# Patient Record
Sex: MALE | Race: WHITE | NOT HISPANIC OR LATINO | Employment: OTHER | URBAN - METROPOLITAN AREA
[De-identification: names, ages, dates, MRNs, and addresses within clinical notes are randomized per-mention and may not be internally consistent; named-entity substitution may affect disease eponyms.]

---

## 2017-01-16 ENCOUNTER — GENERIC CONVERSION - ENCOUNTER (OUTPATIENT)
Dept: OTHER | Facility: OTHER | Age: 67
End: 2017-01-16

## 2017-05-15 ENCOUNTER — GENERIC CONVERSION - ENCOUNTER (OUTPATIENT)
Dept: OTHER | Facility: OTHER | Age: 67
End: 2017-05-15

## 2017-05-22 ENCOUNTER — LAB REQUISITION (OUTPATIENT)
Dept: LAB | Facility: HOSPITAL | Age: 67
End: 2017-05-22
Payer: MEDICARE

## 2017-05-22 ENCOUNTER — ALLSCRIPTS OFFICE VISIT (OUTPATIENT)
Dept: OTHER | Facility: OTHER | Age: 67
End: 2017-05-22

## 2017-05-22 DIAGNOSIS — M10.9 GOUT: ICD-10-CM

## 2017-05-22 DIAGNOSIS — Z11.59 ENCOUNTER FOR SCREENING FOR OTHER VIRAL DISEASES: ICD-10-CM

## 2017-05-22 DIAGNOSIS — D72.829 ELEVATED WHITE BLOOD CELL COUNT: ICD-10-CM

## 2017-05-22 DIAGNOSIS — E11.9 TYPE 2 DIABETES MELLITUS WITHOUT COMPLICATIONS (HCC): ICD-10-CM

## 2017-05-22 DIAGNOSIS — I10 ESSENTIAL (PRIMARY) HYPERTENSION: ICD-10-CM

## 2017-05-22 DIAGNOSIS — R35.0 FREQUENCY OF MICTURITION: ICD-10-CM

## 2017-05-22 LAB
BACTERIA UR QL AUTO: ABNORMAL /HPF
BILIRUB UR QL STRIP: NEGATIVE
BILIRUB UR QL STRIP: NORMAL
CLARITY UR: ABNORMAL
CLARITY UR: NORMAL
COLOR UR: YELLOW
COLOR UR: YELLOW
GLUCOSE (HISTORICAL): NORMAL
GLUCOSE UR STRIP-MCNC: NEGATIVE MG/DL
HGB UR QL STRIP.AUTO: ABNORMAL
HGB UR QL STRIP.AUTO: NORMAL
HYALINE CASTS #/AREA URNS LPF: ABNORMAL /LPF
KETONES UR STRIP-MCNC: NEGATIVE MG/DL
KETONES UR STRIP-MCNC: NORMAL MG/DL
LEUKOCYTE ESTERASE UR QL STRIP: ABNORMAL
LEUKOCYTE ESTERASE UR QL STRIP: NORMAL
NITRITE UR QL STRIP: NEGATIVE
NITRITE UR QL STRIP: NORMAL
NON-SQ EPI CELLS URNS QL MICRO: ABNORMAL /HPF
PH UR STRIP.AUTO: 7 [PH]
PH UR STRIP.AUTO: 7 [PH] (ref 4.5–8)
PROT UR STRIP-MCNC: ABNORMAL MG/DL
PROT UR STRIP-MCNC: NORMAL MG/DL
RBC #/AREA URNS AUTO: ABNORMAL /HPF
SP GR UR STRIP.AUTO: 1
SP GR UR STRIP.AUTO: 1.01 (ref 1–1.03)
UROBILINOGEN UR QL STRIP.AUTO: 0.2
UROBILINOGEN UR QL STRIP.AUTO: 1 E.U./DL
WBC #/AREA URNS AUTO: ABNORMAL /HPF

## 2017-05-22 PROCEDURE — 81001 URINALYSIS AUTO W/SCOPE: CPT | Performed by: FAMILY MEDICINE

## 2017-05-22 PROCEDURE — 87086 URINE CULTURE/COLONY COUNT: CPT | Performed by: FAMILY MEDICINE

## 2017-05-23 ENCOUNTER — GENERIC CONVERSION - ENCOUNTER (OUTPATIENT)
Dept: OTHER | Facility: OTHER | Age: 67
End: 2017-05-23

## 2017-05-23 LAB — BACTERIA UR CULT: NORMAL

## 2017-05-24 ENCOUNTER — GENERIC CONVERSION - ENCOUNTER (OUTPATIENT)
Dept: OTHER | Facility: OTHER | Age: 67
End: 2017-05-24

## 2017-05-26 ENCOUNTER — GENERIC CONVERSION - ENCOUNTER (OUTPATIENT)
Dept: OTHER | Facility: OTHER | Age: 67
End: 2017-05-26

## 2017-06-02 ENCOUNTER — GENERIC CONVERSION - ENCOUNTER (OUTPATIENT)
Dept: OTHER | Facility: OTHER | Age: 67
End: 2017-06-02

## 2017-07-17 ENCOUNTER — GENERIC CONVERSION - ENCOUNTER (OUTPATIENT)
Dept: OTHER | Facility: OTHER | Age: 67
End: 2017-07-17

## 2017-07-25 ENCOUNTER — ALLSCRIPTS OFFICE VISIT (OUTPATIENT)
Dept: OTHER | Facility: OTHER | Age: 67
End: 2017-07-25

## 2017-07-25 DIAGNOSIS — R60.0 LOCALIZED EDEMA: ICD-10-CM

## 2017-07-25 DIAGNOSIS — D72.829 ELEVATED WHITE BLOOD CELL COUNT: ICD-10-CM

## 2017-07-25 DIAGNOSIS — I10 ESSENTIAL (PRIMARY) HYPERTENSION: ICD-10-CM

## 2017-07-25 DIAGNOSIS — M10.9 GOUT: ICD-10-CM

## 2017-08-04 ENCOUNTER — GENERIC CONVERSION - ENCOUNTER (OUTPATIENT)
Dept: OTHER | Facility: OTHER | Age: 67
End: 2017-08-04

## 2017-08-08 ENCOUNTER — HOSPITAL ENCOUNTER (OUTPATIENT)
Dept: NON INVASIVE DIAGNOSTICS | Facility: CLINIC | Age: 67
Discharge: HOME | End: 2017-08-08
Payer: MEDICARE

## 2017-08-08 DIAGNOSIS — R60.0 LOCALIZED EDEMA: ICD-10-CM

## 2017-08-08 PROCEDURE — 93306 TTE W/DOPPLER COMPLETE: CPT

## 2017-08-09 ENCOUNTER — GENERIC CONVERSION - ENCOUNTER (OUTPATIENT)
Dept: OTHER | Facility: OTHER | Age: 67
End: 2017-08-09

## 2017-08-29 ENCOUNTER — ALLSCRIPTS OFFICE VISIT (OUTPATIENT)
Dept: OTHER | Facility: OTHER | Age: 67
End: 2017-08-29

## 2017-09-15 ENCOUNTER — TRANSCRIBE ORDERS (OUTPATIENT)
Dept: SLEEP CENTER | Facility: CLINIC | Age: 67
End: 2017-09-15

## 2017-09-15 ENCOUNTER — HOSPITAL ENCOUNTER (OUTPATIENT)
Dept: SLEEP CENTER | Facility: CLINIC | Age: 67
Discharge: HOME | End: 2017-09-15
Payer: MEDICARE

## 2017-09-15 DIAGNOSIS — G47.33 OSA (OBSTRUCTIVE SLEEP APNEA): ICD-10-CM

## 2017-09-15 DIAGNOSIS — G47.33 OSA (OBSTRUCTIVE SLEEP APNEA): Primary | ICD-10-CM

## 2017-09-25 ENCOUNTER — GENERIC CONVERSION - ENCOUNTER (OUTPATIENT)
Dept: OTHER | Facility: OTHER | Age: 67
End: 2017-09-25

## 2017-11-27 ENCOUNTER — GENERIC CONVERSION - ENCOUNTER (OUTPATIENT)
Dept: FAMILY MEDICINE CLINIC | Facility: CLINIC | Age: 67
End: 2017-11-27

## 2017-11-27 ENCOUNTER — GENERIC CONVERSION - ENCOUNTER (OUTPATIENT)
Dept: OTHER | Facility: OTHER | Age: 67
End: 2017-11-27

## 2017-11-28 ENCOUNTER — GENERIC CONVERSION - ENCOUNTER (OUTPATIENT)
Dept: OTHER | Facility: OTHER | Age: 67
End: 2017-11-28

## 2017-12-08 ENCOUNTER — ALLSCRIPTS OFFICE VISIT (OUTPATIENT)
Dept: OTHER | Facility: OTHER | Age: 67
End: 2017-12-08

## 2017-12-09 NOTE — PROGRESS NOTES
Assessment    1  Diabetes mellitus (250 00) (E11 9)   · well controlled for years w remote A1C 7 2   2  Hypertension (401 9) (I10)   3  Leukocytosis (288 60) (D72 829)   4  Morbid or severe obesity due to excess calories (278 01) (E66 01)   5  Hyperlipidemia (272 4) (E78 5)   6  Acid reflux disease (530 81) (K21 9)   7  Depression with anxiety (300 4) (F41 8)   8  Gout (274 9) (M10 9)    Plan  Acid reflux disease    · Omeprazole 40 MG Oral Capsule Delayed Release; Take 1 capsule by mouth every day  Depression with anxiety    · FLUoxetine HCl - 40 MG Oral Capsule; Take 2 capsules daily  Diabetes mellitus    · MetFORMIN HCl - 500 MG Oral Tablet; Take 1 tablet by mouth  daily with a meal  Flu vaccine need    · Fluzone High-Dose 0 5 ML Intramuscular Suspension Prefilled Syringe  Gout    · Allopurinol 300 MG Oral Tablet; Take 1 tablet by mouth  daily   · (1) URIC ACID; Status:Active; Requested for:08Apr2018; Health Maintenance    · Furosemide 20 MG Oral Tablet; Take 1 tablet by mouth  daily Rx written by PA: Mady Finney  Hyperlipidemia    · Pravastatin Sodium 10 MG Oral Tablet; Take 1 tablet by mouth  daily at bedtime  Hypertension    · AmLODIPine Besylate 10 MG Oral Tablet; TAKE 1 TABLET DAILY   · HydroCHLOROthiazide 12 5 MG Oral Capsule; TAKE 1 CAPSULE BY MOUTHTWO TIMES DAILY   · (1) COMPREHENSIVE METABOLIC PANEL; Status:Active; Requested for:08Apr2018;   Leukocytosis    · (1) CBC/PLT/DIFF; Status:Active; Requested for:08Apr2018;   Need for pneumococcal vaccine    · Pneumovax 23 25 MCG/0 5ML Injection Injectable    Discussion/Summary    Patient presents today for follow-up for his chronic health issues  Blood pressure remains poorly controlled and I bumped up his amlodipine to 10 mg daily in the morning  He does remain on Bystolic which we may need to change at some point as it is quite expensive for him  I would also hold off on bumping this up as his pulse is typically on the lower side  has history of type 2 diabetes which remains quite controlled  Repeat labs in about 4 months  Hyperlipidemia remains relatively well controlled with low-dose pravastatin  Gout has been very well controlled in recent uric acid level was quite low  does continue to have some active depression  He did not increase his Prozac to 2 tablets daily until just recently  We will continue to monitor this  He continues to consider seeing a talk therapist which I think would be an excellent idea  He has been having some intermittent gas pains and I would suggest taking his omeprazole as he is doing and a Pepcid 20 mg around mid day to help prevent this bloating  He will be seeing GI  I am also going to have him see Dr Alin Ahumada again to make sure this is not coming from a cardiac source  Chief Complaint  6M Follow up - DM, HTNshot given today      Advance Directives  Advance Directive [facility]:  YES - Patient has an advance health care directive  The patient has a living will located  in patient's home--   Pt Instructed to bring in copy of living will at next appt  History of Present Illness  Patient presents today for follow-up for his chronic health issues  He has a history of hypertension  He denies any current problems chest pain, shortness of breath, palpitations or lightheadedness  has history of depression which continues to bother him  He notes he has had a lifelong issue with this  He has not increased his Prozac until just a few days ago and he would like to see how that works  He is bothered by intermittent anxiety throughout the day  has history of type 2 diabetes which is well controlled low-dose metformin  Patient presents today for follow-up for GERD  There has been no breakthrough heartburn or dysphagia  Medications are being tolerated without side effects    hyperlipidemia is well controlled with pravastatin at a low dose      Review of Systems   Constitutional: recent 2 lb weight loss, but-- no fever,-- no recent weight gain-- and-- no chills  Cardiovascular: the heart rate was not slow,-- no chest pain,-- the heart rate was not fast-- and-- no palpitations  Respiratory: shortness of breath during exertion, but-- no shortness of breath,-- no cough-- and-- no wheezing  Gastrointestinal: no abdominal pain,-- no nausea,-- no vomiting,-- no constipation-- and-- no diarrhea  Genitourinary: no dysuria  Musculoskeletal: no arthralgias-- and-- no joint swelling  Integumentary: no rashes  Neurological: no headache  Psychiatric: no anxiety-- and-- no depression  Endocrine: erectile dysfunction-- and-- chronic low T  Hematologic/Lymphatic: no tendency for easy bleeding  Active Problems  1  Acid reflux disease (530 81) (K21 9)   2  Benign colon polyp (211 3) (K63 5)   3  Depression with anxiety (300 4) (F41 8)   4  Diabetes mellitus (250 00) (E11 9)   5  History of Edema (782 3) (R60 9)   6  Edema of both legs (782 3) (R60 0)   7  Encounter for screening colonoscopy (V76 51) (Z12 11)   8  Exercise counseling (V65 41) (Z71 82)   9  Fatigue (780 79) (R53 83)   10  Flu vaccine need (V04 81) (Z23)   11  Gout (274 9) (M10 9)   12  Hyperlipidemia (272 4) (E78 5)   13  Hypertension (401 9) (I10)   14  Hypokalemia (276 8) (E87 6)   15  Leukocytosis (288 60) (D72 829)   16  Microscopic hematuria (599 72) (R31 29)   17  Morbid or severe obesity due to excess calories (278 01) (E66 01)   18  History of Need for hepatitis C screening test (V73 89) (Z11 59)   19  Peripheral neuropathy (356 9) (G62 9)   20  Sleep apnea (780 57) (G47 30)   21  Testicular hypogonadism (257 2) (E29 1)   22  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)    Past Medical History  1  History of Atypical chest pain (786 59) (R07 89)   2  History of Edema (782 3) (R60 9)   3  History of shortness of breath (V13 89) (Z87 898)   4  History of shortness of breath (V13 89) (Z87 898)   5  History of Hyponatremia (276 1) (E87 1)   6   History of Liver function abnormality (573 9) (K76 89)   7  History of Need for hepatitis C screening test (V73 89) (Z11 59)    The active problems and past medical history were reviewed and updated today  Surgical History  1  History of Cardiac Cath Procedure Outcome:   2  History of Complete Colonoscopy   3  History of Hernia Repair   4  History of Knee Replacement   5  History of Thyroglossal Duct Cyst Excision   6  History of Tonsillectomy   7  History of Type Of Stress Test    The surgical history was reviewed and updated today  Family History  Mother    1  Family history of Diabetes Mellitus (V18 0)   2  Family history of myocardial infarction (V17 3) (Z82 49)   3  Family history of Hypertension (V17 49)  Sister    4  Family history of myocardial infarction (V17 3) (Z82 49)    The family history was reviewed and updated today  Social History     · Being A Social Drinker   · Disability   ·    · Never smoker   · Occupation:  The social history was reviewed and updated today  Current Meds   1  Allopurinol 300 MG Oral Tablet; Take 1 tablet by mouth  daily; Therapy: 88XGG7589 to (Evaluate:03Mar2018)  Requested for: 74Mmn3974; Last Rx:86Aba7541 Ordered   2  AmLODIPine Besylate 2 5 MG Oral Tablet; Take 1 tablet by mouth two  times daily; Therapy: 42QEN2164 to (Evaluate:10Mar2018)  Requested for: 61Igg1352; Last Rx:26Vgz3731 Ordered   3  Aspirin 81 MG TABS; TAKE 1 TABLET DAILY; Therapy: 90BWX3858 to (Evaluate:26Kph5367) Recorded   4  Bystolic 10 MG Oral Tablet; Take 1 tablet daily; Therapy: 99HRR9878 to (Evaluate:04Nov2018); Last Rx:09Nov2017 Ordered   5  FLUoxetine HCl - 40 MG Oral Capsule; Take 2 capsules daily; Therapy: 40MDG6552 to (Evaluate:89Qrc7039)  Requested for: 50Xte1745; Last Rx:00Suz6785 Ordered   6  Furosemide 20 MG Oral Tablet; Take 1 tablet by mouth  daily Rx written by PA: Kayy Peña; Therapy: 94Qvo3732 to (Aileen Lacy)  Requested for: 37LBT7604; Last Rx:53Nkx0280 Ordered   7   HydroCHLOROthiazide 12 5 MG Oral Capsule; TAKE 1 CAPSULE BY MOUTH TWO TIMES DAILY; Therapy: 47Gcs0978 to (Evaluate:10Mar2018)  Requested for: 85Son6342; Last Rx:92Cfi2080 Ordered   8  Losartan Potassium 50 MG Oral Tablet; Take 1 tablet by mouth  twice a day; Therapy: 81WIY8553 to (Evaluate:10Mar2018)  Requested for: 16Mic4009; Last Rx:15Etj7474 Ordered   9  MetFORMIN HCl - 500 MG Oral Tablet; Take 1 tablet by mouth  daily with a meal; Therapy: 01ICN8725 to (Evaluate:03Mar2018)  Requested for: 49TTZ6971; Last Rx:81Wnz9782 Ordered   10  Omeprazole 40 MG Oral Capsule Delayed Release; Take 1 capsule by mouth  every day; Therapy: 81GNC9712 to (Evaluate:03Mar2018)  Requested for: 21Mpz5803; Last  Rx:89Irk9687 Ordered   11  Pravastatin Sodium 10 MG Oral Tablet; Take 1 tablet by mouth  daily at bedtime; Therapy: 22DDU8819 to (Evaluate:10Mar2018)  Requested for: 79Ntc7166; Last  Rx:24Psd7294 Ordered    The medication list was reviewed and updated today  Allergies  1  Cartia XT CP24   2  Benazepril HCl TABS   3  Clonidine and derivs   4  Entex T TABS    Vitals  Vital Signs    Recorded: 83UIX3464 09:19AM Recorded: 68NQP8244 08:54AM   Heart Rate  56   Respiration  18   Systolic 872 469   Diastolic 72 90   Height  5 ft 8 in   Weight  326 lb    BMI Calculated  49 57   BSA Calculated  2 52       Physical Exam   Constitutional  General appearance: Abnormal   morbidly obese  Eyes  Conjunctiva and lids: No swelling, erythema, or discharge  Pupils and irises: Equal, round and reactive to light  Ears, Nose, Mouth, and Throat  Oropharynx: Normal with no erythema, edema, exudate or lesions  Pulmonary  Respiratory effort: No increased work of breathing or signs of respiratory distress  Auscultation of lungs: Clear to auscultation, equal breath sounds bilaterally, no wheezes, no rales, no rhonci  Cardiovascular  Auscultation of heart: Normal rate and rhythm, normal S1 and S2, without murmurs     Examination of extremities for edema and/or varicosities: Normal    Carotid pulses: Normal    Abdomen  Abdomen: Abnormal   The abdomen was obese  Bowel sounds were normal   Lymphatic  Palpation of lymph nodes in neck: No lymphadenopathy  Musculoskeletal  Gait and station: Normal    Skin  Skin and subcutaneous tissue: Normal without rashes or lesions  Neurologic  Cranial nerves: Cranial nerves 2-12 intact  Psychiatric  Orientation to person, place and time: Normal    Mood and affect: Normal          Results/Data  PHQ-9 Adult Depression Screening 24FIA6056 09:34AM User, Ahs     Test Name Result Flag Reference   PHQ-9 Adult Depression Score 20       Over the last two weeks, how often have you been bothered by any of the following problems? Little interest or pleasure in doing things: Nearly every day - 3 Feeling down, depressed, or hopeless: More than half the days - 2 Trouble falling or staying asleep, or sleeping too much: More than half the days - 2 Feeling tired or having little energy: Nearly every day - 3 Poor appetite or over eating: Nearly every day - 3 Feeling bad about yourself - or that you are a failure or have let yourself or your family down: More than half the days - 2 Trouble concentrating on things, such as reading the newspaper or watching television: Nearly every day - 3 Moving or speaking so slowly that other people could have noticed  Or the opposite -  being so fidgety or restless that you have been moving around a lot more than usual: More than half the days - 2 Thoughts that you would be better off dead, or of hurting yourself in some way: Not at all - 0   PHQ-9 Adult Depression Screening Positive     PHQ-9 Difficulty Level Somewhat difficult     PHQ-9 Severity Severe Depression         Health Management  Diabetes mellitus   *VB - Eye Exam; every 1 year; Last 78IJN5537; Next Due: 05BRS9044; Active  Encounter for screening colonoscopy   COLONOSCOPY; every 5 years; Last 53SOS8103; Next Due: 44IJG6821;  Active  Hyperglycemia   *VB - Foot Exam; every 1 year; Last 02PZQ4513; Next Due: 56ZBM4582; Active    Future Appointments    Date/Time Provider Specialty Site   04/20/2018 01:00 PM GLEN Quiles   Family Medicine [facility] [address]       Signatures   Electronically signed by : GLEN Quinones ; Dec  8 2017  1:07PM EST                       (Author)

## 2018-01-11 DIAGNOSIS — D72.829 ELEVATED WHITE BLOOD CELL COUNT: ICD-10-CM

## 2018-01-11 NOTE — RESULT NOTES
Verified Results  ECHO COMPLETE WITH CONTRAST IF INDICATED 93Eqv7909 09:40AM Lucinda Champion Order Number: [id number]    - Patient Instructions: To schedule this appointment, please contact Central Scheduling at [phone]  Test Name Result Flag Reference   ECHO COMPLETE WITH CONTRAST IF INDICATED (Report)     SONIYA RAGLAND [facility]   [address]  [place], [address]   [phone]     Transthoracic Echocardiogram   2D, M-mode, Doppler, and Color Doppler     Study date: 08-Aug-2017     Patient: Mariya Ramírez   MR number: [id number]   Account number: [de-identified]   : 1950   Age: 69 years   Gender: Male   Status: Outpatient   Location: [address] [place] [address] [facility] [place]   Height: 68 in   Weight: 314 lb   BP: 148/ 80 mmHg     Indications: Edema  Hypertension     Diagnoses: R60 9 - Edema, unspecified     Sonographer: MAYI Hdz   Primary Physician: Billy Zelaya MD   Referring Physician: Billy Zelaya MD   Group: [facility] [address] [facility]   Interpreting Physician: Estephania Weaver MD     SUMMARY     LEFT VENTRICLE:   Size was at the upper limits of normal    Systolic function was normal  Ejection fraction was estimated to be 60 %  There were no regional wall motion abnormalities  Wall thickness was mildly to moderately increased  Features were consistent with a pseudonormal left ventricular filling pattern, with concomitant abnormal relaxation and increased filling pressure (grade 2 diastolic dysfunction)  LEFT ATRIUM:   The atrium was mildly dilated  AORTA:   The root exhibited mild dilatation  HISTORY: PRIOR HISTORY: Risk factors: hypertension, oral hypoglycemic-treated diabetes, medication-treated hypercholesterolemia, and morbid obesity  PROCEDURE: The study was performed in the [address] [place] [address]  This was a routine study  The transthoracic approach was used   The study included complete 2D imaging, M-mode, complete spectral Doppler, and color Doppler  The   heart rate was 60 bpm, at the start of the study  Intravenous contrast (  8ml of Definity) was administered  Echocardiographic views were limited due to poor acoustic window availability, decreased penetration, and lung interference  This   was a technically difficult study  LEFT VENTRICLE: Size was at the upper limits of normal  Systolic function was normal  Ejection fraction was estimated to be 60 %  There were no regional wall motion abnormalities  Wall thickness was mildly to moderately increased  DOPPLER:   The ratio of early ventricular filling to atrial contraction velocities was within the normal range  The deceleration time of the early transmitral flow velocity was normal  Features were consistent with a pseudonormal left ventricular   filling pattern, with concomitant abnormal relaxation and increased filling pressure (grade 2 diastolic dysfunction)  RIGHT VENTRICLE: The size was normal  Systolic function was normal  Wall thickness was normal      LEFT ATRIUM: The atrium was mildly dilated  RIGHT ATRIUM: Size was at the upper limits of normal      MITRAL VALVE: Valve structure was normal  There was normal leaflet separation  DOPPLER: The transmitral velocity was within the normal range  There was no evidence for stenosis  There was no regurgitation  AORTIC VALVE: The valve was trileaflet  Leaflets exhibited normal thickness and normal cuspal separation  DOPPLER: Transaortic velocity was within the normal range  There was no evidence for stenosis  There was no regurgitation  TRICUSPID VALVE: The valve structure was normal  There was normal leaflet separation  DOPPLER: The transtricuspid velocity was within the normal range  There was no evidence for stenosis  There was no regurgitation  PERICARDIUM: There was no pericardial effusion  The pericardium was normal in appearance       AORTA: The root exhibited mild dilatation  SYSTEMIC VEINS: IVC: The inferior vena cava was not well visualized       SYSTEM MEASUREMENT TABLES     2D   Ao Diam: 4 1 cm   IVSd: 1 2 cm   LA Diam: 5 1 cm   LVIDd: 5 5 cm   LVIDs: 3 4 cm   LVPWd: 1 3 cm     PW   MV A [place]: 0 7 m/s   MV Dec [place]: 4 7 m/s2   MV DecT: 188 4 ms   MV E [place]: 0 9 m/s   MV E/A Ratio: 1 3     [employer] Accredited Echocardiography Laboratory     Prepared and electronically signed by     Heather Ahuja MD   Signed 08-Aug-2017 13:19:19

## 2018-01-13 VITALS
RESPIRATION RATE: 18 BRPM | SYSTOLIC BLOOD PRESSURE: 146 MMHG | HEIGHT: 68 IN | WEIGHT: 315 LBS | HEART RATE: 50 BPM | BODY MASS INDEX: 47.74 KG/M2 | DIASTOLIC BLOOD PRESSURE: 80 MMHG

## 2018-01-13 VITALS
DIASTOLIC BLOOD PRESSURE: 88 MMHG | HEART RATE: 60 BPM | RESPIRATION RATE: 18 BRPM | BODY MASS INDEX: 47.74 KG/M2 | HEIGHT: 68 IN | WEIGHT: 315 LBS | SYSTOLIC BLOOD PRESSURE: 170 MMHG

## 2018-01-13 VITALS
HEIGHT: 68 IN | HEART RATE: 60 BPM | RESPIRATION RATE: 18 BRPM | WEIGHT: 314 LBS | BODY MASS INDEX: 47.59 KG/M2 | SYSTOLIC BLOOD PRESSURE: 148 MMHG | DIASTOLIC BLOOD PRESSURE: 80 MMHG

## 2018-01-17 NOTE — MISCELLANEOUS
Message   Recorded as Task   Date: 06/02/2017 11:01 AM, Created By: Jose Rafael Lezama   Task Name: Medical Complaint Callback   Assigned To: Jose Rafael Lezama   Regarding Patient: Merlin Fenton, Status: Active   Comment:    Jose Rafael Lezama - 02 Jun 2017 11:01 AM     TASK CREATED  Caller: Self; Medical Complaint; [phone] (Home)  Pt called back and is still suffering with frequency of urination but not the burning and he is questioning if he needs more antibiotic? May l/m on answering machine  Juan Jane Jr - 02 Jun 2017 5:00 PM     TASK REPLIED TO: Previously Assigned To Prashanth Oliver to refill antibiotic   Yovana Farias - 02 Jun 2017 5:15 PM     TASK EDITED  Pt notified and rx called to pharmacy  Active Problems    1  Benign colon polyp (211 3) (K63 5)   2  Depression with anxiety (300 4) (F41 8)   3  Diabetes mellitus (250 00) (E11 9)   4  History of Edema (782 3) (R60 9)   5  Encounter for screening colonoscopy (V76 51) (Z12 11)   6  Esophageal reflux (530 81) (K21 9)   7  Fatigue (780 79) (R53 83)   8  Flu vaccine need (V04 81) (Z23)   9  Gout (274 9) (M10 9)   10  Hyperlipidemia (272 4) (E78 5)   11  Hypertension (401 9) (I10)   12  Hypokalemia (276 8) (E87 6)   13  Leukocytosis (288 60) (D72 829)   14  Microscopic hematuria (599 72) (R31 29)   15  Morbid or severe obesity due to excess calories (278 01) (E66 01)   16  Need for hepatitis C screening test (V73 89) (Z11 59)   17  Peripheral neuropathy (356 9) (G62 9)   18  Sleep apnea (780 57) (G47 30)   19  Testicular hypogonadism (257 2) (E29 1)   20  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)   21  UTI (urinary tract infection) (599 0) (N39 0)    Current Meds   1  Allopurinol 300 MG Oral Tablet; Take 1 tablet by mouth  daily; Therapy: 24OXB4407 to (Carmelina Pimentel)  Requested for: 82CYW2011; Last   Rx:13Oct2016 Ordered   2  AmLODIPine Besylate 2 5 MG Oral Tablet;  Take 1 tablet by mouth two  times daily; Therapy: 47XAW0309 to (Evaluate:10Aug2017)  Requested for: 16HAO1449; Last   Rx:12May2017 Ordered   3  Aspirin 81 MG TABS; TAKE 1 TABLET DAILY; Therapy: 61OWY7929 to (Evaluate:26Mew3347) Recorded   4  Bystolic 10 MG Oral Tablet; Take 1 tablet by mouth  daily; Therapy: 55XJN5840 to (Evaluate:81Qax7435)  Requested for: 95CMC6006; Last   Rx:13Oct2016 Ordered   5  Ciprofloxacin HCl - 500 MG Oral Tablet; TAKE 1 TABLET EVERY 12 HOURS DAILY; Therapy: 16BPF4462 to (Evaluate:29May2017)  Requested for: 60TTM3730; Last   Rx:22May2017 Ordered   6  FLUoxetine HCl - 40 MG Oral Capsule; Take 1 capsule by mouth  daily; Therapy: 54GYX5455 to (Evaluate:08Oct2017)  Requested for: 35CZU8800; Last   Rx:13Oct2016 Ordered   7  Furosemide 20 MG Oral Tablet; uses once a day very rarely if increased ankle edema; Therapy: 27Uch1084 to (Evaluate:03Mar2017)  Requested for: 33DTR7671; Last   Rx:03Nov2016 Ordered   8  HydroCHLOROthiazide 12 5 MG Oral Capsule; TAKE 1 CAPSULE BY MOUTH TWO   TIMES DAILY; Therapy: 21Izj5284 to (Evaluate:10Aug2017)  Requested for: 84BIK4617; Last   Rx:12May2017 Ordered   9  Losartan Potassium 50 MG Oral Tablet; Take 1 tablet by mouth  twice a day; Therapy: 00FLX8585 to (Evaluate:10Aug2017)  Requested for: 10IWE8412; Last   Rx:12May2017 Ordered   10  MetFORMIN HCl - 500 MG Oral Tablet; Take 1 tablet by mouth  daily with a meal;    Therapy: 72GLQ7103 to [phone])  Requested for: 90EAC3552; Last    Rx:13Oct2016 Ordered   11  Omeprazole 40 MG Oral Capsule Delayed Release; Take 1 capsule by mouth  every    day; Therapy: 66ZFX3203 to [phone])  Requested for: 94BZH3496; Last    Rx:13Oct2016 Ordered   12  Pravastatin Sodium 10 MG Oral Tablet; Take 1 tablet by mouth  daily at bedtime; Therapy: 53HDF4077 to (Evaluate:10Aug2017)  Requested for: 49IPQ1429; Last    Rx:19Vji4305 Ordered   13   Zostavax 09507 UNT/0 65ML Subcutaneous Solution Reconstituted (Zostavax 15958 UNT/0 65ML Subcutaneous Solution Reconstituted); get shot x 1; Therapy: 37QQP9938 to (Evaluate:04Nov2016); Last Rx:03Nov2016 Ordered    Allergies    1  Cartia XT CP24   2  Benazepril HCl TABS   3  Clonidine and derivs   4   Entex T TABS    Plan  UTI (urinary tract infection)    · Ciprofloxacin HCl - 500 MG Oral Tablet (Cipro); TAKE 1 TABLET EVERY 12  HOURS DAILY    Signatures   Electronically signed by : Lois Victoria, ; Jun 2 2017  5:15PM EST                       (Author)

## 2018-01-18 NOTE — RESULT NOTES
Verified Results  (1) URINE MICROSCOPIC 38AVK0051 10:05AM Segundo Woodruff Order Number: [id number]     Test Name Result Flag Reference   CLINICAL REPORT (Report)     Test:        Urine culture  Specimen Source:  Urine, Unspecified Source  Specimen Type:   Urine  Specimen Date:   5/22/2017 10:05 AM  Result Date:    5/23/2017 1:04 PM  Result Status:   Final result  Resulting Lab:   [place] [address]            Tel: [phone]      CULTURE                                       ------------------                                   <10,000 cfu/ml Gram Negative Jd

## 2018-01-23 VITALS
SYSTOLIC BLOOD PRESSURE: 150 MMHG | HEART RATE: 56 BPM | RESPIRATION RATE: 18 BRPM | HEIGHT: 68 IN | WEIGHT: 315 LBS | BODY MASS INDEX: 47.74 KG/M2 | DIASTOLIC BLOOD PRESSURE: 72 MMHG

## 2018-01-28 DIAGNOSIS — K21.9 GASTROESOPHAGEAL REFLUX DISEASE WITHOUT ESOPHAGITIS: ICD-10-CM

## 2018-01-28 DIAGNOSIS — E11.9 CONTROLLED TYPE 2 DIABETES MELLITUS WITHOUT COMPLICATION, WITHOUT LONG-TERM CURRENT USE OF INSULIN (HCC): Primary | ICD-10-CM

## 2018-01-28 PROBLEM — R60.0 EDEMA OF BOTH LEGS: Status: ACTIVE | Noted: 2017-07-25

## 2018-01-28 RX ORDER — NEBIVOLOL 10 MG/1
1 TABLET ORAL DAILY
COMMUNITY
Start: 2017-11-09 | End: 2018-02-01 | Stop reason: SDUPTHER

## 2018-01-28 RX ORDER — HYDROCHLOROTHIAZIDE 12.5 MG/1
1 CAPSULE, GELATIN COATED ORAL 2 TIMES DAILY
COMMUNITY
Start: 2014-08-26 | End: 2018-06-20 | Stop reason: SDUPTHER

## 2018-01-28 RX ORDER — OMEPRAZOLE 40 MG/1
CAPSULE, DELAYED RELEASE ORAL
Qty: 90 CAPSULE | Refills: 3 | Status: SHIPPED | OUTPATIENT
Start: 2018-01-28 | End: 2021-01-19

## 2018-01-28 RX ORDER — OMEPRAZOLE 40 MG/1
1 CAPSULE, DELAYED RELEASE ORAL DAILY
COMMUNITY
Start: 2014-06-02 | End: 2018-04-05 | Stop reason: SDUPTHER

## 2018-01-28 RX ORDER — AMLODIPINE BESYLATE 10 MG/1
1 TABLET ORAL DAILY
COMMUNITY
Start: 2014-08-04 | End: 2018-06-20 | Stop reason: SDUPTHER

## 2018-01-28 RX ORDER — PRAVASTATIN SODIUM 10 MG
1 TABLET ORAL
COMMUNITY
Start: 2013-04-03 | End: 2018-12-24 | Stop reason: SDUPTHER

## 2018-01-28 RX ORDER — FUROSEMIDE 20 MG/1
TABLET ORAL
COMMUNITY
Start: 2012-02-27 | End: 2018-11-03 | Stop reason: HOSPADM

## 2018-01-28 RX ORDER — ALLOPURINOL 300 MG/1
1 TABLET ORAL DAILY
COMMUNITY
Start: 2012-03-22 | End: 2018-12-24 | Stop reason: SDUPTHER

## 2018-01-28 RX ORDER — FLUOXETINE HYDROCHLORIDE 40 MG/1
1 CAPSULE ORAL DAILY
COMMUNITY
Start: 2014-06-12 | End: 2018-12-24 | Stop reason: SDUPTHER

## 2018-01-28 RX ORDER — LOSARTAN POTASSIUM 50 MG/1
1 TABLET ORAL 2 TIMES DAILY
COMMUNITY
Start: 2014-07-07 | End: 2018-02-07 | Stop reason: SDUPTHER

## 2018-02-01 DIAGNOSIS — I10 ESSENTIAL HYPERTENSION: Primary | ICD-10-CM

## 2018-02-01 RX ORDER — NEBIVOLOL 10 MG/1
10 TABLET ORAL DAILY
Qty: 90 TABLET | Refills: 3 | Status: SHIPPED | OUTPATIENT
Start: 2018-02-01 | End: 2019-03-18 | Stop reason: SDUPTHER

## 2018-02-07 DIAGNOSIS — I10 ESSENTIAL HYPERTENSION: Primary | ICD-10-CM

## 2018-02-07 RX ORDER — LOSARTAN POTASSIUM 50 MG/1
50 TABLET ORAL 2 TIMES DAILY
Qty: 180 TABLET | Refills: 3 | Status: SHIPPED | OUTPATIENT
Start: 2018-02-07 | End: 2019-03-18 | Stop reason: SDUPTHER

## 2018-04-01 DIAGNOSIS — E11.9 CONTROLLED TYPE 2 DIABETES MELLITUS WITHOUT COMPLICATION, WITHOUT LONG-TERM CURRENT USE OF INSULIN (HCC): ICD-10-CM

## 2018-04-01 DIAGNOSIS — K21.9 GASTROESOPHAGEAL REFLUX DISEASE WITHOUT ESOPHAGITIS: ICD-10-CM

## 2018-04-01 RX ORDER — OMEPRAZOLE 40 MG/1
CAPSULE, DELAYED RELEASE ORAL
Qty: 90 CAPSULE | OUTPATIENT
Start: 2018-04-01

## 2018-04-05 RX ORDER — OMEPRAZOLE 40 MG/1
40 CAPSULE, DELAYED RELEASE ORAL DAILY
Qty: 90 CAPSULE | Refills: 3 | Status: SHIPPED | OUTPATIENT
Start: 2018-04-05 | End: 2018-06-20 | Stop reason: SDUPTHER

## 2018-04-08 DIAGNOSIS — I10 ESSENTIAL (PRIMARY) HYPERTENSION: ICD-10-CM

## 2018-04-08 DIAGNOSIS — M10.9 GOUT: ICD-10-CM

## 2018-04-08 DIAGNOSIS — D72.829 ELEVATED WHITE BLOOD CELL COUNT: ICD-10-CM

## 2018-06-19 LAB
HEPATITIS C ANTIBODY (HISTORICAL): NORMAL
HEPATITIS C ANTIBODY CONFIRMATION (HISTORICAL): NORMAL

## 2018-06-19 RX ORDER — AMOXICILLIN 500 MG/1
CAPSULE ORAL
COMMUNITY
Start: 2018-06-13 | End: 2018-06-20 | Stop reason: ALTCHOICE

## 2018-06-20 ENCOUNTER — OFFICE VISIT (OUTPATIENT)
Dept: FAMILY MEDICINE CLINIC | Facility: CLINIC | Age: 68
End: 2018-06-20
Payer: MEDICARE

## 2018-06-20 VITALS
WEIGHT: 315 LBS | DIASTOLIC BLOOD PRESSURE: 86 MMHG | OXYGEN SATURATION: 96 % | HEIGHT: 68 IN | BODY MASS INDEX: 47.74 KG/M2 | HEART RATE: 60 BPM | RESPIRATION RATE: 18 BRPM | SYSTOLIC BLOOD PRESSURE: 150 MMHG

## 2018-06-20 DIAGNOSIS — G47.33 OBSTRUCTIVE SLEEP APNEA SYNDROME: ICD-10-CM

## 2018-06-20 DIAGNOSIS — R60.0 EDEMA OF BOTH LEGS: ICD-10-CM

## 2018-06-20 DIAGNOSIS — Z23 NEED FOR ZOSTER VACCINE: ICD-10-CM

## 2018-06-20 DIAGNOSIS — Z00.00 MEDICARE ANNUAL WELLNESS VISIT, SUBSEQUENT: Primary | ICD-10-CM

## 2018-06-20 DIAGNOSIS — D72.829 LEUKOCYTOSIS, UNSPECIFIED TYPE: ICD-10-CM

## 2018-06-20 DIAGNOSIS — N40.1 BENIGN PROSTATIC HYPERPLASIA WITH URINARY FREQUENCY: ICD-10-CM

## 2018-06-20 DIAGNOSIS — E11.9 TYPE 2 DIABETES MELLITUS WITHOUT COMPLICATION, WITHOUT LONG-TERM CURRENT USE OF INSULIN (HCC): ICD-10-CM

## 2018-06-20 DIAGNOSIS — Z12.5 PROSTATE CANCER SCREENING: ICD-10-CM

## 2018-06-20 DIAGNOSIS — E66.01 MORBID OBESITY (HCC): ICD-10-CM

## 2018-06-20 DIAGNOSIS — R35.0 BENIGN PROSTATIC HYPERPLASIA WITH URINARY FREQUENCY: ICD-10-CM

## 2018-06-20 DIAGNOSIS — K21.9 GASTROESOPHAGEAL REFLUX DISEASE WITHOUT ESOPHAGITIS: ICD-10-CM

## 2018-06-20 DIAGNOSIS — I10 ESSENTIAL HYPERTENSION: ICD-10-CM

## 2018-06-20 DIAGNOSIS — F41.8 DEPRESSION WITH ANXIETY: ICD-10-CM

## 2018-06-20 LAB — SL AMB POCT HEMOGLOBIN AIC: NORMAL

## 2018-06-20 PROCEDURE — G0439 PPPS, SUBSEQ VISIT: HCPCS | Performed by: FAMILY MEDICINE

## 2018-06-20 PROCEDURE — 83036 HEMOGLOBIN GLYCOSYLATED A1C: CPT | Performed by: FAMILY MEDICINE

## 2018-06-20 PROCEDURE — 99214 OFFICE O/P EST MOD 30 MIN: CPT | Performed by: FAMILY MEDICINE

## 2018-06-20 RX ORDER — HYDROCHLOROTHIAZIDE 12.5 MG/1
25 CAPSULE, GELATIN COATED ORAL 2 TIMES DAILY
Qty: 90 CAPSULE | Refills: 3 | Status: SHIPPED | OUTPATIENT
Start: 2018-06-20 | End: 2018-08-20 | Stop reason: SDUPTHER

## 2018-06-20 RX ORDER — AMLODIPINE BESYLATE 5 MG/1
5 TABLET ORAL DAILY
Qty: 90 TABLET | Refills: 3 | Status: SHIPPED | OUTPATIENT
Start: 2018-06-20 | End: 2019-07-16 | Stop reason: SDUPTHER

## 2018-06-20 NOTE — ASSESSMENT & PLAN NOTE
Patient notes his depression is somewhat active but thinks he is doing a bit better  He would like to continue with his fluoxetine which she has only been taking 1 tablet daily  He agrees to contact me if things are getting any worse

## 2018-06-20 NOTE — ASSESSMENT & PLAN NOTE
Home blood pressures remained stable  I am going to adjust his amlodipine to 5 mg daily in light of some persistent peripheral edema  I increased his hydrochlorothiazide to 25 mg daily, but he will take the entire dose 1st thing in the morning as he is having some significant nocturia  His nocturia may certainly be related to his prostate and if he is not improving, we may consider giving him something to help open up the prostate and improve his urinary flow

## 2018-06-20 NOTE — PROGRESS NOTES
Assessment/Plan:    Depression with anxiety  Patient notes his depression is somewhat active but thinks he is doing a bit better  He would like to continue with his fluoxetine which she has only been taking 1 tablet daily  He agrees to contact me if things are getting any worse  Benign prostatic hyperplasia with urinary frequency  Continue to monitor  Essential hypertension  Home blood pressures remained stable  I am going to adjust his amlodipine to 5 mg daily in light of some persistent peripheral edema  I increased his hydrochlorothiazide to 25 mg daily, but he will take the entire dose 1st thing in the morning as he is having some significant nocturia  His nocturia may certainly be related to his prostate and if he is not improving, we may consider giving him something to help open up the prostate and improve his urinary flow  Type 2 diabetes mellitus without complication, without long-term current use of insulin ([facility])  Lab Results   Component Value Date    HGBA1C 6 1 (H) 06/18/2013       No results for input(s): POCGLU in the last 72 hours  Check A1c prior to follow-up  Sleep apnea  Continue on CPAP    Morbid obesity ([facility] [address] )  Continue to work on weight loss  Gastroesophageal reflux disease without esophagitis  He is doing well off of omeprazole and he has had some decrease in chronic bloating  He may utilizing omeprazole just as needed  Edema of both legs  Monitor  Hopefully this will improve with lower dosing of amlodipine    Leukocytosis  Continue to monitor CBC  This has been stable  Diagnoses and all orders for this visit:    Medicare annual wellness visit, subsequent    Need for zoster vaccine  -     Zoster Vac Recomb Adjuvanted ([facility] [address]) 50 MCG SUSR;  Inject 0 5 mL into the shoulder, thigh, or buttocks once for 1 dose    Type 2 diabetes mellitus without complication, without long-term current use of insulin ([facility])  -     POCT hemoglobin A1c  -     Microalbumin / creatinine urine ratio; Future  -     Hemoglobin A1C; Future    Morbid obesity (HCC)    Depression with anxiety    Gastroesophageal reflux disease without esophagitis    Benign prostatic hyperplasia with urinary frequency    Obstructive sleep apnea syndrome    Essential hypertension  -     amLODIPine (NORVASC) 5 mg tablet; Take 1 tablet (5 mg total) by mouth daily  -     hydrochlorothiazide (MICROZIDE) 12 5 mg capsule; Take 2 capsules (25 mg total) by mouth 2 (two) times a day  -     Comprehensive metabolic panel; Future    Edema of both legs    Leukocytosis, unspecified type  -     CBC and differential; Future    Prostate cancer screening  -     PSA, Total Screen; Future    Other orders  -     Discontinue: amoxicillin (AMOXIL) 500 mg capsule;   -     HEPATITIS C ANTIBODY (HISTORICAL)  -     Hm Colonoscopy          Subjective:      Patient ID: Sheri Feldman  is a 77 y o  male  HPI  The patient presents today for follow-up for diabetes  Fortunately, the A1c is well controlled  There have been no significant episodes of hypo or hyperglycemia  The patient is not experiencing any blurry vision, polyuria, polydipsia, or peripheral neuropathy symptoms  Patient remains compliant with medications and routine follow-up  The patient presents today for a hypertension follow-up  Patient remains compliant with medications and denies any chest pain, shortness of breath, palpitations, lightheadedness or diaphoresis  He does have some persistent lower extremity swelling which he thinks is from the increased dose of amlodipine  He also has some significant nocturia which he thinks is coming from taking his hydrochlorothiazide twice a day  He has reflux and was having bloating with omeprazole also he has now decreased that to just as needed  He has been having some intermittent heartburn especially when he eats spicy meal   He continues to be compliant with CPAP and denies excessive fatigue at this time    He is morbidly obese and has had trouble consistently losing weight  The following portions of the patient's history were reviewed and updated as appropriate: allergies, current medications, past family history, past medical history, past social history, past surgical history and problem list     Review of Systems   Constitutional: Negative for appetite change, chills, fatigue, fever and unexpected weight change  HENT: Negative for trouble swallowing  Eyes: Negative for visual disturbance  Respiratory: Negative for cough, chest tightness, shortness of breath and wheezing  Cardiovascular: Negative for chest pain  Gastrointestinal: Negative for abdominal distention, abdominal pain, blood in stool, constipation and diarrhea  Endocrine: Negative for polyuria  Genitourinary: Negative for difficulty urinating and flank pain  Musculoskeletal: Negative for arthralgias and myalgias  Skin: Negative for rash  Neurological: Negative for dizziness and light-headedness  Hematological: Negative for adenopathy  Does not bruise/bleed easily  Psychiatric/Behavioral: Positive for dysphoric mood  Negative for sleep disturbance  The patient is not nervous/anxious  Objective:      /86   Pulse 60   Resp 18   Ht 5' 8" (1 727 m)   Wt (!) 146 kg (322 lb)   SpO2 96%   BMI 48 96 kg/m²          Physical Exam   Constitutional: He is oriented to person, place, and time  He appears well-developed and well-nourished  No distress  HENT:   Head: Normocephalic  Eyes: Pupils are equal, round, and reactive to light  Neck: No tracheal deviation present  No thyromegaly present  Cardiovascular: Normal rate, regular rhythm and normal heart sounds  No murmur heard  Pulmonary/Chest: Effort normal  No respiratory distress  He has no wheezes  He has no rales  Abdominal: Soft  He exhibits no distension  There is no tenderness  Musculoskeletal: Normal range of motion     Neurological: He is alert and oriented to person, place, and time  No cranial nerve deficit  Skin: Skin is warm  He is not diaphoretic  No erythema  Psychiatric: He has a normal mood and affect   Judgment and thought content normal

## 2018-06-20 NOTE — ASSESSMENT & PLAN NOTE
He is doing well off of omeprazole and he has had some decrease in chronic bloating  He may utilizing omeprazole just as needed

## 2018-06-20 NOTE — PATIENT INSTRUCTIONS
Depression with anxiety  Patient notes his depression is somewhat active but thinks he is doing a bit better  He would like to continue with his fluoxetine which she has only been taking 1 tablet daily  He agrees to contact me if things are getting any worse  Benign prostatic hyperplasia with urinary frequency  Continue to monitor  Essential hypertension  I am going to adjust his amlodipine to 5 mg daily in light of some persistent peripheral edema  I increased his hydrochlorothiazide to 25 mg daily, but he will take the entire dose 1st thing in the morning as he is having some significant nocturia  His nocturia may certainly be related to his prostate and if he is not improving, we may consider giving him something to help open up the prostate and improve his urinary flow  Type 2 diabetes mellitus without complication, without long-term current use of insulin ([facility])  Lab Results   Component Value Date    HGBA1C 6 1 (H) 06/18/2013       No results for input(s): POCGLU in the last 72 hours  Check A1c prior to follow-up  Sleep apnea  Continue on CPAP    Morbid obesity ([facility] [address] )  Continue to work on weight loss  Gastroesophageal reflux disease without esophagitis  He is doing well off of omeprazole and he has had some decrease in chronic bloating  He may utilizing omeprazole just as needed  Edema of both legs  Monitor    Hopefully this will improve with lower dosing of amlodipine

## 2018-06-20 NOTE — ASSESSMENT & PLAN NOTE
Lab Results   Component Value Date    HGBA1C 6 1 (H) 06/18/2013       No results for input(s): POCGLU in the last 72 hours  Check A1c prior to follow-up

## 2018-06-20 NOTE — PROGRESS NOTES
Assessment and Plan:    Problem List Items Addressed This Visit     Gastroesophageal reflux disease without esophagitis    Depression with anxiety     Patient notes his depression is somewhat active but thinks he is doing a bit better  He would like to continue with his fluoxetine which she has only been taking 1 tablet daily  He agrees to contact me if things are getting any worse  Type 2 diabetes mellitus without complication, without long-term current use of insulin ([facility] [address] )    Relevant Orders    POCT hemoglobin A1c    Morbid obesity ([facility] [address] )      Other Visit Diagnoses     Medicare annual wellness visit, subsequent    -  Primary    Need for zoster vaccine        Relevant Medications    Zoster Vac Recomb Adjuvanted ([address]) [address] [facility] Maintenance Due   Topic Date Due    Depression Screening PHQ-9  1950    URINE MICROALBUMIN  02/06/1958    HEMOGLOBIN A1C  12/18/2013    GLAUCOMA SCREENING 67+ YR  02/06/2015    DXA SCAN  10/04/2015     Patient presents today for Medicare wellness visit  Overall, he does seem to be doing pretty well  He is having some active issues with depression which is chronic issue for him  He remains on Prozac at 40 mg daily  He had taken up to 2 tablets daily but would prefer to stay on 40 mg  He feels he is stable and improving and is certainly not suicidal   He would like to hold off on any changes in that department  He has absolute no trouble with his activities of daily life  He is upset with himself with his ongoing difficulty losing weight  Ice had no falls, appears to be safe at home and has had no difficulty with cognitive impairment  He does not have a living will and he was given a copy of 5 wishes today  He is up-to-date on colonoscopy  Prostate exam revealed a mildly enlarged prostate today  He is having relatively significant BPH symptoms  PSA will be checked prior to follow-up      HPI:  Tian Mayers  is a 77 y o  male here for his Subsequent Wellness Visit      Patient Active Problem List   Diagnosis    Gastroesophageal reflux disease without esophagitis    Benign colon polyp    Depression with anxiety    Type 2 diabetes mellitus without complication, without long-term current use of insulin (HCC)    Edema of both legs    Fatigue    Gout    Hyperlipidemia    Hypertension    Hypokalemia    Leukocytosis    Microscopic hematuria    Morbid obesity ([address] )    Peripheral neuropathy    Sleep apnea    Testicular hypogonadism    Thyroglossal duct cyst     Past Medical History:   Diagnosis Date    Hyponatremia     last assessed: 09/08/2014    Liver function abnormality      Past Surgical History:   Procedure Laterality Date    CARDIAC CATHETERIZATION      outcome: Neg    CARDIOVASCULAR STRESS TEST      resolved: 10/27/2010; negative    COLONOSCOPY  11/2013    polyp; complete    HERNIA REPAIR      resolved: 11/1999    REPLACEMENT TOTAL KNEE Left 2010    REPLACEMENT TOTAL KNEE Right 2003    THYROGLOSSAL DUCT EXCISION      TONSILLECTOMY      last assessed: 06/02/2014     Family History   Problem Relation Age of Onset    Diabetes Mother     Heart attack Mother     Hypertension Mother     Heart attack Sister      History   Smoking Status    Never Smoker   Smokeless Tobacco    Never Used     History   Alcohol Use    Yes     Comment: Social: 5-6 on the weekends      History   Drug use: Unknown       Current Outpatient Prescriptions   Medication Sig Dispense Refill    allopurinol (ZYLOPRIM) 300 mg tablet Take 1 tablet by mouth daily      amLODIPine (NORVASC) 10 mg tablet Take 1 tablet by mouth daily      aspirin 81 MG tablet Take 1 tablet by mouth daily      FLUoxetine (PROzac) 40 MG capsule Take 2 capsules by mouth daily      furosemide (LASIX) 20 mg tablet Take by mouth      hydrochlorothiazide (MICROZIDE) 12 5 mg capsule Take 1 capsule by mouth 2 (two) times a day      losartan (COZAAR) 50 mg tablet Take 1 tablet (50 mg total) by mouth 2 (two) times a day 180 tablet 3    metFORMIN (GLUCOPHAGE) 500 mg tablet Take 1 tablet (500 mg total) by mouth daily with breakfast 90 tablet 3    nebivolol (BYSTOLIC) 10 mg tablet Take 1 tablet (10 mg total) by mouth daily 90 tablet 3    omeprazole (PriLOSEC) 40 MG capsule TAKE 1 CAPSULE BY MOUTH  EVERY DAY 90 capsule 3    pravastatin (PRAVACHOL) 10 mg tablet Take 1 tablet by mouth      Zoster Vac Recomb Adjuvanted (SHINGRIX) 50 MCG SUSR Inject 0 5 mL into the shoulder, thigh, or buttocks once for 1 dose 1 each 0     No current facility-administered medications for this visit        Allergies   Allergen Reactions    Benazepril Cough    Clonidine Fatigue    Diltiazem Bradycardia    Entex T  [Pseudoephedrine-Guaifenesin]      Annotation - [id number]: LEG SWELLING     Immunization History   Administered Date(s) Administered    Influenza Split High Dose Preservative Free IM 09/30/2013, 12/01/2014, 11/02/2015, 11/03/2016, 12/08/2017    Influenza TIV (IM) 12/14/2005, 11/26/2007    Pneumococcal Conjugate 13-Valent 12/01/2014    Pneumococcal Polysaccharide PPV23 11/26/2007, 12/08/2017    Tdap 06/05/2012       Patient Care Team:  Audi Johnson MD as PCP - MD Audi De La Paz MD Jiles Fanning, MD      Medicare Screening Tests and Risk Assessments:  AWV Clinical     ISAR:   Previous hospitalizations?:  No       Once in a Lifetime Medicare Screening:       Medicare Screening Tests and Risk Assessment:   AAA Risk Assessment    Osteoporosis Risk Assessment    HIV Risk Assessment        Drug and Alcohol Use:   Tobacco use    Cigarettes:  never smoker    Tobacco use duration    Tobacco Cessation Readiness    Alcohol use    Alcohol use:  occasional use    Alcohol Treatment Readiness   Illicit Drug Use    Drug use:  never        Diet & Exercise:   Diet   What is your diet?:  Regular   How many servings a day of the following:   Exercise    Do you currently exercise?:  yes    Frequency:  occasional   Times per week:  3     Type of exercise:  walking       Cognitive Impairment Screening:   Cognitive Impairment Screening    Do you have difficulty learning or retaining new information?:  No Do you have difficulty handling new tasks?:  No   Do you have difficulty with reasoning?:  No Do you have difficulty with spatial ability and orientation?:  No   Do you have difficulty with language?:  No Do you have difficulty with behavior?:  Yes       Functional Ability/Level of Safety:   Hearing    Hearing difficulties:  Yes Bilateral:  slightly decreased   Left:  slightly decreased Right:  slightly decreased   Hearing aid:  No    Hearing Impairment Assessment    Current Activities    Help needed with the folllowing:    Using the phone:  No Transportation:  No   Shopping:  No Preparing Meals:  No   Doing Housework:  No Doing Laundry:  No   Managing Medications:  No Managing Money:  No   ADL    Fall Risk   Have you fallen in the last 12 months?:  No    Injury History       Home Safety:   Home Safety Risk Factors   Unfamilar with surroundings:  No Uneven floors:  No   Stairs or handrail saftey risk:  Yes Loose rugs:  No   Household clutter:  No Poor household lighting:  No   No grab bars in bathroom:  Yes Further evaluation needed:  No       Advanced Directives:   Advanced Directives    Living Will:  No Durable POA for healthcare:  No   Advanced directive:  No    Patient's End of Life Decisions    Reviewed with patient:  Yes        Urinary Incontinence:   Do you have urinary incontinence?:  No        Glaucoma:            Provider Screening     Preventative Screening/Counseling:   Cardiovascular Screening/Counseling:   (Labs Q5 years, EKG optional one-time)   General:  Screening Current           Diabetes Screening/Counseling:   (2 tests/year if Pre-Diabetes or 1 test/year if no Diabetes)   General:  Screening Current           Colorectal Cancer Screening/Counseling:   (FOBT Q1 yr; Flex Sig Q4 yrs or Q10 yrs after Screening Colonoscopy; Screening Colonoscpy Q2 yrs High Risk or Q10 yrs Low Risk; Barium Enema Q2 yrs High Risk or Q4 yrs Low Risk)   General:  Screening Current           Prostate Cancer Screening/Counseling:   (Annual)     Due for: Labs:  PSA         Breast Cancer Screening/Counseling:   (Baseline Age 28 - 43; Annual Age 36+)         Cervical Cancer Screening/Counseling:   (Annual for High Risk or Childbearing Age with Abnormal Pap in Last 3 yrs; Every 2 all others)         Osteoporosis Screening/Counseling:   (Every 2 Yrs if at risk or more if medically necessary)   General:  Screening Not Indicated           AAA Screening/Counseling:   (Once per Lifetime with risk factors)    General:  Screening Not Indicated           Glaucoma Screening/Counseling:   (Annual)   General:  Screening Current          HIV Screening/Counseling:   (Voluntary; Once annually for high risk OR 3 times for Pregnancy at diagnosis of IUP; 3rd trimester; and at Labor         Hepatitis C Screening:             Immunizations:        Other Preventative Couseling (Non-Medicare Wellness Visit Required):       Referrals (Non-Medicare Wellness Visit Required):       Medical Equipment/Suppliers:

## 2018-06-28 DIAGNOSIS — G47.33 OSA (OBSTRUCTIVE SLEEP APNEA): Primary | ICD-10-CM

## 2018-08-20 DIAGNOSIS — I10 ESSENTIAL HYPERTENSION: ICD-10-CM

## 2018-08-20 RX ORDER — HYDROCHLOROTHIAZIDE 12.5 MG/1
CAPSULE, GELATIN COATED ORAL
Qty: 360 CAPSULE | Refills: 3 | Status: SHIPPED | OUTPATIENT
Start: 2018-08-20 | End: 2018-08-23 | Stop reason: SDUPTHER

## 2018-08-23 DIAGNOSIS — I10 ESSENTIAL HYPERTENSION: ICD-10-CM

## 2018-08-23 RX ORDER — HYDROCHLOROTHIAZIDE 12.5 MG/1
CAPSULE, GELATIN COATED ORAL
Qty: 360 CAPSULE | Refills: 3 | Status: SHIPPED | OUTPATIENT
Start: 2018-08-23 | End: 2018-11-03 | Stop reason: HOSPADM

## 2018-09-28 ENCOUNTER — OFFICE VISIT (OUTPATIENT)
Dept: SLEEP CENTER | Facility: CLINIC | Age: 68
End: 2018-09-28
Payer: MEDICARE

## 2018-09-28 VITALS
RESPIRATION RATE: 14 BRPM | DIASTOLIC BLOOD PRESSURE: 78 MMHG | HEART RATE: 72 BPM | BODY MASS INDEX: 47.74 KG/M2 | WEIGHT: 315 LBS | HEIGHT: 68 IN | SYSTOLIC BLOOD PRESSURE: 128 MMHG | OXYGEN SATURATION: 98 %

## 2018-09-28 DIAGNOSIS — F41.8 DEPRESSION WITH ANXIETY: ICD-10-CM

## 2018-09-28 DIAGNOSIS — E66.01 MORBID OBESITY (HCC): ICD-10-CM

## 2018-09-28 DIAGNOSIS — I10 ESSENTIAL HYPERTENSION: ICD-10-CM

## 2018-09-28 DIAGNOSIS — E11.9 TYPE 2 DIABETES MELLITUS WITHOUT COMPLICATION, WITHOUT LONG-TERM CURRENT USE OF INSULIN (HCC): ICD-10-CM

## 2018-09-28 DIAGNOSIS — J31.0 CHRONIC RHINITIS: ICD-10-CM

## 2018-09-28 DIAGNOSIS — G47.33 OSA (OBSTRUCTIVE SLEEP APNEA): Primary | ICD-10-CM

## 2018-09-28 PROCEDURE — 99214 OFFICE O/P EST MOD 30 MIN: CPT | Performed by: INTERNAL MEDICINE

## 2018-09-28 RX ORDER — CHLORHEXIDINE GLUCONATE 0.12 MG/ML
RINSE ORAL
COMMUNITY
Start: 2018-07-11 | End: 2019-05-14

## 2018-09-28 RX ORDER — AMOXICILLIN 500 MG/1
CAPSULE ORAL
COMMUNITY
Start: 2018-07-11 | End: 2018-09-28 | Stop reason: ALTCHOICE

## 2018-09-28 RX ORDER — METRONIDAZOLE 500 MG/1
TABLET ORAL
COMMUNITY
Start: 2018-07-11 | End: 2018-09-28 | Stop reason: ALTCHOICE

## 2018-09-28 NOTE — PROGRESS NOTES
Follow-Up Note - [facility]   77 y o  male  :1950  QJQ:[id number]    CC: I saw this patient for follow-up in clinic today for his Sleep Disordered Breathing, Coexisting Sleep and Medical Problems  He got a replacement CPAP machine recently  PFSH, Problem List, Medications & Allergies were reviewed in EMR  Interval changes: none reported  He  has a past medical history of Diabetes mellitus ([address] ); Hypertension; Hyponatremia; Liver function abnormality; and Sleep apnea  He has a current medication list which includes the following prescription(s): allopurinol, amlodipine, aspirin, chlorhexidine, fluoxetine, furosemide, hydrochlorothiazide, losartan, metformin, nebivolol, omeprazole, and pravastatin  ROS: Reviewed (see attached)  Significant for approximately 10-12 lb weight gain  He has nasal congestion and burning sensation in the nares  He also reports some postnasal drip  Acid reflux and mood are stable on current medication  Medical conditions are stable  Last hemoglobin A1c was 6 1 mg percent  HPI:  With respect to compliance, data download shows:  using PAP > 4 hours/night 97% of the time  JOHNY (estimated) 0 4/hour at 90th percentile pressure of 15 3 cm H2O  Suzy Mcmahon reports  · minor difficulty tolerating PAP;   · no  adverse effects: nasal congesetion  · Benefitting from use: sleeping better     Sleep Routine: He reports getting 8 hours sleep; he has no difficulty initiating or maintaining sleep   He awakens spontaneously feeling refreshed  He has excessive drowsiness and naps several times a week "out of boredom"  He rated himself at Total score: 4 /24 on the [place] sleepiness scale  Habits: reports that he has never smoked  He has never used smokeless tobacco ,  reports that he drinks alcohol ,  reports that he does not use drugs  , Caffeine use: limited , Exercise routine: none         EXAM: /78 (BP Location: Left arm, Cuff Size: Large) Pulse 72   Resp 14   Ht 5' 8" (1 727 m)   Wt (!) 148 kg (327 lb)   SpO2 98%   BMI 49 72 kg/m²      Patient is well groomed; well appearing; no deformity  He is alert, orientated, cooperative and in no distress  Mental state appears normal   Skin/Extrem: warm & dry; col & hydration normal; no edema  EOMI; There are no facial pressure marks or rashes  Nasal airway is patent  Oral airway is crowded  Mucous membranes appeared normal  RRR; Resp effort is normal  There is truncal obesity  Gait & Balance normal   Speech is clear & coherent  IMPRESSION: Primary/secondary Sleep conditions (to Medical or psychiatric) & comorbidities   1  MARISSA (obstructive sleep apnea)  Sleep F/U  - established patient    PAP DME Resupply/Reorder   2  Chronic rhinitis     3  Depression with anxiety     4  Essential hypertension     5  Type 2 diabetes mellitus without complication, without long-term current use of insulin ([facility] [address] )     6  Morbid obesity ([facility] [address] )         PLAN:  1  Treatment with  PAP is medically necessary and Vanna Pandya is agreable to continue use  2  We discussed methods to improve comfort using PAP, care of equipment, and importance of compliance with therapy  3  Pressure setting:  Continue 15-20 cm H2O     4  Rx provided to replace supplies and Care coordinated with DME provider  5  Strategies for weight reduction were discussed  6  I advised regular nasal saline rinse with saline gel q h s  He is under care of ENT but  If nasal symptoms persist, he may need to return to ENT  7  Follow-up is advised in 1 year or sooner if needed to monitor progress, compliance and to adjust therapy  Thank you for allowing me to participate in the care of this patient      Sincerely,    Authenticated electronically by Felice Anderson MD on 21/34/24   Board Certified Specialist

## 2018-09-28 NOTE — PROGRESS NOTES
Review of Systems      Genitourinary need to urinate more than twice a night   Cardiology ankle/leg swelling   Gastrointestinal none   Neurology awaken with headache, numbness/tingling of an extremity, forgetfulness and poor concentration or confusion,    Constitutional weight change   Integumentary none   Psychiatry anxiety and depression   Musculoskeletal none   Pulmonary none   ENT throat clearing   Endocrine frequent urination   Hematological none

## 2018-10-01 ENCOUNTER — TELEPHONE (OUTPATIENT)
Dept: SLEEP CENTER | Facility: CLINIC | Age: 68
End: 2018-10-01

## 2018-10-01 DIAGNOSIS — G47.33 OSA (OBSTRUCTIVE SLEEP APNEA): Primary | ICD-10-CM

## 2018-10-03 DIAGNOSIS — G47.33 OSA (OBSTRUCTIVE SLEEP APNEA): Primary | ICD-10-CM

## 2018-10-30 ENCOUNTER — APPOINTMENT (OUTPATIENT)
Dept: CT IMAGING | Facility: HOSPITAL | Age: 68
DRG: 309 | End: 2018-10-30
Payer: MEDICARE

## 2018-10-30 ENCOUNTER — APPOINTMENT (EMERGENCY)
Dept: RADIOLOGY | Facility: HOSPITAL | Age: 68
DRG: 309 | End: 2018-10-30
Payer: MEDICARE

## 2018-10-30 ENCOUNTER — HOSPITAL ENCOUNTER (INPATIENT)
Facility: HOSPITAL | Age: 68
LOS: 3 days | Discharge: HOME | DRG: 309 | End: 2018-11-03
Attending: EMERGENCY MEDICINE | Admitting: INTERNAL MEDICINE
Payer: MEDICARE

## 2018-10-30 DIAGNOSIS — I10 ESSENTIAL HYPERTENSION: ICD-10-CM

## 2018-10-30 DIAGNOSIS — R94.31 EKG ABNORMALITIES: ICD-10-CM

## 2018-10-30 DIAGNOSIS — R61 DIAPHORESIS: Primary | ICD-10-CM

## 2018-10-30 DIAGNOSIS — I48.91 ATRIAL FIBRILLATION, UNSPECIFIED TYPE (HCC): ICD-10-CM

## 2018-10-30 DIAGNOSIS — I48.20 CHRONIC ATRIAL FIBRILLATION (HCC): ICD-10-CM

## 2018-10-30 DIAGNOSIS — R06.00 DYSPNEA ON EXERTION: ICD-10-CM

## 2018-10-30 PROBLEM — R06.09 DYSPNEA ON EXERTION: Status: ACTIVE | Noted: 2018-10-30

## 2018-10-30 LAB
ALBUMIN SERPL BCP-MCNC: 4.3 G/DL (ref 3.5–5)
ALP SERPL-CCNC: 50 U/L (ref 46–116)
ALT SERPL W P-5'-P-CCNC: 57 U/L (ref 12–78)
ANION GAP SERPL CALCULATED.3IONS-SCNC: 12 MMOL/L (ref 4–13)
APTT PPP: 35 SECONDS (ref 24–36)
AST SERPL W P-5'-P-CCNC: 36 U/L (ref 5–45)
ATRIAL RATE: 108 BPM
ATRIAL RATE: 117 BPM
BASOPHILS # BLD MANUAL: 0 THOUSAND/UL (ref 0–0.1)
BASOPHILS NFR MAR MANUAL: 0 % (ref 0–1)
BILIRUB SERPL-MCNC: 0.93 MG/DL (ref 0.2–1)
BUN SERPL-MCNC: 16 MG/DL (ref 5–25)
CALCIUM SERPL-MCNC: 9.9 MG/DL (ref 8.3–10.1)
CHLORIDE SERPL-SCNC: 96 MMOL/L (ref 100–108)
CO2 SERPL-SCNC: 27 MMOL/L (ref 21–32)
CREAT SERPL-MCNC: 1.14 MG/DL (ref 0.6–1.3)
DEPRECATED D DIMER PPP: ABNORMAL NG/ML (FEU) (ref 0–424)
EOSINOPHIL # BLD MANUAL: 0 THOUSAND/UL (ref 0–0.4)
EOSINOPHIL NFR BLD MANUAL: 0 % (ref 0–6)
ERYTHROCYTE [DISTWIDTH] IN BLOOD BY AUTOMATED COUNT: 11.6 % (ref 11.6–15.1)
GFR SERPL CREATININE-BSD FRML MDRD: 65 ML/MIN/1.73SQ M
GLUCOSE SERPL-MCNC: 128 MG/DL (ref 65–140)
GLUCOSE SERPL-MCNC: 133 MG/DL (ref 65–140)
HCT VFR BLD AUTO: 44.1 % (ref 36.5–49.3)
HGB BLD-MCNC: 15.6 G/DL (ref 12–17)
INR PPP: 1.06 (ref 0.86–1.17)
LYMPHOCYTES # BLD AUTO: 40 % (ref 14–44)
LYMPHOCYTES # BLD AUTO: 5.28 THOUSAND/UL (ref 0.6–4.47)
MCH RBC QN AUTO: 32.1 PG (ref 26.8–34.3)
MCHC RBC AUTO-ENTMCNC: 35.4 G/DL (ref 31.4–37.4)
MCV RBC AUTO: 91 FL (ref 82–98)
MONOCYTES # BLD AUTO: 1.06 THOUSAND/UL (ref 0–1.22)
MONOCYTES NFR BLD: 8 % (ref 4–12)
MYELOCYTES NFR BLD MANUAL: 2 % (ref 0–1)
NEUTROPHILS # BLD MANUAL: 6.47 THOUSAND/UL (ref 1.85–7.62)
NEUTS SEG NFR BLD AUTO: 49 % (ref 43–75)
NRBC BLD AUTO-RTO: 0 /100 WBCS
P AXIS: 64 DEGREES
P AXIS: 80 DEGREES
PLATELET # BLD AUTO: 233 THOUSANDS/UL (ref 149–390)
PLATELET BLD QL SMEAR: ADEQUATE
PMV BLD AUTO: 10 FL (ref 8.9–12.7)
POTASSIUM SERPL-SCNC: 4 MMOL/L (ref 3.5–5.3)
PROT SERPL-MCNC: 8.1 G/DL (ref 6.4–8.2)
PROTHROMBIN TIME: 13.9 SECONDS (ref 11.8–14.2)
QRS AXIS: -16 DEGREES
QRS AXIS: -8 DEGREES
QRSD INTERVAL: 100 MS
QRSD INTERVAL: 96 MS
QT INTERVAL: 296 MS
QT INTERVAL: 298 MS
QTC INTERVAL: 391 MS
QTC INTERVAL: 391 MS
RBC # BLD AUTO: 4.86 MILLION/UL (ref 3.88–5.62)
RBC MORPH BLD: NORMAL
SODIUM SERPL-SCNC: 135 MMOL/L (ref 136–145)
T WAVE AXIS: 104 DEGREES
T WAVE AXIS: 88 DEGREES
TOTAL CELLS COUNTED SPEC: 100
TROPONIN I SERPL-MCNC: 0.02 NG/ML
TROPONIN I SERPL-MCNC: <0.02 NG/ML
TSH SERPL DL<=0.05 MIU/L-ACNC: 1.75 UIU/ML (ref 0.36–3.74)
VARIANT LYMPHS # BLD AUTO: 1 %
VENTRICULAR RATE: 104 BPM
VENTRICULAR RATE: 105 BPM
WBC # BLD AUTO: 13.21 THOUSAND/UL (ref 4.31–10.16)

## 2018-10-30 PROCEDURE — 85610 PROTHROMBIN TIME: CPT | Performed by: EMERGENCY MEDICINE

## 2018-10-30 PROCEDURE — 85007 BL SMEAR W/DIFF WBC COUNT: CPT | Performed by: EMERGENCY MEDICINE

## 2018-10-30 PROCEDURE — 84484 ASSAY OF TROPONIN QUANT: CPT | Performed by: EMERGENCY MEDICINE

## 2018-10-30 PROCEDURE — 93005 ELECTROCARDIOGRAM TRACING: CPT

## 2018-10-30 PROCEDURE — 85730 THROMBOPLASTIN TIME PARTIAL: CPT | Performed by: EMERGENCY MEDICINE

## 2018-10-30 PROCEDURE — 93010 ELECTROCARDIOGRAM REPORT: CPT | Performed by: INTERNAL MEDICINE

## 2018-10-30 PROCEDURE — 71046 X-RAY EXAM CHEST 2 VIEWS: CPT

## 2018-10-30 PROCEDURE — 96360 HYDRATION IV INFUSION INIT: CPT

## 2018-10-30 PROCEDURE — 84443 ASSAY THYROID STIM HORMONE: CPT | Performed by: EMERGENCY MEDICINE

## 2018-10-30 PROCEDURE — 36415 COLL VENOUS BLD VENIPUNCTURE: CPT | Performed by: EMERGENCY MEDICINE

## 2018-10-30 PROCEDURE — 85379 FIBRIN DEGRADATION QUANT: CPT | Performed by: INTERNAL MEDICINE

## 2018-10-30 PROCEDURE — 84484 ASSAY OF TROPONIN QUANT: CPT | Performed by: INTERNAL MEDICINE

## 2018-10-30 PROCEDURE — 99220 PR INITIAL OBSERVATION CARE/DAY 70 MINUTES: CPT | Performed by: INTERNAL MEDICINE

## 2018-10-30 PROCEDURE — 99285 EMERGENCY DEPT VISIT HI MDM: CPT

## 2018-10-30 PROCEDURE — 80053 COMPREHEN METABOLIC PANEL: CPT | Performed by: EMERGENCY MEDICINE

## 2018-10-30 PROCEDURE — 71275 CT ANGIOGRAPHY CHEST: CPT

## 2018-10-30 PROCEDURE — 85027 COMPLETE CBC AUTOMATED: CPT | Performed by: EMERGENCY MEDICINE

## 2018-10-30 PROCEDURE — 82948 REAGENT STRIP/BLOOD GLUCOSE: CPT

## 2018-10-30 RX ORDER — DOCUSATE SODIUM 100 MG/1
100 CAPSULE, LIQUID FILLED ORAL 2 TIMES DAILY
Status: DISCONTINUED | OUTPATIENT
Start: 2018-10-30 | End: 2018-11-03 | Stop reason: HOSPADM

## 2018-10-30 RX ORDER — NEBIVOLOL 10 MG/1
10 TABLET ORAL
Status: DISCONTINUED | OUTPATIENT
Start: 2018-10-30 | End: 2018-10-31

## 2018-10-30 RX ORDER — LOSARTAN POTASSIUM 50 MG/1
50 TABLET ORAL 2 TIMES DAILY
Status: DISCONTINUED | OUTPATIENT
Start: 2018-10-30 | End: 2018-10-31

## 2018-10-30 RX ORDER — ALLOPURINOL 100 MG/1
300 TABLET ORAL DAILY
Status: DISCONTINUED | OUTPATIENT
Start: 2018-10-31 | End: 2018-11-03 | Stop reason: HOSPADM

## 2018-10-30 RX ORDER — ONDANSETRON 2 MG/ML
4 INJECTION INTRAMUSCULAR; INTRAVENOUS EVERY 6 HOURS PRN
Status: DISCONTINUED | OUTPATIENT
Start: 2018-10-30 | End: 2018-11-03 | Stop reason: HOSPADM

## 2018-10-30 RX ORDER — PANTOPRAZOLE SODIUM 40 MG/1
40 TABLET, DELAYED RELEASE ORAL
Status: DISCONTINUED | OUTPATIENT
Start: 2018-10-31 | End: 2018-11-03 | Stop reason: HOSPADM

## 2018-10-30 RX ORDER — ACETAMINOPHEN 325 MG/1
650 TABLET ORAL EVERY 6 HOURS PRN
Status: DISCONTINUED | OUTPATIENT
Start: 2018-10-30 | End: 2018-11-03 | Stop reason: HOSPADM

## 2018-10-30 RX ORDER — AMLODIPINE BESYLATE 5 MG/1
5 TABLET ORAL DAILY
Status: DISCONTINUED | OUTPATIENT
Start: 2018-10-31 | End: 2018-11-01

## 2018-10-30 RX ORDER — FLUOXETINE HYDROCHLORIDE 20 MG/1
40 CAPSULE ORAL DAILY
Status: DISCONTINUED | OUTPATIENT
Start: 2018-10-31 | End: 2018-11-03 | Stop reason: HOSPADM

## 2018-10-30 RX ORDER — DIGOXIN 0.25 MG/ML
125 INJECTION INTRAMUSCULAR; INTRAVENOUS ONCE AS NEEDED
Status: DISCONTINUED | OUTPATIENT
Start: 2018-10-30 | End: 2018-11-03 | Stop reason: HOSPADM

## 2018-10-30 RX ORDER — ASPIRIN 81 MG/1
81 TABLET, CHEWABLE ORAL DAILY
Status: DISCONTINUED | OUTPATIENT
Start: 2018-10-31 | End: 2018-11-03

## 2018-10-30 RX ORDER — ASPIRIN 81 MG/1
324 TABLET, CHEWABLE ORAL ONCE
Status: COMPLETED | OUTPATIENT
Start: 2018-10-30 | End: 2018-10-30

## 2018-10-30 RX ORDER — PRAVASTATIN SODIUM 10 MG
10 TABLET ORAL
Status: DISCONTINUED | OUTPATIENT
Start: 2018-10-31 | End: 2018-11-03 | Stop reason: HOSPADM

## 2018-10-30 RX ORDER — HYDROCHLOROTHIAZIDE 25 MG/1
25 TABLET ORAL DAILY
Status: DISCONTINUED | OUTPATIENT
Start: 2018-10-31 | End: 2018-11-03 | Stop reason: HOSPADM

## 2018-10-30 RX ORDER — HYDROCHLOROTHIAZIDE 12.5 MG/1
25 TABLET ORAL ONCE
Status: COMPLETED | OUTPATIENT
Start: 2018-10-30 | End: 2018-10-30

## 2018-10-30 RX ADMIN — LOSARTAN POTASSIUM 50 MG: 50 TABLET ORAL at 21:15

## 2018-10-30 RX ADMIN — HYDROCHLOROTHIAZIDE 25 MG: 12.5 TABLET ORAL at 17:26

## 2018-10-30 RX ADMIN — DOCUSATE SODIUM 100 MG: 100 CAPSULE, LIQUID FILLED ORAL at 21:16

## 2018-10-30 RX ADMIN — ASPIRIN 324 MG: 81 TABLET, CHEWABLE ORAL at 16:17

## 2018-10-30 RX ADMIN — SODIUM CHLORIDE 1000 ML: 0.9 INJECTION, SOLUTION INTRAVENOUS at 15:30

## 2018-10-30 RX ADMIN — ENOXAPARIN SODIUM 150 MG: 150 INJECTION SUBCUTANEOUS at 21:41

## 2018-10-30 RX ADMIN — NEBIVOLOL HYDROCHLORIDE 10 MG: 10 TABLET ORAL at 21:15

## 2018-10-30 RX ADMIN — IOHEXOL 100 ML: 350 INJECTION, SOLUTION INTRAVENOUS at 23:24

## 2018-10-30 NOTE — ED PROVIDER NOTES
History  Chief Complaint   Patient presents with    Rapid Heart Rate     Pt was scheduled for outpatient hemhroidectomy today  Pt noted to be in A-fibb on Monitor at center, no history  Denies chest pain or SOB  Pt speaking in full sentances  HR between  as per EMS  Patient is a 64-year-old male with a past medical history significant for hypertension, hyperlipidemia, diabetes, morbid obesity who presents with shortness of breath, alternating heart rate that was seen at the outpatient surgery center when patient presented for his hemorrhoidectomy  Per report, patient presented for his procedure and was noted to be diaphoretic  An EKG was done which was read as atrial fibrillation with rapid ventricular response, therefore patient was sent over to the emergency department for evaluation  Patient reports that over the last 3 months he has been increasingly fatigued, dyspneic with exertion as compared to prior ability to walk up steps without difficulty, and notes changes in his heart rate  Patient reports that he takes his blood pressure daily and sometimes he takes it a few minutes apart and has noted that at 1 time he can have a heart rate in the 60s and then when he read takes it his heart rate is in the 100s  Denies sensing the difference in heart rate  Denies chest pain, chest tightness, dizziness, syncope, fevers, chills, cough  Prior to Admission Medications   Prescriptions Last Dose Informant Patient Reported? Taking?    FLUoxetine (PROzac) 40 MG capsule   Yes No   Sig: Take 2 capsules by mouth daily   allopurinol (ZYLOPRIM) 300 mg tablet   Yes No   Sig: Take 1 tablet by mouth daily   amLODIPine (NORVASC) 5 mg tablet   No No   Sig: Take 1 tablet (5 mg total) by mouth daily   aspirin 81 MG tablet Past Week at Unknown time  Yes Yes   Sig: Take 1 tablet by mouth daily   chlorhexidine (PERIDEX) 0 12 % solution   Yes No   furosemide (LASIX) 20 mg tablet   Yes No   Sig: Take by mouth hydrochlorothiazide (MICROZIDE) 12 5 mg capsule   No No   Sig: Take 2 Capsules by mouth two times a day   losartan (COZAAR) 50 mg tablet   No No   Sig: Take 1 tablet (50 mg total) by mouth 2 (two) times a day   metFORMIN (GLUCOPHAGE) 500 mg tablet   No No   Sig: Take 1 tablet (500 mg total) by mouth daily with breakfast   nebivolol (BYSTOLIC) 10 mg tablet   No No   Sig: Take 1 tablet (10 mg total) by mouth daily   omeprazole (PriLOSEC) 40 MG capsule   No No   Sig: TAKE 1 CAPSULE BY MOUTH  EVERY DAY   pravastatin (PRAVACHOL) 10 mg tablet   Yes No   Sig: Take 1 tablet by mouth      Facility-Administered Medications: None       Past Medical History:   Diagnosis Date    Diabetes mellitus ([facility] [address] )     Hypertension     Hyponatremia     last assessed: 09/08/2014    Liver function abnormality     Sleep apnea        Past Surgical History:   Procedure Laterality Date    CARDIAC CATHETERIZATION      outcome: Neg    CARDIOVASCULAR STRESS TEST      resolved: 10/27/2010; negative    COLONOSCOPY  11/2013    polyp; complete    HERNIA REPAIR      resolved: 11/1999    REPLACEMENT TOTAL KNEE Left 2010    REPLACEMENT TOTAL KNEE Right 2003    THYROGLOSSAL DUCT EXCISION      TONSILLECTOMY      last assessed: 06/02/2014       Family History   Problem Relation Age of Onset    Diabetes Mother     Heart attack Mother     Hypertension Mother     Heart attack Sister      I have reviewed and agree with the history as documented  Social History   Substance Use Topics    Smoking status: Never Smoker    Smokeless tobacco: Never Used    Alcohol use Yes      Comment: Social: 5-6 on the weekends        Review of Systems   Constitutional: Positive for activity change, diaphoresis and fatigue  Negative for chills and fever  HENT: Negative for congestion and rhinorrhea  Eyes: Negative for photophobia and visual disturbance  Respiratory: Positive for shortness of breath  Negative for cough, chest tightness and wheezing  Cardiovascular: Positive for leg swelling  Negative for chest pain and palpitations  Gastrointestinal: Negative for abdominal pain, constipation, diarrhea, nausea and vomiting  Genitourinary: Negative for dysuria and hematuria  Musculoskeletal: Negative for back pain and neck pain  Skin: Negative for pallor and rash  Neurological: Positive for weakness  Negative for dizziness, seizures, syncope, light-headedness, numbness and headaches  Physical Exam  ED Triage Vitals   Temperature Pulse Respirations Blood Pressure SpO2   10/30/18 1713 10/30/18 1439 10/30/18 1439 10/30/18 1439 10/30/18 1439   98 5 °F (36 9 °C) 91 17 (!) 193/114 99 %      Temp Source Heart Rate Source Patient Position - Orthostatic VS BP Location FiO2 (%)   10/30/18 1713 10/30/18 1439 10/30/18 1439 10/30/18 1439 --   Oral Monitor Sitting Right arm       Pain Score       10/30/18 1439       No Pain           Orthostatic Vital Signs  Vitals:    10/30/18 1439 10/30/18 1655 10/30/18 1700   BP: (!) 193/114 (!) 180/96 (!) 180/96   Pulse: 91 (!) 108 100   Patient Position - Orthostatic VS: Sitting Lying Lying       Physical Exam   Constitutional: He is oriented to person, place, and time  He appears well-developed and well-nourished  No distress  Morbid obesity   HENT:   Head: Normocephalic and atraumatic  Right Ear: External ear normal    Left Ear: External ear normal    Nose: Nose normal    Mouth/Throat: Oropharynx is clear and moist  No oropharyngeal exudate  Eyes: Pupils are equal, round, and reactive to light  Conjunctivae and EOM are normal    Neck: Normal range of motion  Neck supple  Cardiovascular: Normal heart sounds and intact distal pulses  Exam reveals no gallop and no friction rub  No murmur heard  HR ranging from 65-125bpm   Pulmonary/Chest: Effort normal and breath sounds normal  No respiratory distress  He has no wheezes  He has no rales  He exhibits no tenderness  Abdominal: Soft   Bowel sounds are normal  He exhibits no distension  There is no tenderness  There is no rebound and no guarding  Musculoskeletal: Normal range of motion  He exhibits edema  Neurological: He is alert and oriented to person, place, and time  Skin: Skin is warm  Capillary refill takes less than 2 seconds  He is diaphoretic  No erythema  No pallor  Psychiatric: He has a normal mood and affect  His behavior is normal    Nursing note and vitals reviewed  ED Medications  Medications   sodium chloride 0 9 % bolus 1,000 mL (0 mL Intravenous Stopped 10/30/18 1655)   aspirin chewable tablet 324 mg (324 mg Oral Given 10/30/18 1617)   hydrochlorothiazide (HYDRODIURIL) tablet 25 mg (25 mg Oral Given 10/30/18 1726)       Diagnostic Studies  Results Reviewed     Procedure Component Value Units Date/Time    TSH, 3rd generation with Free T4 reflex [[id number]]  (Normal) Collected:  10/30/18 1514    Lab Status:  Final result Specimen:  Blood from Arm, Right Updated:  10/30/18 1612     TSH 3RD GENERATON 1 748 uIU/mL     Narrative:         Patients undergoing fluorescein dye angiography may retain small amounts of fluorescein in the body for 48-72 hours post procedure  Samples containing fluorescein can produce falsely depressed TSH values  If the patient had this procedure,a specimen should be resubmitted post fluorescein clearance  CBC and differential [[id number]]  (Abnormal) Collected:  10/30/18 1511    Lab Status:  Final result Specimen:  Blood from Arm, Right Updated:  10/30/18 1547     WBC 13 21 (H) Thousand/uL      RBC 4 86 Million/uL      Hemoglobin 15 6 g/dL      Hematocrit 44 1 %      MCV 91 fL      MCH 32 1 pg      MCHC 35 4 g/dL      RDW 11 6 %      MPV 10 0 fL      Platelets 163 Thousands/uL      nRBC 0 /100 WBCs     Narrative: This is an appended report  These results have been appended to a previously verified report      Comprehensive metabolic panel [[id number]]  (Abnormal) Collected:  10/30/18 1511    Lab Status:  Final result Specimen:  Blood from Arm, Right Updated:  10/30/18 1541     Sodium 135 (L) mmol/L      Potassium 4 0 mmol/L      Chloride 96 (L) mmol/L      CO2 27 mmol/L      ANION GAP 12 mmol/L      BUN 16 mg/dL      Creatinine 1 14 mg/dL      Glucose 128 mg/dL      Calcium 9 9 mg/dL      AST 36 U/L      ALT 57 U/L      Alkaline Phosphatase 50 U/L      Total Protein 8 1 g/dL      Albumin 4 3 g/dL      Total Bilirubin 0 93 mg/dL      eGFR 65 ml/min/1 73sq m     Narrative:         National Kidney Disease Education Program recommendations are as follows:  GFR calculation is accurate only with a steady state creatinine  Chronic Kidney disease less than 60 ml/min/1 73 sq  meters  Kidney failure less than 15 ml/min/1 73 sq  meters  Troponin I [[id number]]  (Normal) Collected:  10/30/18 1511    Lab Status:  Final result Specimen:  Blood from Arm, Right Updated:  10/30/18 1541     Troponin I 0 02 ng/mL     Protime-INR [[id number]]  (Normal) Collected:  10/30/18 1513    Lab Status:  Final result Specimen:  Blood from Arm, Right Updated:  10/30/18 1534     Protime 13 9 seconds      INR 1 06    APTT [[id number]]  (Normal) Collected:  10/30/18 1513    Lab Status:  Final result Specimen:  Blood from Arm, Right Updated:  10/30/18 1534     PTT 35 seconds     POCT urinalysis dipstick [[id number]]     Lab Status:  No result Specimen:  Urine                  X-ray chest 2 views   ED Interpretation by Lendon Aase, DO (10/30 1623)   No acute abnormalities as interpreted by me independently       Final Result by Gisele Valdez DO (10/30 1655)   Exam limited by patient body habitus  No acute cardiopulmonary disease              Workstation performed: [id number]               Procedures  ECG 12 Lead Documentation  Date/Time: 10/30/2018 2:52 PM  Performed by: Oksana Mckenzie by: Jamari Mejia     ECG reviewed by me, the ED Provider: yes    Patient location:  ED  Previous ECG:     Previous ECG:  Unavailable  Interpretation: Interpretation: non-specific    Rate:     ECG rate:  104    ECG rate assessment: tachycardic    Rhythm:     Rhythm: sinus rhythm    Ectopy:     Ectopy: none    QRS:     QRS axis:  Normal    QRS intervals:  Normal  Conduction:     Conduction: abnormal      Abnormal conduction: non-specific intraventricular conduction delay    ST segments:     ST segments:  Normal  T waves:     T waves: normal                Phone Consults  ED Phone Contact    ED Course  ED Course as of Oct 30 1731   Tue Oct 30, 2018   1709 Patient did not take his daily hydralazine dose today so will administer home medication as patient is hypertensive here in the ED  HEART Risk Score      Most Recent Value   History  1 Filed at: 10/30/2018 1532   ECG  1 Filed at: 10/30/2018 1532   Age  2 Filed at: 10/30/2018 1532   Risk Factors  2 Filed at: 10/30/2018 1532   Troponin  0 Filed at: 10/30/2018 1532   Heart Score Risk Calculator   History  1 Filed at: 10/30/2018 1532   ECG  1 Filed at: 10/30/2018 1532   Age  2 Filed at: 10/30/2018 1532   Risk Factors  2 Filed at: 10/30/2018 1532   Troponin  0 Filed at: 10/30/2018 1532   HEART Score  6 Filed at: 10/30/2018 1532   HEART Score  6 Filed at: 10/30/2018 1532                      Initial Sepsis Screening     Row Name 10/30/18 1708                Is the patient's history suggestive of a new or worsening infection? No  -LK        Suspected source of infection          Are two or more of the following signs & symptoms of infection both present and new to the patient?           Indicate SIRS criteria          If the answer is yes to both questions, suspicion of sepsis is present          If severe sepsis is present AND tissue hypoperfusion perists in the hour after fluid resuscitation or lactate > 4, the patient meets criteria for SEPTIC SHOCK          Are any of the following organ dysfunction criteria present within 6 hours of suspected infection and SIRS criteria that are NOT considered to be chronic conditions?         Organ dysfunction          Date of presentation of severe sepsis          Time of presentation of severe sepsis          Tissue hypoperfusion persists in the hour after crystalloid fluid administration, evidenced, by either:          Was hypotension present within one hour of the conclusion of crystalloid fluid administration?         Date of presentation of septic shock          Time of presentation of septic shock            User Key  (r) = Recorded By, (t) = Taken By, (c) = Cosigned By    [address] Name Provider Type    REECE Barron DO Physician                  [facility]  Number of Diagnoses or Management Options  Diaphoresis:   Dyspnea on exertion:   EKG abnormalities:   Diagnosis management comments: Assessment and plan:  Normal sinus rhythm with conduction delay versus atrial fibrillation  Unstable angina versus tachy dysrhythmia  EKG from outpatient facility appears like atrial fibrillation (photo in chart), but here patient is in NSR  Heart score 6  Admission        CritCare Time    Disposition  Final diagnoses:   Diaphoresis   Dyspnea on exertion   EKG abnormalities     Time reflects when diagnosis was documented in both MDM as applicable and the Disposition within this note     Time User Action Codes Description Comment    10/30/2018  3:39 PM [facility] [place]     10/30/2018  3:40 PM Mercedes Solo Add [R06 [id number]] Dyspnea on exertion     10/30/2018  3:40 PM Mercedes Solo Add [R94 31] EKG abnormalities       ED Disposition     ED Disposition Condition Comment    Admit  Case was discussed with Dr Colton Grande and the patient's admission status was agreed to be Admission Status: observation status to the service of Dr Colton Grande   Follow-up Information    None         Patient's Medications   Discharge Prescriptions    No medications on file     No discharge procedures on file      ED Provider  Attending physically available and evaluated Evy Patterson [place] I managed the patient along with the ED Attending      Electronically Signed by         Sesar Elmore, DO  10/30/18 [address] [facility], DO  10/30/18 1167

## 2018-10-30 NOTE — ED NOTES
Dinner tray ordered for patient at this time-to be delivered to in-patient room      Suellen Leventhal, RN  10/30/18 7511

## 2018-10-30 NOTE — SEPSIS NOTE
Sepsis Note   Herve Castellano  77 y o  male MRN: [id number]  Unit/Bed#: ED 32 Encounter: [id number]            Initial Sepsis Screening     Row Name 10/30/18 8811                Is the patient's history suggestive of a new or worsening infection? No  -LK        Suspected source of infection          Are two or more of the following signs & symptoms of infection both present and new to the patient?         Indicate SIRS criteria          If the answer is yes to both questions, suspicion of sepsis is present          If severe sepsis is present AND tissue hypoperfusion perists in the hour after fluid resuscitation or lactate > 4, the patient meets criteria for SEPTIC SHOCK          Are any of the following organ dysfunction criteria present within 6 hours of suspected infection and SIRS criteria that are NOT considered to be chronic conditions?         Organ dysfunction          Date of presentation of severe sepsis          Time of presentation of severe sepsis          Tissue hypoperfusion persists in the hour after crystalloid fluid administration, evidenced, by either:          Was hypotension present within one hour of the conclusion of crystalloid fluid administration?           Date of presentation of septic shock          Time of presentation of septic shock            User Key  (r) = Recorded By, (t) = Taken By, (c) = Cosigned By    [address] Name Provider Type    REECE Betancourt DO Physician

## 2018-10-30 NOTE — ED NOTES
Pt states that he has been feeling weak with exertion  Pt states he takes his HR regularly and has been noticed it is erratic  Pt states he "wants to sleep a lot" and much fatigue        Azael Doherty RN  10/30/18 7159

## 2018-10-30 NOTE — ED ATTENDING ATTESTATION
Bess GOLDBERG DO, saw and evaluated the patient  I have discussed the patient with the resident/non-physician practitioner and agree with the resident's/non-physician practitioner's findings, Plan of Care, and MDM as documented in the resident's/non-physician practitioner's note, except where noted  All available labs and Radiology studies were reviewed  At this point I agree with the current assessment done in the Emergency Department  I have conducted an independent evaluation of this patient a history and physical is as follows:    77 y o  M w/ h/o HTN, HLD, DM sent from a surgical center for "new onset afib "  Pt was scheduled to have an elective hemorrhoidectomy  They noted him to be in afib and pt was diaphoretic, so they cancelled the surgery and sent him to the ER  Pt denies CP or SOB  Pt states he's been having a rapid heart beat for the past 2-3 months, but has not been able to get an appointment with cardiology  Plan: Will check labs, CXR      Critical Care Time  CritCare Time    Procedures

## 2018-10-31 ENCOUNTER — APPOINTMENT (OUTPATIENT)
Dept: NON INVASIVE DIAGNOSTICS | Facility: HOSPITAL | Age: 68
DRG: 309 | End: 2018-10-31
Payer: MEDICARE

## 2018-10-31 LAB
ANION GAP SERPL CALCULATED.3IONS-SCNC: 12 MMOL/L (ref 4–13)
BUN SERPL-MCNC: 16 MG/DL (ref 5–25)
CALCIUM SERPL-MCNC: 9.2 MG/DL (ref 8.3–10.1)
CHLORIDE SERPL-SCNC: 97 MMOL/L (ref 100–108)
CHOLEST SERPL-MCNC: 140 MG/DL (ref 50–200)
CO2 SERPL-SCNC: 26 MMOL/L (ref 21–32)
CREAT SERPL-MCNC: 1.15 MG/DL (ref 0.6–1.3)
ERYTHROCYTE [DISTWIDTH] IN BLOOD BY AUTOMATED COUNT: 11.8 % (ref 11.6–15.1)
GFR SERPL CREATININE-BSD FRML MDRD: 64 ML/MIN/1.73SQ M
GLUCOSE P FAST SERPL-MCNC: 130 MG/DL (ref 65–99)
GLUCOSE SERPL-MCNC: 111 MG/DL (ref 65–140)
GLUCOSE SERPL-MCNC: 113 MG/DL (ref 65–140)
GLUCOSE SERPL-MCNC: 116 MG/DL (ref 65–140)
GLUCOSE SERPL-MCNC: 121 MG/DL (ref 65–140)
GLUCOSE SERPL-MCNC: 130 MG/DL (ref 65–140)
HCT VFR BLD AUTO: 41 % (ref 36.5–49.3)
HDLC SERPL-MCNC: 42 MG/DL (ref 40–60)
HGB BLD-MCNC: 14.3 G/DL (ref 12–17)
LDLC SERPL CALC-MCNC: 70 MG/DL (ref 0–100)
MCH RBC QN AUTO: 32 PG (ref 26.8–34.3)
MCHC RBC AUTO-ENTMCNC: 34.9 G/DL (ref 31.4–37.4)
MCV RBC AUTO: 92 FL (ref 82–98)
NONHDLC SERPL-MCNC: 98 MG/DL
PLATELET # BLD AUTO: 218 THOUSANDS/UL (ref 149–390)
PMV BLD AUTO: 10 FL (ref 8.9–12.7)
POTASSIUM SERPL-SCNC: 3.7 MMOL/L (ref 3.5–5.3)
RBC # BLD AUTO: 4.47 MILLION/UL (ref 3.88–5.62)
SODIUM SERPL-SCNC: 135 MMOL/L (ref 136–145)
TRIGL SERPL-MCNC: 138 MG/DL
TROPONIN I SERPL-MCNC: 0.02 NG/ML
WBC # BLD AUTO: 15.64 THOUSAND/UL (ref 4.31–10.16)

## 2018-10-31 PROCEDURE — 80048 BASIC METABOLIC PNL TOTAL CA: CPT | Performed by: INTERNAL MEDICINE

## 2018-10-31 PROCEDURE — 85027 COMPLETE CBC AUTOMATED: CPT | Performed by: INTERNAL MEDICINE

## 2018-10-31 PROCEDURE — 80061 LIPID PANEL: CPT | Performed by: INTERNAL MEDICINE

## 2018-10-31 PROCEDURE — 99222 1ST HOSP IP/OBS MODERATE 55: CPT | Performed by: INTERNAL MEDICINE

## 2018-10-31 PROCEDURE — 93970 EXTREMITY STUDY: CPT | Performed by: SURGERY

## 2018-10-31 PROCEDURE — 94660 CPAP INITIATION&MGMT: CPT

## 2018-10-31 PROCEDURE — 93970 EXTREMITY STUDY: CPT

## 2018-10-31 PROCEDURE — 82948 REAGENT STRIP/BLOOD GLUCOSE: CPT

## 2018-10-31 PROCEDURE — 99232 SBSQ HOSP IP/OBS MODERATE 35: CPT | Performed by: INTERNAL MEDICINE

## 2018-10-31 RX ORDER — LOSARTAN POTASSIUM 50 MG/1
50 TABLET ORAL 2 TIMES DAILY
Status: DISCONTINUED | OUTPATIENT
Start: 2018-10-31 | End: 2018-11-03 | Stop reason: HOSPADM

## 2018-10-31 RX ORDER — NEBIVOLOL 10 MG/1
20 TABLET ORAL
Status: DISCONTINUED | OUTPATIENT
Start: 2018-10-31 | End: 2018-10-31

## 2018-10-31 RX ORDER — METOPROLOL SUCCINATE 50 MG/1
50 TABLET, EXTENDED RELEASE ORAL EVERY 12 HOURS
Status: DISCONTINUED | OUTPATIENT
Start: 2018-10-31 | End: 2018-11-01

## 2018-10-31 RX ADMIN — LOSARTAN POTASSIUM 50 MG: 50 TABLET ORAL at 17:16

## 2018-10-31 RX ADMIN — ASPIRIN 81 MG: 81 TABLET, CHEWABLE ORAL at 09:42

## 2018-10-31 RX ADMIN — FLUOXETINE HYDROCHLORIDE 40 MG: 20 CAPSULE ORAL at 09:42

## 2018-10-31 RX ADMIN — RIVAROXABAN 20 MG: 20 TABLET, FILM COATED ORAL at 13:19

## 2018-10-31 RX ADMIN — ALLOPURINOL 300 MG: 100 TABLET ORAL at 09:42

## 2018-10-31 RX ADMIN — LOSARTAN POTASSIUM 50 MG: 50 TABLET ORAL at 09:42

## 2018-10-31 RX ADMIN — DOCUSATE SODIUM 100 MG: 100 CAPSULE, LIQUID FILLED ORAL at 09:42

## 2018-10-31 RX ADMIN — PRAVASTATIN SODIUM 10 MG: 10 TABLET ORAL at 17:16

## 2018-10-31 RX ADMIN — AMLODIPINE BESYLATE 5 MG: 5 TABLET ORAL at 09:42

## 2018-10-31 RX ADMIN — HYDROCHLOROTHIAZIDE 25 MG: 25 TABLET ORAL at 09:42

## 2018-10-31 RX ADMIN — ENOXAPARIN SODIUM 150 MG: 150 INJECTION SUBCUTANEOUS at 09:42

## 2018-10-31 RX ADMIN — METOPROLOL SUCCINATE 50 MG: 50 TABLET, EXTENDED RELEASE ORAL at 20:13

## 2018-10-31 RX ADMIN — PANTOPRAZOLE SODIUM 40 MG: 40 TABLET, DELAYED RELEASE ORAL at 06:07

## 2018-10-31 NOTE — H&P
H&P Exam - Corry Odell  77 y o  male MRN: [id number]    Unit/Bed#: E4 -87 Encounter: [id number]    Chief complaint:  Referred for evaluation of atrial fibrillation with rapid ventricular rate    History of Present Illness     HPI:  Corry Odell  is a 77 y o  male with past medical history significant for hypertension, hyperlipidemia, diabetes type 2, and chronic diastolic congestive heart failure who presented for a scheduled hemorrhoidectomy today  Preoperatively patient was noted to have atrial fibrillation with rapid ventricular rate  He was noted to be diaphoretic  He was sent to the ER for further evaluation  Patient is noted to be in atrial fibrillation in the ER, however he was rate controlled  He did not require any rate control agents  Patient was referred for admission  Patient currently denies any chest pain, chest pressure, or chest discomfort  He denies any sensation of palpitations  Patient notes however for the past several months he has had fatigue, as well as dyspnea with exertion, and decreased exercise tolerance  Patient also notes for the past several months when he would check his blood pressure, his heart rate would vary widely with 2 subsequent blood pressure checks, only a few minutes apart  He notes his heart rate with speed up as far as 120  Patient relates he has had lower extremity edema  He notes this improved with his family doctor decreasing his Norvasc from 10 mg to 5 mg  Patient relates he is intolerant to diltiazem  He is not certain of the reaction  Patient also relates that he had dyspnea while he was on metoprolol  He believes this was on high doses of metoprolol, but he did not feel comfortable restarting this medication  Patient relates that he has white coat syndrome  He notes his blood pressure was markedly elevated when he goes to his family doctor's office    He notes he has a machine and checks his blood pressure at home  He relates that in the morning, prior to taking his blood pressure medications his systolic blood pressure is usually 160s  However in the evening, systolic blood pressures in the 120s  Patient denies any current pain anywhere  He denies any headache, chest pain, back pain, abdominal pain  He denies any nausea, vomiting, diarrhea  He denies any shortness of breath at rest or cough  He denies any dizziness or lightheadedness  Patient notes he has never had a DVT or PE  He notes however DVTs run in his family  He denies any recent calf pain or swelling  He denies any travel        Historical Information   Past Medical History:   Diagnosis Date    BPH (benign prostatic hyperplasia)     Chronic diastolic (congestive) heart failure (HCC)     Depression     Diabetes mellitus (HCC)     GERD (gastroesophageal reflux disease)     Hyperlipidemia     Hypertension     Hyponatremia     last assessed: 09/08/2014    Leukocytosis     Liver function abnormality     Morbid obesity (HCC)     Obstructive sleep apnea     Sleep apnea      Patient Active Problem List   Diagnosis    Gastroesophageal reflux disease without esophagitis    Benign colon polyp    Depression with anxiety    Type 2 diabetes mellitus without complication, without long-term current use of insulin (HCC)    Edema of both legs    Fatigue    Gout    Hyperlipidemia    Essential hypertension    Hypokalemia    Leukocytosis    Microscopic hematuria    Morbid obesity (HCC)    Peripheral neuropathy    MARISSA (obstructive sleep apnea)    Testicular hypogonadism    Thyroglossal duct cyst    Benign prostatic hyperplasia with urinary frequency    Chronic rhinitis    Chronic diastolic (congestive) heart failure (HCC)    GERD (gastroesophageal reflux disease)    Obstructive sleep apnea    Depression    BPH (benign prostatic hyperplasia)    A-fib (HCC)    Dyspnea on exertion     Past Surgical History:   Procedure Laterality Date    CARDIAC CATHETERIZATION      outcome: Neg    CARDIOVASCULAR STRESS TEST      resolved: 10/27/2010; negative    COLONOSCOPY  11/2013    polyp; complete    HERNIA REPAIR      resolved: 11/1999    REPLACEMENT TOTAL KNEE Left 2010    REPLACEMENT TOTAL KNEE Right 2003    THYROGLOSSAL DUCT EXCISION      TONSILLECTOMY      last assessed: 06/02/2014       Social History   History   Alcohol Use    Yes     Comment: Social: 5-6 on the weekends     History   Drug Use No     History   Smoking Status    Never Smoker   Smokeless Tobacco    Never Used       Family History:   Family History   Problem Relation Age of Onset    Diabetes Mother     Heart attack Mother     Hypertension Mother     Heart attack Sister        Meds/Allergies       Current Facility-Administered Medications:     acetaminophen (TYLENOL) tablet 650 mg, 650 mg, Oral, Q6H PRN, Enrrique Irizarry MD    [START ON 10/31/2018] allopurinol (ZYLOPRIM) tablet 300 mg, 300 mg, Oral, Daily, Enrrique Irizarry MD    [START ON 10/31/2018] amLODIPine (NORVASC) tablet 5 mg, 5 mg, Oral, Daily, MD Beatrice Lyons  [START ON 10/31/2018] aspirin tablet 81 mg, 81 mg, Oral, Daily, Enrrique Irizarry MD    digoxin (LANOXIN) injection 125 mcg, 125 mcg, Intravenous, Once PRN, Enrrique Irizarry MD    docusate sodium (COLACE) capsule 100 mg, 100 mg, Oral, BID, Enrrique Irizarry MD    enoxaparin (LOVENOX) subcutaneous injection 150 mg, 1 mg/kg, Subcutaneous, Q12H [facility], MD Beatrice Lyons ON 10/31/2018] FLUoxetine (PROzac) capsule 40 mg, 40 mg, Oral, Daily, MD Beatrice Lyons ON 10/31/2018] hydrochlorothiazide (HYDRODIURIL) tablet 25 mg, 25 mg, Oral, Daily, Enrrique Irizarry MD    losartan (COZAAR) tablet 50 mg, 50 mg, Oral, BID, Enrrique Irizarry MD    nebivolol (BYSTOLIC) tablet 10 mg, 10 mg, Oral, HS, Enrrique Irizarry MD    ondansetron (ZOFRAN) injection 4 mg, 4 mg, Intravenous, Q6H PRN, Enrrique Irizarry MD    [START ON 10/31/2018] pantoprazole (PROTONIX) EC tablet 40 mg, 40 mg, Oral, Early Morning, MD Eagle Kirby  Manrique Locks ON 10/31/2018] pravastatin (PRAVACHOL) tablet 10 mg, 10 mg, Oral, Daily With Jenni Gonzalez MD    Allergies   Allergen Reactions    Benazepril Cough    Clonidine Fatigue    Diltiazem Bradycardia    Entex T  [Pseudoephedrine-Guaifenesin]      Annotation - [id number]: LEG SWELLING       Review of Systems  A detailed 12 point review of systems was conducted and is negative apart from those mentioned in the HPI  Objective   Vitals: Blood pressure 160/92, pulse 89, temperature 98 4 °F (36 9 °C), temperature source Tympanic, resp  rate 22, height 5' 10" (1 778 m), weight (!) 151 kg (332 lb 14 3 oz), SpO2 94 %  Physical Exam   HEENT: EOMI, sclera anicteric, dry mucous membranes, tongue mucosa dry without lesions  Neck: supple, no JVD,   Heart:  Irregularly irregular, S1S2 present  No murmur, rub or gallop  Lungs; Clear to auscultation bilaterally  No wheezing, crackles or rhonchi  No accessory muscle use or respiratory distress  Abdomen: soft, non-tender, non-distended, NABS  No guarding or rebound  No peritoneal sound or mass  Extremities: no clubbing, cyanosis  1+ nonpitting edema bilateral lower extremities, and 1+ bilateral pedal edema     2+ pedal pulses bilaterally  Full range of motion  Neurologic:  Awake and alert  Fluent speech  Skin: warm and dry  No petechiae, purpura or rash      Lab Results:     Results from last 7 days  Lab Units 10/30/18  1511   WBC Thousand/uL 13 21*   HEMOGLOBIN g/dL 15 6   HEMATOCRIT % 44 1   PLATELETS Thousands/uL 233       Results from last 7 days  Lab Units 10/30/18  1511   SODIUM mmol/L 135*   POTASSIUM mmol/L 4 0   CHLORIDE mmol/L 96*   CO2 mmol/L 27   BUN mg/dL 16   CREATININE mg/dL 1 14   CALCIUM mg/dL 9 9         Imaging:  Chest x-ray:  No acute disease      Assessment/Plan   1-atrial fibrillation with rapid ventricular rate:  Patient initially presented with atrial fibrillation with rapid ventricular rate  In the ER however his heart rate has been adequately controlled and did not require the initiation of any medication  -patient relates he is unable take diltiazem as he was previously allergic to it  He also notes he is unable to take metoprolol as it induced dyspnea in the past   It is noted that this dyspnea was on higher doses as his metoprolol and initially started at 50 mg that had been increased to 100 mg  Patient's he does not wish to try either of these medications at this time  -patient's heart rate is currently controlled  If he has a rapid rate, will initiate digoxin  -TSH within normal limits   -check 2D echocardiogram   -based on patient's history, he has been having intermittent episodes of atrial fibrillation for several weeks now    -will start anticoagulation with Lovenox 1 mg/kg subcu q 12 hours   -patient has a history of obstructive sleep apnea that may predispose him to atrial fibrillation  -patient's left calf is also tender to palpation  He notes a family history of DVTs  Will check a D-dimer to risk stratify for thrombotic event  If elevated will also check a CTA of the chest to evaluate for pulmonary embolism, especially with patient's new onset of dyspnea with exertion over the past several weeks    2-chronic diastolic congestive heart failure:  Patient's 2D echocardiogram from 08/2017 revealed a left ventricular ejection fraction of 60%, and grade 2 diastolic dysfunction  Patient is not have any evidence of current volume overload or congestive heart failure exacerbation    -check 2D echocardiogram due to atrial fibrillation   -patient takes Lasix on a p r n   Basis at home  He also takes daily hydrochlorothiazide    3-dyspnea on exertion:  Patient's he has had dyspnea on exertion the past several weeks      -This may be related to atrial fibrillation with rapid ventricular rate      - He also has a history of chronic diastolic congestive heart failure which may contribute to his dyspnea  -Patient also being evaluated with a D-dimer for possible PE risk stratification    4-essential hypertension:  Patient is currently on Norvasc 5 mg tablet daily, hydrochlorothiazide 25 mg daily, losartan 50 mg b i d , and Bystolic 10 mg at bedtime  -he notes his blood pressure typically ranges systolic we 537 in the morning as well as 120 in the evening    -will attempt to titrate his antihypertensive regimen, for heart rate control, as well as blood pressure control  Patient's unfortunately he is not able to tolerate diltiazem or metoprolol  Collaborate with the cardiology service  5-hyperlipidemia:  Continue statin    6-diabetes type 2:  Without long-term use of insulin  Without complication  Home metformin on hold  Will check Accu-Cheks and give sliding scale insulin    7-morbid obesity:  Nutritional counseling    8-depression:  Continue home fluoxetine  He takes 40 mg once daily    9-chronic leukocytosis:  White count at baseline at 13  Continue outpatient follow-up    Disposition:  Based on patient's new onset atrial fibrillation, it is anticipated that his length of stay will be 1 midnight, and he will be placed on observation status  This will be readdressed tomorrow based on his test results and heart rate  Code Status: Level 1 - Full Code        Portions of the record may have been created with voice recognition software

## 2018-10-31 NOTE — PROGRESS NOTES
I spoke with Dr Kvng Campos in reference to patient's admission to floor  Order was placed for a D-Dimer to be checked and for SLIM on call to be notified if elevated  D-Dimer did result very elevated and was reported to Fernando REILLY and a new STAT order of a CTA was placed to be completed

## 2018-10-31 NOTE — PROGRESS NOTES
Progress Note - Lco Montenegro  77 y o  male MRN: [id number]    Unit/Bed#: E4 -01 Encounter: [id number]      Assessment/Plan:  1-atrial fibrillation with rapid ventricular rate:  Patient initially presented with atrial fibrillation with rapid ventricular rate  He was admitted to the hospital for further evaluation  -TSH within normal limits              -awake 2D echocardiogram              -based on patient's symptom history, he has been having intermittent episodes of atrial fibrillation for several weeks now               -patient was evaluated by the cardiology service  He recommended SANDRITA cardioversion, and heart rate control with Toprol XL, instead of patient's home Bystolic   -initially patient was started on anticoagulation with Lovenox 1 mg/kg subcu q 12 hours  He was transitioned to Xarelto, by the cardiology service  -patient has a history of obstructive sleep apnea that may predispose him to atrial fibrillation               -due to his elevated D-dimer, patient underwent a CTA of the chest that was unremarkable for a pulmonary embolism      2-chronic diastolic congestive heart failure:  Patient's 2D echocardiogram from 08/2017 revealed a left ventricular ejection fraction of 60%, and grade 2 diastolic dysfunction  Patient does not have any evidence of current volume overload or congestive heart failure exacerbation               -check 2D echocardiogram due to atrial fibrillation              -patient takes Lasix on a p r n   Basis at home  He also takes daily hydrochlorothiazide     3-dyspnea on exertion:  Patient's he has had dyspnea on exertion the past several weeks                 -This may be related to atrial fibrillation with rapid ventricular rate  - He also has a history of chronic diastolic congestive heart failure which may contribute to his dyspnea                  -CTA of the chest negative for PE, or any other pulmonary pathology  -echocardiogram pending     4-essential hypertension:  Patient is currently on Norvasc 5 mg tablet daily, hydrochlorothiazide 25 mg daily, losartan 50 mg b i d , and Bystolic 10 mg at bedtime  -he notes his blood pressure typically ranges systolic we 939 in the morning as well as 120 in the evening               -patient's medication regimen is being titrated, with the recommendations of the cardiology service  Continue to monitor     5-hyperlipidemia:  Continue statin     6-diabetes type 2:  Without long-term use of insulin  Without complication  Home metformin on hold  Will check Accu-Cheks and give sliding scale insulin   -blood sugars today were adequately controlled, with blood sugars of 121, 113, and 116     7-morbid obesity:  Nutritional counseling     8-depression:  Continue home fluoxetine  He takes 40 mg once daily     9-chronic leukocytosis:  White count at baseline  Continue outpatient follow-up    10-probable coronary disease:  Patient's CT scan revealed evidence of aortic and coronary atherosclerosis  Lipid panel pending  Cardiology service recommends evaluating for intensive statin therapy due to evidence of atherosclerosis in the coronary arteries   -due to patient's dyspnea on exertion, and decreased exercise tolerance, will confer with Cardiology service if further ischemic testing is warranted   -patient does not appear to have a prior stress test in the system    11-family:  Updated patient's wife at the bedside and answered all questions  Reviewed all test results and treatment plan     Discussed with Dr Tamara Vickers     VTE Pharmacologic Prophylaxis: RX contraindicated due to: Xarelto  VTE Mechanical Prophylaxis: sequential compression device    Certification Statement: The patient will continue to require additional inpatient hospital stay due to need for further acute intervention for SANDRITA and cardioversion in a m      Status: inpatient ===================================================================    Subjective:  Patient relates that he did have some dyspnea on exertion  He denies any chest congestion or cough  He denies any current pain anywhere  He notes he was ambulating and denies any dizziness or lightheadedness  He is tolerating p o  Denies any nausea, vomiting, diarrhea  Physical Exam:   Temp:  [96 4 °F (35 8 °C)-98 °F (36 7 °C)] 96 4 °F (35 8 °C)  HR:  [74-92] 74  Resp:  [22] 22  BP: (132-157)/(87-98) 150/87    Gen:  Pleasant, non-tachypnic, non-dyspnic  Conversant  Sitting up in a chair  Laughing and joking with his wife  Heart:  Irregularly irregular , S1S2 present, no murmur, rub or gallop  Lungs:  Decreased air movement bilaterally  Coarse breath sounds, however no wheezing, crackles, or rhonchi  No accessory muscle use or respiratory distress  Abd: soft, non-tender, non-distended  NABS, no guarding, rebound or peritoneal signs  Extremities: no clubbing, cyanosis  1+ bilateral lower extremity edema     2+pedal pulses bilaterally  Full range of motion  Neuro: awake, alert and oriented  Fluent and goal directed speech  moving all 4 extremities  Skin: warm and dry: no petechiae, purpura and rash  LABS:     Results from last 7 days  Lab Units 10/31/18  0606 10/30/18  1511   WBC Thousand/uL 15 64* 13 21*   HEMOGLOBIN g/dL 14 3 15 6   HEMATOCRIT % 41 0 44 1   PLATELETS Thousands/uL 218 233       Results from last 7 days  Lab Units 10/31/18  0606 10/30/18  1511   SODIUM mmol/L 135* 135*   POTASSIUM mmol/L 3 7 4 0   CHLORIDE mmol/L 97* 96*   CO2 mmol/L 26 27   BUN mg/dL 16 16   CREATININE mg/dL 1 15 1 14   CALCIUM mg/dL 9 2 9 9       Hospital Data:    Chest x-ray:  No acute disease    CTA chest PE study: No evidence of pulmonary embolus    No evidence of acute intrathoracic pathology    Bilateral lower extremity ultrasound:  Pending    Echocardiogram: Pending        ---------------------------------------------------------------------------------------------------------------  This note has been constructed using a voice recognition system

## 2018-10-31 NOTE — CONSULTS
Consult - Cardiology   Soumya Mcdonnell  77 y o  male MRN: [id number]  Unit/Bed#: E4 -01 Encounter: [id number]        Reason For Consult:  Atrial fibrillation               Assessment and Plan:     1  Atrial fibrillation:  New diagnosis  RVR on arrival; current heart rate 70-90s  Poorly felt by patient  At times mildly symptomatic with intermittent and variable reduction in effort tolerance  2  Hypertension:  Patient reports chronic pattern of high morning pressures with otherwise overall normotensive trend on pre-hospital regimen of Bystolic 10 mg q h s , Cozaar 50 mg b i d , HCTZ 25 mg + Norvasc 5 mg q a m   3  Dyslipidemia:  Pre-hospital treatment with Pravachol  4  Obstructive sleep apnea:  CPAP compliant  5  Obesity  6  Diabetes mellitus    · Echocardiogram forthcoming  · For atrial fibrillation we will for now pursue rate control strategy advancing Bystolic to to 20 mg daily  Option of ablation versus antiarrhythmic therapy was discussed with the patient and can be readdressed in the outpatient setting  And while not currently an option, because of probable duration of dysrhythmia, future cardioversion can be considered  · Anticoagulation indicated with chads Vasc score of 3 (age, hypertension, diabetes)  Options discussed with the patient with expressed preference for DOAC (direct oral anticoagulant)  Will initiate Xarelto 20 mg daily  · For the patient's hypertension we will continue to follow his trend with advance of Bystolic perhaps needing to consider near future reduction in Norvasc or Cozaar  With current BP trend will continue same for now      History Of Present Illness: This gentleman is a 70-year-old historically cared for by Dr Chito Rivera of our group typically seen annually for management of hypertension, dyslipidemia, and mild peripheral edema (last seen August 2017)      # previous echocardiogram, 8/2017:  Normal EF with no RWMA, Gr II diastolic dysfunction, mild left atrial dilatation, without sign of valvular heart disease  # Lexiscan Myoview stress test 4/2013:  Normal by ECG criteria with no perfusion defect    Yesterday the patient presented to an outpatient surgery center for elective hemorrhoidectomy where he was discovered to have an irregular and rapid heart rate  ECG confirmed atrial fibrillation prompting EMS transfer and subsequent admission to this campus  He remains in atrial fibrillation with a controlled ventricular rate at the time my visit  The patient is not keenly aware of his heart rate rhythm though he does indicate that with routine blood pressure checks he has for at least the last 2-3 months noted some rapid variability in his heart rate  During the same amount of time he has had some generalized fatigue and varying degrees of effort tolerance which seem new compared to the way he had felt earlier in the year  He has had note chest pain, orthopnea, PND, syncope or presyncope        Past Medical History:        Past Medical History:   Diagnosis Date    BPH (benign prostatic hyperplasia)     Chronic diastolic (congestive) heart failure (HCC)     Depression     [facility] [facility]     GERD (gastroesophageal reflux disease)     Hyperlipidemia     Hypertension     Hyponatremia     last assessed: 09/08/2014    Leukocytosis     Liver function abnormality     Morbid obesity [facility] [address] [facility]     Obstructive sleep apnea     Sleep apnea     Past Surgical History:   Procedure Laterality Date    CARDIAC CATHETERIZATION      outcome: Neg    CARDIOVASCULAR STRESS TEST      resolved: 10/27/2010; negative    COLONOSCOPY  11/2013    polyp; complete    HERNIA REPAIR      resolved: 11/1999    REPLACEMENT TOTAL KNEE Left 2010    REPLACEMENT TOTAL KNEE Right 2003    THYROGLOSSAL DUCT EXCISION      TONSILLECTOMY      last assessed: 06/02/2014        Allergy:        Allergies   Allergen Reactions    Benazepril Cough    Clonidine Fatigue    Diltiazem Bradycardia    Entex T  [Pseudoephedrine-Guaifenesin]      [facility] - [id number]: LEG SWELLING       Medications:       Prior to Admission medications    Medication Sig Start Date End Date Taking?  Authorizing Provider   aspirin 81 MG tablet Take 1 tablet by mouth daily 7/7/14  Yes Historical Provider, MD   allopurinol (ZYLOPRIM) 300 mg tablet Take 1 tablet by mouth daily 3/22/12   Historical Provider, MD   amLODIPine (NORVASC) 5 mg tablet Take 1 tablet (5 mg total) by mouth daily 6/20/18   Lucien Nguyễn MD   chlorhexidine (PERIDEX) 0 12 % solution  7/11/18   Historical Provider, MD   FLUoxetine (PROzac) 40 MG capsule Take 2 capsules by mouth daily 6/12/14   Historical Provider, MD   furosemide (LASIX) 20 mg tablet Take by mouth 2/27/12   Historical Provider, MD   hydrochlorothiazide (MICROZIDE) 12 5 mg capsule Take 2 Capsules by mouth two times a day 8/23/18   Lucien Nguyễn MD   losartan (COZAAR) 50 mg tablet Take 1 tablet (50 mg total) by mouth 2 (two) times a day 2/7/18   Esther Forde MD   metFORMIN (GLUCOPHAGE) 500 mg tablet Take 1 tablet (500 mg total) by mouth daily with breakfast 4/5/18   Lucien Nguyễn MD   nebivolol (BYSTOLIC) 10 mg tablet Take 1 tablet (10 mg total) by mouth daily 2/1/18   Esther Forde MD   omeprazole (PriLOSEC) 40 MG capsule TAKE 1 CAPSULE BY MOUTH  EVERY DAY 1/28/18   Wilton Denson,    pravastatin (PRAVACHOL) 10 mg tablet Take 1 tablet by mouth 4/3/13   Historical Provider, MD       Family History:     Family History   Problem Relation Age of Onset    Diabetes Mother     Heart attack Mother     Hypertension Mother     Heart attack Sister         Social History:       Social History     Social History    Marital status: /Civil Union     Spouse name: N/A    Number of children: N/A    Years of education: N/A     Occupational History    Disability           significant asbestos and silica exposure in the past     Social History Main Topics    Smoking status: Never Smoker    Smokeless tobacco: Never Used    Alcohol use Yes      Comment: Social: 5-6 on the weekends    Drug use: No    Sexual activity: Not Currently     Other Topics Concern    None     Social History Narrative    None       ROS:  Symptoms per HPI  Compliant with CPAP  Chronic mild dependent edema without recent worsening  Occasional arthralgias  Remainder review of systems is negative    Exam:  General:  Alert, normally conversant, comfortable appearing gentleman who visually appears to have a BMI of obesity  Head: Normocephalic, atraumatic  Eyes:  EOMI  Pupils - equal, round, reactive to accomodation  No icterus  Normal Conjunctiva  Oropharynx:  Moist without lesion  Neck:  No gross bruit, JVD, thyromegaly, or lymphadenopathy  Heart:  Somewhat distant, Irregular with controlled rate  No rub nor pathologic murmur  Lungs: Moderate excursion air exchange- Clear without rales/rhonchi/wheeze  Abdomen:  Soft and nontender with normal bowel sounds  No organomegaly or mass  Lower Limbs:  No edema  Pulses[de-identified]  RLE - DP:  2+                 LLE - DP:  2+  Musculoskeletal: Independent movement of limbs observed, Formal ROM and strength eval not performed  Neurologic:    Oriented to: person , place, situation  Cranial Nerves: grossly intact - vision, smell, taste, and hearing  were not tested       Motor function:grossly normal, symmetric   Sensation: Was not tested

## 2018-10-31 NOTE — UTILIZATION REVIEW
Initial Clinical Review    Admission: Date/Time/Statement:  OBS 10/30 @ 1706    Orders Placed This Encounter   Procedures    Place in Observation (expected length of stay for this patient is less than two midnights)     Standing Status:   Standing     Number of Occurrences:   1     Order Specific Question:   Admitting Physician     Answer:   Marquez Lindsay     Order Specific Question:   Level of Care     Answer:   Med Surg [16]         ED: Date/Time/Mode of Arrival:   ED Arrival Information     Expected Arrival [address] Wright of Arrival Escorted By Service Admission Type    10/30/2018  10/30/2018 14:36 Urgent Ambulance [facility] Urgent    Arrival Complaint    new onset a fib          Chief Complaint:   Chief Complaint   Patient presents with    Rapid Heart Rate     Pt was scheduled for outpatient hemhroidectomy today  Pt noted to be in A-fibb on Monitor at center, no history  Denies chest pain or SOB  Pt speaking in full sentances  HR between  as per EMS  History of Illness: 75-year-old male with a past medical history significant for hypertension, hyperlipidemia, diabetes, morbid obesity who presents with shortness of breath, alternating heart rate that was seen at the outpatient surgery center when patient presented for his hemorrhoidectomy  Per report, patient presented for his procedure and was noted to be diaphoretic  An EKG was done which was read as atrial fibrillation with rapid ventricular response, therefore patient was sent over to the emergency department for evaluation  Patient reports that over the last 3 months he has been increasingly fatigued, dyspneic with exertion as compared to prior ability to walk up steps without difficulty, and notes changes in his heart rate    Patient reports that he takes his blood pressure daily and sometimes he takes it a few minutes apart and has noted that at 1 time he can have a heart rate in the 60s and then when he read takes it his heart rate is in the 100s  Denies sensing the difference in heart rate  Denies chest pain, chest tightness, dizziness, syncope, fevers, chills, cough  HR ranging from 65-125bpm     ED Vital Signs:   ED Triage Vitals   Temperature Pulse Respirations Blood Pressure SpO2   10/30/18 1713 10/30/18 1439 10/30/18 1439 10/30/18 1439 10/30/18 1439   98 5 °F (36 9 °C) 91 17 (!) 193/114 99 %      Temp Source Heart Rate Source Patient Position - Orthostatic VS BP Location FiO2 (%)   10/30/18 1713 10/30/18 1439 10/30/18 1439 10/30/18 1439 --   Oral Monitor Sitting Right arm       Pain Score       10/30/18 1439       No Pain        Wt Readings from Last 1 Encounters:   10/30/18 (!) 151 kg (332 lb 14 3 oz)       Vital Signs (abnormal):   10/30/18 1745 -- 102  27  174/83 -- -- CP   10/30/18 1713 98 5 °F (36 9 °C) -- -- -- -- -- AG   10/30/18 1700 -- 100  26  180/96 93 % -- AG   10/30/18 1655 --  108 15  180/96 94 % -- AG   10/30/18 1442 -- -- -- -- --  151 kg (332 lb 14 3 oz) AG   10/30/18 1439 -- 91 17  193/114        Abnormal Labs/Diagnostic Test Results:   WBC's 13 21  Na 135,   Cl 96  CXR: No acute cardiopulmonary disease    EKG: Atrial fibrillation with rapid ventricular response  D-Dimer >10,000      ED Treatment:   Medication Administration from 10/30/2018 1352 to 10/30/2018 1807       Date/Time Order Dose Route Action Action by Comments     10/30/2018 1655 sodium chloride 0 9 % bolus 1,000 mL 0 mL Intravenous Stopped Nathalia Parra RN      10/30/2018 1530 sodium chloride 0 9 % bolus 1,000 mL 1,000 mL Intravenous New Bag Nathalia Parra RN      10/30/2018 1617 aspirin chewable tablet 324 mg 324 mg Oral Given Nathalia Parra RN      10/30/2018 1726 hydrochlorothiazide (HYDRODIURIL) tablet 25 mg 25 mg Oral Given Nathalia Parra RN           Past Medical/Surgical History:   Past Medical History:   Diagnosis Date    BPH (benign prostatic hyperplasia)     Chronic diastolic (congestive[facility] failure ([facility] )     Depression     Diabetes mellitus ([facility] )     GERD (gastroesophageal reflux disease)     Hyperlipidemia     Hypertension     Hyponatremia     Leukocytosis     Liver function abnormality     Morbid obesity (HCC)     Obstructive sleep apnea     Sleep apnea        Admitting Diagnosis: Diaphoresis [R61]  A-fib (HCC) [I48 91]  Dyspnea on exertion [R06 09]  EKG abnormalities [R94 31]    Age/Sex: 77 y o  male    Assessment/Plan:  78 yo female presents with   atrial fibrillation with rapid ventricular rate:  Patient initially presented with atrial fibrillation with rapid ventricular rate  In the ER however his heart rate has been adequately controlled and did not require the initiation of any medication  -patient relates he is unable take diltiazem as he was previously allergic to it  He also notes he is unable to take metoprolol as it induced dyspnea in the past   It is noted that this dyspnea was on higher doses as his metoprolol and initially started at 50 mg that had been increased to 100 mg  Patient's he does not wish to try either of these medications at this time  -patient's heart rate is currently controlled  If he has a rapid rate, will initiate digoxin  -TSH within normal limits              -check 2D echocardiogram              -based on patient's history, he has been having intermittent episodes of atrial fibrillation for several weeks now               -will start anticoagulation with Lovenox 1 mg/kg subcu q 12 hours              -patient has a history of obstructive sleep apnea that may predispose him to atrial fibrillation  -patient's left calf is also tender to palpation  He notes a family history of DVTs  Will check a D-dimer to risk stratify for thrombotic event    If elevated will also check a CTA of the chest to evaluate for pulmonary embolism, especially with patient's new onset of dyspnea with exertion over the past several weeks     2-chronic diastolic congestive heart failure:  Patient's 2D echocardiogram from 08/2017 revealed a left ventricular ejection fraction of 60%, and grade 2 diastolic dysfunction  Patient is not have any evidence of current volume overload or congestive heart failure exacerbation               -check 2D echocardiogram due to atrial fibrillation              -patient takes Lasix on a p r n   Basis at home  He also takes daily hydrochlorothiazide     3-dyspnea on exertion:  Patient's he has had dyspnea on exertion the past several weeks                 -This may be related to atrial fibrillation with rapid ventricular rate  - He also has a history of chronic diastolic congestive heart failure which may contribute to his dyspnea  -Patient also being evaluated with a D-dimer for possible PE risk stratification     4-essential hypertension:  Patient is currently on Norvasc 5 mg tablet daily, hydrochlorothiazide 25 mg daily, losartan 50 mg b i d , and Bystolic 10 mg at bedtime  -he notes his blood pressure typically ranges systolic we 978 in the morning as well as 120 in the evening               -will attempt to titrate his antihypertensive regimen, for heart rate control, as well as blood pressure control  Patient's unfortunately he is not able to tolerate diltiazem or metoprolol  Collaborate with the cardiology service      5-hyperlipidemia:  Continue statin     6-diabetes type 2:  Without long-term use of insulin  Without complication  Home metformin on hold  Will check Accu-Cheks and give sliding scale insulin     7-morbid obesity:  Nutritional counseling     8-depression:  Continue home fluoxetine  He takes 40 mg once daily     9-chronic leukocytosis:  White count at baseline at 13   Continue outpatient follow-up     Disposition:  Based on patient's new onset atrial fibrillation, it is anticipated that his length of stay will be 1 midnight, and he will be placed on observation status  This will be readdressed tomorrow based on his test results and heart rate        Admission Orders: OBS  TELE  Consult Cardio  Serial trops  ECHO  D Dimer  Bilat LE Duplex  CBC, BMP in am  SCD's  CPAP @ hs    Scheduled Meds:   Current Facility-Administered Medications:  acetaminophen 650 mg Oral Q6H PRN Veneda Ryan, MD   allopurinol 300 mg Oral Daily Veneda Mu, MD   amLODIPine 5 mg Oral Daily Veneda Ryan, MD   aspirin 81 mg Oral Daily Veneda Ryan, MD   digoxin 125 mcg Intravenous Once PRN Veneda Mu, MD   docusate sodium 100 mg Oral BID Veneda Mu, MD   enoxaparin 1 mg/kg Subcutaneous Q12H Sandra Butterfield MD   FLUoxetine 40 mg Oral Daily Veneda Mu, MD   hydrochlorothiazide 25 mg Oral Daily Veneda Ryan, MD   insulin lispro 1-5 Units Subcutaneous TID Nadia Napier MD   insulin lispro 1-5 Units Subcutaneous HS Veneda Mu, MD   losartan 50 mg Oral BID Veneda Mu, MD   nebivolol 10 mg Oral HS Veneda Mu, MD   ondansetron 4 mg Intravenous Q6H PRN Veneda Mu, MD   pantoprazole 40 mg Oral Daily Before Breakfast Veneda Mu, MD   pravastatin 10 mg Oral Daily With Fior Oseguera MD     Continuous Infusions:    PRN Meds:   acetaminophen    digoxin    Ondansetron  10/31: 98 - 88 - 22   157/98   RA  WBC's 15 64  Per Cardiio: New onset atrial fibrillation  Would favor SANDRITA cardioversion-will try to set up for tomorrow  NPO after midnight      Need echocardiogram to rule out tachycardia induced cardiomyopathy      Will use Toprol XL instead of Bystolic      Will check lipids and consider intensifying statin given evidence of atherosclerosis in the coronary arteries  Thank you,  Aysha Johns [facility] Review Department  Phone: [phone];  Fax [phone]  ATTENTION: Please call with any questions or concerns to [phone]  and carefully follow the prompts so that you are directed to the right person  Send all requests for admission clinical reviews, approved or denied determinations and any other requests to fax [phone]   All voicemails are confidential

## 2018-10-31 NOTE — PHYSICIAN ADVISOR
Current patient class: Inpatient  The patient is currently on Hospital Day: 2 at [facility]      The patient was admitted to the hospital at [id number] on 10/31/18 for the following diagnosis:  Diaphoresis [R61]  A-fib ([address] ) [I48 91]  Dyspnea on exertion [R06 09]  EKG abnormalities [R94 31]       There is documentation in the medical record of an expected length of stay of at least 2 midnights  The patient is therefore expected to satisfy the 2 midnight benchmark and given the 2 midnight presumption is appropriate for INPATIENT ADMISSION  Given this expectation of a satisfying stay, CMS instructs us that the patient is most often appropriate for inpatient admission under part A provided medical necessity is documented in the chart  After review of the relevant documentation, labs, vital signs and test results, the patient is appropriate for INPATIENT ADMISSION  Admission to the hospital as an inpatient is a complex decision making process which requires the practitioner to consider the patients presenting complaint, history and physical examination and all relevant testing  With this in mind, in this case, the patient was deemed appropriate for INPATIENT ADMISSION  After review of the documentation and testing available at the time of the admission I concur with this clinical determination of medical necessity  Rationale is as follows: The patient is a 77 yrs old Male who presented to the ED at 10/30/2018  2:37 PM with a chief complaint of Rapid Heart Rate (Pt was scheduled for outpatient hemhroidectomy today  Pt noted to be in A-fibb on Monitor at center, no history  Denies chest pain or SOB  Pt speaking in full sentances  HR between  as per EMS )   The patient is a 77-year-old male who presented with new onset rapid atrial fibrillation  He was admitted for cardiology evaluation, echocardiogram and adjustment of rate controlling medications    After evaluation by Cardiology they feel he is appropriate for SANDRITA cardioversion  Severity of illness and intensity of service warrant inpatient LOC given expected LOS >2 days        The patients vitals on arrival were ED Triage Vitals   Temperature Pulse Respirations Blood Pressure SpO2   10/30/18 1713 10/30/18 1439 10/30/18 1439 10/30/18 1439 10/30/18 1439   98 5 °F (36 9 °C) 91 17 (!) 193/114 99 %      Temp Source Heart Rate Source Patient Position - Orthostatic VS BP Location FiO2 (%)   10/30/18 1713 10/30/18 1439 10/30/18 1439 10/30/18 1439 --   Oral Monitor Sitting Right arm       Pain Score       10/30/18 1439       No Pain           Past Medical History:   Diagnosis Date    BPH (benign prostatic hyperplasia)     Chronic diastolic (congestive) heart failure (HCC)     Depression     Diabetes mellitus ([facility] [address] [facility]     GERD (gastroesophageal reflux disease)     Hyperlipidemia     Hypertension     Hyponatremia     last assessed: 09/08/2014    Leukocytosis     Liver function abnormality     Morbid obesity ([facility] [address] [facility]     Obstructive sleep apnea     Sleep apnea      Past Surgical History:   Procedure Laterality Date    CARDIAC CATHETERIZATION      outcome: Neg    CARDIOVASCULAR STRESS TEST      resolved: 10/27/2010; negative    COLONOSCOPY  11/2013    polyp; complete    HERNIA REPAIR      resolved: 11/1999    REPLACEMENT TOTAL KNEE Left 2010    REPLACEMENT TOTAL KNEE Right 2003    THYROGLOSSAL DUCT EXCISION      TONSILLECTOMY      last assessed: 06/02/2014           Consults have been placed to:   IP CONSULT TO CARDIOLOGY    Vitals:    10/31/18 0300 10/31/18 0755 10/31/18 1206 10/31/18 1616   BP: 134/98 157/98 132/94 150/87   BP Location: Left arm Left arm Left arm Left arm   Pulse: 92 88 77 74   Resp: 22 22 22 22   Temp: 97 6 °F (36 4 °C) 98 °F (36 7 °C)  (!) 96 4 °F (35 8 °C)   TempSrc: Tympanic Temporal  Oral   SpO2: 93% 97% 96% 96%   Weight:       Height:           Most recent labs:    Recent Labs      10/30/18   1511 10/30/18   1513   10/30/18   2339  10/31/18   0606   WBC  13 21*   --    --    --   15 64*   HGB  15 6   --    --    --   14 3   HCT  44 1   --    --    --   41 0   PLT  233   --    --    --   218   K  4 0   --    --    --   3 7   NA  135*   --    --    --   135*   CALCIUM  9 9   --    --    --   9 2   BUN  16   --    --    --   16   CREATININE  1 14   --    --    --   1 15   INR   --   1 06   --    --    --    TROPONINI  0 02   --    < >  0 02   --    AST  36   --    --    --    --    ALT  57   --    --    --    --    ALKPHOS  50   --    --    --    --     < > = values in this interval not displayed         Scheduled Meds:  Current Facility-Administered Medications:  acetaminophen 650 mg Oral Q6H PRN Pablo Gayle MD   allopurinol 300 mg Oral Daily Pablo Gayle MD   amLODIPine 5 mg Oral Daily Pablo Gayle MD   aspirin 81 mg Oral Daily Pablo Gayle MD   digoxin 125 mcg Intravenous Once PRN Pablo Gayle MD   docusate sodium 100 mg Oral BID Pablo Gayle MD   FLUoxetine 40 mg Oral Daily Pablo Gayle MD   hydrochlorothiazide 25 mg Oral Daily Pablo Gayle MD   insulin lispro 1-5 Units Subcutaneous TID Sarahi Garcia MD   insulin lispro 1-5 Units Subcutaneous HS Pablo Gayle MD   losartan 50 mg Oral BID Renaldo Caballero PA-C   metoprolol succinate 50 mg Oral Q12H Missy Rosales MD   ondansetron 4 mg Intravenous Q6H PRN Pablo Gayle MD   pantoprazole 40 mg Oral Daily Before Breakfast Pablo Gayle MD   pravastatin 10 mg Oral Daily With Shiela Luciano MD   rivaroxaban 20 mg Oral Daily With Breakfast Renaldo Caballero PA-C     Continuous Infusions:   PRN Meds:   acetaminophen    digoxin    ondansetron    Surgical procedures (if appropriate):

## 2018-11-01 ENCOUNTER — ANESTHESIA EVENT (INPATIENT)
Dept: NON INVASIVE DIAGNOSTICS | Facility: HOSPITAL | Age: 68
DRG: 309 | End: 2018-11-01
Payer: MEDICARE

## 2018-11-01 LAB
GLUCOSE SERPL-MCNC: 104 MG/DL (ref 65–140)
GLUCOSE SERPL-MCNC: 119 MG/DL (ref 65–140)
GLUCOSE SERPL-MCNC: 120 MG/DL (ref 65–140)
GLUCOSE SERPL-MCNC: 135 MG/DL (ref 65–140)

## 2018-11-01 PROCEDURE — 82948 REAGENT STRIP/BLOOD GLUCOSE: CPT

## 2018-11-01 PROCEDURE — 99232 SBSQ HOSP IP/OBS MODERATE 35: CPT | Performed by: INTERNAL MEDICINE

## 2018-11-01 PROCEDURE — 93306 TTE W/DOPPLER COMPLETE: CPT | Performed by: INTERNAL MEDICINE

## 2018-11-01 PROCEDURE — 94660 CPAP INITIATION&MGMT: CPT

## 2018-11-01 PROCEDURE — 93306 TTE W/DOPPLER COMPLETE: CPT

## 2018-11-01 RX ORDER — DIGOXIN 125 MCG
250 TABLET ORAL DAILY
Status: DISCONTINUED | OUTPATIENT
Start: 2018-11-02 | End: 2018-11-02

## 2018-11-01 RX ORDER — POTASSIUM CHLORIDE 750 MG/1
10 TABLET, EXTENDED RELEASE ORAL DAILY
Status: DISCONTINUED | OUTPATIENT
Start: 2018-11-01 | End: 2018-11-03 | Stop reason: HOSPADM

## 2018-11-01 RX ORDER — NEBIVOLOL 10 MG/1
10 TABLET ORAL DAILY
Status: DISCONTINUED | OUTPATIENT
Start: 2018-11-01 | End: 2018-11-03 | Stop reason: HOSPADM

## 2018-11-01 RX ORDER — AMLODIPINE BESYLATE 2.5 MG/1
2.5 TABLET ORAL DAILY
Status: DISCONTINUED | OUTPATIENT
Start: 2018-11-02 | End: 2018-11-02

## 2018-11-01 RX ORDER — DIGOXIN 125 MCG
250 TABLET ORAL EVERY 4 HOURS
Status: COMPLETED | OUTPATIENT
Start: 2018-11-01 | End: 2018-11-02

## 2018-11-01 RX ADMIN — LOSARTAN POTASSIUM 50 MG: 50 TABLET ORAL at 09:26

## 2018-11-01 RX ADMIN — DIGOXIN 250 MCG: 125 TABLET ORAL at 22:34

## 2018-11-01 RX ADMIN — FLUOXETINE HYDROCHLORIDE 40 MG: 20 CAPSULE ORAL at 09:26

## 2018-11-01 RX ADMIN — AMLODIPINE BESYLATE 5 MG: 5 TABLET ORAL at 09:26

## 2018-11-01 RX ADMIN — NEBIVOLOL HYDROCHLORIDE 10 MG: 10 TABLET ORAL at 17:03

## 2018-11-01 RX ADMIN — DIGOXIN 250 MCG: 125 TABLET ORAL at 17:03

## 2018-11-01 RX ADMIN — RIVAROXABAN 20 MG: 20 TABLET, FILM COATED ORAL at 09:26

## 2018-11-01 RX ADMIN — ALLOPURINOL 300 MG: 100 TABLET ORAL at 09:26

## 2018-11-01 RX ADMIN — HYDROCHLOROTHIAZIDE 25 MG: 25 TABLET ORAL at 09:26

## 2018-11-01 RX ADMIN — LOSARTAN POTASSIUM 50 MG: 50 TABLET ORAL at 17:03

## 2018-11-01 RX ADMIN — METOPROLOL SUCCINATE 50 MG: 50 TABLET, EXTENDED RELEASE ORAL at 06:09

## 2018-11-01 RX ADMIN — POTASSIUM CHLORIDE 10 MEQ: 750 TABLET, EXTENDED RELEASE ORAL at 17:03

## 2018-11-01 RX ADMIN — PANTOPRAZOLE SODIUM 40 MG: 40 TABLET, DELAYED RELEASE ORAL at 06:09

## 2018-11-01 RX ADMIN — ASPIRIN 81 MG: 81 TABLET, CHEWABLE ORAL at 09:26

## 2018-11-01 RX ADMIN — PRAVASTATIN SODIUM 10 MG: 10 TABLET ORAL at 17:03

## 2018-11-01 NOTE — ANESTHESIA PREPROCEDURE EVALUATION
Review of Systems/Medical History  Patient summary reviewed  Chart reviewed      Cardiovascular  Hyperlipidemia, Hypertension , Dysrhythmias , atrial fibrillation,    Pulmonary  Shortness of breath, Sleep apnea ,        GI/Hepatic    GERD well controlled,        Prostatic disorder, benign prostatic hyperplasia       Endo/Other  Diabetes type 2 ,   Obesity  morbid obesity   GYN       Hematology  Negative hematology ROS      Musculoskeletal  Negative musculoskeletal ROS        Neurology    Diabetic neuropathy,    Psychology   Anxiety, Depression ,              Physical Exam    Airway    Mallampati score: III  TM Distance: >3 FB  Neck ROM: full     Dental   No notable dental hx     Cardiovascular  Rhythm: irregular, Rate: normal,     Pulmonary  Pulmonary exam normal Breath sounds clear to auscultation,     Other Findings        Anesthesia Plan  ASA Score- 4     Anesthesia Type- general with ASA Monitors  Additional Monitors:   Airway Plan:         Plan Factors-    Induction-     Postoperative Plan-     Informed Consent- Anesthetic plan and risks discussed with patient

## 2018-11-01 NOTE — PROGRESS NOTES
Progress Note - Nell Number  77 y o  male MRN: [id number]    Unit/Bed#: E4 -01 Encounter: [id number]      Assessment/Plan:  1-atrial fibrillation with rapid ventricular rate:  Patient initially presented with atrial fibrillation with rapid ventricular rate  He was admitted to the hospital for further evaluation               -TSH within normal limits              -2D echocardiogram without significant valvular heart disease  Left atrium only mildly dilated               -based on patient's symptom history, he has been having intermittent episodes of atrial fibrillation for several weeks now               -COLLEENJLZL was evaluated by the cardiology service  He recommended SANDRITA cardioversion, and heart rate control                        -patient has a history of obstructive sleep apnea that may predispose him to atrial fibrillation               -due to his elevated D-dimer, patient underwent a CTA of the chest that was unremarkable for a pulmonary embolism    -patient was evaluated by Cardiology today  The note the patient has done poorly with metoprolol in the past, they recommend continuing never followed 10 mg daily, and adding digoxin, with a loading dose    -continue anticoagulation with Xarelto, as prescribed by the cardiology service      2-chronic diastolic congestive heart failure:  Patient's 2D echocardiogram from 08/2017 revealed a left ventricular ejection fraction of 60%, and grade 2 diastolic dysfunction  -patient's current 2D echocardiogram revealed a normal left ventricular ejection fraction of 55%    They were not able to comment on diastolic dysfunction      3-dyspnea on exertion:  Patient's he has had dyspnea on exertion the past several weeks                 -This may be related to atrial fibrillation with rapid ventricular rate                - He also has a history of chronic diastolic congestive heart failure which may contribute to his dyspnea                 -CTA of the chest negative for PE, or any other pulmonary pathology                  4-essential hypertension:  Patient's blood pressure regimen is being titrated with the assistance of the cardiology service  His systolic blood pressure today ranged 130-100 50s  Continue current regimen and monitor      5-hyperlipidemia:  Continue statin     6-diabetes type 2:  Without long-term use of insulin   Without complication   Home metformin on hold   Will check Accu-Cheks and give sliding scale insulin              -blood sugars today were adequately controlled, with blood sugars of 119, 120, 104     7-morbid obesity:  Nutritional counseling     8-depression:  Continue home fluoxetine   He takes 40 mg once daily     9-chronic leukocytosis:  White count at baseline  Continue outpatient follow-up     10-probable coronary disease:  Patient's CT scan revealed evidence of aortic and coronary atherosclerosis  Lipid panel pending  Cardiology service recommends evaluating for intensive statin therapy due to evidence of atherosclerosis in the coronary arteries              -due to patient's dyspnea on exertion, and decreased exercise tolerance, will confer with Cardiology service if further ischemic testing is warranted              -patient does not appear to have a prior stress test in the system     11-family:  Updated patient's wife at the bedside and answered all questions  plan          VTE Pharmacologic Prophylaxis: RX contraindicated due to: Xarelto  VTE Mechanical Prophylaxis: sequential compression device      Certification Statement: The patient will continue to require additional inpatient hospital stay due to need for further acute intervention for SANDRITA and cardioversion    Status: inpatient     ===================================================================    Subjective:  Patient relates he feels better today    He denies any shortness of breath at rest   He notes he did have dyspnea when he was walking slowly around the halls  He denies any cough  He denies any pain anywhere  He denies any chest pain, back pain, abdominal pain  He denies any nausea, vomiting, diarrhea  He is tolerating p  O  He is passing his bowel movements  Denies constipation  He denies any dizziness or lightheadedness  Physical Exam:   Temp:  [96 °F (35 6 °C)-97 8 °F (36 6 °C)] 97 8 °F (36 6 °C)  HR:  [64-80] 75  Resp:  [18-20] 18  BP: (122-151)/(71-88) 151/78    Gen:  Pleasant, non-tachypnic, non-dyspnic  Conversant  Sitting up in a chair  Wife at the bedside  Heart:  Irregularly irregular, S1S2 present, no murmur, rub or gallop  Lungs: clear to ausculatation bilaterally  No wheezing, crackles, or rhonchi  No accessory muscle use or respiratory distress  Abd: soft, non-tender, non-distended  NABS, no guarding, rebound or peritoneal signs  Extremities: no clubbing, cyanosis  1+ bilateral lower extremity edema  1+ pedal edema     2+pedal pulses bilaterally  Full range of motion  Neuro: awake, alert and oriented  Fluent speech  Steady gait  Strength globally intact  Skin: warm and dry: no petechiae, purpura and rash  LABS:     Results from last 7 days  Lab Units 10/31/18  0606 10/30/18  1511   WBC Thousand/uL 15 64* 13 21*   HEMOGLOBIN g/dL 14 3 15 6   HEMATOCRIT % 41 0 44 1   PLATELETS Thousands/uL 218 233       Results from last 7 days  Lab Units 10/31/18  0606 10/30/18  1511   POTASSIUM mmol/L 3 7 4 0   CHLORIDE mmol/L 97* 96*   CO2 mmol/L 26 27   BUN mg/dL 16 16   CREATININE mg/dL 1 15 1 14   CALCIUM mg/dL 9 2 9 9       Hospital Data:    Chest x-ray:  No acute disease     CTA chest PE study: No evidence of pulmonary embolus   No evidence of acute intrathoracic pathology     Bilateral lower extremity ultrasound:   negative DVT     Echocardiogram:    LEFT VENTRICLE:  Systolic function was normal  Ejection fraction was estimated to be 55 %  This study was inadequate for the evaluation of regional wall motion    Wall thickness was mildly increased  LEFT ATRIUM:  The atrium was mildly dilated  ---------------------------------------------------------------------------------------------------------------  This note has been constructed using a voice recognition system

## 2018-11-01 NOTE — PROGRESS NOTES
Progress Note - Ty Grace  77 y o  male MRN: [id number]    Unit/Bed#: E4 -01 Encounter: [id number]  Subjective:   Still somewhat fatigued  No chest pain  Not aware palpitations  No significant edema  No lightheadedness although he has been getting this when he gets up quickly    Objective:   Vitals: Blood pressure 148/88, pulse 71, temperature (!) 97 3 °F (36 3 °C), temperature source Tympanic, resp  rate 18, height 5' 10" (1 778 m), weight (!) 151 kg (332 lb 14 3 oz), SpO2 98 %  ,Body mass index is 47 77 kg/m²  Physical exam:  Lungs-clear without wheezing rhonchi rales  Heart-regular without murmur rub or gallop  Abdomen-soft nontender without mass organomegaly  Extremities-trace edema  Assessment:  1  New onset atrial fibrillation with RVR  Xarelto started patient switch from nebivolol 2 metoprolol for rate control but has done poorly with metoprolol in the past  2  Hypertension  Somewhat difficult to control on multiple meds including beta-blocker, ARB, thiazide diuretic and calcium channel blocker  Obesity is certainly been a contributing factor  3  Hyperlipidemia-patient on statin therapy   4  Diabetes mellitus  5  Morbid obesity  6  Suspect perhaps mild acute diastolic heart failure with onset of atrial fibrillation    Plan:   Transesophageal echo/cardioversion tomorrow  I suspect patient will revert to atrial fibrillation  He may have been in this for 3 months since he has not felt right for that period of time    Continue Xarelto  Will switch back to nebivolol 10 mg daily but load with digoxin   Continue amlodipine, losartan and hydrochlorothiazide  Continue pravastatin  Hopefully digoxin will not affect sinus rate-has lower potential for this then increasing beta-blocker or switching amlodipine to diltiazem      David Gutierrez MD

## 2018-11-02 ENCOUNTER — APPOINTMENT (INPATIENT)
Dept: NON INVASIVE DIAGNOSTICS | Facility: HOSPITAL | Age: 68
DRG: 309 | End: 2018-11-02
Attending: INTERNAL MEDICINE
Payer: MEDICARE

## 2018-11-02 ENCOUNTER — ANESTHESIA (INPATIENT)
Dept: NON INVASIVE DIAGNOSTICS | Facility: HOSPITAL | Age: 68
DRG: 309 | End: 2018-11-02
Payer: MEDICARE

## 2018-11-02 LAB
ANION GAP SERPL CALCULATED.3IONS-SCNC: 8 MMOL/L (ref 4–13)
BUN SERPL-MCNC: 14 MG/DL (ref 5–25)
CALCIUM SERPL-MCNC: 9.2 MG/DL (ref 8.3–10.1)
CHLORIDE SERPL-SCNC: 95 MMOL/L (ref 100–108)
CO2 SERPL-SCNC: 26 MMOL/L (ref 21–32)
CREAT SERPL-MCNC: 1.02 MG/DL (ref 0.6–1.3)
ERYTHROCYTE [DISTWIDTH] IN BLOOD BY AUTOMATED COUNT: 11.6 % (ref 11.6–15.1)
GFR SERPL CREATININE-BSD FRML MDRD: 74 ML/MIN/1.73SQ M
GLUCOSE SERPL-MCNC: 109 MG/DL (ref 65–140)
GLUCOSE SERPL-MCNC: 120 MG/DL (ref 65–140)
GLUCOSE SERPL-MCNC: 127 MG/DL (ref 65–140)
GLUCOSE SERPL-MCNC: 93 MG/DL (ref 65–140)
GLUCOSE SERPL-MCNC: 95 MG/DL (ref 65–140)
GLUCOSE SERPL-MCNC: 98 MG/DL (ref 65–140)
HCT VFR BLD AUTO: 43 % (ref 36.5–49.3)
HGB BLD-MCNC: 14.8 G/DL (ref 12–17)
MAGNESIUM SERPL-MCNC: 2.1 MG/DL (ref 1.6–2.6)
MCH RBC QN AUTO: 31.8 PG (ref 26.8–34.3)
MCHC RBC AUTO-ENTMCNC: 34.4 G/DL (ref 31.4–37.4)
MCV RBC AUTO: 93 FL (ref 82–98)
PLATELET # BLD AUTO: 200 THOUSANDS/UL (ref 149–390)
PMV BLD AUTO: 10.4 FL (ref 8.9–12.7)
POTASSIUM SERPL-SCNC: 4 MMOL/L (ref 3.5–5.3)
RBC # BLD AUTO: 4.65 MILLION/UL (ref 3.88–5.62)
SODIUM SERPL-SCNC: 129 MMOL/L (ref 136–145)
WBC # BLD AUTO: 14.22 THOUSAND/UL (ref 4.31–10.16)

## 2018-11-02 PROCEDURE — 94760 N-INVAS EAR/PLS OXIMETRY 1: CPT

## 2018-11-02 PROCEDURE — 82948 REAGENT STRIP/BLOOD GLUCOSE: CPT

## 2018-11-02 PROCEDURE — 83735 ASSAY OF MAGNESIUM: CPT | Performed by: INTERNAL MEDICINE

## 2018-11-02 PROCEDURE — 93325 DOPPLER ECHO COLOR FLOW MAPG: CPT | Performed by: INTERNAL MEDICINE

## 2018-11-02 PROCEDURE — 93005 ELECTROCARDIOGRAM TRACING: CPT

## 2018-11-02 PROCEDURE — 5A2204Z RESTORATION OF CARDIAC RHYTHM, SINGLE: ICD-10-PCS | Performed by: INTERNAL MEDICINE

## 2018-11-02 PROCEDURE — 92960 CARDIOVERSION ELECTRIC EXT: CPT | Performed by: INTERNAL MEDICINE

## 2018-11-02 PROCEDURE — 85027 COMPLETE CBC AUTOMATED: CPT | Performed by: INTERNAL MEDICINE

## 2018-11-02 PROCEDURE — 93320 DOPPLER ECHO COMPLETE: CPT | Performed by: INTERNAL MEDICINE

## 2018-11-02 PROCEDURE — 80048 BASIC METABOLIC PNL TOTAL CA: CPT | Performed by: INTERNAL MEDICINE

## 2018-11-02 PROCEDURE — 93312 ECHO TRANSESOPHAGEAL: CPT

## 2018-11-02 PROCEDURE — 94660 CPAP INITIATION&MGMT: CPT

## 2018-11-02 PROCEDURE — 99232 SBSQ HOSP IP/OBS MODERATE 35: CPT | Performed by: INTERNAL MEDICINE

## 2018-11-02 PROCEDURE — 93312 ECHO TRANSESOPHAGEAL: CPT | Performed by: INTERNAL MEDICINE

## 2018-11-02 PROCEDURE — 92960 CARDIOVERSION ELECTRIC EXT: CPT

## 2018-11-02 RX ORDER — PROPOFOL 10 MG/ML
INJECTION, EMULSION INTRAVENOUS AS NEEDED
Status: DISCONTINUED | OUTPATIENT
Start: 2018-11-02 | End: 2018-11-02 | Stop reason: SURG

## 2018-11-02 RX ORDER — SODIUM CHLORIDE 9 MG/ML
INJECTION, SOLUTION INTRAVENOUS CONTINUOUS PRN
Status: DISCONTINUED | OUTPATIENT
Start: 2018-11-02 | End: 2018-11-02 | Stop reason: SURG

## 2018-11-02 RX ORDER — PROPOFOL 10 MG/ML
INJECTION, EMULSION INTRAVENOUS CONTINUOUS PRN
Status: DISCONTINUED | OUTPATIENT
Start: 2018-11-02 | End: 2018-11-02

## 2018-11-02 RX ORDER — AMLODIPINE BESYLATE 10 MG/1
10 TABLET ORAL DAILY
Status: DISCONTINUED | OUTPATIENT
Start: 2018-11-03 | End: 2018-11-03 | Stop reason: HOSPADM

## 2018-11-02 RX ORDER — ONDANSETRON 2 MG/ML
4 INJECTION INTRAMUSCULAR; INTRAVENOUS ONCE AS NEEDED
Status: DISCONTINUED | OUTPATIENT
Start: 2018-11-02 | End: 2018-11-03 | Stop reason: HOSPADM

## 2018-11-02 RX ADMIN — LOSARTAN POTASSIUM 50 MG: 50 TABLET ORAL at 08:52

## 2018-11-02 RX ADMIN — ASPIRIN 81 MG: 81 TABLET, CHEWABLE ORAL at 08:51

## 2018-11-02 RX ADMIN — PROPOFOL 50 MG: 10 INJECTION, EMULSION INTRAVENOUS at 14:13

## 2018-11-02 RX ADMIN — NEBIVOLOL HYDROCHLORIDE 10 MG: 10 TABLET ORAL at 08:52

## 2018-11-02 RX ADMIN — LOSARTAN POTASSIUM 50 MG: 50 TABLET ORAL at 17:08

## 2018-11-02 RX ADMIN — ALLOPURINOL 300 MG: 100 TABLET ORAL at 08:51

## 2018-11-02 RX ADMIN — PRAVASTATIN SODIUM 10 MG: 10 TABLET ORAL at 17:08

## 2018-11-02 RX ADMIN — PROPOFOL 100 MG: 10 INJECTION, EMULSION INTRAVENOUS at 14:09

## 2018-11-02 RX ADMIN — DIGOXIN 250 MCG: 125 TABLET ORAL at 08:51

## 2018-11-02 RX ADMIN — PROPOFOL 20 MG: 10 INJECTION, EMULSION INTRAVENOUS at 14:04

## 2018-11-02 RX ADMIN — PROPOFOL 50 MG: 10 INJECTION, EMULSION INTRAVENOUS at 14:17

## 2018-11-02 RX ADMIN — PROPOFOL 50 MG: 10 INJECTION, EMULSION INTRAVENOUS at 14:11

## 2018-11-02 RX ADMIN — SODIUM CHLORIDE: 0.9 INJECTION, SOLUTION INTRAVENOUS at 13:46

## 2018-11-02 RX ADMIN — RIVAROXABAN 20 MG: 20 TABLET, FILM COATED ORAL at 08:52

## 2018-11-02 RX ADMIN — PROPOFOL 50 MG: 10 INJECTION, EMULSION INTRAVENOUS at 14:21

## 2018-11-02 RX ADMIN — PROPOFOL 50 MG: 10 INJECTION, EMULSION INTRAVENOUS at 14:24

## 2018-11-02 RX ADMIN — HYDROCHLOROTHIAZIDE 25 MG: 25 TABLET ORAL at 08:52

## 2018-11-02 RX ADMIN — PROPOFOL 50 MG: 10 INJECTION, EMULSION INTRAVENOUS at 14:03

## 2018-11-02 RX ADMIN — PROPOFOL 100 MG: 10 INJECTION, EMULSION INTRAVENOUS at 14:02

## 2018-11-02 RX ADMIN — POTASSIUM CHLORIDE 10 MEQ: 750 TABLET, EXTENDED RELEASE ORAL at 08:52

## 2018-11-02 RX ADMIN — PROPOFOL 130 MG: 10 INJECTION, EMULSION INTRAVENOUS at 14:05

## 2018-11-02 RX ADMIN — AMLODIPINE BESYLATE 2.5 MG: 2.5 TABLET ORAL at 08:52

## 2018-11-02 RX ADMIN — DIGOXIN 250 MCG: 125 TABLET ORAL at 01:57

## 2018-11-02 RX ADMIN — FLUOXETINE HYDROCHLORIDE 40 MG: 20 CAPSULE ORAL at 08:52

## 2018-11-02 NOTE — PROGRESS NOTES
Pt interested in eating better  Provided wt loss/heart healthy diet ed  Reviewed My[employer]ate for balanced meals and portion control  Discussed tips for eating out as pt reported eating out frequently w/wife  Also reviewed healthier snack options  Encouraged high fiber, lean proteins, fresh fruits and veggies, encouraged cooking at home vs eating out so frequently  Written ed material was provided and outpt RD contact information as well to f/u  Pt was appreciative of all the information  Currently pt on CCD2 w/ no nutrition protocol  Rec adding TLC 2 3gm Na to CCD2 as pt w/ CHF, HTN, HLD and looking to lose wt  Thanks

## 2018-11-02 NOTE — PROCEDURES
Procedure note:  SANDRITA Preliminary Report    Impression:      LV:  Normal size and systolic function  Left ventricular ejection fraction ~ 60%  LA:  Dilated  DARIO:  Normal size and function  No spontaneous echocontrast ("smoke"), or thrombus  RA:  Not well visualized  Interatrial septum:  Appears normal with no PFO or ASD   RV:  Normal size and systolic function  MV:  Normal structure  No mitral stenosis or significant regurgitation  AV:  Normal structure  No aortic stenosis or significant regurgitation  TV:  Normal structure  No tricuspid stenosis  Mild tricuspid regurgitation  PV:  Grossly normal but poorly visualized  No gross pulmonic stenosis or regurgitation  AO:  Normal appearing root, descending thoracic aorta, and aortic arch  Atherosclerosis noted in the descending aorta and arch  Informed consent obtained from patient prior to procedure after all indications, risks, and benefits of SANDRITA thoroughly discussed  Propofol was utilized for sedation and administered intravenously by Anesthesia  Blood pressure, EKG, pulse oximetry, respirations, and level of consciousness were monitored throughout the procedure  Pt tolerated procedure well with nurse anesthetist performing sedation and monitoring  SANDRITA probe passed without difficulty with no blood seen on probe upon extubation  Loc Montenegro  is a 77 y o  y o  male who follows with Dr Lulu Rosado in the office  The patient was identified in the cath lab  A time out procedure was performed  The patient was sedated by the anesthesia service  After adequate sedation, a SANDRITA was performed  Please see separate report for full details  Briefly, the LV function was normal, and there was no left atrial or left atrial appendage thrombus identified  After the SANDRITA, adequate sedation was once again confirmed  The patient was cardioverted to sinus rhythm with a 150 J biphasic shock  There were no complications    The patient was monitored for the appropriate time interval in the holding area, and was transferred back to the medical floor in stable condition

## 2018-11-02 NOTE — ANESTHESIA POSTPROCEDURE EVALUATION
Post-Op Assessment Note      CV Status:  Stable    Mental Status:  Alert and awake    Hydration Status:  Euvolemic    PONV Controlled:  Controlled    Airway Patency:  Patent    Post Op Vitals Reviewed: Yes          Staff: Anesthesiologist           /79 (11/02/18 1454)    Temp      Pulse (!) 48 (11/02/18 1454)   Resp 19 (11/02/18 1454)    SpO2 96 % (11/02/18 1454)

## 2018-11-02 NOTE — PROGRESS NOTES
Progress Note - Malorie Riggs  77 y o  male MRN: [id number]    Unit/Bed#: E4 -01 Encounter: [id number]      Assessment/Plan:  1-atrial fibrillation with rapid ventricular rate:  Patient initially presented with atrial fibrillation with rapid ventricular rate   He was admitted to the hospital for further evaluation   Js Bedollalexy within normal limits              -2D echocardiogram without significant valvular heart disease  Left atrium only mildly dilated               -based on patient's symptom history, he has been having intermittent episodes of atrial fibrillation for several weeks now              -patient has a history of obstructive sleep apnea that may predispose him to atrial fibrillation               -due to his elevated D-dimer, patient underwent a CTA of the chest that was unremarkable for a pulmonary embolism               -patient was evaluated by Cardiology  The note the patient has done poorly with metoprolol in the past, they recommend continuing never followed 10 mg daily, and adding digoxin, with a loading dose               -continue anticoagulation with Xarelto, as prescribed by the cardiology service  -patient was referred for cardioversion, which was successful today  He had subsequent bradycardia, so cardiology service recommends he be monitored overnight  Currently in a sinus bradycardia in the 50s  Patient denies any symptoms      2-chronic diastolic congestive heart failure:  Patient's 2D echocardiogram from 08/2017 revealed a left ventricular ejection fraction of 60%, and grade 2 diastolic dysfunction  -patient's current 2D echocardiogram revealed a normal left ventricular ejection fraction of 55%  They were not able to comment on diastolic dysfunction     -patient is currently euvolemic      3-dyspnea on exertion:  Patient's he has had dyspnea on exertion the past several weeks                 -This may be related to atrial fibrillation with rapid ventricular rate                - He also has a history of chronic diastolic congestive heart failure which may contribute to his dyspnea                 -CTA of the chest negative for PE, or any other pulmonary pathology  -patient's noted dyspnea has already improved status post cardioversion                  4-essential hypertension:  Patient's blood pressure regimen is being titrated with the assistance of the cardiology service  His blood pressure has historically been challenging to optimize  Blood pressure today ranging a systolic of 574W-685P primarily    -continue current regimen and monitor  It is being titrated with the assistance of his cardiologist     5-hyperlipidemia:  Continue statin     6-diabetes type 2:  Without long-term use of insulin   Without complication   Home metformin on hold   Will check Accu-Cheks and give sliding scale insulin              -blood sugars today were adequately controlled, with blood sugars of 120, 109, 93, 95     7-morbid obesity:  Nutritional counseling     8-depression:  Continue home fluoxetine   He takes 40 mg once daily     9-chronic leukocytosis:  White count at baseline  Continue outpatient follow-up     10-probable coronary disease:  Patient's CT scan revealed evidence of aortic and coronary atherosclerosis   Lipid panel pending   Cardiology service recommends evaluating for intensive statin therapy due to evidence of atherosclerosis in the coronary arteries              -due to patient's dyspnea on exertion, and decreased exercise tolerance, will confer with Cardiology service if further ischemic testing is warranted as an outpatient              -DONTE does not appear to have a prior stress test in the system    11-hyponatremia:  Patient's sodium was low today 129  Suspect this is secondary to fluid status, as the past 2 days it has been stable at 135  Recheck in a m        11-family:  Updated patient's wife at the bedside and answered all questions       Discussed with patient's nurse and   Discussed with cardiologist:  Dr Irma Solis        VTE Pharmacologic Prophylaxis: RX contraindicated due to: Xarelto  VTE Mechanical Prophylaxis: sequential compression device      Certification Statement: The patient will continue to require additional inpatient hospital stay due to need for further acute intervention for post cardioversion bradycardia, and telemetry monitoring    Status: inpatient     ===================================================================    Subjective:  Patient relates that he feels much better after his cardioversion  He denies any chest pain, chest tightness  He notes he is slightly sore on his skin, at the cardioversion site  he denies any sore throat  He denies any difficulty swallowing  He is tolerating p o  He denies any pain anywhere else  He denies any headache, back pain, abdominal pain  He denies any nausea, vomiting, or indigestion  He denies any dizziness, and lightheadedness, and notes he has been walking without any difficulty  Physical Exam:   Temp:  [96 8 °F (36 °C)-97 7 °F (36 5 °C)] 97 7 °F (36 5 °C)  HR:  [46-86] 55  Resp:  [14-19] 18  BP: (131-200)/() 161/96    Gen:  Pleasant, non-tachypnic, non-dyspnic  Conversant  Sitting up in a chair  Wife at the bedside  Heart: regular rate and rhythm, S1S2 present, no murmur, rub or gallop  Lungs: clear to ausculatation bilaterally  No wheezing, crackles, or rhonchi  No accessory muscle use or respiratory distress  Abd: soft, non-tender, non-distended  NABS, no guarding, rebound or peritoneal signs  Extremities: no clubbing, cyanosis  Trace edema of both hands  2+ radial pulses  1+ edema both lower extremities  2+pedal pulses bilaterally  Full range of motion  Neuro: awake, alert and oriented  Fluent and goal directed speech  Skin: warm and dry: no petechiae, purpura and rash      LABS:     Results from last 7 days  Lab Units 11/02/18  0534 10/31/18  0606 10/30/18  1511   WBC Thousand/uL 14 22* 15 64* 13 21*   HEMOGLOBIN g/dL 14 8 14 3 15 6   HEMATOCRIT % 43 0 41 0 44 1   PLATELETS Thousands/uL 200 218 233       Results from last 7 days  Lab Units 11/02/18 0534 10/31/18  0606 10/30/18  1511   POTASSIUM mmol/L 4 0 3 7 4 0   CHLORIDE mmol/L 95* 97* 96*   CO2 mmol/L 26 26 27   BUN mg/dL 14 16 16   CREATININE mg/dL 1 02 1 15 1 14   CALCIUM mg/dL 9 2 9 2 9 9       Hospital Data:    Chest x-ray:  No acute disease     CTA chest PE study: No evidence of pulmonary embolus   No evidence of acute intrathoracic pathology     Bilateral lower extremity ultrasound:   negative DVT     Echocardiogram:    LEFT VENTRICLE:  Systolic function was normal  Ejection fraction was estimated to be 55 %  This study was inadequate for the evaluation of regional wall motion  Wall thickness was mildly increased  LEFT ATRIUM:  The atrium was mildly dilated        ---------------------------------------------------------------------------------------------------------------  This note has been constructed using a voice recognition system

## 2018-11-03 VITALS
BODY MASS INDEX: 45.1 KG/M2 | HEIGHT: 70 IN | SYSTOLIC BLOOD PRESSURE: 161 MMHG | RESPIRATION RATE: 18 BRPM | TEMPERATURE: 97.1 F | OXYGEN SATURATION: 96 % | HEART RATE: 53 BPM | WEIGHT: 315 LBS | DIASTOLIC BLOOD PRESSURE: 83 MMHG

## 2018-11-03 LAB
ANION GAP SERPL CALCULATED.3IONS-SCNC: 8 MMOL/L (ref 4–13)
ATRIAL RATE: 42 BPM
BUN SERPL-MCNC: 16 MG/DL (ref 5–25)
CALCIUM SERPL-MCNC: 9.4 MG/DL (ref 8.3–10.1)
CHLORIDE SERPL-SCNC: 96 MMOL/L (ref 100–108)
CO2 SERPL-SCNC: 28 MMOL/L (ref 21–32)
CREAT SERPL-MCNC: 1.09 MG/DL (ref 0.6–1.3)
ERYTHROCYTE [DISTWIDTH] IN BLOOD BY AUTOMATED COUNT: 11.6 % (ref 11.6–15.1)
GFR SERPL CREATININE-BSD FRML MDRD: 68 ML/MIN/1.73SQ M
GLUCOSE SERPL-MCNC: 112 MG/DL (ref 65–140)
GLUCOSE SERPL-MCNC: 122 MG/DL (ref 65–140)
HCT VFR BLD AUTO: 39.4 % (ref 36.5–49.3)
HGB BLD-MCNC: 13.6 G/DL (ref 12–17)
MCH RBC QN AUTO: 31.6 PG (ref 26.8–34.3)
MCHC RBC AUTO-ENTMCNC: 34.5 G/DL (ref 31.4–37.4)
MCV RBC AUTO: 92 FL (ref 82–98)
P AXIS: 25 DEGREES
PLATELET # BLD AUTO: 201 THOUSANDS/UL (ref 149–390)
PMV BLD AUTO: 10.8 FL (ref 8.9–12.7)
POTASSIUM SERPL-SCNC: 4 MMOL/L (ref 3.5–5.3)
QRS AXIS: -12 DEGREES
QRSD INTERVAL: 104 MS
QT INTERVAL: 500 MS
QTC INTERVAL: 417 MS
RBC # BLD AUTO: 4.3 MILLION/UL (ref 3.88–5.62)
SODIUM SERPL-SCNC: 132 MMOL/L (ref 136–145)
T WAVE AXIS: 73 DEGREES
VENTRICULAR RATE: 42 BPM
WBC # BLD AUTO: 11.88 THOUSAND/UL (ref 4.31–10.16)

## 2018-11-03 PROCEDURE — 93010 ELECTROCARDIOGRAM REPORT: CPT | Performed by: INTERNAL MEDICINE

## 2018-11-03 PROCEDURE — 99232 SBSQ HOSP IP/OBS MODERATE 35: CPT | Performed by: INTERNAL MEDICINE

## 2018-11-03 PROCEDURE — 94660 CPAP INITIATION&MGMT: CPT

## 2018-11-03 PROCEDURE — 85027 COMPLETE CBC AUTOMATED: CPT | Performed by: INTERNAL MEDICINE

## 2018-11-03 PROCEDURE — 80048 BASIC METABOLIC PNL TOTAL CA: CPT | Performed by: INTERNAL MEDICINE

## 2018-11-03 PROCEDURE — 82948 REAGENT STRIP/BLOOD GLUCOSE: CPT

## 2018-11-03 PROCEDURE — 99239 HOSP IP/OBS DSCHRG MGMT >30: CPT | Performed by: INTERNAL MEDICINE

## 2018-11-03 RX ORDER — POTASSIUM CHLORIDE 750 MG/1
10 TABLET, EXTENDED RELEASE ORAL DAILY
Qty: 30 TABLET | Refills: 0 | Status: SHIPPED | OUTPATIENT
Start: 2018-11-04 | End: 2018-11-20 | Stop reason: SDUPTHER

## 2018-11-03 RX ORDER — AMLODIPINE BESYLATE 10 MG/1
10 TABLET ORAL DAILY
Qty: 30 TABLET | Refills: 0 | Status: SHIPPED | OUTPATIENT
Start: 2018-11-04 | End: 2018-11-20 | Stop reason: SDUPTHER

## 2018-11-03 RX ORDER — HYDROCHLOROTHIAZIDE 25 MG/1
25 TABLET ORAL DAILY
Qty: 30 TABLET | Refills: 0 | Status: SHIPPED | OUTPATIENT
Start: 2018-11-04 | End: 2018-11-20 | Stop reason: SDUPTHER

## 2018-11-03 RX ADMIN — DOCUSATE SODIUM 100 MG: 100 CAPSULE, LIQUID FILLED ORAL at 09:42

## 2018-11-03 RX ADMIN — NEBIVOLOL HYDROCHLORIDE 10 MG: 10 TABLET ORAL at 09:42

## 2018-11-03 RX ADMIN — LOSARTAN POTASSIUM 50 MG: 50 TABLET ORAL at 09:42

## 2018-11-03 RX ADMIN — RIVAROXABAN 20 MG: 20 TABLET, FILM COATED ORAL at 09:41

## 2018-11-03 RX ADMIN — FLUOXETINE HYDROCHLORIDE 40 MG: 20 CAPSULE ORAL at 09:41

## 2018-11-03 RX ADMIN — POTASSIUM CHLORIDE 10 MEQ: 750 TABLET, EXTENDED RELEASE ORAL at 09:41

## 2018-11-03 RX ADMIN — HYDROCHLOROTHIAZIDE 25 MG: 25 TABLET ORAL at 09:42

## 2018-11-03 RX ADMIN — AMLODIPINE BESYLATE 10 MG: 10 TABLET ORAL at 09:42

## 2018-11-03 RX ADMIN — ASPIRIN 81 MG: 81 TABLET, CHEWABLE ORAL at 09:42

## 2018-11-03 RX ADMIN — ALLOPURINOL 300 MG: 100 TABLET ORAL at 09:42

## 2018-11-03 RX ADMIN — PANTOPRAZOLE SODIUM 40 MG: 40 TABLET, DELAYED RELEASE ORAL at 06:12

## 2018-11-03 NOTE — PROGRESS NOTES
Progress Note - Cardiology   Omar Si  77 y o  male MRN: [id number]  Unit/Bed#: E4 -01 Encounter: [id number]      Assessment:     1  Persistent atrial fibrillation status post cardioversion now in sinus rhythm  2  Evidence of aortic and coronary atherosclerosis from CT scan  3  Diabetes  4  Hypertension  5  Dyslipidemia  6  Obesity  7  Obstructive sleep apnea     Discussion/Recommendations:    Maintaining sinus rhythm on telemetry  Would continue Bystolic  May consider antiarrhythmics and/or ablation as an outpatient  Given samples for Xarelto  Follow-up with Cardiology artery scheduled with Dr Lyla Mejia      Subjective:  Feels well      Physical Exam:  GEN:  NAD  HEENT:  MMM, NCAT, pink conjunctiva, EOMI, nonicteric sclera  CV:  NO JVD/HJR, RR, NO M/R/G, +S1/S2, NO PARASTERNAL HEAVE/THRILL, NO LE EDEMA, NO HEPATIC SYSTOLIC PULSATION, WARM EXTREMITIES  RESP:  CTAB/L  ABD:  SOFT, NT, NO GROSS ORGANOMEGALY        Vitals:   /83 (BP Location: Right arm)   Pulse (!) 53   Temp (!) 97 1 °F (36 2 °C) (Tympanic)   Resp 18   Ht 5' 10" (1 778 m)   Wt (!) 151 kg (332 lb 14 3 oz)   SpO2 96%   BMI 47 77 kg/m²   Vitals:    10/30/18 1442 10/30/18 1900   Weight: (!) 151 kg (332 lb 14 3 oz) (!) 151 kg (332 lb 14 3 oz)       Intake/Output Summary (Last 24 hours) at 11/03/18 1040  Last data filed at 11/02/18 1440   Gross per 24 hour   Intake              400 ml   Output                0 ml   Net              400 ml         TELEMETRY:  Sinus rhythm  Lab Results:    Results from last 7 days  Lab Units 11/03/18  0613   WBC Thousand/uL 11 88*   HEMOGLOBIN g/dL 13 6   HEMATOCRIT % 39 4   PLATELETS Thousands/uL 201       Results from last 7 days  Lab Units 11/03/18  0613  10/30/18  1511   POTASSIUM mmol/L 4 0  < > 4 0   CHLORIDE mmol/L 96*  < > 96*   CO2 mmol/L 28  < > 27   BUN mg/dL 16  < > 16   CREATININE mg/dL 1 09  < > 1 14   CALCIUM mg/dL 9 4  < > 9 9   ALK PHOS U/L  --   --  50   ALT U/L --   --  57   AST U/L  --   --  36   < > = values in this interval not displayed      Results from last 7 days  Lab Units 11/03/18  0613   POTASSIUM mmol/L 4 0   CHLORIDE mmol/L 96*   CO2 mmol/L 28   BUN mg/dL 16   CREATININE mg/dL 1 09   CALCIUM mg/dL 9 4             Medications:    Current Facility-Administered Medications:     acetaminophen (TYLENOL) tablet 650 mg, 650 mg, Oral, Q6H PRN, Javon Beckett MD    allopurinol (ZYLOPRIM) tablet 300 mg, 300 mg, Oral, Daily, Javon Beckett MD, 300 mg at 11/03/18 0942    amLODIPine (NORVASC) tablet 10 mg, 10 mg, Oral, Daily, Dorita Garcia MD, 10 mg at 11/03/18 0942    digoxin (LANOXIN) injection 125 mcg, 125 mcg, Intravenous, Once PRN, Javon Beckett MD    docusate sodium (COLACE) capsule 100 mg, 100 mg, Oral, BID, Javon Beckett MD, 100 mg at 11/03/18 0942    FLUoxetine (PROzac) capsule 40 mg, 40 mg, Oral, Daily, Javon Beckett MD, 40 mg at 11/03/18 0941    hydrochlorothiazide (HYDRODIURIL) tablet 25 mg, 25 mg, Oral, Daily, Javon Beckett MD, 25 mg at 11/03/18 0942    insulin lispro (HumaLOG) 100 units/mL subcutaneous injection 1-5 Units, 1-5 Units, Subcutaneous, TID AC **AND** Fingerstick Glucose (POCT), , , TID AC, Javon Beckett MD    insulin lispro (HumaLOG) 100 units/mL subcutaneous injection 1-5 Units, 1-5 Units, Subcutaneous, HS, Javon Beckett MD    losartan (COZAAR) tablet 50 mg, 50 mg, Oral, BID, Ryan Peralta PA-C, 50 mg at 11/03/18 5846    nebivolol (BYSTOLIC) tablet 10 mg, 10 mg, Oral, Daily, Javon Beckett MD, 10 mg at 11/03/18 0942    ondansetron (ZOFRAN) injection 4 mg, 4 mg, Intravenous, Q6H PRN, Javon Beckett MD    ondansetron [facility]) injection 4 mg, 4 mg, Intravenous, Once PRN, K[address]oy,     pantoprazole (PROTONIX) EC tablet 40 mg, 40 mg, Oral, Daily Before Breakfast, Javon Beckett MD, 40 mg at 11/03/18 0612    perflutren lipid microsphere (DEFINITY) injection, 1 2 mL/min, Intravenous, Once in imaging, Milady Tabitha Flower MD    potassium chloride (K-DUR,KLOR-CON) CR tablet 10 mEq, 10 mEq, Oral, Daily, Toño Calvert MD, 10 mEq at 11/03/18 0941    pravastatin (PRAVACHOL) tablet 10 mg, 10 mg, Oral, Daily With Jacque Lujan MD, 10 mg at 11/02/18 1708    rivaroxaban (XARELTO) tablet 20 mg, 20 mg, Oral, Daily With Breakfast, Miguel Patel PA-C, 20 mg at 11/03/18 0941    Portions of the record may have been created with voice recognition software  Occasional wrong word or "sound a like" substitutions may have occurred due to the inherent limitations of voice recognition software  Read the chart carefully and recognize, using context, where substitutions have occurred

## 2018-11-03 NOTE — DISCHARGE SUMMARY
Discharge Summary - [address] Sophie Mulligan  77 y o  male MRN: [id number]    [facility] [place] [address] MED SURG Room / Bed: [place] [address] [address] 297-* Encounter: [id number]    BRIEF OVERVIEW    Admitting Provider: Esme Candelaria MD  Discharge Provider: Kenzie Arellano DO  Admission Date: 10/30/2018     Discharge Date: No discharge date for patient encounter    Primary Care Physician at Discharge: Sharon Quevedo [phone]    Primary Discharge Diagnosis  AFib with RVR    Other Problems Addressed  Patient Active Problem List    Diagnosis Date Noted    A-fib [facility]) 10/30/2018    Dyspnea on exertion 10/30/2018    Chronic diastolic (congestive) heart failure (HCC)     GERD (gastroesophageal reflux disease)     Obstructive sleep apnea     Depression     BPH (benign prostatic hyperplasia)     Chronic rhinitis 09/28/2018    Benign prostatic hyperplasia with urinary frequency 06/20/2018    Edema of both legs 07/25/2017    Type 2 diabetes mellitus without complication, without long-term current use of insulin ([facility] ) 05/02/2016    MARISSA (obstructive sleep apnea) 07/31/2015    Hypokalemia 12/02/2014    Leukocytosis 12/02/2014    Depression with anxiety 07/03/2014    Microscopic hematuria 07/03/2014    Benign colon polyp 07/02/2014    Thyroglossal duct cyst 06/02/2014    Morbid obesity ([facility] ) 11/20/2013    Testicular hypogonadism 07/01/2013    Fatigue 06/26/2013    Peripheral neuropathy 08/08/2012    Gastroesophageal reflux disease without esophagitis 05/02/2012    Gout 05/02/2012    Hyperlipidemia 05/02/2012    Essential hypertension 05/02/2012       Consulting Providers   Cardiology Dr Génesis Ruby  Therapeutic Operative Procedures Performed  Lower extremity venous duplex no evidence of acute or chronic deep vein thrombosis  PE systolic function normal EF 55 percent    Discharge Disposition: home    Allergies  Allergies   Allergen Reactions    Benazepril Cough    Clonidine Fatigue    Diltiazem Bradycardia    Entex T  [Pseudoephedrine-Guaifenesin]      Annotation - [id number]: LEG SWELLING     Diet restrictions: diabetic low-salt diet   Activity restrictions: none  Discharge Condition: good      Outpatient Follow-Up  Dr Frankie Porter in 1 week  Follow up with consulting providers  Dr Patricia Long in one week     [address] [facility]    Presenting Problem/History of Present Illness  A-fib [facility])  Hospital Course  77year-old presents with increased heart rate  AFib with rapid ventricular response,  new onset a fib, rate was controlled with Bystolic, consult by Cardiology and a SANDRITA cardioversion was completed  Patient tolerated procedure and prior to discharge heart rate was in the 60s with normal sinus rhythm  He will remain on Xarelto and beta-blocker for rate control  Diastolic heart failure   patient remain euvolemic, and rate controlled, heart rate compensated on discharge    Morbid obesity    recommend diet and exercise for weight loss    Other medical conditions stable at discharge      Discharge  Statement   I spent 35 minutes discharging the patient  This time was spent on the day of discharge  I had direct contact with the patient on the day of discharge  Additional documentation is required if more than 30 minutes were spent on discharge  Discussed with Cardiology    Discharge instructions/Information to patient and family:   See after visit summary for information provided to patient and family

## 2018-11-03 NOTE — PROGRESS NOTES
Progress Note - Loc [place]  77 y o  male MRN: [id number]    Unit/Bed#: E4 -43 Encounter: [id number]    Assessment/Plan:    AFib    now rate controlled with beta-blocker continue Xarelto and Cardiology follow-up    Morbid obesity   would benefit from weight loss    Chronic diastolic HF  euvolemic compensated with current medications continue Cardiology follow-up      Hypertension   slight elevation monitor BG further titration of doses if persists    Dyslipidemia   continue statin for LDL control    Diabetes   restart metformin    Subjective:   Feels good today, denies chest pain shortness of breath nausea vomiting diarrhea no fevers chills appetite good    Objective:     Vitals: Blood pressure 161/83, pulse (!) 53, temperature (!) 97 1 °F (36 2 °C), temperature source Tympanic, resp  rate 18, height 5' 10" (1 778 m), weight (!) 151 kg (332 lb 14 3 oz), SpO2 96 %  ,Body mass index is 47 77 kg/m²  Results from last 7 days  Lab Units 11/03/18  0613  10/30/18  1513   WBC Thousand/uL 11 88*  < >  --    HEMOGLOBIN g/dL 13 6  < >  --    HEMATOCRIT % 39 4  < >  --    PLATELETS Thousands/uL 201  < >  --    INR   --   --  1 06   < > = values in this interval not displayed  Results from last 7 days  Lab Units 11/03/18  0613  10/30/18  1511   POTASSIUM mmol/L 4 0  < > 4 0   CHLORIDE mmol/L 96*  < > 96*   CO2 mmol/L 28  < > 27   BUN mg/dL 16  < > 16   CREATININE mg/dL 1 09  < > 1 14   CALCIUM mg/dL 9 4  < > 9 9   ALK PHOS U/L  --   --  50   ALT U/L  --   --  57   AST U/L  --   --  36   < > = values in this interval not displayed      Scheduled Meds:    Current Facility-Administered Medications:  acetaminophen 650 mg Oral Q6H PRN Jayshree Nunez MD   allopurinol 300 mg Oral Daily Jayshree Nunez MD   amLODIPine 10 mg Oral Daily Mirza Benson MD   digoxin 125 mcg Intravenous Once PRN Jayshree Nunez MD   docusate sodium 100 mg Oral BID Jayshree Nunez MD   FLUoxetine 40 mg Oral Daily Nirmala Flattery Roman Lockhart MD   hydrochlorothiazide 25 mg Oral Daily Joy Shaw MD   insulin lispro 1-5 Units Subcutaneous TID AC Joy Shaw MD   insulin lispro 1-5 Units Subcutaneous HS Joy Shwa MD   losartan 50 mg Oral BID Aston Jones PA-C   nebivolol 10 mg Oral Daily Joy Shaw MD   ondansetron 4 mg Intravenous Q6H PRN Joy Shaw MD   ondansetron 4 mg Intravenous Once PRN Royal Dioni DO   pantoprazole 40 mg Oral Daily Before Marce Fraga MD   perflutren lipid microsphere 1 2 mL/min Intravenous Once in imaging Joy Shaw MD   potassium chloride 10 mEq Oral Daily Edgardo Clinton MD   pravastatin 10 mg Oral Daily With Merline Deng MD   rivaroxaban 20 mg Oral Daily With Breakfast Aston Jones PA-C       Continuous Infusions:     Physical exam:  General appearance:  Alert no distress interaction appropriate orient x3  Head/Eyes:  Nonicteric PERRL EOMI   Neck:  Supple  Lungs:  Decreased BS bilateral no wheezing rhonchi or rales  Heart: normal S1 S2 irregular  Abdomen:  Obese nontender with bowel sounds  Extremities: no edema  Skin: no rash    Invasive Devices     Peripheral Intravenous Line            Peripheral IV 11/03/18 Left Forearm less than 1 day                  VTE Pharmacologic Prophylaxis:  Xarelto         Counseling / Coordination of Care  Total floor / unit time spent today  30   minutes  Greater than 50% of total time was spent with the patient and / or family counseling and / or coordination of care    A description of the counseling / coordination of care:

## 2018-11-05 ENCOUNTER — TRANSITIONAL CARE MANAGEMENT (OUTPATIENT)
Dept: FAMILY MEDICINE CLINIC | Facility: CLINIC | Age: 68
End: 2018-11-05

## 2018-11-09 ENCOUNTER — OFFICE VISIT (OUTPATIENT)
Dept: FAMILY MEDICINE CLINIC | Facility: CLINIC | Age: 68
End: 2018-11-09
Payer: MEDICARE

## 2018-11-09 VITALS
WEIGHT: 315 LBS | OXYGEN SATURATION: 98 % | SYSTOLIC BLOOD PRESSURE: 148 MMHG | DIASTOLIC BLOOD PRESSURE: 70 MMHG | HEIGHT: 68 IN | BODY MASS INDEX: 47.74 KG/M2 | HEART RATE: 60 BPM | RESPIRATION RATE: 18 BRPM

## 2018-11-09 DIAGNOSIS — I50.32 CHRONIC DIASTOLIC (CONGESTIVE) HEART FAILURE (HCC): ICD-10-CM

## 2018-11-09 DIAGNOSIS — F32.A DEPRESSION, UNSPECIFIED DEPRESSION TYPE: ICD-10-CM

## 2018-11-09 DIAGNOSIS — E66.01 MORBID OBESITY (HCC): ICD-10-CM

## 2018-11-09 DIAGNOSIS — F41.8 DEPRESSION WITH ANXIETY: ICD-10-CM

## 2018-11-09 DIAGNOSIS — E11.9 TYPE 2 DIABETES MELLITUS WITHOUT COMPLICATION, WITHOUT LONG-TERM CURRENT USE OF INSULIN (HCC): ICD-10-CM

## 2018-11-09 DIAGNOSIS — G47.33 OBSTRUCTIVE SLEEP APNEA: ICD-10-CM

## 2018-11-09 DIAGNOSIS — I48.20 CHRONIC ATRIAL FIBRILLATION (HCC): Primary | ICD-10-CM

## 2018-11-09 DIAGNOSIS — G47.33 OSA (OBSTRUCTIVE SLEEP APNEA): ICD-10-CM

## 2018-11-09 DIAGNOSIS — I10 ESSENTIAL HYPERTENSION: ICD-10-CM

## 2018-11-09 PROBLEM — R06.00 DYSPNEA ON EXERTION: Status: RESOLVED | Noted: 2018-10-30 | Resolved: 2018-11-09

## 2018-11-09 PROBLEM — R60.0 EDEMA OF BOTH LEGS: Status: RESOLVED | Noted: 2017-07-25 | Resolved: 2018-11-09

## 2018-11-09 PROBLEM — R06.09 DYSPNEA ON EXERTION: Status: RESOLVED | Noted: 2018-10-30 | Resolved: 2018-11-09

## 2018-11-09 PROCEDURE — 99495 TRANSJ CARE MGMT MOD F2F 14D: CPT | Performed by: FAMILY MEDICINE

## 2018-11-09 RX ORDER — FUROSEMIDE 20 MG/1
TABLET ORAL AS NEEDED
COMMUNITY
Start: 2018-11-05 | End: 2018-11-20 | Stop reason: SDUPTHER

## 2018-11-09 RX ORDER — LIDOCAINE 50 MG/G
OINTMENT TOPICAL 2 TIMES DAILY PRN
COMMUNITY
Start: 2018-10-16 | End: 2021-07-26 | Stop reason: ALTCHOICE

## 2018-11-09 NOTE — ASSESSMENT & PLAN NOTE
Lab Results   Component Value Date    HGBA1C 6 2% 06/20/2018     Current A1c was 6 2  Check A1c prior to follow-up next month

## 2018-11-09 NOTE — PROGRESS NOTES
Assessment/Plan:       Problem List Items Addressed This Visit        Endocrine    Type 2 diabetes mellitus without complication, without long-term current use of insulin ([email or web address] )     Lab Results   Component Value Date    HGBA1C 6 2% 06/20/2018     Current A1c was 6 2  Check A1c prior to follow-up next month  Respiratory    MARISSA (obstructive sleep apnea)    RESOLVED: Obstructive sleep apnea       Cardiovascular and Mediastinum    Essential hypertension     Blood pressure was a bit elevated today  Check some blood pressure readings at home  I have an appointment with him in 1 month and he will be seeing Cardiology next month  He should notify me if his pressure is consistently above 140/90  We can consider bumping up his Bystolic but we would have to be careful of his pulse which is only in the low 60s today  Relevant Medications    furosemide (LASIX) 20 mg tablet    Chronic diastolic (congestive) heart failure ([facility] [address] )     He appears to be well compensated with no signs of fluid overload at this point  A-fib ([email or web address] ) - Primary     He remains in sinus rhythm at this point  Continue with Bystolic as well as Xarelto  Other    Depression with anxiety     This actually seems improved at this point  Continue to monitor  Morbid obesity ([facility] )     He was encouraged to continue work on weight loss  Depression            Subjective:      Patient ID: Seema Pop  is a 77 y o  male      Date and time hospital follow up call was made:  11/5/2018  3:47 PM  Hospital care reviewed:  Records reviewed  Patient was hopsitalized at:  [address]  Date of admission:  10/30/18  Date of discharge:  11/3/18  Diagnosis:  Diaphoresis  Disposition:  Home  Were the patients medicaitons reviewed and updated:  No  Current symptoms:  None  Post hospital issues:  None  Should patient be enrolled in anticoag monitoring?:  No  Scheduled for follow up?:  Yes  Did you obtain your prescribed medications:  Yes  Do you need help managing your perscriptions or medications:  No  Is transportation to your appointments needed:  No  I have advised the patient to call PCP with any new or worsening symptoms (please type in name along with any credentials):  lhunter  Living Arrangements:  Spouse or Significiant other  Support System:  None  Are you recieving outpatient services:  No  Are you recieving home care services:  No  Are you using any community resources:  No  Current waiver service:  No  Have you fallen in the last 12 months:  No  Interperter language line required?:  No  Counseling:  Patient  Comments:  konrad with Dr Willis Clayton on 11/9         HPI   Patient presents today for transition care management visit  He was admitted to [address] [address] after experiencing significant fluttering in his chest for several days or maybe even weeks  He was noted to be in atrial fibrillation and also had an elevated D-dimer  CTA of his chest was negative  A lower extremity duplex was negative as well  He had a echocardiogram did not reveal significant issues  He underwent cardioversion and remains in sinus rhythm  He notes he is feeling much better  He notes that his fatigue is much improved as well as his mood  He currently denies any problems chest pain, shortness of breath, palpitations or lightheadedness  He has been working on weight loss and has been exercising more without cardiovascular limitations such as chest pain or palpitations  He does get a bit short of breath which she blames on being a bit out of shape  His history of diabetes and denies polyuria or polydipsia        The following portions of the patient's history were reviewed and updated as appropriate: allergies, current medications, past family history, past medical history, past social history, past surgical history and problem list     Review of Systems   Constitutional: Negative for appetite change, chills, fatigue, fever and unexpected weight change  HENT: Negative for trouble swallowing  Eyes: Negative for visual disturbance  Respiratory: Negative for cough, chest tightness, shortness of breath and wheezing  Cardiovascular: Negative for chest pain  Gastrointestinal: Negative for abdominal distention, abdominal pain, blood in stool, constipation and diarrhea  Endocrine: Negative for polyuria  Genitourinary: Negative for difficulty urinating and flank pain  Musculoskeletal: Negative for arthralgias and myalgias  Skin: Negative for rash  Neurological: Negative for dizziness and light-headedness  Hematological: Negative for adenopathy  Does not bruise/bleed easily  Psychiatric/Behavioral: Negative for sleep disturbance  Objective:      /70   Pulse 60   Resp 18   Ht 5' 8" (1 727 m)   Wt (!) 144 kg (317 lb)   SpO2 98%   BMI 48 20 kg/m²          Physical Exam   Constitutional: He is oriented to person, place, and time  He appears well-developed and well-nourished  No distress  obese   HENT:   Head: Normocephalic  Eyes: Pupils are equal, round, and reactive to light  Right eye exhibits no discharge  Neck: No tracheal deviation present  No thyromegaly present  Cardiovascular: Normal rate, regular rhythm and normal heart sounds  No murmur heard  Pulmonary/Chest: Effort normal  No respiratory distress  He has no wheezes  He has no rales  He exhibits no tenderness  Abdominal: Soft  He exhibits no distension  There is no tenderness  There is no rebound  Musculoskeletal: Normal range of motion  He exhibits no edema  Lymphadenopathy:     He has no cervical adenopathy  Neurological: He is alert and oriented to person, place, and time  No cranial nerve deficit  Skin: Skin is warm  He is not diaphoretic  No erythema  Psychiatric: He has a normal mood and affect   Judgment and thought content normal          Ellen Cochran MD

## 2018-11-09 NOTE — ASSESSMENT & PLAN NOTE
Blood pressure was a bit elevated today  Check some blood pressure readings at home  I have an appointment with him in 1 month and he will be seeing Cardiology next month  He should notify me if his pressure is consistently above 140/90  We can consider bumping up his Bystolic but we would have to be careful of his pulse which is only in the low 60s today

## 2018-11-20 ENCOUNTER — OFFICE VISIT (OUTPATIENT)
Dept: CARDIOLOGY CLINIC | Facility: CLINIC | Age: 68
End: 2018-11-20
Payer: MEDICARE

## 2018-11-20 VITALS
DIASTOLIC BLOOD PRESSURE: 86 MMHG | SYSTOLIC BLOOD PRESSURE: 126 MMHG | HEART RATE: 61 BPM | WEIGHT: 315 LBS | BODY MASS INDEX: 47.74 KG/M2 | HEIGHT: 68 IN

## 2018-11-20 DIAGNOSIS — R06.00 DOE (DYSPNEA ON EXERTION): Primary | ICD-10-CM

## 2018-11-20 DIAGNOSIS — I48.0 PAROXYSMAL ATRIAL FIBRILLATION (HCC): ICD-10-CM

## 2018-11-20 DIAGNOSIS — E78.2 MIXED HYPERLIPIDEMIA: ICD-10-CM

## 2018-11-20 DIAGNOSIS — I10 ESSENTIAL HYPERTENSION: ICD-10-CM

## 2018-11-20 DIAGNOSIS — I50.32 CHRONIC DIASTOLIC (CONGESTIVE) HEART FAILURE (HCC): ICD-10-CM

## 2018-11-20 DIAGNOSIS — I48.20 CHRONIC ATRIAL FIBRILLATION (HCC): ICD-10-CM

## 2018-11-20 PROCEDURE — 93000 ELECTROCARDIOGRAM COMPLETE: CPT | Performed by: INTERNAL MEDICINE

## 2018-11-20 PROCEDURE — 99214 OFFICE O/P EST MOD 30 MIN: CPT | Performed by: INTERNAL MEDICINE

## 2018-11-20 RX ORDER — POTASSIUM CHLORIDE 750 MG/1
10 TABLET, EXTENDED RELEASE ORAL DAILY
Qty: 90 TABLET | Refills: 3 | Status: SHIPPED | OUTPATIENT
Start: 2018-11-20 | End: 2019-11-07 | Stop reason: SDUPTHER

## 2018-11-20 RX ORDER — AMLODIPINE BESYLATE 10 MG/1
10 TABLET ORAL DAILY
Qty: 30 TABLET | Refills: 0
Start: 2018-11-20 | End: 2019-10-16 | Stop reason: SDUPTHER

## 2018-11-20 RX ORDER — FUROSEMIDE 20 MG/1
20 TABLET ORAL EVERY OTHER DAY
Refills: 0
Start: 2018-11-20 | End: 2018-12-24 | Stop reason: SDUPTHER

## 2018-11-20 RX ORDER — HYDROCHLOROTHIAZIDE 25 MG/1
25 TABLET ORAL DAILY
Qty: 90 TABLET | Refills: 3 | Status: SHIPPED | OUTPATIENT
Start: 2018-11-20 | End: 2019-11-26 | Stop reason: SDUPTHER

## 2018-11-20 NOTE — PROGRESS NOTES
Cardiology Follow Up    Madie Strongster   1950  [id number]  [address]  [address] [facility] [address] [phone]    Reason for visit: Post hospital FU for new afib s/p DCCV during recent admission    Also has HTN, HLP, DM, morbid obesity    Perhaps mild acute diastolic CHF associated with afib    1  Persistent atrial fibrillation (HCC)  POCT ECG   2  Chronic diastolic (congestive) heart failure ([address] )     3  Essential hypertension     4  Mixed hyperlipidemia         Interval History: Since his discharge he felt better however over the weekend he had some orthopnea and ABURTO    I directed him to take lasix 20 mg two times which helped    He still has some dyspnea    He denies chest pain  Elsa Prows He does have some edema of the LLE  Elsa Prows He does get some lightheadedness with change of position   Elsa Prows He denies palpitations       Patient Active Problem List   Diagnosis    Gastroesophageal reflux disease without esophagitis    Benign colon polyp    Depression with anxiety    Type 2 diabetes mellitus without complication, without long-term current use of insulin (HCC)    Gout    Hyperlipidemia    Essential hypertension    Hypokalemia    Leukocytosis    Microscopic hematuria    Morbid obesity (HCC)    Peripheral neuropathy    MARISSA (obstructive sleep apnea)    Testicular hypogonadism    Thyroglossal duct cyst    Benign prostatic hyperplasia with urinary frequency    Chronic rhinitis    Chronic diastolic (congestive) heart failure (HCC)    Depression    A-fib (HCC)     Past Medical History:   Diagnosis Date    BPH (benign prostatic hyperplasia)     Chronic diastolic (congestive) heart failure (HCC)     Depression     Diabetes mellitus (HCC)     GERD (gastroesophageal reflux disease)     Hyperlipidemia     Hypertension     Hyponatremia     last assessed: 09/08/2014    Leukocytosis     Liver function abnormality     Morbid obesity ([facility] )     Obstructive sleep apnea     Sleep apnea      Social History     Social History    Marital status: /Civil Union     Spouse name: N/A    Number of children: N/A    Years of education: N/A     Occupational History    Disability           significant asbestos and silica exposure in the past     Social History Main Topics    Smoking status: Never Smoker    Smokeless tobacco: Never Used    Alcohol use Yes      Comment: Social: 5-6 on the weekends    Drug use: No    Sexual activity: Not Currently     Other Topics Concern    Not on file     Social History Narrative    No narrative on file      Family History   Problem Relation Age of Onset    Diabetes Mother     Heart attack Mother     Hypertension Mother     Heart attack Sister      Past Surgical History:   Procedure Laterality Date    CARDIAC CATHETERIZATION      outcome: Neg    CARDIOVASCULAR STRESS TEST      resolved: 10/27/2010; negative    COLONOSCOPY  11/2013    polyp; complete    HERNIA REPAIR      resolved: 11/1999    REPLACEMENT TOTAL KNEE Left 2010    REPLACEMENT TOTAL KNEE Right 2003    THYROGLOSSAL DUCT EXCISION      TONSILLECTOMY      last assessed: 06/02/2014       Current Outpatient Prescriptions:     allopurinol (ZYLOPRIM) 300 mg tablet, Take 1 tablet by mouth daily, Disp: , Rfl:     amLODIPine (NORVASC) 10 mg tablet, Take 1 tablet (10 mg total) by mouth daily (Patient taking differently: Take 25 mg by mouth daily  ), Disp: 30 tablet, Rfl: 0    chlorhexidine (PERIDEX) 0 12 % solution, , Disp: , Rfl:     FLUoxetine (PROzac) 40 MG capsule, Take 1 capsule by mouth daily  , Disp: , Rfl:     furosemide (LASIX) 20 mg tablet, as needed  , Disp: , Rfl:     hydrochlorothiazide (HYDRODIURIL) 25 mg tablet, Take 1 tablet (25 mg total) by mouth daily, Disp: 30 tablet, Rfl: 0    lidocaine (XYLOCAINE) 5 % ointment, , Disp: , Rfl:     losartan (COZAAR) 50 mg tablet, Take 1 tablet (50 mg total) by mouth 2 (two) times a day, Disp: 180 tablet, Rfl: 3    metFORMIN (GLUCOPHAGE) 500 mg tablet, Take 1 tablet (500 mg total) by mouth daily with breakfast, Disp: 90 tablet, Rfl: 3    nebivolol (BYSTOLIC) 10 mg tablet, Take 1 tablet (10 mg total) by mouth daily, Disp: 90 tablet, Rfl: 3    omeprazole (PriLOSEC) 40 MG capsule, TAKE 1 CAPSULE BY MOUTH  EVERY DAY, Disp: 90 capsule, Rfl: 3    potassium chloride (K-DUR,KLOR-CON) 10 mEq tablet, Take 1 tablet (10 mEq total) by mouth daily, Disp: 30 tablet, Rfl: 0    pravastatin (PRAVACHOL) 10 mg tablet, Take 1 tablet by mouth, Disp: , Rfl:     rivaroxaban (XARELTO) 20 mg tablet, Take 1 tablet (20 mg total) by mouth daily with breakfast, Disp: 30 tablet, Rfl: 0  Allergies   Allergen Reactions    Metoprolol Shortness Of Breath     Tolerates Bystolic    Benazepril Cough    Clonidine Fatigue    Diltiazem Bradycardia    Entex T  [Pseudoephedrine-Guaifenesin]      Annotation - [id number]: LEG SWELLING       Review of Systems:  Review of Systems   Constitutional: Negative for appetite change, fatigue and unexpected weight change  Respiratory: Positive for cough (dry) and shortness of breath  Negative for wheezing  Cardiovascular: Positive for leg swelling  Negative for chest pain  Gastrointestinal: Negative for abdominal pain, blood in stool, constipation and diarrhea  Genitourinary: Positive for frequency  Negative for dysuria, hematuria and urgency  Musculoskeletal: Positive for arthralgias  Negative for back pain, gait problem and joint swelling  Neurological: Positive for headaches  Negative for dizziness, facial asymmetry, light-headedness and numbness  Psychiatric/Behavioral: Negative for agitation, behavioral problems, confusion and decreased concentration         Physical Exam:  Vitals:    11/20/18 1607   BP: 126/86   BP Location: Right arm   Patient Position: Sitting   Cuff Size: Large   Pulse: 61   Weight: (!) 143 kg (315 lb)   Height: 5' 8" (1 727 m) Physical Exam   Constitutional: He is oriented to person, place, and time  He appears well-developed and well-nourished  No distress  obese   HENT:   Head: Normocephalic and atraumatic  Mouth/Throat: No oropharyngeal exudate  Eyes: Conjunctivae are normal  No scleral icterus  Neck: Neck supple  Normal carotid pulses and no JVD present  Carotid bruit is not present  No thyromegaly present  Cardiovascular: Normal rate, regular rhythm, normal heart sounds and intact distal pulses  Exam reveals no gallop and no friction rub  No murmur heard  Pulmonary/Chest: Breath sounds normal  He has no decreased breath sounds  He has no wheezes  He has no rhonchi  He has no rales  Abdominal: Soft  He exhibits no mass  There is no hepatosplenomegaly  There is no tenderness  Musculoskeletal: He exhibits edema (trace edema of the LEs)  He exhibits no tenderness or deformity  Neurological: He is alert and oriented to person, place, and time  He has normal strength  No cranial nerve deficit or sensory deficit  Skin: Skin is warm and dry  No rash noted  No erythema  No pallor  Psychiatric: He has a normal mood and affect  His behavior is normal  Judgment and thought content normal           Discussion/Summary:  1/2  Dypsnea/diastolic CHF  Gerhardt Sep Perhaps acute diastolic CHF    Mild    Will give lasix QOD  Gerhardt Sep Check BMP and probnp along with CBC  3  PAF-now jx rhythm    Perhaps loss of atrial kick not helping    Continue xarelto and bystolic for potential rate control  4  HTN-well controlled on beta blocker, CCB, ARB, thiazide  5  HLP-on statin therapy    Continue same  Gerhardt Sep LDL 83 last year      FU one month    Fu with blood work by phone      Edin Nicole MD      2

## 2018-11-28 LAB
LEFT EYE DIABETIC RETINOPATHY: NORMAL
RIGHT EYE DIABETIC RETINOPATHY: NORMAL
SEVERITY (EYE EXAM): NORMAL

## 2018-12-03 ENCOUNTER — PATIENT OUTREACH (OUTPATIENT)
Dept: FAMILY MEDICINE CLINIC | Facility: CLINIC | Age: 68
End: 2018-12-03

## 2018-12-07 LAB
CREAT ?TM UR-SCNC: 208 UMOL/L
HBA1C MFR BLD HPLC: 6.1 %
MICROALBUMIN UR-MCNC: 1.1 MG/L (ref 0–20)
MICROALBUMIN/CREAT UR: 5.3 MG/G{CREAT}

## 2018-12-10 ENCOUNTER — OFFICE VISIT (OUTPATIENT)
Dept: FAMILY MEDICINE CLINIC | Facility: CLINIC | Age: 68
End: 2018-12-10
Payer: MEDICARE

## 2018-12-10 VITALS
HEART RATE: 64 BPM | WEIGHT: 306.2 LBS | SYSTOLIC BLOOD PRESSURE: 130 MMHG | HEIGHT: 68 IN | DIASTOLIC BLOOD PRESSURE: 82 MMHG | BODY MASS INDEX: 46.41 KG/M2 | RESPIRATION RATE: 18 BRPM | TEMPERATURE: 98 F

## 2018-12-10 DIAGNOSIS — I10 ESSENTIAL HYPERTENSION: ICD-10-CM

## 2018-12-10 DIAGNOSIS — I48.0 PAROXYSMAL ATRIAL FIBRILLATION (HCC): ICD-10-CM

## 2018-12-10 DIAGNOSIS — I50.32 CHRONIC DIASTOLIC (CONGESTIVE) HEART FAILURE (HCC): ICD-10-CM

## 2018-12-10 DIAGNOSIS — F32.A MILD DEPRESSIVE DISORDER: ICD-10-CM

## 2018-12-10 DIAGNOSIS — E78.2 MIXED HYPERLIPIDEMIA: ICD-10-CM

## 2018-12-10 DIAGNOSIS — G47.33 OSA (OBSTRUCTIVE SLEEP APNEA): ICD-10-CM

## 2018-12-10 DIAGNOSIS — K21.9 GASTROESOPHAGEAL REFLUX DISEASE WITHOUT ESOPHAGITIS: ICD-10-CM

## 2018-12-10 DIAGNOSIS — E66.01 MORBID OBESITY (HCC): ICD-10-CM

## 2018-12-10 DIAGNOSIS — F41.8 DEPRESSION WITH ANXIETY: ICD-10-CM

## 2018-12-10 DIAGNOSIS — E11.9 TYPE 2 DIABETES MELLITUS WITHOUT COMPLICATION, WITHOUT LONG-TERM CURRENT USE OF INSULIN (HCC): Primary | ICD-10-CM

## 2018-12-10 PROCEDURE — 99214 OFFICE O/P EST MOD 30 MIN: CPT | Performed by: FAMILY MEDICINE

## 2018-12-10 NOTE — ASSESSMENT & PLAN NOTE
He is stable with furosemide    He did have some mild hyponatremia and hypokalemia which I will re-evaluate prior to follow-up

## 2018-12-10 NOTE — ASSESSMENT & PLAN NOTE
He is in sinus rhythm currently  Continue with anticoagulation  Continue with routine cardiology follow-up

## 2018-12-10 NOTE — PATIENT INSTRUCTIONS
Problem List Items Addressed This Visit        Digestive    Gastroesophageal reflux disease without esophagitis     Stable with omeprazole every 3rd day            Endocrine    Type 2 diabetes mellitus without complication, without long-term current use of insulin ([facility] [address] ) - Primary     Lab Results   Component Value Date    HGBA1C 6 1 12/07/2018     Very well controlled  Continue with low-dose metformin  Relevant Orders    Comprehensive metabolic panel    Hemoglobin A1C       Respiratory    MARISSA (obstructive sleep apnea)       Cardiovascular and Mediastinum    Essential hypertension     Blood pressure is very well controlled  Continue to work on weight loss  As his weight is coming down, so will his blood pressure  He has had a couple episodes of lightheadedness  Consider breaking his hydrochlorothiazide in half to 12 5 mg if he continues to have some intermittent lightheadedness when standing abruptly  Relevant Orders    Comprehensive metabolic panel    Chronic diastolic (congestive) heart failure ([facility] [address] )     He is stable with furosemide  He did have some mild hyponatremia and hypokalemia which I will re-evaluate prior to follow-up         Relevant Orders    Comprehensive metabolic panel    A-fib [facility])     He is in sinus rhythm currently  Continue with anticoagulation  Continue with routine cardiology follow-up  Other    Depression with anxiety    Hyperlipidemia    Relevant Orders    Comprehensive metabolic panel    Morbid obesity ([facility] )    Relevant Orders    Comprehensive metabolic panel    Mild depressive disorder (HCC)        Low Fat Diet   AMBULATORY CARE:   A low-fat diet  is an eating plan that is low in total fat, unhealthy fat, and cholesterol  You may need to follow a low-fat diet if you have trouble digesting or absorbing fat  You may also need to follow this diet if you have high cholesterol  You can also lower your cholesterol by increasing the amount of fiber in your diet  Soluble fiber is a type of fiber that helps to decrease cholesterol levels  Different types of fat in food:   · Limit unhealthy fats  A diet that is high in cholesterol, saturated fat, and trans fat may cause unhealthy cholesterol levels  Unhealthy cholesterol levels increase your risk of heart disease  ¨ Cholesterol:  Limit intake of cholesterol to less than 200 mg per day  Cholesterol is found in meat, eggs, and dairy  ¨ Saturated fat:  Limit saturated fat to less than 7% of your total daily calories  Ask your dietitian how many calories you need each day  Saturated fat is found in butter, cheese, ice cream, whole milk, and palm oil  Saturated fat is also found in meat, such as beef, pork, chicken skin, and processed meats  Processed meats include sausage, hot dogs, and bologna  ¨ Trans fat:  Avoid trans fat as much as possible  Trans fat is used in fried and baked foods  Foods that say trans fat free on the label may still have up to 0 5 grams of trans fat per serving  · Include healthy fats  Replace foods that are high in saturated and trans fat with foods high in healthy fats  This may help to decrease high cholesterol levels  ¨ Monounsaturated fats: These are found in avocados, nuts, and vegetable oils, such as olive, canola, and sunflower oil  ¨ Polyunsaturated fats: These can be found in vegetable oils, such as soybean or corn oil  Omega-3 fats can help to decrease the risk of heart disease  Omega-3 fats are found in fish, such as salmon, herring, trout, and tuna  Omega-3 fats can also be found in plant foods, such as walnuts, flaxseed, soybeans, and canola oil    Foods to limit or avoid:   · Grains:      ¨ Snacks that are made with partially hydrogenated oils, such as chips, regular crackers, and butter-flavored popcorn    ¨ High-fat baked goods, such as biscuits, croissants, doughnuts, pies, cookies, and pastries    · Dairy:      ¨ Whole milk, 2% milk, and yogurt and ice cream made with whole milk    ¨ Half and half creamer, heavy cream, and whipping cream    ¨ Cheese, cream cheese, and sour cream    · Meats and proteins:      ¨ High-fat cuts of meat (T-bone steak, regular hamburger, and ribs)    ¨ Fried meat, poultry (turkey and chicken), and fish    ¨ Poultry (chicken and turkey) with skin    ¨ Cold cuts (salami or bologna), hot dogs, holley, and sausage    ¨ Whole eggs and egg yolks    · Vegetables and fruits with added fat:      ¨ Fried vegetables or vegetables in butter or high-fat sauces, such as cream or cheese sauces    ¨ Fried fruit or fruit served with butter or cream    · Fats:      ¨ Butter, stick margarine, and shortening    ¨ Coconut, palm oil, and palm kernel oil  Foods to include:   · Grains:      ¨ Whole-grain breads, cereals, pasta, and brown rice    ¨ Low-fat crackers and pretzels    · Vegetables and fruits:      ¨ Fresh, frozen, or canned vegetables (no salt or low-sodium)    ¨ Fresh, frozen, dried, or canned fruit (canned in light syrup or fruit juice)    ¨ Avocado    · Low-fat dairy products:      ¨ Nonfat (skim) or 1% milk    ¨ Nonfat or low-fat cheese, yogurt, and cottage cheese    · Meats and proteins:      ¨ Chicken or turkey with no skin    ¨ Baked or broiled fish    ¨ Lean beef and pork (loin, round, extra lean hamburger)    ¨ Beans and peas, unsalted nuts, soy products    ¨ Egg whites and substitutes    ¨ Seeds and nuts    · Fats:      ¨ Unsaturated oil, such as canola, olive, peanut, soybean, or sunflower oil    ¨ Soft or liquid margarine and vegetable oil spread    ¨ Low-fat salad dressing  Other ways to decrease fat:   · Read food labels before you buy foods  Choose foods that have less than 30% of calories from fat  Choose low-fat or fat-free dairy products  Remember that fat free does not mean calorie free  These foods still contain calories, and too many calories can lead to weight gain  · Trim fat from meat and avoid fried food    Trim all visible fat from meat before you cook it  Remove the skin from poultry  Do not mcclain meat, fish, or poultry  Bake, roast, boil, or broil these foods instead  Avoid fried foods  Eat a baked potato instead of Western Jasmin fries  Steam vegetables instead of sautéing them in butter  · Add less fat to foods  Use imitation holley bits on salads and baked potatoes instead of regular holley bits  Use fat-free or low-fat salad dressings instead of regular dressings  Use low-fat or nonfat butter-flavored topping instead of regular butter or margarine on popcorn and other foods  Ways to decrease fat in recipes:  Replace high-fat ingredients with low-fat or nonfat ones  This may cause baked goods to be drier than usual  You may need to use nonfat cooking spray on pans to prevent food from sticking  You also may need to change the amount of other ingredients, such as water, in the recipe  Try the following:  · Use low-fat or light margarine instead of regular margarine or shortening  · Use lean ground turkey breast or chicken, or lean ground beef (less than 5% fat) instead of hamburger  · Add 1 teaspoon of canola oil to 8 ounces of skim milk instead of using cream or half and half  · Use grated zucchini, carrots, or apples in breads instead of coconut  · Use blenderized, low-fat cottage cheese, plain tofu, or low-fat ricotta cheese instead of cream cheese  · Use 1 egg white and 1 teaspoon of canola oil, or use ¼ cup (2 ounces) of fat-free egg substitute instead of a whole egg  · Replace half of the oil that is called for in a recipe with applesauce when you bake  Use 3 tablespoons of cocoa powder and 1 tablespoon of canola oil instead of a square of baking chocolate  How to increase fiber:  Eat enough high-fiber foods to get 20 to 30 grams of fiber every day  Slowly increase your fiber intake to avoid stomach cramps, gas, and other problems  · Eat 3 ounces of whole-grain foods each day    An ounce is about 1 slice of bread  Eat whole-grain breads, such as whole-wheat bread  Whole wheat, whole-wheat flour, or other whole grains should be listed as the first ingredient on the food label  Replace white flour with whole-grain flour or use half of each in recipes  Whole-grain flour is heavier than white flour, so you may have to add more yeast or baking powder  · Eat a high-fiber cereal for breakfast   Oatmeal is a good source of soluble fiber  Look for cereals that have bran or fiber in the name  Choose whole-grain products, such as brown rice, barley, and whole-wheat pasta  · Eat more beans, peas, and lentils  For example, add beans to soups or salads  Eat at least 5 cups of fruits and vegetables each day  Eat fruits and vegetables with the peel because the peel is high in fiber  © 2017 [address] Information is for End User's use only and may not be sold, redistributed or otherwise used for commercial purposes  All illustrations and images included in CareNotes® are the copyrighted property of [employer] , [employer]  or Beto Parra  The above information is an  only  It is not intended as medical advice for individual conditions or treatments  Talk to your doctor, nurse or pharmacist before following any medical regimen to see if it is safe and effective for you  Heart Healthy Diet   AMBULATORY CARE:   A heart healthy diet  is an eating plan low in total fat, unhealthy fats, and sodium (salt)  A heart healthy diet helps decrease your risk for heart disease and stroke  Limit the amount of fat you eat to 25% to 35% of your total daily calories  Limit sodium to less than 2,300 mg each day  Healthy fats:  Healthy fats can help improve cholesterol levels  The risk for heart disease is decreased when cholesterol levels are normal  Choose healthy fats, such as the following:  · Unsaturated fat  is found in foods such as soybean, canola, olive, corn, and safflower oils   It is also found in soft tub margarine that is made with liquid vegetable oil  · Omega-3 fat  is found in certain fish, such as salmon, tuna, and trout, and in walnuts and flaxseed  Unhealthy fats:  Unhealthy fats can cause unhealthy cholesterol levels in your blood and increase your risk of heart disease  Limit unhealthy fats, such as the following:  · Cholesterol  is found in animal foods, such as eggs and lobster, and in dairy products made from whole milk  Limit cholesterol to less than 300 milligrams (mg) each day  You may need to limit cholesterol to 200 mg each day if you have heart disease  · Saturated fat  is found in meats, such as holley and hamburger  It is also found in chicken or turkey skin, whole milk, and butter  Limit saturated fat to less than 7% of your total daily calories  Limit saturated fat to less than 6% if you have heart disease or are at increased risk for it  · Trans fat  is found in packaged foods, such as potato chips and cookies  It is also in hard margarine, some fried foods, and shortening  Avoid trans fats as much as possible    Heart healthy foods and drinks to include:  Ask your dietitian or healthcare provider how many servings to have from each of the following food groups:  · Grains:      ¨ Whole-wheat breads, cereals, and pastas, and brown rice    ¨ Low-fat, low-sodium crackers and chips    · Vegetables:      ¨ Broccoli, green beans, green peas, and spinach    ¨ Collards, kale, and lima beans    ¨ Carrots, sweet potatoes, tomatoes, and peppers    ¨ Canned vegetables with no salt added    · Fruits:      ¨ Bananas, peaches, pears, and pineapple    ¨ Grapes, raisins, and dates    ¨ Oranges, tangerines, grapefruit, orange juice, and grapefruit juice    ¨ Apricots, mangoes, melons, and papaya    ¨ Raspberries and strawberries    ¨ Canned fruit with no added sugar    · Low-fat dairy products:      ¨ Nonfat (skim) milk, 1% milk, and low-fat almond, cashew, or soy milks fortified with calcium    ¨ Low-fat cheese, regular or frozen yogurt, and cottage cheese    · Meats and proteins , such as lean cuts of beef and pork (loin, leg, round), skinless chicken and turkey, legumes, soy products, egg whites, and nuts  Foods and drinks to limit or avoid:  Ask your dietitian or healthcare provider about these and other foods that are high in unhealthy fat, sodium, and sugar:  · Snack or packaged foods , such as frozen dinners, cookies, macaroni and cheese, and cereals with more than 300 mg of sodium per serving    · Canned or dry mixes  for cakes, soups, sauces, or gravies    · Vegetables with added sodium , such as instant potatoes, vegetables with added sauces, or regular canned vegetables    · Other foods high in sodium , such as ketchup, barbecue sauce, salad dressing, pickles, olives, soy sauce, and miso    · High-fat dairy foods  such as whole or 2% milk, cream cheese, or sour cream, and cheeses     · High-fat protein foods  such as high-fat cuts of beef (T-bone steaks, ribs), chicken or turkey with skin, and organ meats, such as liver    · Cured or smoked meats , such as hot dogs, holley, and sausage    · Unhealthy fats and oils , such as butter, stick margarine, shortening, and cooking oils such as coconut or palm oil    · Food and drinks high in sugar , such as soft drinks (soda), sports drinks, sweetened tea, candy, cake, cookies, pies, and doughnuts  Other diet guidelines to follow:   · Eat more foods containing omega-3 fats  Eat fish high in omega-3 fats at least 2 times a week  · Limit alcohol  Too much alcohol can damage your heart and raise your blood pressure  Women should limit alcohol to 1 drink a day  Men should limit alcohol to 2 drinks a day  A drink of alcohol is 12 ounces of beer, 5 ounces of wine, or 1½ ounces of liquor  · Choose low-sodium foods  High-sodium foods can lead to high blood pressure  Add little or no salt to food you prepare   Use herbs and spices in place of salt     · Eat more fiber  to help lower cholesterol levels  Eat at least 5 servings of fruits and vegetables each day  Eat 3 ounces of whole-grain foods each day  Legumes (beans) are also a good source of fiber  Lifestyle guidelines:   · Do not smoke  Nicotine and other chemicals in cigarettes and cigars can cause lung and heart damage  Ask your healthcare provider for information if you currently smoke and need help to quit  E-cigarettes or smokeless tobacco still contain nicotine  Talk to your healthcare provider before you use these products  · Exercise regularly  to help you maintain a healthy weight and improve your blood pressure and cholesterol levels  Ask your healthcare provider about the best exercise plan for you  Do not start an exercise program without asking your healthcare provider  Follow up with your healthcare provider as directed:  Write down your questions so you remember to ask them during your visits  © 2017 [address] Information is for End User's use only and may not be sold, redistributed or otherwise used for commercial purposes  All illustrations and images included in CareNotes® are the copyrighted property of A D A [employer]  or Bet[employer] Parra  The above information is an  only  It is not intended as medical advice for individual conditions or treatments  Talk to your doctor, nurse or pharmacist before following any medical regimen to see if it is safe and effective for you  Calorie Counting Diet   WHAT YOU NEED TO KNOW:   What is a calorie counting diet? It is a meal plan based on counting calories each day to reach a healthy body weight  You will need to eat fewer calories if you are trying to lose weight  Weight loss may decrease your risk for certain health problems or improve your health if you have health problems  Some of these health problems include heart disease, high blood pressure, and diabetes     What foods should I avoid? Your dietitian will tell you if you need to avoid certain foods based on your body weight and health condition  You may need to avoid high-fat foods if you are at risk for or have heart disease  You may need to eat fewer foods from the breads and starches food group if you have diabetes  How many calories are in foods? The following is a list of foods and drinks with the approximate number of calories in each  Check the food label to find the exact number of calories  A dietitian can tell you how many calories you should have from each food group each day    · Carbohydrate:      ¨ ½ of a 3-inch bagel, 1 slice of bread, or ½ of a hamburger bun or hot dog bun (80)    ¨ 1 (8-inch) flour tortilla or ½ cup of cooked rice (100)    ¨ 1 (6-inch) corn tortilla (80)    ¨ 1 (6-inch) pancake or 1 cup of bran flakes cereal (110)    ¨ ½ cup of cooked cereal (80)    ¨ ½ cup of cooked pasta (85)    ¨ 1 ounce of pretzels (100)    ¨ 3 cups of air-popped popcorn without butter or oil (80)    · Dairy:      ¨ 1 cup of skim or 1% milk (90)    ¨ 1 cup of 2% milk (120)    ¨ 1 cup of whole milk (160)    ¨ 1 cup of 2% chocolate milk (220)    ¨ 1 ounce of low-fat cheese with 3 grams of fat per ounce (70)    ¨ 1 ounce of cheddar cheese (114)    ¨ ½ cup of 1% fat cottage cheese (80)    ¨ 1 cup of plain or sugar-free, fat-free yogurt (90)    · Protein foods:      ¨ 3 ounces of fish (not breaded or fried) (95)    ¨ 3 ounces of breaded, fried fish (195)    ¨ ¾ cup of tuna canned in water (105)    ¨ 3 ounces of chicken breast without skin (105)    ¨ 1 fried chicken breast with skin (350)    ¨ ¼ cup of fat free egg substitute (40)    ¨ 1 large egg (75)    ¨ 3 ounces of lean beef or pork (165)    ¨ 3 ounces of fried pork chop or ham (185)    ¨ ½ cup of cooked dried beans, such as kidney, patel, lentils, or navy (115)    ¨ 3 ounces of bologna or lunch meat (225)    ¨ 2 links of breakfast sausage (140)    · Vegetables:      ¨ ½ cup of sliced mushrooms (10)    ¨ 1 cup of salad greens, such as lettuce, spinach, or miguel angel (15)    ¨ ½ cup of steamed asparagus (20)    ¨ ½ cup of cooked summer squash, zucchini squash, or green or wax beans (25)    ¨ 1 cup of broccoli or cauliflower florets, or 1 medium tomato (25)    ¨ 1 large raw carrot or ½ cup of cooked carrots (40)    ¨ ? of a medium cucumber or 1 stalk of celery (5)    ¨ 1 small baked potato (160)    ¨ 1 cup of breaded, fried vegetables (230)    · Fruit:      ¨ 1 (6-inch) banana (55)     ¨ ½ of a 4-inch grapefruit (55)    ¨ 15 grapes (60)    ¨ 1 medium orange or apple (70)    ¨ 1 large peach (65)    ¨ 1 cup of fresh pineapple chunks (75)    ¨ 1 cup of melon cubes (50)    ¨ 1¼ cups of whole strawberries (45)    ¨ ½ cup of fruit canned in juice (55)    ¨ ½ cup of fruit canned in heavy syrup (110)    ¨ ?  cup of raisins (130)    ¨ ½ cup of unsweetened fruit juice (60)    ¨ ½ cup of grape, cranberry, or prune juice (90)    · Fat:      ¨ 10 peanuts or 2 teaspoons of peanut butter (55)    ¨ 2 tablespoons of avocado or 1 tablespoon of regular salad dressing (45)    ¨ 2 slices of holley (90)    ¨ 1 teaspoon of oil, such as safflower, canola, corn, or olive oil (45)    ¨ 2 teaspoons of low-fat margarine, or 1 tablespoon of low-fat mayonnaise (50)    ¨ 1 teaspoon of regular margarine (40)    ¨ 1 tablespoon of regular mayonnaise (135)    ¨ 1 tablespoon of cream cheese or 2 tablespoons of low-fat cream cheese (45)    ¨ 2 tablespoons of vegetable shortening (215)    · Dessert and sweets:      ¨ 8 animal crackers or 5 vanilla wafers (80)    ¨ 1 frozen fruit juice bar (80)    ¨ ½ cup of ice milk or low-fat frozen yogurt (90)    ¨ ½ cup of sherbet or sorbet (125)    ¨ ½ cup of sugar-free pudding or custard (60)    ¨ ½ cup of ice cream (140)    ¨ ½ cup of pudding or custard (175)    ¨ 1 (2-inch) square chocolate brownie (185)    · Combination foods:      ¨ Bean burrito made with an 8-inch tortilla, without cheese (275)    ¨ Chicken breast sandwich with lettuce and tomato (325)    ¨ 1 cup of chicken noodle soup (60)    ¨ 1 beef taco (175)    ¨ Regular hamburger with lettuce and tomato (310)    ¨ Regular cheeseburger with lettuce and tomato (410)     ¨ ¼ of a 12-inch cheese pizza (280)    ¨ Fried fish sandwich with lettuce and tomato (425)    ¨ Hot dog and bun (275)    ¨ 1½ cups of macaroni and cheese (310)    ¨ Taco salad with a fried tortilla shell (870)    · Low-calorie foods:      ¨ 1 tablespoon of ketchup or 1 tablespoon of fat free sour cream (15)    ¨ 1 teaspoon of mustard (5)    ¨ ¼ cup of salsa (20)    ¨ 1 large dill pickle (15)    ¨ 1 tablespoon of fat free salad dressing (10)    ¨ 2 teaspoons of low-sugar, light jam or jelly, or 1 tablespoon of sugar-free syrup (15)    ¨ 1 sugar-free popsicle (15)    ¨ 1 cup of club soda, seltzer water, or diet soda (0)  CARE AGREEMENT:   You have the right to help plan your care  Discuss treatment options with your caregivers to decide what care you want to receive  You always have the right to refuse treatment  The above information is an  only  It is not intended as medical advice for individual conditions or treatments  Talk to your doctor, nurse or pharmacist before following any medical regimen to see if it is safe and effective for you  © 2017 [address] Information is for End User's use only and may not be sold, redistributed or otherwise used for commercial purposes  All illustrations and images included in CareNotes® are the copyrighted property of [employer]  or [employer]

## 2018-12-10 NOTE — ASSESSMENT & PLAN NOTE
Blood pressure is very well controlled  Continue to work on weight loss  As his weight is coming down, so will his blood pressure  He has had a couple episodes of lightheadedness  Consider breaking his hydrochlorothiazide in half to 12 5 mg if he continues to have some intermittent lightheadedness when standing abruptly

## 2018-12-10 NOTE — PROGRESS NOTES
Assessment/Plan:       Problem List Items Addressed This Visit        Digestive    Gastroesophageal reflux disease without esophagitis     Stable with omeprazole every 3rd day            Endocrine    Type 2 diabetes mellitus without complication, without long-term current use of insulin ([facility] [address] ) - Primary     Lab Results   Component Value Date    HGBA1C 6 1 12/07/2018     Very well controlled  Continue with low-dose metformin  Relevant Orders    Comprehensive metabolic panel    Hemoglobin A1C       Respiratory    MARISSA (obstructive sleep apnea)     Continue on CPAP treatment  Cardiovascular and Mediastinum    Essential hypertension     Blood pressure is very well controlled  Continue to work on weight loss  As his weight is coming down, so will his blood pressure  He has had a couple episodes of lightheadedness  Consider breaking his hydrochlorothiazide in half to 12 5 mg if he continues to have some intermittent lightheadedness when standing abruptly  Relevant Orders    Comprehensive metabolic panel    Chronic diastolic (congestive) heart failure ([facility] [address] )     He is stable with furosemide  He did have some mild hyponatremia and hypokalemia which I will re-evaluate prior to follow-up         Relevant Orders    Comprehensive metabolic panel    A-fib [facility])     He is in sinus rhythm currently  Continue with anticoagulation  Continue with routine cardiology follow-up  Other    Depression with anxiety    Hyperlipidemia    Relevant Orders    Comprehensive metabolic panel    Morbid obesity ([facility] [address] )    Relevant Orders    Comprehensive metabolic panel    Mild depressive disorder (HCC)            Subjective:      Patient ID: Loc [place]en[place]  is a 77 y o  male  HPI patient presents today for follow-up for chronic health issues  The patient presents today for follow-up for diabetes  Fortunately, the A1c is well controlled    There have been no significant episodes of hypo or hyperglycemia  The patient is not experiencing any blurry vision, polyuria, polydipsia, or peripheral neuropathy symptoms  Patient remains compliant with medications and routine follow-up  The patient presents today for a hypertension follow-up  Patient remains compliant with medications and denies any chest pain, shortness of breath, palpitations,  or diaphoresis  He has been losing weight and was wondering if he could cut back on his medications at all especially as he has had a few episodes of mild lightheadedness upon standing abruptly  He has had no significant palpitations  He has known atrial fibrillation  He does remain on anticoagulation without bruising or bleeding  He has history of some mild depression which remains quite stable with fluoxetine  He denies excessive depression or anxiety  The following portions of the patient's history were reviewed and updated as appropriate: allergies, current medications, past family history, past medical history, past social history, past surgical history and problem list     Review of Systems   Constitutional: Negative for appetite change, chills, fatigue, fever and unexpected weight change  HENT: Negative for trouble swallowing  Eyes: Negative for visual disturbance  Respiratory: Negative for cough, chest tightness, shortness of breath and wheezing  Cardiovascular: Negative for chest pain  Gastrointestinal: Negative for abdominal distention, abdominal pain, blood in stool, constipation and diarrhea  Endocrine: Negative for polyuria  Genitourinary: Negative for difficulty urinating and flank pain  Musculoskeletal: Negative for arthralgias and myalgias  Skin: Negative for rash  Neurological: Negative for dizziness and light-headedness  Hematological: Negative for adenopathy  Does not bruise/bleed easily  Psychiatric/Behavioral: Negative for sleep disturbance           Objective:      /82   Pulse 64   Temp 98 °F (36 7 °C) Resp 18   Ht 5' 8" (1 727 m)   Wt (!) 139 kg (306 lb 3 2 oz)   BMI 46 56 kg/m²          Physical Exam   Constitutional: He is oriented to person, place, and time  He appears well-developed and well-nourished  No distress  HENT:   Head: Normocephalic  Eyes: Pupils are equal, round, and reactive to light  Neck: No tracheal deviation present  No thyromegaly present  Cardiovascular: Normal rate, regular rhythm and normal heart sounds  No murmur heard  Pulmonary/Chest: Effort normal  No respiratory distress  He has no wheezes  He has no rales  Abdominal: Soft  He exhibits no distension  There is no tenderness  Musculoskeletal: Normal range of motion  He exhibits no edema or tenderness  Neurological: He is alert and oriented to person, place, and time  No cranial nerve deficit  Skin: Skin is warm  He is not diaphoretic  Psychiatric: He has a normal mood and affect  Judgment and thought content normal          Kendy Abrams MD    BMI Counseling: Body mass index is 46 56 kg/m²  Discussed the patient's BMI with him  The BMI is above average  BMI counseling and education was provided to the patient  Nutrition recommendations include reducing portion sizes, decreasing overall calorie intake and 3-5 servings of fruits/vegetables daily  Exercise recommendations include moderate aerobic physical activity for 150 minutes/week

## 2018-12-10 NOTE — ASSESSMENT & PLAN NOTE
Lab Results   Component Value Date    HGBA1C 6 1 12/07/2018     Very well controlled  Continue with low-dose metformin

## 2018-12-19 ENCOUNTER — OFFICE VISIT (OUTPATIENT)
Dept: CARDIOLOGY CLINIC | Facility: CLINIC | Age: 68
End: 2018-12-19
Payer: MEDICARE

## 2018-12-19 ENCOUNTER — PATIENT OUTREACH (OUTPATIENT)
Dept: FAMILY MEDICINE CLINIC | Facility: CLINIC | Age: 68
End: 2018-12-19

## 2018-12-19 VITALS
SYSTOLIC BLOOD PRESSURE: 128 MMHG | HEART RATE: 58 BPM | BODY MASS INDEX: 46.29 KG/M2 | DIASTOLIC BLOOD PRESSURE: 78 MMHG | WEIGHT: 305.4 LBS | HEIGHT: 68 IN

## 2018-12-19 DIAGNOSIS — I48.0 PAROXYSMAL ATRIAL FIBRILLATION (HCC): Primary | ICD-10-CM

## 2018-12-19 DIAGNOSIS — I50.32 CHRONIC DIASTOLIC (CONGESTIVE) HEART FAILURE (HCC): ICD-10-CM

## 2018-12-19 DIAGNOSIS — I10 ESSENTIAL HYPERTENSION: ICD-10-CM

## 2018-12-19 DIAGNOSIS — E78.2 MIXED HYPERLIPIDEMIA: ICD-10-CM

## 2018-12-19 PROCEDURE — 99213 OFFICE O/P EST LOW 20 MIN: CPT | Performed by: INTERNAL MEDICINE

## 2018-12-19 NOTE — PROGRESS NOTES
Cardiology Follow Up    Seema Pop   1950  [id number]  [address]  [address] [address] [phone]    Reason for FU: here for one month FU of new onset AFIB s/p DCCV  Lalitha Wilkes Also has chronic diastolic CHF  Lalitha Wilkes Also has HTN and HLP    1  Paroxysmal atrial fibrillation (HCC)     2  Chronic diastolic (congestive) heart failure ([address] )     3  Essential hypertension     4  Mixed hyperlipidemia         Interval History: SInce last visit he states his dyspnea is gone  He denies chest pain  Lalitha Nairer He denies palpitations  He does get edema but it is better with taking lasix q2-3 days  Lalitha Nairer He denies lightheadedness       Patient Active Problem List   Diagnosis    Gastroesophageal reflux disease without esophagitis    Benign colon polyp    Depression with anxiety    Type 2 diabetes mellitus without complication, without long-term current use of insulin (HCC)    Gout    Hyperlipidemia    Essential hypertension    Hypokalemia    Leukocytosis    Microscopic hematuria    Morbid obesity (HCC)    Peripheral neuropathy    MARISSA (obstructive sleep apnea)    Testicular hypogonadism    Thyroglossal duct cyst    Benign prostatic hyperplasia with urinary frequency    Chronic rhinitis    Chronic diastolic (congestive) heart failure (HCC)    Mild depressive disorder (HCC)    A-fib (HCC)     Past Medical History:   Diagnosis Date    BPH (benign prostatic hyperplasia)     Chronic diastolic (congestive) heart failure (HCC)     Depression     Diabetes mellitus (HCC)     GERD (gastroesophageal reflux disease)     Hyperlipidemia     Hypertension     Hyponatremia     last assessed: 09/08/2014    Leukocytosis     Liver function abnormality     Morbid obesity (HCC)     Obstructive sleep apnea     Sleep apnea      Social History     Social History    Marital status: /Civil Union     Spouse name: N/A    Number of children: N/A    Years of education: N/A     Occupational History    Disability           significant asbestos and silica exposure in the past     Social History Main Topics    Smoking status: Never Smoker    Smokeless tobacco: Never Used    Alcohol use Yes      Comment: Social: 5-6 on the weekends    Drug use: No    Sexual activity: Not Currently     Other Topics Concern    Not on file     Social History Narrative    No narrative on file      Family History   Problem Relation Age of Onset    Diabetes Mother     Heart attack Mother     Hypertension Mother     Heart attack Sister      Past Surgical History:   Procedure Laterality Date    CARDIAC CATHETERIZATION      outcome: Neg    CARDIOVASCULAR STRESS TEST      resolved: 10/27/2010; negative    COLONOSCOPY  11/2013    polyp; complete    HERNIA REPAIR      resolved: 11/1999    REPLACEMENT TOTAL KNEE Left 2010    REPLACEMENT TOTAL KNEE Right 2003    THYROGLOSSAL DUCT EXCISION      TONSILLECTOMY      last assessed: 06/02/2014       Current Outpatient Prescriptions:     allopurinol (ZYLOPRIM) 300 mg tablet, Take 1 tablet by mouth daily, Disp: , Rfl:     amLODIPine (NORVASC) 10 mg tablet, Take 1 tablet (10 mg total) by mouth daily, Disp: 30 tablet, Rfl: 0    chlorhexidine (PERIDEX) 0 12 % solution, , Disp: , Rfl:     FLUoxetine (PROzac) 40 MG capsule, Take 1 capsule by mouth daily  , Disp: , Rfl:     furosemide (LASIX) 20 mg tablet, Take 1 tablet (20 mg total) by mouth every other day, Disp: , Rfl: 0    hydrochlorothiazide (HYDRODIURIL) 25 mg tablet, Take 1 tablet (25 mg total) by mouth daily, Disp: 90 tablet, Rfl: 3    lidocaine (XYLOCAINE) 5 % ointment, , Disp: , Rfl:     losartan (COZAAR) 50 mg tablet, Take 1 tablet (50 mg total) by mouth 2 (two) times a day, Disp: 180 tablet, Rfl: 3    metFORMIN (GLUCOPHAGE) 500 mg tablet, Take 1 tablet (500 mg total) by mouth daily with breakfast, Disp: 90 tablet, Rfl: 3    nebivolol (BYSTOLIC) 10 mg tablet, Take 1 tablet (10 mg total) by mouth daily, Disp: 90 tablet, Rfl: 3    omeprazole (PriLOSEC) 40 MG capsule, TAKE 1 CAPSULE BY MOUTH  EVERY DAY, Disp: 90 capsule, Rfl: 3    potassium chloride (K-DUR,KLOR-CON) 10 mEq tablet, Take 1 tablet (10 mEq total) by mouth daily, Disp: 90 tablet, Rfl: 3    pravastatin (PRAVACHOL) 10 mg tablet, Take 1 tablet by mouth, Disp: , Rfl:     rivaroxaban (XARELTO) 20 mg tablet, Take 1 tablet (20 mg total) by mouth daily with breakfast, Disp: 90 tablet, Rfl: 3  Allergies   Allergen Reactions    Metoprolol Shortness Of Breath     Tolerates Bystolic    Benazepril Cough    Clonidine Fatigue    Diltiazem Bradycardia    Entex T  [Pseudoephedrine-Guaifenesin]      Annotation - [id number]: LEG SWELLING       Review of Systems:  Review of Systems   Constitutional: Negative for appetite change, fatigue and unexpected weight change  Respiratory: Positive for cough (dry cough    better)  Negative for shortness of breath and wheezing  Cardiovascular: Positive for leg swelling (better)  Negative for chest pain  Gastrointestinal: Negative for abdominal pain, blood in stool, constipation and diarrhea  Genitourinary: Positive for frequency  Negative for dysuria, hematuria and urgency  Musculoskeletal: Positive for arthralgias  Negative for back pain, gait problem and joint swelling  Neurological: Negative for dizziness, facial asymmetry, light-headedness, numbness and headaches  Psychiatric/Behavioral: Negative for agitation, behavioral problems, confusion and decreased concentration  Physical Exam:  Vitals:    12/19/18 1418   BP: 128/78   BP Location: Right arm   Patient Position: Sitting   Cuff Size: Large   Pulse: 58   Weight: (!) 139 kg (305 lb 6 4 oz)   Height: 5' 8" (1 727 m)       Physical Exam   Constitutional: He is oriented to person, place, and time  He appears well-developed and well-nourished  No distress     obese   HENT:   Head: Normocephalic and atraumatic  Mouth/Throat: No oropharyngeal exudate  Eyes: Conjunctivae are normal  No scleral icterus  Neck: Neck supple  Normal carotid pulses and no JVD present  Carotid bruit is not present  No thyromegaly present  Cardiovascular: Normal rate, regular rhythm, normal heart sounds and intact distal pulses  Exam reveals no gallop and no friction rub  No murmur heard  Pulmonary/Chest: Breath sounds normal  He has no decreased breath sounds  He has no wheezes  He has no rhonchi  He has no rales  Abdominal: Soft  He exhibits no mass  There is no hepatosplenomegaly  There is no tenderness  Musculoskeletal: He exhibits edema (trace edema of the LEs)  He exhibits no tenderness or deformity  Neurological: He is alert and oriented to person, place, and time  He has normal strength  No cranial nerve deficit or sensory deficit  Skin: Skin is warm and dry  No rash noted  No erythema  No pallor  Psychiatric: He has a normal mood and affect  His behavior is normal  Judgment and thought content normal        Discussion/Summary:  1  PAF-in NSR  Eduardo Francis On xarelto    On nebivolol for BP and potential rate control  2  Chronic diastolic CHF-well compensated with prn use of furosemide with increased  KCL supplement on those days  Wt down 8 lbs and feels good  3  HTN-well controlled on HCTZ, amlodipine and nebivilol and losartan  4  HLP-on statin-lipids good        FU 4 months      Bong Reddy MD

## 2018-12-19 NOTE — PROGRESS NOTES
Maegan Malone continues to work on weight loss  He is following a diabetic, low sodium diet  Blood sugars have been in the  range without hypoglycemic episodes  He has no s/s of heart failure and is aware of weight parameters to call cardiology  BP has been stable  He is taking all medications and f/u appointments are scheduled  He is managing well at home and declines further outreach

## 2018-12-24 DIAGNOSIS — F33.41 RECURRENT MAJOR DEPRESSIVE DISORDER, IN PARTIAL REMISSION (HCC): ICD-10-CM

## 2018-12-24 DIAGNOSIS — R06.00 DOE (DYSPNEA ON EXERTION): ICD-10-CM

## 2018-12-24 DIAGNOSIS — I50.32 CHRONIC DIASTOLIC (CONGESTIVE) HEART FAILURE (HCC): ICD-10-CM

## 2018-12-24 DIAGNOSIS — E78.00 HYPERCHOLESTEROLEMIA: ICD-10-CM

## 2018-12-24 DIAGNOSIS — M1A.00X0 IDIOPATHIC CHRONIC GOUT WITHOUT TOPHUS, UNSPECIFIED SITE: Primary | ICD-10-CM

## 2018-12-24 RX ORDER — FUROSEMIDE 20 MG/1
TABLET ORAL
Qty: 90 TABLET | Refills: 3 | Status: SHIPPED | OUTPATIENT
Start: 2018-12-24 | End: 2019-12-06 | Stop reason: HOSPADM

## 2018-12-24 RX ORDER — PRAVASTATIN SODIUM 10 MG
TABLET ORAL
Qty: 90 TABLET | Refills: 3 | Status: SHIPPED | OUTPATIENT
Start: 2018-12-24 | End: 2020-03-16

## 2018-12-24 RX ORDER — FLUOXETINE HYDROCHLORIDE 40 MG/1
CAPSULE ORAL
Qty: 180 CAPSULE | Refills: 3 | Status: SHIPPED | OUTPATIENT
Start: 2018-12-24 | End: 2019-10-16 | Stop reason: SDUPTHER

## 2018-12-24 RX ORDER — ALLOPURINOL 300 MG/1
TABLET ORAL DAILY
Qty: 90 TABLET | Refills: 3 | Status: SHIPPED | OUTPATIENT
Start: 2018-12-24 | End: 2020-01-20

## 2019-01-15 ENCOUNTER — EPISODE CHANGES (OUTPATIENT)
Dept: CASE MANAGEMENT | Facility: HOSPITAL | Age: 69
End: 2019-01-15

## 2019-01-31 ENCOUNTER — PATIENT OUTREACH (OUTPATIENT)
Dept: FAMILY MEDICINE CLINIC | Facility: CLINIC | Age: 69
End: 2019-01-31

## 2019-03-18 DIAGNOSIS — E11.9 CONTROLLED TYPE 2 DIABETES MELLITUS WITHOUT COMPLICATION, WITHOUT LONG-TERM CURRENT USE OF INSULIN (HCC): ICD-10-CM

## 2019-03-18 DIAGNOSIS — I10 ESSENTIAL HYPERTENSION: ICD-10-CM

## 2019-03-18 DIAGNOSIS — K21.9 GASTROESOPHAGEAL REFLUX DISEASE WITHOUT ESOPHAGITIS: ICD-10-CM

## 2019-03-18 RX ORDER — LOSARTAN POTASSIUM 50 MG/1
TABLET ORAL
Qty: 180 TABLET | Refills: 3 | Status: SHIPPED | OUTPATIENT
Start: 2019-03-18 | End: 2019-12-06 | Stop reason: HOSPADM

## 2019-03-18 RX ORDER — OMEPRAZOLE 40 MG/1
CAPSULE, DELAYED RELEASE ORAL
Qty: 90 CAPSULE | Refills: 3 | Status: SHIPPED | OUTPATIENT
Start: 2019-03-18 | End: 2019-09-17 | Stop reason: SDUPTHER

## 2019-03-18 RX ORDER — NEBIVOLOL HYDROCHLORIDE 10 MG/1
TABLET ORAL
Qty: 90 TABLET | Refills: 3 | Status: SHIPPED | OUTPATIENT
Start: 2019-03-18 | End: 2019-12-06 | Stop reason: HOSPADM

## 2019-04-25 ENCOUNTER — TELEPHONE (OUTPATIENT)
Dept: CARDIOLOGY CLINIC | Facility: CLINIC | Age: 69
End: 2019-04-25

## 2019-05-14 ENCOUNTER — OFFICE VISIT (OUTPATIENT)
Dept: CARDIOLOGY CLINIC | Facility: CLINIC | Age: 69
End: 2019-05-14
Payer: MEDICARE

## 2019-05-14 VITALS
WEIGHT: 315 LBS | SYSTOLIC BLOOD PRESSURE: 148 MMHG | BODY MASS INDEX: 46.65 KG/M2 | HEIGHT: 69 IN | HEART RATE: 56 BPM | DIASTOLIC BLOOD PRESSURE: 86 MMHG | RESPIRATION RATE: 18 BRPM

## 2019-05-14 DIAGNOSIS — I50.32 CHRONIC DIASTOLIC (CONGESTIVE) HEART FAILURE (HCC): ICD-10-CM

## 2019-05-14 DIAGNOSIS — I10 ESSENTIAL HYPERTENSION: ICD-10-CM

## 2019-05-14 DIAGNOSIS — I48.0 PAROXYSMAL ATRIAL FIBRILLATION (HCC): Primary | ICD-10-CM

## 2019-05-14 DIAGNOSIS — E78.2 MIXED HYPERLIPIDEMIA: ICD-10-CM

## 2019-05-14 PROCEDURE — 99214 OFFICE O/P EST MOD 30 MIN: CPT | Performed by: INTERNAL MEDICINE

## 2019-05-14 PROCEDURE — 1124F ACP DISCUSS-NO DSCNMKR DOCD: CPT | Performed by: INTERNAL MEDICINE

## 2019-07-16 DIAGNOSIS — I10 ESSENTIAL HYPERTENSION: ICD-10-CM

## 2019-07-16 RX ORDER — AMLODIPINE BESYLATE 10 MG/1
10 TABLET ORAL DAILY
Qty: 90 TABLET | Refills: 3 | Status: SHIPPED | OUTPATIENT
Start: 2019-07-16 | End: 2019-09-17 | Stop reason: SDUPTHER

## 2019-07-16 NOTE — TELEPHONE ENCOUNTER
Called and spoke with Pt  Pt is currently taking 10mg  Arabella corrected prescription ordered   Pending approval

## 2019-09-17 ENCOUNTER — OFFICE VISIT (OUTPATIENT)
Dept: CARDIOLOGY CLINIC | Facility: CLINIC | Age: 69
End: 2019-09-17
Payer: MEDICARE

## 2019-09-17 VITALS
BODY MASS INDEX: 46.65 KG/M2 | SYSTOLIC BLOOD PRESSURE: 150 MMHG | HEIGHT: 69 IN | DIASTOLIC BLOOD PRESSURE: 92 MMHG | WEIGHT: 315 LBS

## 2019-09-17 DIAGNOSIS — E78.2 MIXED HYPERLIPIDEMIA: ICD-10-CM

## 2019-09-17 DIAGNOSIS — I10 ESSENTIAL HYPERTENSION: ICD-10-CM

## 2019-09-17 DIAGNOSIS — I50.32 CHRONIC DIASTOLIC (CONGESTIVE) HEART FAILURE (HCC): ICD-10-CM

## 2019-09-17 DIAGNOSIS — I48.0 PAROXYSMAL ATRIAL FIBRILLATION (HCC): Primary | ICD-10-CM

## 2019-09-17 PROCEDURE — 93000 ELECTROCARDIOGRAM COMPLETE: CPT | Performed by: INTERNAL MEDICINE

## 2019-09-17 PROCEDURE — 99214 OFFICE O/P EST MOD 30 MIN: CPT | Performed by: INTERNAL MEDICINE

## 2019-09-17 PROCEDURE — 1124F ACP DISCUSS-NO DSCNMKR DOCD: CPT | Performed by: INTERNAL MEDICINE

## 2019-09-17 RX ORDER — DIGOXIN 250 MCG
250 TABLET ORAL DAILY
Qty: 30 TABLET | Refills: 5 | Status: SHIPPED | OUTPATIENT
Start: 2019-09-17 | End: 2019-12-06 | Stop reason: HOSPADM

## 2019-09-17 RX ORDER — ZOLPIDEM TARTRATE 10 MG/1
TABLET ORAL
COMMUNITY
Start: 2008-01-15 | End: 2019-12-19 | Stop reason: ALTCHOICE

## 2019-09-17 NOTE — PROGRESS NOTES
Cardiology Follow Up    Ariane Abts   1950  [id number]  [address]  [address] [facility] [address] [phone]    Reason for visit: 4 month FU for PAF  Haris Sena Also has chronic diastolic CHF, HTN and HLP      1  Paroxysmal atrial fibrillation (HCC)  POCT ECG   2  Essential hypertension     3  Chronic diastolic (congestive) heart failure ([address] )     4  Mixed hyperlipidemia         Interval History: Since his last visit he started feeling sluggish in the past month    He did not that his HR was higher about a month    He did have increased edema and decreased his amlodipine to 5 mg daily  Haris Sena His edema is better  He takes lasix 2x per month    He does get some pressure in the chest pain  He does get ABURTO with overexertion  He denies palpitations  He denies lightheadedness       Patient Active Problem List   Diagnosis    Gastroesophageal reflux disease without esophagitis    Benign colon polyp    Depression with anxiety    Type 2 diabetes mellitus without complication, without long-term current use of insulin (HCC)    Gout    Hyperlipidemia    Essential hypertension    Hypokalemia    Leukocytosis    Microscopic hematuria    Morbid obesity (HCC)    Peripheral neuropathy    MARISSA (obstructive sleep apnea)    Testicular hypogonadism    Thyroglossal duct cyst    Benign prostatic hyperplasia with urinary frequency    Chronic rhinitis    Chronic diastolic (congestive) heart failure (HCC)    Mild depressive disorder (HCC)    A-fib (HCC)     Past Medical History:   Diagnosis Date    Atrial fibrillation (HCC)     BPH (benign prostatic hyperplasia)     Chronic diastolic (congestive) heart failure (HCC)     Depression     Diabetes mellitus (HCC)     GERD (gastroesophageal reflux disease)     Gout     Hyperlipidemia     Hypertension     Hyponatremia     last assessed: 09/08/2014    Leukocytosis     Liver function abnormality     Morbid obesity (HCC)     Obstructive sleep apnea     Sleep apnea      Social History     Socioeconomic History    Marital status: /Civil Union     Spouse name: Not on file    Number of children: Not on file    Years of education: Not on file    Highest education level: Not on file   Occupational History    Occupation: Disability    Occupation:      Comment: significant asbestos and silica exposure in the past   Social Needs    Financial resource strain: Not on file    Food insecurity:     Worry: Not on file     Inability: Not on file   Evisors needs:     Medical: Not on file     Non-medical: Not on file   Tobacco Use    Smoking status: Never Smoker    Smokeless tobacco: Never Used   Substance and Sexual Activity    Alcohol use: Yes     Comment: Social: 5-6 on the weekends    Drug use: No    Sexual activity: Not Currently   Lifestyle    Physical activity:     Days per week: Not on file     Minutes per session: Not on file    Stress: Not on file   Relationships    Social connections:     Talks on phone: Not on file     Gets together: Not on file     Attends Mandaen service: Not on file     Active member of club or organization: Not on file     Attends meetings of clubs or organizations: Not on file     Relationship status: Not on file    Intimate partner violence:     Fear of current or ex partner: Not on file     Emotionally abused: Not on file     Physically abused: Not on file     Forced sexual activity: Not on file   Other Topics Concern    Not on file   Social History Narrative    4 cups of coffee/day      Family History   Problem Relation Age of Onset    Diabetes Mother     Heart attack Mother     Hypertension Mother     Heart attack Sister      Past Surgical History:   Procedure Laterality Date    CARDIAC CATHETERIZATION      outcome: Neg    CARDIAC ELECTROPHYSIOLOGY STUDY AND ABLATION      CARDIOVASCULAR STRESS TEST      resolved: 10/27/2010; negative    COLONOSCOPY  11/2013    polyp; complete    HERNIA REPAIR      resolved: 11/1999    REPLACEMENT TOTAL KNEE Left 2010    REPLACEMENT TOTAL KNEE Right 2003    THYROGLOSSAL DUCT EXCISION      TONSILLECTOMY      last assessed: 06/02/2014       Current Outpatient Medications:     allopurinol (ZYLOPRIM) 300 mg tablet, TAKE 1 TABLET BY MOUTH  DAILY, Disp: 90 tablet, Rfl: 3    amLODIPine (NORVASC) 10 mg tablet, Take 1 tablet (10 mg total) by mouth daily, Disp: 30 tablet, Rfl: 0    BYSTOLIC 10 MG tablet, TAKE 1 TABLET BY MOUTH  DAILY, Disp: 90 tablet, Rfl: 3    FLUoxetine (PROzac) 40 MG capsule, TAKE 2 CAPSULES BY MOUTH  DAILY, Disp: 180 capsule, Rfl: 3    furosemide (LASIX) 20 mg tablet, TAKE 1 TABLET BY MOUTH  DAILY RX WRITTEN BY PA: NINO ANDRADE (Patient taking differently: daily as needed ), Disp: 90 tablet, Rfl: 3    hydrochlorothiazide (HYDRODIURIL) 25 mg tablet, Take 1 tablet (25 mg total) by mouth daily, Disp: 90 tablet, Rfl: 3    lidocaine (XYLOCAINE) 5 % ointment, , Disp: , Rfl:     losartan (COZAAR) 50 mg tablet, TAKE 1 TABLET BY MOUTH TWO  TIMES DAILY, Disp: 180 tablet, Rfl: 3    metFORMIN (GLUCOPHAGE) 500 mg tablet, TAKE 1 TABLET BY MOUTH  DAILY WITH BREAKFAST, Disp: 90 tablet, Rfl: 3    omeprazole (PriLOSEC) 40 MG capsule, TAKE 1 CAPSULE BY MOUTH  EVERY DAY, Disp: 90 capsule, Rfl: 3    potassium chloride (K-DUR,KLOR-CON) 10 mEq tablet, Take 1 tablet (10 mEq total) by mouth daily, Disp: 90 tablet, Rfl: 3    pravastatin (PRAVACHOL) 10 mg tablet, TAKE 1 TABLET BY MOUTH  DAILY AT BEDTIME, Disp: 90 tablet, Rfl: 3    rivaroxaban (XARELTO) 20 mg tablet, Take 1 tablet (20 mg total) by mouth daily with breakfast, Disp: 90 tablet, Rfl: 3    zolpidem (AMBIEN) 10 mg tablet, Take one tablet at bedtime, Disp: , Rfl:   Allergies   Allergen Reactions    Metoprolol Shortness Of Breath     Tolerates Bystolic    Benazepril Cough    Clonidine Fatigue    Diltiazem Bradycardia    Entex T  [Pseudoephedrine-Guaifenesin]      [facility] - [id number]: LEG SWELLING       Review of Systems:  Review of Systems   Constitutional: Positive for fatigue  Negative for activity change, appetite change and unexpected weight change  Respiratory: Positive for cough and shortness of breath  Negative for chest tightness and wheezing  Cardiovascular: Positive for leg swelling  Negative for chest pain and palpitations  Gastrointestinal: Positive for constipation  Negative for abdominal pain, blood in stool and diarrhea  Genitourinary: Positive for frequency and hematuria  Negative for dysuria and urgency  Musculoskeletal: Positive for arthralgias and back pain  Negative for gait problem and joint swelling  Neurological: Positive for dizziness and light-headedness  Negative for syncope, speech difficulty and headaches  Psychiatric/Behavioral: Negative for agitation, behavioral problems, confusion and decreased concentration  Physical Exam:  Vitals:    09/17/19 1637   BP: 150/92   Weight: (!) 144 kg (318 lb)   Height: 5' 9" (1 753 m)       Physical Exam   Constitutional: He is oriented to person, place, and time  He appears well-developed and well-nourished  No distress  obese   HENT:   Head: Normocephalic and atraumatic  Mouth/Throat: Oropharynx is clear and moist  No oropharyngeal exudate  Eyes: Conjunctivae are normal  No scleral icterus  Neck: Neck supple  Normal carotid pulses and no JVD present  Carotid bruit is not present  No thyromegaly present  Cardiovascular: An irregularly irregular rhythm present  Tachycardia present  Exam reveals no gallop and no friction rub  No murmur heard  Pulses:       Dorsalis pedis pulses are 2+ on the right side, and 2+ on the left side  Posterior tibial pulses are 2+ on the right side, and 2+ on the left side  Pulmonary/Chest: Breath sounds normal  He has no wheezes  He has no rhonchi  He has no rales     Abdominal: He exhibits no mass  There is no hepatosplenomegaly  There is no tenderness  Genitourinary: Penile tenderness: 2+ edema  Musculoskeletal: He exhibits edema  He exhibits no tenderness or deformity  Neurological: He is alert and oriented to person, place, and time  He has normal strength  No cranial nerve deficit or sensory deficit  Skin: Skin is warm and dry  No rash noted  No erythema  No pallor  Psychiatric: He has a normal mood and affect  His behavior is normal  Judgment and thought content normal        Discussion/Summary:  1  Paroxysmal atrial fibrillation  Back in atrial fibrillation  Does appear to be borderline tachycardic mildly tachycardic  On Bystolic  I feel he needs definitive treatment of his arrhythmia  Will send him to electrophysiology for an opinion regarding a rhythm control medication versus going directly to ablation  He does have sleep apnea but is compliant with CPAP  He does tend to be bradycardic in sinus rhythm  I suspect he has been in atrial fibrillation continuously for at least close to 1 month  I will start digoxin at 0 25 mg daily  Will check a digoxin level and BMP in 10 days time  I will also do a CBC at that time since he is on Xarelto  2  Hypertension  Somewhat labile  For now continue amlodipine at 5 mg daily along with Bystolic and hydrochlorothiazide  He also takes losartan as well  3  Chronic diastolic heart failure  Feel there may be some acuity due to change of rhythm  Did encourage him to take furosemide perhaps 1-2 times in the next week as we try to afforded better rate control  4  Hyperlipidemia  Generally favorable lipids on low-dose pravastatin  Will check FLP with upcoming blood work          FU with me one month  Refer to Duane Kwok MD

## 2019-09-27 ENCOUNTER — OFFICE VISIT (OUTPATIENT)
Dept: SLEEP CENTER | Facility: CLINIC | Age: 69
End: 2019-09-27
Payer: MEDICARE

## 2019-09-27 VITALS
SYSTOLIC BLOOD PRESSURE: 142 MMHG | HEIGHT: 69 IN | HEART RATE: 60 BPM | DIASTOLIC BLOOD PRESSURE: 76 MMHG | WEIGHT: 315 LBS | BODY MASS INDEX: 46.65 KG/M2

## 2019-09-27 DIAGNOSIS — I50.32 CHRONIC DIASTOLIC (CONGESTIVE) HEART FAILURE (HCC): ICD-10-CM

## 2019-09-27 DIAGNOSIS — G47.33 OSA (OBSTRUCTIVE SLEEP APNEA): Primary | ICD-10-CM

## 2019-09-27 DIAGNOSIS — I48.0 PAROXYSMAL ATRIAL FIBRILLATION (HCC): ICD-10-CM

## 2019-09-27 DIAGNOSIS — G47.9 SLEEP DISTURBANCE: ICD-10-CM

## 2019-09-27 DIAGNOSIS — J31.0 CHRONIC RHINITIS: ICD-10-CM

## 2019-09-27 DIAGNOSIS — E11.9 TYPE 2 DIABETES MELLITUS WITHOUT COMPLICATION, WITHOUT LONG-TERM CURRENT USE OF INSULIN (HCC): ICD-10-CM

## 2019-09-27 DIAGNOSIS — I10 ESSENTIAL HYPERTENSION: ICD-10-CM

## 2019-09-27 DIAGNOSIS — K21.9 GASTROESOPHAGEAL REFLUX DISEASE WITHOUT ESOPHAGITIS: ICD-10-CM

## 2019-09-27 DIAGNOSIS — F41.8 DEPRESSION WITH ANXIETY: ICD-10-CM

## 2019-09-27 DIAGNOSIS — E66.01 MORBID OBESITY (HCC): ICD-10-CM

## 2019-09-27 PROCEDURE — 99214 OFFICE O/P EST MOD 30 MIN: CPT | Performed by: INTERNAL MEDICINE

## 2019-09-27 NOTE — PATIENT INSTRUCTIONS

## 2019-09-27 NOTE — PROGRESS NOTES
Follow-Up Note - [address]   68 y o  male  QIV:[birth year]  XOH:[id number]    CC: I saw this patient for follow-up in clinic today for his Sleep Disordered Breathing, Coexisting Sleep and Medical Problems  PFSH, Problem List, Medications & Allergies were reviewed in EMR  Interval changes: Incidental finding of atrial fibrillation and had unsuccessful cardioversion  He  has a past medical history of Atrial fibrillation ([facility] [address] ), BPH (benign prostatic hyperplasia), Chronic diastolic (congestive) heart failure ([facility] ), Depression, Diabetes mellitus ([facility] ), GERD (gastroesophageal reflux disease), Gout, Hyperlipidemia, Hypertension, Hyponatremia, Leukocytosis, Liver function abnormality, Morbid obesity ([facility] ), Obstructive sleep apnea, and Sleep apnea  He has a current medication list which includes the following prescription(s): allopurinol, amlodipine, bystolic, digoxin, fluoxetine, furosemide, hydrochlorothiazide, lidocaine, losartan, metformin, omeprazole, potassium chloride, pravastatin, rivaroxaban, and zolpidem  ROS: Reviewed (see attached)  Significant for intentional weight loss but regained some while on vacation  He has nasal symptoms due to allergies  He has some fatigue  He feels mood is stable on current medication  He reported no cardiac or respiratory symptoms  DATA REVIEWED:  using PAP > 4 hours/night 100% of the time  Estimated JOHNY 1/hour at pressure of 15 5 cm H2O @90th percentile  SUBJECTIVE: Regarding use of PAP, Darryl Esquivel reports:   · He is experiencing some adverse effects: dry nose  · He is   benefiting from use: sleeping better and unable to sleep without PAP   Sleep Routine: He reports getting 8 hrs sleep  ; he has no difficulty initiating, but reports diffculty maintaining sleep  because of nocturia 3-4 times a night  He awakens spontaneously and feels refreshed  He denied excessive drowsiness    He rated himself at Total score: 3 /24 on the [place] sleepiness scale  Habits: reports that he has never smoked  He has never used smokeless tobacco ,  reports that he drinks alcohol ,  reports that he does not use drugs  , Caffeine use: limited , Exercise routine: irregular   OBJECTIVE: /76   Pulse 60   Ht 5' 9" (1 753 m)   Wt (!) 145 kg (319 lb)   BMI 47 11 kg/m²    Constitutional: Patient is well groomed; well appearing  Skin/Extrem: warm & dry; col & hydration normal; no edema  Psych: cooperativeand in no distress  Mental State appears normal   CNS: Alert, orientated, clear & coherent speech  H&N: EOMI; NC/AT:+ facial pressure marks, no rashes  Neck Circumference: 20"  ENMT Mucus membranes normal Nasal airway:patent  Oral airway: crowded  Resp:effort is normal CVS: RRR ABD:truncal obesity MSK:Gait normal     ASSESSMENT: Primary Sleep/Secondary(to Medical or Psych conditions) & comorbidities   1  MARISSA (obstructive sleep apnea)  PAP DME Resupply/Reorder   2  Sleep disturbance     3  Chronic rhinitis     4  Depression with anxiety     5  Essential hypertension     6  Morbid obesity ([facility] [address] )     7  Type 2 diabetes mellitus without complication, without long-term current use of insulin ([facility] [address] )     8  Chronic diastolic (congestive) heart failure (HCC)     9  Paroxysmal atrial fibrillation ([facility] )     10  Gastroesophageal reflux disease without esophagitis       PLAN:  1  Treatment with  PAP is medically necessary and [place] is agreable to continue use  2  Care of equipment, methods to improve comfort using PAP and importance of compliance with therapy were discussed  3  Pressure setting: continue 15-20 cmH2O  A retitration study is not warranted  4  Rx provided to replace supplies and Care coordinated with DME provider  5  Strategies for weight reduction were discussed  6  He is planned for cardioversion  7  Nasal symptoms may improve with regular nasal saline rinse followed by topical nasal steroid if necessary    8  Follow-up is advised in 1 year or sooner if needed to monitor progress, compliance and to adjust therapy  Thank you for allowing me to participate in the care of this patient      Sincerely,    Authenticated electronically by Hema Bains MD on 11/49/41   Board Certified Specialist

## 2019-09-27 NOTE — PROGRESS NOTES
Review of Systems      Genitourinary need to urinate more than twice a night   Cardiology ankle/leg swelling   Gastrointestinal frequent heartburn/acid reflux   Neurology balance problems   Constitutional weight change   Integumentary none   Psychiatry anxiety and depression   Musculoskeletal none   Pulmonary none   ENT none   Endocrine frequent urination   Hematological none

## 2019-10-02 ENCOUNTER — TELEPHONE (OUTPATIENT)
Dept: SLEEP CENTER | Facility: CLINIC | Age: 69
End: 2019-10-02

## 2019-10-14 PROBLEM — I11.0 HYPERTENSIVE HEART DISEASE WITH CONGESTIVE HEART FAILURE (HCC): Status: ACTIVE | Noted: 2019-10-14

## 2019-10-16 ENCOUNTER — OFFICE VISIT (OUTPATIENT)
Dept: FAMILY MEDICINE CLINIC | Facility: CLINIC | Age: 69
End: 2019-10-16
Payer: MEDICARE

## 2019-10-16 ENCOUNTER — TELEPHONE (OUTPATIENT)
Dept: FAMILY MEDICINE CLINIC | Facility: CLINIC | Age: 69
End: 2019-10-16

## 2019-10-16 VITALS
DIASTOLIC BLOOD PRESSURE: 87 MMHG | HEART RATE: 100 BPM | SYSTOLIC BLOOD PRESSURE: 135 MMHG | HEIGHT: 68 IN | WEIGHT: 315 LBS | OXYGEN SATURATION: 96 % | BODY MASS INDEX: 47.74 KG/M2

## 2019-10-16 DIAGNOSIS — I50.32 CHRONIC DIASTOLIC (CONGESTIVE) HEART FAILURE (HCC): ICD-10-CM

## 2019-10-16 DIAGNOSIS — I50.42 HYPERTENSIVE HEART DISEASE WITH CHRONIC COMBINED SYSTOLIC AND DIASTOLIC CONGESTIVE HEART FAILURE (HCC): ICD-10-CM

## 2019-10-16 DIAGNOSIS — N40.1 BENIGN PROSTATIC HYPERPLASIA WITH URINARY FREQUENCY: ICD-10-CM

## 2019-10-16 DIAGNOSIS — E66.01 MORBID OBESITY (HCC): ICD-10-CM

## 2019-10-16 DIAGNOSIS — F32.0 MAJOR DEPRESSIVE DISORDER, SINGLE EPISODE, MILD (HCC): ICD-10-CM

## 2019-10-16 DIAGNOSIS — E66.01 OBESITY, CLASS III, BMI 40-49.9 (MORBID OBESITY) (HCC): ICD-10-CM

## 2019-10-16 DIAGNOSIS — E11.9 CONTROLLED TYPE 2 DIABETES MELLITUS WITHOUT COMPLICATION, WITHOUT LONG-TERM CURRENT USE OF INSULIN (HCC): ICD-10-CM

## 2019-10-16 DIAGNOSIS — E11.9 TYPE 2 DIABETES MELLITUS WITHOUT COMPLICATION, WITHOUT LONG-TERM CURRENT USE OF INSULIN (HCC): ICD-10-CM

## 2019-10-16 DIAGNOSIS — Z00.00 MEDICARE ANNUAL WELLNESS VISIT, SUBSEQUENT: Primary | ICD-10-CM

## 2019-10-16 DIAGNOSIS — G47.33 OSA (OBSTRUCTIVE SLEEP APNEA): ICD-10-CM

## 2019-10-16 DIAGNOSIS — D72.829 LEUKOCYTOSIS, UNSPECIFIED TYPE: ICD-10-CM

## 2019-10-16 DIAGNOSIS — E78.2 MIXED HYPERLIPIDEMIA: ICD-10-CM

## 2019-10-16 DIAGNOSIS — R35.0 BENIGN PROSTATIC HYPERPLASIA WITH URINARY FREQUENCY: ICD-10-CM

## 2019-10-16 DIAGNOSIS — I10 ESSENTIAL HYPERTENSION: ICD-10-CM

## 2019-10-16 DIAGNOSIS — F33.41 RECURRENT MAJOR DEPRESSIVE DISORDER, IN PARTIAL REMISSION (HCC): ICD-10-CM

## 2019-10-16 DIAGNOSIS — I11.0 HYPERTENSIVE HEART DISEASE WITH CHRONIC COMBINED SYSTOLIC AND DIASTOLIC CONGESTIVE HEART FAILURE (HCC): ICD-10-CM

## 2019-10-16 DIAGNOSIS — I48.0 PAROXYSMAL ATRIAL FIBRILLATION (HCC): ICD-10-CM

## 2019-10-16 DIAGNOSIS — Z23 FLU VACCINE NEED: ICD-10-CM

## 2019-10-16 DIAGNOSIS — K21.9 GASTROESOPHAGEAL REFLUX DISEASE WITHOUT ESOPHAGITIS: ICD-10-CM

## 2019-10-16 LAB — SL AMB POCT HEMOGLOBIN AIC: 6.1 (ref ?–6.5)

## 2019-10-16 PROCEDURE — 83036 HEMOGLOBIN GLYCOSYLATED A1C: CPT | Performed by: FAMILY MEDICINE

## 2019-10-16 PROCEDURE — G0008 ADMIN INFLUENZA VIRUS VAC: HCPCS | Performed by: FAMILY MEDICINE

## 2019-10-16 PROCEDURE — 90662 IIV NO PRSV INCREASED AG IM: CPT | Performed by: FAMILY MEDICINE

## 2019-10-16 PROCEDURE — 99214 OFFICE O/P EST MOD 30 MIN: CPT | Performed by: FAMILY MEDICINE

## 2019-10-16 PROCEDURE — G0439 PPPS, SUBSEQ VISIT: HCPCS | Performed by: FAMILY MEDICINE

## 2019-10-16 RX ORDER — AMLODIPINE BESYLATE 5 MG/1
5 TABLET ORAL DAILY
Qty: 90 TABLET | Refills: 3
Start: 2019-10-16 | End: 2019-12-19

## 2019-10-16 RX ORDER — FLUOXETINE HYDROCHLORIDE 40 MG/1
80 CAPSULE ORAL DAILY
Qty: 180 CAPSULE | Refills: 3
Start: 2019-10-16 | End: 2019-12-06 | Stop reason: HOSPADM

## 2019-10-16 NOTE — PROGRESS NOTES
Assessment and Plan:     Problem List Items Addressed This Visit     None      Visit Diagnoses     Medicare annual wellness visit, subsequent    -  Primary    Obesity, Class III, BMI 40-49 9 (morbid obesity) ([facility] )        Controlled type 2 diabetes mellitus without complication, without long-term current use of insulin ([facility] )        Relevant Orders    POCT hemoglobin A1c (Completed)    Ambulatory referral to Ophthalmology    Flu vaccine need        Relevant Orders    influenza vaccine, [id number], high-dose, PF 0 5 mL (FLUZONE HIGH-DOSE) (Completed)        BMI Counseling: Body mass index is 48 67 kg/m²  The BMI is above normal  Nutrition recommendations include decreasing portion sizes and encouraging healthy choices of fruits and vegetables  Exercise recommendations include moderate physical activity 150 minutes/week  No pharmacotherapy was ordered  Preventive health issues were discussed with patient, and age appropriate screening tests were ordered as noted in patient's After Visit Summary  Personalized health advice and appropriate referrals for health education or preventive services given if needed, as noted in patient's After Visit Summary       History of Present Illness:     Patient presents for Medicare Annual Wellness visit    Patient Care Team:  Azra Powell MD as PCP - General  MD Azra Aquino MD Louellen Rossetti, MD     Problem List:     Patient Active Problem List   Diagnosis    Gastroesophageal reflux disease without esophagitis    Benign colon polyp    Major depressive disorder, single episode, mild (HCC)    Type 2 diabetes mellitus without complication, without long-term current use of insulin ([facility] )    Gout    Hyperlipidemia    Essential hypertension    Hypokalemia    Leukocytosis    Microscopic hematuria    Morbid obesity ([facility] )    Peripheral neuropathy    MARISSA (obstructive sleep apnea)    Testicular hypogonadism    Thyroglossal duct cyst    Benign prostatic hyperplasia with urinary frequency    Chronic rhinitis    Chronic diastolic (congestive) heart failure (HCC)    Mild depressive disorder (HCC)    A-fib (HCC)    Hypertensive heart disease with congestive heart failure ([place] [address] )      Past Medical and Surgical History:     Past Medical History:   Diagnosis Date    Atrial fibrillation (HCC)     BPH (benign prostatic hyperplasia)     Chronic diastolic (congestive) heart failure (HCC)     Depression     Diabetes mellitus ([facility] )     GERD (gastroesophageal reflux disease)     Gout     Hyperlipidemia     Hypertension     Hyponatremia     last assessed: 09/08/2014    Leukocytosis     Liver function abnormality     Morbid obesity ([place] [address] )     Obstructive sleep apnea     Sleep apnea      Past Surgical History:   Procedure Laterality Date    CARDIAC CATHETERIZATION      outcome: Neg    CARDIAC ELECTROPHYSIOLOGY STUDY AND ABLATION      CARDIOVASCULAR STRESS TEST      resolved: 10/27/2010; negative    COLONOSCOPY  11/2013    polyp; complete    HERNIA REPAIR      resolved: 11/1999    REPLACEMENT TOTAL KNEE Left 2010    REPLACEMENT TOTAL KNEE Right 2003    THYROGLOSSAL DUCT EXCISION      TONSILLECTOMY      last assessed: 06/02/2014      Family History:     Family History   Problem Relation Age of Onset    Diabetes Mother     Heart attack Mother     Hypertension Mother     Heart attack Sister       Social History:     Social History     Socioeconomic History    Marital status: /Civil Union     Spouse name: Not on file    Number of children: Not on file    Years of education: Not on file    Highest education level: Not on file   Occupational History    Occupation: Disability    Occupation:      Comment: significant asbestos and silica exposure in the past   Social Needs    Financial resource strain: Not on file    Food insecurity:     Worry: Not on file     Inability: Not on file   [employer] needs: Medical: Not on file     Non-medical: Not on file   Tobacco Use    Smoking status: Never Smoker    Smokeless tobacco: Never Used   Substance and Sexual Activity    Alcohol use: Yes     Comment: Social: 5-6 on the weekends    Drug use: No    Sexual activity: Not Currently   Lifestyle    Physical activity:     Days per week: Not on file     Minutes per session: Not on file    Stress: Not on file   Relationships    Social connections:     Talks on phone: Not on file     Gets together: Not on file     Attends Anabaptist service: Not on file     Active member of club or organization: Not on file     Attends meetings of clubs or organizations: Not on file     Relationship status: Not on file    Intimate partner violence:     Fear of current or ex partner: Not on file     Emotionally abused: Not on file     Physically abused: Not on file     Forced sexual activity: Not on file   Other Topics Concern    Not on file   Social History Narrative    4 cups of coffee/day       Medications and Allergies:     Current Outpatient Medications   Medication Sig Dispense Refill    allopurinol (ZYLOPRIM) 300 mg tablet TAKE 1 TABLET BY MOUTH  DAILY 90 tablet 3    amLODIPine (NORVASC) 10 mg tablet Take 1 tablet (10 mg total) by mouth daily 30 tablet 0    BYSTOLIC 10 MG tablet TAKE 1 TABLET BY MOUTH  DAILY 90 tablet 3    digoxin (LANOXIN) 0 25 mg tablet Take 1 tablet (250 mcg total) by mouth daily 30 tablet 5    FLUoxetine (PROzac) 40 MG capsule TAKE 2 CAPSULES BY MOUTH  DAILY (Patient taking differently: Take 40 mg by mouth daily ) 180 capsule 3    furosemide (LASIX) 20 mg tablet TAKE 1 TABLET BY MOUTH  DAILY RX WRITTEN BY PA: NINO ANDRADE (Patient taking differently: daily as needed ) 90 tablet 3    hydrochlorothiazide (HYDRODIURIL) 25 mg tablet Take 1 tablet (25 mg total) by mouth daily (Patient taking differently: Take 25 mg by mouth 2 (two) times a day ) 90 tablet 3    lidocaine (XYLOCAINE) 5 % ointment       losartan (COZAAR) 50 mg tablet TAKE 1 TABLET BY MOUTH TWO  TIMES DAILY 180 tablet 3    metFORMIN (GLUCOPHAGE) 500 mg tablet TAKE 1 TABLET BY MOUTH  DAILY WITH BREAKFAST 90 tablet 3    omeprazole (PriLOSEC) 40 MG capsule TAKE 1 CAPSULE BY MOUTH  EVERY DAY 90 capsule 3    potassium chloride (K-DUR,KLOR-CON) 10 mEq tablet Take 1 tablet (10 mEq total) by mouth daily 90 tablet 3    pravastatin (PRAVACHOL) 10 mg tablet TAKE 1 TABLET BY MOUTH  DAILY AT BEDTIME 90 tablet 3    rivaroxaban (XARELTO) 20 mg tablet Take 1 tablet (20 mg total) by mouth daily with breakfast 90 tablet 3    zolpidem (AMBIEN) 10 mg tablet Take one tablet at bedtime       No current facility-administered medications for this visit        Allergies   Allergen Reactions    Metoprolol Shortness Of Breath     Tolerates Bystolic    Benazepril Cough    Clonidine Fatigue    Diltiazem Bradycardia    Entex T  [Pseudoephedrine-Guaifenesin]      Annotation - [id number]: LEG SWELLING      Immunizations:     Immunization History   Administered Date(s) Administered    Influenza Split High Dose Preservative Free IM 09/30/2013, 12/01/2014, 11/02/2015, 11/03/2016, 12/08/2017    Influenza TIV (IM) 12/14/2005, 11/26/2007    Influenza, high dose seasonal 0 5 mL 10/16/2019    Pneumococcal Conjugate 13-Valent 12/01/2014    Pneumococcal Polysaccharide PPV23 11/26/2007, 12/08/2017, 10/26/2018    Td (adult), adsorbed 01/15/1996    Tdap 06/05/2012    Zoster Vaccine Recombinant 12/15/2018, 04/30/2019    influenza, trivalent, adjuvanted 10/26/2018      Health Maintenance:         Topic Date Due    CRC Screening: Colonoscopy  05/26/2022    Hepatitis C Screening  Completed         Topic Date Due    HEPATITIS B VACCINES (1 of 3 - Risk 3-dose series) 02/06/1969    INFLUENZA VACCINE  07/01/2019      Medicare Health Risk Assessment:     BP (!) 178/98 (BP Location: Left arm, Patient Position: Sitting, Cuff Size: Large)   Pulse 100   Ht 5' 7 5" (1 715 m)   Wt (!) 143 kg (315 lb 6 oz)   SpO2 96%   BMI 48 67 kg/m²      Sixto Sousa is here for his Subsequent Wellness visit  Health Risk Assessment:   Patient rates overall health as good  Patient feels that their physical health rating is slightly worse  Eyesight was rated as slightly worse  Hearing was rated as slightly worse  Patient feels that their emotional and mental health rating is slightly worse  Pain experienced in the last 7 days has been none  Fall Risk Screening: In the past year, patient has experienced: no history of falling in past year      Home Safety:  Patient does not have trouble with stairs inside or outside of their home  Patient has working smoke alarms and has working carbon monoxide detector  Home safety hazards include: none  Nutrition:   Current diet is Diabetic and No Added Salt  Medications:   Patient is not currently taking any over-the-counter supplements  Patient is able to manage medications  Activities of Daily Living (ADLs)/Instrumental Activities of Daily Living (IADLs):   Walk and transfer into and out of bed and chair?: Yes  Dress and groom yourself?: Yes    Bathe or shower yourself?: Yes    Feed yourself?  Yes  Do your laundry/housekeeping?: Yes  Manage your money, pay your bills and track your expenses?: Yes  Make your own meals?: Yes    Do your own shopping?: Yes    Previous Hospitalizations:   Any hospitalizations or ED visits within the last 12 months?: No      Advance Care Planning:   Living will: No    Durable POA for healthcare: Yes    Five wishes given: Yes    End of Life Decisions reviewed with patient: Yes      Cognitive Screening:   Provider or family/friend/caregiver concerned regarding cognition?: No    PREVENTIVE SCREENINGS      Cardiovascular Screening:    General: Screening Not Indicated and History Lipid Disorder      Diabetes Screening:     General: Screening Not Indicated and History Diabetes      Colorectal Cancer Screening:     General: Screening Current      Prostate Cancer Screening:    General: Risks and Benefits Discussed      Osteoporosis Screening:    General: Screening Not Indicated      Abdominal Aortic Aneurysm (AAA) Screening:    Risk factors include: age between 73-69 yo        Lung Cancer Screening:     General: Screening Not Indicated      Hepatitis C Screening:    General: Screening Current      Preventive Screening Comments: Patient presents today for Medicare wellness visit  Overall, he is doing relatively well  His health is stable but he is experiencing some depression which were going to work on  He does not want to undergo any talk therapy at this time  He is up-to-date on colon cancer screening  He would like to hold off on digital rectal exam   Colon cancer screening is up-to-date until next year  He is up to date with vaccines  He completes all of his own activities of daily life  He has no cognitive impairment        Kristie Huggins MD

## 2019-10-16 NOTE — ASSESSMENT & PLAN NOTE
Hold on making changes today  He will be seeing Cardiology later this week and we may decide to bump up his Bystolic  I did reach out to Dr Niurka Danielle today

## 2019-10-16 NOTE — ASSESSMENT & PLAN NOTE
He is on anticoagulation  He will see Cardiology in 2 days  I did reach out to his cardiologist to keep an eye on his blood pressure  He will also be seeing EP regarding his recurrent atrial fibrillation  He did not tolerate amlodipine at 10 mg due to significant lower extremity edema

## 2019-10-16 NOTE — TELEPHONE ENCOUNTER
Pt called, he said that at his appointment today you guys had discussed seeing a therapist about his depression and he had said no several times  He said driving home he was thinking about it and decided he wanted to go ahead and try this

## 2019-10-16 NOTE — TELEPHONE ENCOUNTER
LMOVM for pt to call back  I'm fairly certain that [facility] does not take Medicare  We can offer him other options or SL

## 2019-10-16 NOTE — TELEPHONE ENCOUNTER
Pt  Had and appointment today,  Prozac medication was increase, do you still want us to work on the referral

## 2019-10-16 NOTE — ASSESSMENT & PLAN NOTE
Wt Readings from Last 3 Encounters:   10/16/19 (!) 143 kg (315 lb 6 oz)   09/27/19 (!) 145 kg (319 lb)   09/17/19 (!) 144 kg (318 lb)     He has no current signs of congestive heart failure  Continue to monitor closely

## 2019-10-16 NOTE — ASSESSMENT & PLAN NOTE
We decided to bump up his fluoxetine to 80 mg daily  He actually had already done this a few days ago  Monitor symptoms closely and follow up within 4 months  Since he is having some breakthrough anxiety, he may benefit from something such as BuSpar, but I certainly agree with his sentiment to avoid more medication at this time

## 2019-10-16 NOTE — PROGRESS NOTES
Assessment/Plan:       Problem List Items Addressed This Visit        Digestive    Gastroesophageal reflux disease without esophagitis     Reflux is stable  Endocrine    Type 2 diabetes mellitus without complication, without long-term current use of insulin ([facility] )       Lab Results   Component Value Date    HGBA1C 6 1 10/16/2019    Patient is having some diarrhea since starting digoxin  Metformin can also contribute to diarrhea, and since his A1c is so good, we are going to stop the metformin at this time  He should continue to watch out for extra carbohydrates such as bread products, pastas etc   He should certainly avoid sweets  I will see him back within 4 months time will repeat an A1c  Respiratory    MARISSA (obstructive sleep apnea)     Continue on CPAP            Cardiovascular and Mediastinum    Essential hypertension     Hold on making changes today  He will be seeing Cardiology later this week and we may decide to bump up his Bystolic  I did reach out to Dr Bernie Kidd today  Chronic diastolic (congestive) heart failure (HCC)    A-fib ([facility] )     He is on anticoagulation  He will see Cardiology in 2 days  I did reach out to his cardiologist to keep an eye on his blood pressure  He will also be seeing EP regarding his recurrent atrial fibrillation  He did not tolerate amlodipine at 10 mg due to significant lower extremity edema  Hypertensive heart disease with congestive heart failure (HCC)     Wt Readings from Last 3 Encounters:   10/16/19 (!) 143 kg (315 lb 6 oz)   09/27/19 (!) 145 kg (319 lb)   09/17/19 (!) 144 kg (318 lb)     He has no current signs of congestive heart failure  Continue to monitor closely  Other    Major depressive disorder, single episode, mild (HCC)     We decided to bump up his fluoxetine to 80 mg daily  He actually had already done this a few days ago  Monitor symptoms closely and follow up within 4 months    Since he is having some breakthrough anxiety, he may benefit from something such as BuSpar, but I certainly agree with his sentiment to avoid more medication at this time  Hyperlipidemia     Continue on pravastatin  Leukocytosis    Morbid obesity ([facility] )     He continues to work on weight loss  Benign prostatic hyperplasia with urinary frequency     He is having no excessive symptoms at this time  He declines VAN today  Other Visit Diagnoses     Medicare annual wellness visit, subsequent    -  Primary    Obesity, Class III, BMI 40-49 9 (morbid obesity) ([facility] )        Controlled type 2 diabetes mellitus without complication, without long-term current use of insulin ([facility] )        Relevant Orders    POCT hemoglobin A1c (Completed)    Ambulatory referral to Ophthalmology    Flu vaccine need        Relevant Orders    influenza vaccine, [id number], high-dose, PF 0 5 mL (FLUZONE HIGH-DOSE) (Completed)    Recurrent major depressive disorder, in partial remission ([place] [address] )                Subjective:      Patient ID: Esperanza Ferris  is a 68 y o  male  HPI patient presents today for follow-up for chronic health issues in addition to Medicare wellness visit  Overall, he feels he is doing relatively well  Unfortunately, his mood has been poor and he does feel depressed  He scored a 12 on his PHQ-9  He denies suicidal homicidal ideation  He took it upon himself to bump up his Prozac to 40 mg about 3 days ago  He has history of hypertension and denies chest pain, shortness of breath, palpitations or lightheadedness  He is back in atrial fibrillation and tolerating did digoxin as well as Bystolic  He is on anticoagulation without bruising or bleeding  He has a history of hyperlipidemia tolerates low-dose statin without significant myalgias  He has had no recurrent gout and does remain on allopurinol    He has a history of intermittent leukocytosis but denies fever, chills or unintentional weight loss       The following portions of the patient's history were reviewed and updated as appropriate: allergies, current medications, past family history, past medical history, past social history, past surgical history and problem list       Current Outpatient Medications:     allopurinol (ZYLOPRIM) 300 mg tablet, TAKE 1 TABLET BY MOUTH  DAILY, Disp: 90 tablet, Rfl: 3    amLODIPine (NORVASC) 10 mg tablet, Take 1 tablet (10 mg total) by mouth daily, Disp: 30 tablet, Rfl: 0    BYSTOLIC 10 MG tablet, TAKE 1 TABLET BY MOUTH  DAILY, Disp: 90 tablet, Rfl: 3    digoxin (LANOXIN) 0 25 mg tablet, Take 1 tablet (250 mcg total) by mouth daily, Disp: 30 tablet, Rfl: 5    FLUoxetine (PROzac) 40 MG capsule, TAKE 2 CAPSULES BY MOUTH  DAILY (Patient taking differently: Take 40 mg by mouth daily ), Disp: 180 capsule, Rfl: 3    furosemide (LASIX) 20 mg tablet, TAKE 1 TABLET BY MOUTH  DAILY RX WRITTEN BY PA: NINO ANDRADE (Patient taking differently: daily as needed ), Disp: 90 tablet, Rfl: 3    hydrochlorothiazide (HYDRODIURIL) 25 mg tablet, Take 1 tablet (25 mg total) by mouth daily (Patient taking differently: Take 25 mg by mouth 2 (two) times a day ), Disp: 90 tablet, Rfl: 3    lidocaine (XYLOCAINE) 5 % ointment, , Disp: , Rfl:     losartan (COZAAR) 50 mg tablet, TAKE 1 TABLET BY MOUTH TWO  TIMES DAILY, Disp: 180 tablet, Rfl: 3    omeprazole (PriLOSEC) 40 MG capsule, TAKE 1 CAPSULE BY MOUTH  EVERY DAY, Disp: 90 capsule, Rfl: 3    potassium chloride (K-DUR,KLOR-CON) 10 mEq tablet, Take 1 tablet (10 mEq total) by mouth daily, Disp: 90 tablet, Rfl: 3    pravastatin (PRAVACHOL) 10 mg tablet, TAKE 1 TABLET BY MOUTH  DAILY AT BEDTIME, Disp: 90 tablet, Rfl: 3    rivaroxaban (XARELTO) 20 mg tablet, Take 1 tablet (20 mg total) by mouth daily with breakfast, Disp: 90 tablet, Rfl: 3    zolpidem (AMBIEN) 10 mg tablet, Take one tablet at bedtime, Disp: , Rfl:      Review of Systems   Constitutional: Negative for appetite change, chills, fatigue, fever and unexpected weight change  HENT: Negative for trouble swallowing  Eyes: Negative for visual disturbance  Respiratory: Negative for cough, chest tightness, shortness of breath and wheezing  Cardiovascular: Positive for leg swelling (mild)  Negative for chest pain  Gastrointestinal: Negative for abdominal distention, abdominal pain, blood in stool, constipation and diarrhea  Endocrine: Negative for polyuria  Genitourinary: Negative for difficulty urinating and flank pain  Musculoskeletal: Negative for arthralgias and myalgias  Skin: Negative for rash  Neurological: Negative for dizziness, tremors, weakness and light-headedness  Hematological: Negative for adenopathy  Does not bruise/bleed easily  Psychiatric/Behavioral: Negative for sleep disturbance  Objective:      /87 Comment: Home blood pressure reading this morning  Pulse 100   Ht 5' 7 5" (1 715 m)   Wt (!) 143 kg (315 lb 6 oz)   SpO2 96%   BMI 48 67 kg/m²          Physical Exam   Constitutional: He is oriented to person, place, and time  He appears well-developed and well-nourished  No distress  HENT:   Head: Normocephalic  Eyes: Pupils are equal, round, and reactive to light  Right eye exhibits no discharge  Left eye exhibits no discharge  Neck: No tracheal deviation present  No thyromegaly present  Cardiovascular: Normal rate, regular rhythm and normal heart sounds  No murmur heard  Pulmonary/Chest: Effort normal  No respiratory distress  He has no wheezes  He has no rales  Abdominal: Soft  He exhibits no distension  There is no tenderness  Musculoskeletal: Normal range of motion  He exhibits edema (trace edema)  Lymphadenopathy:     He has no cervical adenopathy  Neurological: He is alert and oriented to person, place, and time  No cranial nerve deficit  Skin: Skin is warm  He is not diaphoretic  No erythema  Psychiatric: He has a normal mood and affect   Judgment and thought content normal          [facility], MD

## 2019-10-16 NOTE — ASSESSMENT & PLAN NOTE
Lab Results   Component Value Date    HGBA1C 6 1 10/16/2019    Patient is having some diarrhea since starting digoxin  Metformin can also contribute to diarrhea, and since his A1c is so good, we are going to stop the metformin at this time  He should continue to watch out for extra carbohydrates such as bread products, pastas etc   He should certainly avoid sweets  I will see him back within 4 months time will repeat an A1c

## 2019-10-16 NOTE — PATIENT INSTRUCTIONS

## 2019-10-17 ENCOUNTER — TELEPHONE (OUTPATIENT)
Dept: FAMILY MEDICINE CLINIC | Facility: CLINIC | Age: 69
End: 2019-10-17

## 2019-10-17 NOTE — TELEPHONE ENCOUNTER
As per the previous message, the patient requested counseling after he left the visit  Can we please help to set him up with someone for talk therapy regarding chronic anxiety?

## 2019-10-18 ENCOUNTER — OFFICE VISIT (OUTPATIENT)
Dept: CARDIOLOGY CLINIC | Facility: CLINIC | Age: 69
End: 2019-10-18
Payer: MEDICARE

## 2019-10-18 VITALS
HEIGHT: 68 IN | DIASTOLIC BLOOD PRESSURE: 82 MMHG | HEART RATE: 71 BPM | SYSTOLIC BLOOD PRESSURE: 138 MMHG | OXYGEN SATURATION: 96 % | WEIGHT: 315 LBS | BODY MASS INDEX: 47.74 KG/M2

## 2019-10-18 DIAGNOSIS — I50.32 CHRONIC DIASTOLIC (CONGESTIVE) HEART FAILURE (HCC): ICD-10-CM

## 2019-10-18 DIAGNOSIS — E78.2 MIXED HYPERLIPIDEMIA: ICD-10-CM

## 2019-10-18 DIAGNOSIS — I48.19 OTHER PERSISTENT ATRIAL FIBRILLATION (HCC): Primary | ICD-10-CM

## 2019-10-18 DIAGNOSIS — I10 ESSENTIAL HYPERTENSION: ICD-10-CM

## 2019-10-18 PROCEDURE — 99214 OFFICE O/P EST MOD 30 MIN: CPT | Performed by: INTERNAL MEDICINE

## 2019-10-18 NOTE — PROGRESS NOTES
Cardiology Follow Up    Genesis Lubin   1950  [id number]  [address]  [address] [facility] [employer] [address] [phone]      Reason for visit:  1 month follow-up for persistent atrial fibrillation  Patient also some diastolic heart failure attending atrial fibrillation as well as hypertension hyperlipidemia    1  Other persistent atrial fibrillation     2  Chronic diastolic (congestive) heart failure ([facility] )     3  Essential hypertension     4  Mixed hyperlipidemia         Interval History:  Since the patient's last visit, he denies palpitations  He states his breathing is good  He denies chest pressure  He does get some bloating at times  He has been taking his Lasix up to 3 times per week which does relieve this  He states he has edema but this is not bad at this time  The patient does get some dizziness at times  His blood pressures are labile    He has had some blood pressures in the low 100  Range    Patient Active Problem List   Diagnosis    Gastroesophageal reflux disease without esophagitis    Benign colon polyp    Major depressive disorder, single episode, mild (HCC)    Type 2 diabetes mellitus without complication, without long-term current use of insulin (HCC)    Gout    Hyperlipidemia    Essential hypertension    Hypokalemia    Leukocytosis    Microscopic hematuria    Morbid obesity (HCC)    Peripheral neuropathy    MARISSA (obstructive sleep apnea)    Testicular hypogonadism    Thyroglossal duct cyst    Benign prostatic hyperplasia with urinary frequency    Chronic rhinitis    Chronic diastolic (congestive) heart failure (HCC)    Mild depressive disorder (HCC)    [facility]fib [facility]    Hypertensive heart disease with congestive heart failure (HCC)     Past Medical History:   Diagnosis Date    Atrial fibrillation (HCC)     BPH (benign prostatic hyperplasia)     Chronic diastolic (congestive) heart failure ([facility] )     Depression     Diabetes mellitus ([facility] )     GERD (gastroesophageal reflux disease)     Gout     Hyperlipidemia     Hypertension     Hyponatremia     last assessed: 09/08/2014    Leukocytosis     Liver function abnormality     Morbid obesity (HCC)     Obstructive sleep apnea     Sleep apnea      Social History     Socioeconomic History    Marital status: /Civil Union     Spouse name: Not on file    Number of children: Not on file    Years of education: Not on file    Highest education level: Not on file   Occupational History    Occupation: Disability    Occupation:      Comment: significant asbestos and silica exposure in the past   Social Needs    Financial resource strain: Not on file    Food insecurity:     Worry: Not on file     Inability: Not on file   [employer] needs:     Medical: Not on file     Non-medical: Not on file   Tobacco Use    Smoking status: Never Smoker    Smokeless tobacco: Never Used   Substance and Sexual Activity    Alcohol use: Yes     Comment: Social: 5-6 on the weekends    Drug use: No    Sexual activity: Not Currently   Lifestyle    Physical activity:     Days per week: Not on file     Minutes per session: Not on file    Stress: Not on file   Relationships    Social connections:     Talks on phone: Not on file     Gets together: Not on file     Attends Orthodox service: Not on file     Active member of club or organization: Not on file     Attends meetings of clubs or organizations: Not on file     Relationship status: Not on file    Intimate partner violence:     Fear of current or ex partner: Not on file     Emotionally abused: Not on file     Physically abused: Not on file     Forced sexual activity: Not on file   Other Topics Concern    Not on file   Social History Narrative    4 cups of coffee/day      Family History   Problem Relation Age of Onset    Diabetes Mother     Heart attack Mother    Ambrosione Ashley Hypertension Mother     Heart attack Sister      Past Surgical History:   Procedure Laterality Date    CARDIAC CATHETERIZATION      outcome: Neg    CARDIAC ELECTROPHYSIOLOGY STUDY AND ABLATION      CARDIOVASCULAR STRESS TEST      resolved: 10/27/2010; negative    COLONOSCOPY  11/2013    polyp; complete    HERNIA REPAIR      resolved: 11/1999    REPLACEMENT TOTAL KNEE Left 2010    REPLACEMENT TOTAL KNEE Right 2003    THYROGLOSSAL DUCT EXCISION      TONSILLECTOMY      last assessed: 06/02/2014       Current Outpatient Medications:     allopurinol (ZYLOPRIM) 300 mg tablet, TAKE 1 TABLET BY MOUTH  DAILY, Disp: 90 tablet, Rfl: 3    amLODIPine (NORVASC) 5 mg tablet, Take 1 tablet (5 mg total) by mouth daily, Disp: 90 tablet, Rfl: 3    BYSTOLIC 10 MG tablet, TAKE 1 TABLET BY MOUTH  DAILY, Disp: 90 tablet, Rfl: 3    digoxin (LANOXIN) 0 25 mg tablet, Take 1 tablet (250 mcg total) by mouth daily, Disp: 30 tablet, Rfl: 5    FLUoxetine (PROzac) 40 MG capsule, Take 2 capsules (80 mg total) by mouth daily, Disp: 180 capsule, Rfl: 3    furosemide (LASIX) 20 mg tablet, TAKE 1 TABLET BY MOUTH  DAILY RX WRITTEN BY PA: NINO ANDRADE (Patient taking differently: daily as needed ), Disp: 90 tablet, Rfl: 3    hydrochlorothiazide (HYDRODIURIL) 25 mg tablet, Take 1 tablet (25 mg total) by mouth daily (Patient taking differently: Take 25 mg by mouth 2 (two) times a day ), Disp: 90 tablet, Rfl: 3    lidocaine (XYLOCAINE) 5 % ointment, 2 (two) times a day as needed , Disp: , Rfl:     losartan (COZAAR) 50 mg tablet, TAKE 1 TABLET BY MOUTH TWO  TIMES DAILY, Disp: 180 tablet, Rfl: 3    omeprazole (PriLOSEC) 40 MG capsule, TAKE 1 CAPSULE BY MOUTH  EVERY DAY, Disp: 90 capsule, Rfl: 3    potassium chloride (K-DUR,KLOR-CON) 10 mEq tablet, Take 1 tablet (10 mEq total) by mouth daily, Disp: 90 tablet, Rfl: 3    pravastatin (PRAVACHOL) 10 mg tablet, TAKE 1 TABLET BY MOUTH  DAILY AT BEDTIME, Disp: 90 tablet, Rfl: 3    rivaroxaban (XARELTO) 20 mg tablet, Take 1 tablet (20 mg total) by mouth daily with breakfast, Disp: 90 tablet, Rfl: 3    zolpidem (AMBIEN) 10 mg tablet, Take one tablet at bedtime, Disp: , Rfl:   Allergies   Allergen Reactions    Metoprolol Shortness Of Breath     Tolerates Bystolic    Benazepril Cough    Clonidine Fatigue    Diltiazem Bradycardia    Entex T  [Pseudoephedrine-Guaifenesin]      Annotation - [id number]: LEG SWELLING       Review of Systems:  Review of Systems   Constitutional: Positive for fatigue  Negative for activity change, appetite change and unexpected weight change  Respiratory: Positive for cough  Negative for chest tightness, shortness of breath and wheezing  Cardiovascular: Positive for leg swelling  Negative for chest pain and palpitations  Gastrointestinal: Positive for abdominal distention  Negative for abdominal pain, blood in stool, constipation and diarrhea  Genitourinary: Positive for frequency  Negative for dysuria, hematuria and urgency  Musculoskeletal: Positive for arthralgias  Negative for back pain, gait problem, joint swelling and myalgias  Neurological: Positive for dizziness and light-headedness  Negative for syncope and speech difficulty  Psychiatric/Behavioral: Negative for agitation, behavioral problems, confusion and decreased concentration  Physical Exam:  Vitals:    10/18/19 1328   BP: 138/82   BP Location: Left arm   Patient Position: Sitting   Cuff Size: Large   Pulse: 71   SpO2: 96%   Weight: (!) 145 kg (319 lb)   Height: 5' 7 5" (1 715 m)       Physical Exam   Constitutional: He is oriented to person, place, and time  He appears well-developed and well-nourished  No distress  obese   HENT:   Head: Normocephalic and atraumatic  Mouth/Throat: Oropharynx is clear and moist    Eyes: Conjunctivae are normal  No scleral icterus  Neck: Neck supple  Normal carotid pulses and no JVD present  Carotid bruit is not present  No thyromegaly present  Cardiovascular: Normal rate  An irregularly irregular rhythm present  Exam reveals no gallop and no friction rub  No murmur heard  Pulses:       Dorsalis pedis pulses are 2+ on the right side, and 2+ on the left side  Posterior tibial pulses are 2+ on the right side, and 2+ on the left side  Pulmonary/Chest: He has decreased breath sounds  He has no wheezes  He has no rhonchi  He has no rales  Abdominal: Soft  He exhibits no mass  There is no hepatosplenomegaly  There is no tenderness  Musculoskeletal: He exhibits edema (1+)  He exhibits no tenderness or deformity  Neurological: He is alert and oriented to person, place, and time  He has normal strength  No cranial nerve deficit or sensory deficit  Skin: Skin is warm and dry  No rash noted  No erythema  No pallor  Psychiatric: He has a normal mood and affect  His behavior is normal  Judgment and thought content normal        Discussion/Summary:  1  Persistent atrial fibrillation  Rate control with nebivolol and digoxin  Level recently checked and was fine  Patient on systemic anticoagulation in the form of Xarelto  Continue same  Will be seen Dr Uri Jara in near future to discuss possible rhythm control verses ablation  2  Chronic diastolic heart failure  Appears to be compensated with intermittent use of furosemide 20 mg daily  Continue same  3  Hypertension  Labile  Was high and his family [de-identified] office  Is on a rather complex regimen which includes beta-blockers, calcium channel blockers, thiazide diuretics and ARBs  At times his blood pressure is on the low side  Would not adjust medications for that reason  Once again wt loss advised  4  Mixed hyperlipidemia  Historically good LDL cholesterol on low-dose pravastatin    Continue same    Fu 4 months      Lavetta Opitz, MD

## 2019-10-25 ENCOUNTER — CONSULT (OUTPATIENT)
Dept: CARDIOLOGY CLINIC | Facility: CLINIC | Age: 69
End: 2019-10-25
Payer: MEDICARE

## 2019-10-25 VITALS
HEIGHT: 68 IN | DIASTOLIC BLOOD PRESSURE: 90 MMHG | BODY MASS INDEX: 47.74 KG/M2 | SYSTOLIC BLOOD PRESSURE: 152 MMHG | WEIGHT: 315 LBS | HEART RATE: 83 BPM

## 2019-10-25 DIAGNOSIS — I48.0 PAROXYSMAL ATRIAL FIBRILLATION (HCC): Primary | ICD-10-CM

## 2019-10-25 DIAGNOSIS — I50.32 CHRONIC DIASTOLIC (CONGESTIVE) HEART FAILURE (HCC): ICD-10-CM

## 2019-10-25 DIAGNOSIS — Z79.01 ANTICOAGULATION ADEQUATE: ICD-10-CM

## 2019-10-25 DIAGNOSIS — I10 ESSENTIAL HYPERTENSION: ICD-10-CM

## 2019-10-25 PROCEDURE — 99205 OFFICE O/P NEW HI 60 MIN: CPT | Performed by: INTERNAL MEDICINE

## 2019-10-25 PROCEDURE — 93000 ELECTROCARDIOGRAM COMPLETE: CPT | Performed by: INTERNAL MEDICINE

## 2019-10-25 NOTE — PROGRESS NOTES
EPS Consultation/New Patient Evaluation - Mary Tubbs  68 y o  male MRN: [id number]           ASSESSMENT:  1  Paroxysmal atrial fibrillation [facility])  Ambulatory referral to Cardiac Electrophysiology    POCT ECG   2  Essential hypertension     3  Chronic diastolic (congestive) heart failure ([facility] [address] )     4  Anticoagulation adequate             PLAN:  1  Persistent atrial fibrillation  S/p electrical cardioversion in November 2018 remain NSR for about 1-2 months then he was back in Afib  On Digoxin and anticoagulated with Xarelto   He felt much better in regular rhythm  More energy and less sob  I explained to the patient that they would be a candidate for an atrial fibrillation ablation  I explained that the success rate is approximately 70-80% with the understanding that some patients will require a 2nd procedure  I explained in detail how the procedure is performed  Risks were explained to the patient including but not limited to bleeding, damage to blood vessels heart valves and other organs, small risk of serious complication including cerebral vascular accident, heart perforation, myocardial infarction, atrial esophageal fistula, phrenic nerve damage  I also explained that the majority of patients have transient chest discomfort postoperatively secondary to heart inflammation and this often lasts up to one week  Finally I explained that patients may experience atrial fibrillation in the 1st three months postoperatively and this does not mean the procedure was unsuccessful as transient atrial arrhythmias may occur secondary to inflammation and healing post ablation  Arrangements will be made for a CT scan of the pulmonary veins to assess pulmonary vein anatomy  Patient understands they will be staying overnight in the hospital     All questions were answered      Proceed with SANDRITA and Cryo Naxv Atrial fibrillation ablation with Loop recorder implantation   Hold Xarelto night before and Htcz 72 hours prior to procedure   CT pulmonary vein   Dofetilide initiation 500 mcg BID   Loop recorder implant necessary to optimize therapy patients symptoms are sob and not palpitations  Need to keep afib burden down and cardiac event recorder will help us prevent episodes post ablation that could interfere with success of ablation  It will be used to guide AAD therapy and to assess need for repeat ablation    2  Hypertension  Well controlled    3  MARISSA  Compliant with treatment    4  Hyperlipidemia  Tolerating statin    5  Modifiable risk factors counseled patient to decrease etoh intake  He is social drinker but any reduction can help with afib burden        CC/HPI:   Darvin Jenkins  is a 68 y o  male who was referred by Dr Burak Hull for persistent atrial fibrillation  Patient has a history chronic diastolic heart failure, hypertension, hyperlipidemia, type 2 diabetes, MARISSA, hypokalemia, morbid obesity, and depressive disorder  Patient reports experiencing fatigue and shortness of breath  He states he did feel better when he was cardioverted last year however he was back in atrial fibrillation shortly after  Patient admits to drinking 2-3 vodka and cranberry three times weekly  He states has lost about 25 lbs recently with exercise  He use to work in [employer] for 28 years  ROS:   Negative for palpitations, chest discomfort, presyncope or syncope, lower extremity swelling  Positive for fatigue and shortness of breath  All other 12 point ROS negative     Objective:     Vitals: Blood pressure 152/90, pulse 83, height 5' 7 5" (1 715 m), weight (!) 145 kg (320 lb)  , Body mass index is 49 38 kg/m²  ,        Physical Exam:    GEN: Darvin Jenkins   appears well, alert and oriented x 3, pleasant and cooperative   HEENT: pupils equal, round, and reactive to light; extraocular muscles intact  NECK: supple, no carotid bruits   HEART: regular rhythm, normal S1 and S2, no murmurs, clicks, gallops or rubs   LUNGS: clear to auscultation bilaterally; no wheezes, rales, or rhonchi   ABDOMEN: normal bowel sounds, soft, no tenderness, no distention  EXTREMITIES: peripheral pulses normal; no clubbing, cyanosis, or edema  NEURO: no focal findings   SKIN: normal without suspicious lesions on exposed skin    Medications:      Current Outpatient Medications:     allopurinol (ZYLOPRIM) 300 mg tablet, TAKE 1 TABLET BY MOUTH  DAILY, Disp: 90 tablet, Rfl: 3    amLODIPine (NORVASC) 5 mg tablet, Take 1 tablet (5 mg total) by mouth daily, Disp: 90 tablet, Rfl: 3    BYSTOLIC 10 MG tablet, TAKE 1 TABLET BY MOUTH  DAILY, Disp: 90 tablet, Rfl: 3    digoxin (LANOXIN) 0 25 mg tablet, Take 1 tablet (250 mcg total) by mouth daily, Disp: 30 tablet, Rfl: 5    FLUoxetine (PROzac) 40 MG capsule, Take 2 capsules (80 mg total) by mouth daily, Disp: 180 capsule, Rfl: 3    furosemide (LASIX) 20 mg tablet, TAKE 1 TABLET BY MOUTH  DAILY RX WRITTEN BY PA: NINO ANDRADE (Patient taking differently: daily as needed ), Disp: 90 tablet, Rfl: 3    hydrochlorothiazide (HYDRODIURIL) 25 mg tablet, Take 1 tablet (25 mg total) by mouth daily (Patient taking differently: Take 25 mg by mouth 2 (two) times a day ), Disp: 90 tablet, Rfl: 3    lidocaine (XYLOCAINE) 5 % ointment, 2 (two) times a day as needed , Disp: , Rfl:     losartan (COZAAR) 50 mg tablet, TAKE 1 TABLET BY MOUTH TWO  TIMES DAILY, Disp: 180 tablet, Rfl: 3    omeprazole (PriLOSEC) 40 MG capsule, TAKE 1 CAPSULE BY MOUTH  EVERY DAY, Disp: 90 capsule, Rfl: 3    potassium chloride (K-DUR,KLOR-CON) 10 mEq tablet, Take 1 tablet (10 mEq total) by mouth daily, Disp: 90 tablet, Rfl: 3    pravastatin (PRAVACHOL) 10 mg tablet, TAKE 1 TABLET BY MOUTH  DAILY AT BEDTIME, Disp: 90 tablet, Rfl: 3    rivaroxaban (XARELTO) 20 mg tablet, Take 1 tablet (20 mg total) by mouth daily with breakfast, Disp: 90 tablet, Rfl: 3    zolpidem (AMBIEN) 10 mg tablet, Take one tablet at bedtime, Disp: , Rfl:      Family History   Problem Relation Age of Onset    Diabetes Mother     Heart attack Mother     Hypertension Mother     Heart attack Sister      Social History     Socioeconomic History    Marital status: /Civil Union     Spouse name: Not on file    Number of children: Not on file    Years of education: Not on file    Highest education level: Not on file   Occupational History    Occupation: Disability    Occupation:      Comment: significant asbestos and silica exposure in the past   Social Needs    Financial resource strain: Not on file    Food insecurity:     Worry: Not on file     Inability: Not on file   Phorm needs:     Medical: Not on file     Non-medical: Not on file   Tobacco Use    Smoking status: Never Smoker    Smokeless tobacco: Never Used   Substance and Sexual Activity    Alcohol use: Yes     Comment: Social: 5-6 on the weekends    Drug use: No    Sexual activity: Not Currently   Lifestyle    Physical activity:     Days per week: Not on file     Minutes per session: Not on file    Stress: Not on file   Relationships    Social connections:     Talks on phone: Not on file     Gets together: Not on file     Attends Hindu service: Not on file     Active member of club or organization: Not on file     Attends meetings of clubs or organizations: Not on file     Relationship status: Not on file    Intimate partner violence:     Fear of current or ex partner: Not on file     Emotionally abused: Not on file     Physically abused: Not on file     Forced sexual activity: Not on file   Other Topics Concern    Not on file   Social History Narrative    4 cups of coffee/day     Social History     Tobacco Use   Smoking Status Never Smoker   Smokeless Tobacco Never Used     Social History     Substance and Sexual Activity   Alcohol Use Yes    Comment: Social: 5-6 on the weekends       Labs & Results:  Below is the patient's most recent value for Albumin, ALT, AST, BUN, Calcium, Chloride, Cholesterol, CO2, Creatinine, GFR, Glucose, HDL, Hematocrit, Hemoglobin, Hemoglobin A1C, LDL, Magnesium, Phosphorus, Platelets, Potassium, PSA, Sodium, Triglycerides, and WBC  Lab Results   Component Value Date    ALT 57 10/30/2018    AST 36 10/30/2018    BUN 16 2018    CALCIUM 9 4 2018    CL 96 (L) 2018    CO2 28 2018    CREATININE 1 09 2018    HDL 42 10/31/2018    HCT 39 4 2018    HGB 13 6 2018    HGBA1C 6 1 10/16/2019    MG 2 1 2018     2018    K 4 0 2018    TRIG 138 10/31/2018    WBC 11 88 (H) 2018     Note: for a comprehensive list of the patient's lab results, access the Results Review activity  Cardiac testing:   Results for orders placed during the hospital encounter of 10/30/18   Echo complete with contrast if indicated    Narrative Carol [address] [place] [address] 35  [place], [address]  [phone]    Transthoracic Echocardiogram  2D, M-mode, Doppler, and Color Doppler    Study date:  2018    Patient: Mar Suresh  MR number: [id number]  Account number: [de-identified]  : 1950  Age: 77 years  Gender: Male  Status: Inpatient  Location: Bedside  Height: 70 in  Weight: 332 lb  BP: 134/ 98 mmHg    Indications: Atrial fibrillation  Diagnoses: I48 0 - Atrial fibrillation    Sonographer:  Rafaela Vance RDCS  Primary Physician:  Elana Trevino MD  Referring Physician:  Bijan Bains MD  Group:  [id number]  Interpreting Physician:  Link Wyatt DO    SUMMARY    PROCEDURE INFORMATION:  This was a very technically difficult and poor quality study  Echocardiographic views were limited due to decreased penetration and lung interference  Intravenous contrast ( 1 2 mL of definity) was administered  LEFT VENTRICLE:  Systolic function was normal  Ejection fraction was estimated to be 55 %    This study was inadequate for the evaluation of regional wall motion  Wall thickness was mildly increased  LEFT ATRIUM:  The atrium was mildly dilated  RIGHT ATRIUM:  The atrium was poorly visualized  MITRAL VALVE:  Grossly normal but very poorly visualized mitral valve with possible mild annular calcification  AORTIC VALVE:  The valve was not well visualized  TRICUSPID VALVE:  Not well visualized  There was mild to moderate regurgitation  IVC, HEPATIC VEINS:  The inferior vena cava was mildly dilated  HISTORY: PRIOR HISTORY: DM2  Hyperlipidemia  Hypertension  Morbid obesity  CHF  PROCEDURE: The procedure was performed at the bedside  This was a routine study  The transthoracic approach was used  The study included complete 2D imaging, M-mode, complete spectral Doppler, and color Doppler  The heart rate was 102 bpm,  at the start of the study  Intravenous contrast ( 1 2 mL of definity) was administered  Echocardiographic views were limited due to decreased penetration and lung interference  This was a very technically difficult and poor quality study  LEFT VENTRICLE: Size was normal  Systolic function was normal  Ejection fraction was estimated to be 55 %  This study was inadequate for the evaluation of regional wall motion  Wall thickness was mildly increased  DOPPLER: Transmitral flow  pattern: atrial fibrillation  The study was not technically sufficient to allow evaluation of LV diastolic function  RIGHT VENTRICLE: The size was normal  Systolic function was normal  Wall thickness was normal     LEFT ATRIUM: The atrium was mildly dilated  RIGHT ATRIUM: The atrium was poorly visualized  MITRAL VALVE: Grossly normal but very poorly visualized mitral valve with possible mild annular calcification  DOPPLER: There was no evidence for stenosis  There was no significant regurgitation  AORTIC VALVE: The valve was not well visualized  DOPPLER: Transaortic velocity was within the normal range  There was no evidence for stenosis  There was no significant regurgitation, by limited doppler study  TRICUSPID VALVE: Not well visualized  DOPPLER: There was mild to moderate regurgitation  PULMONIC VALVE: Not well visualized  PERICARDIUM: There was no pericardial effusion  AORTA: The root exhibited normal size  SYSTEMIC VEINS: IVC: The inferior vena cava was mildly dilated  Respirophasic changes were normal     SYSTEM MEASUREMENT TABLES    2D  %FS: 40 6 %  Ao Diam: 4 1 cm  EDV(Teich): 136 4 ml  EF(Teich): 70 9 %  ESV(Teich): 39 8 ml  IVSd: 1 7 cm  LA Area: 40 8 cm2  LA Diam: 5 1 cm  LVIDd: 5 3 cm  LVIDs: 3 2 cm  LVPWd: 1 7 cm  RA Area: 30 2 cm2  SV(Teich): 96 6 ml    [facility] Accredited Echocardiography Laboratory    Prepared and electronically signed by    Juan J Watts DO  Signed 01-Nov-2018 12:13:25       Results for orders placed during the hospital encounter of 10/30/18   SANDRITA    Narrative Mari Elam MD     11/2/2018  2:36 PM  Procedure note:  SANDRITA Preliminary Report    Impression:      LV:  Normal size and systolic function  Left ventricular   ejection fraction ~ 60%  LA:  Dilated  DARIO:  Normal size and function  No spontaneous echocontrast   ("smoke"), or thrombus  RA:  Not well visualized  Interatrial septum:  Appears normal with no PFO or ASD   RV:  Normal size and systolic function  MV:  Normal structure  No mitral stenosis or significant   regurgitation  AV:  Normal structure  No aortic stenosis or significant   regurgitation  TV:  Normal structure  No tricuspid stenosis  Mild tricuspid   regurgitation  PV:  Grossly normal but poorly visualized  No gross pulmonic   stenosis or regurgitation  AO:  Normal appearing root, descending thoracic aorta, and aortic   arch  Atherosclerosis noted in the descending aorta and arch  Informed consent obtained from patient prior to procedure after   all indications, risks, and benefits of SANDRITA thoroughly discussed  Propofol was utilized for sedation and administered   intravenously by Anesthesia  Blood pressure, EKG, pulse   oximetry, respirations, and level of consciousness were monitored   throughout the procedure  Pt tolerated procedure well with nurse   anesthetist performing sedation and monitoring  SANDRITA probe passed   without difficulty with no blood seen on probe upon extubation  Kavitha Friend  is a 77 y o  y o  male who follows   with Dr Boris Howard in the office  The patient was identified in the cath lab  A time out procedure   was performed  The patient was sedated by the anesthesia service  After adequate sedation, a SANDRITA was performed  Please see separate   report for full details  Briefly, the LV function was normal,   and there was no left atrial or left atrial appendage thrombus   identified  After the SANDRITA, adequate sedation was once again confirmed  The   patient was cardioverted to sinus rhythm with a 150 J biphasic   shock  There were no complications  The patient was monitored for the appropriate time interval in   the holding area, and was transferred back to the medical floor   in stable condition  No results found for this or any previous visit  No results found for this or any previous visit           Scribe Attestation    I,:   Mendoza Tao am acting as a scribe while in the presence of the attending physician :        I,:   Lynn Lyle DO personally performed the services described in this documentation    as scribed in my presence :

## 2019-10-28 DIAGNOSIS — I48.0 PAROXYSMAL ATRIAL FIBRILLATION (HCC): Primary | ICD-10-CM

## 2019-10-29 ENCOUNTER — TELEPHONE (OUTPATIENT)
Dept: CARDIOLOGY CLINIC | Facility: CLINIC | Age: 69
End: 2019-10-29

## 2019-10-29 DIAGNOSIS — I48.0 PAROXYSMAL ATRIAL FIBRILLATION (HCC): Primary | ICD-10-CM

## 2019-10-29 NOTE — TELEPHONE ENCOUNTER
Patient schedule for SANDRITA/A fib ablation/Loop implant/dofetilide admit post procedure at [facility] on 12/03/2019 will be perform by Dr Fredy Caceres  Patient also schedule for cardiac pulmonary vein mapping test at [facility] on 11/13/19  Patient aware of general instructions also mail out with blood test form  Patient also referred to have medication loading treatment post procedure (Dofetilide 500mg BID) please Leland Galarza or Kaelyn howard can you check medication price through his insurance  Please Wale can you see if this patient will need insurance approval for those services

## 2019-10-29 NOTE — TELEPHONE ENCOUNTER
Per [facility]  90 day adebayo [facility]  $130 00  30 day through retail is 45% of total cost of the drug    Insurance states they could not give me a definite price for retail 30 day just that it will be 45 % of total cost

## 2019-10-29 NOTE — TELEPHONE ENCOUNTER
----- Message from Karyn Ruffin sent at 10/25/2019  9:40 AM EDT -----  Regarding: ABLATION   Please call this patient to schedule SANDRITA and Cryo Naxv Atrial fibrillation ablation with Loop recorder implantation       Hold Xarelto night before and Htcz 72 hours prior to procedure   CT pulmonary vein   Dofetilide initiation 500 mcg BID

## 2019-10-30 NOTE — TELEPHONE ENCOUNTER
45 % of the cost of Dofetilide 500 mcg BID through [address] is going to cost per insurance $129 00 for a 30 day supply  Patient is not really willing to pay this when he knows he can get it cheaper elsewhere  Just as a side note  Per [employer] [facility] is cheapest at $39 00 for a 30 day    Sotalol would cost $6 32 for a 30 day through [facility]

## 2019-10-31 NOTE — TELEPHONE ENCOUNTER
Laura Horn mention is possible for the patient to get a cheaper price in a retail Rx, but we wonder if patient will be able to get discharge from hospital without the 30 days supply from [facility]

## 2019-11-07 ENCOUNTER — TELEPHONE (OUTPATIENT)
Dept: BEHAVIORAL/MENTAL HEALTH CLINIC | Facility: CLINIC | Age: 69
End: 2019-11-07

## 2019-11-07 DIAGNOSIS — I50.32 CHRONIC DIASTOLIC (CONGESTIVE) HEART FAILURE (HCC): ICD-10-CM

## 2019-11-08 RX ORDER — POTASSIUM CHLORIDE 750 MG/1
TABLET, EXTENDED RELEASE ORAL
Qty: 90 TABLET | Refills: 3 | Status: SHIPPED | OUTPATIENT
Start: 2019-11-08 | End: 2019-12-06 | Stop reason: HOSPADM

## 2019-11-13 ENCOUNTER — HOSPITAL ENCOUNTER (OUTPATIENT)
Dept: RADIOLOGY | Facility: HOSPITAL | Age: 69
Discharge: HOME | End: 2019-11-13
Attending: INTERNAL MEDICINE
Payer: MEDICARE

## 2019-11-13 DIAGNOSIS — I48.0 PAROXYSMAL ATRIAL FIBRILLATION (HCC): ICD-10-CM

## 2019-11-13 PROCEDURE — 75572 CT HRT W/3D IMAGE: CPT

## 2019-11-13 RX ADMIN — IODIXANOL 100 ML: 320 INJECTION, SOLUTION INTRAVASCULAR at 12:35

## 2019-11-22 NOTE — TELEPHONE ENCOUNTER
Called patient and left message, per Dr Terrial Opitz dental procedure will be ok to get it done on 11/26/2019

## 2019-11-22 NOTE — TELEPHONE ENCOUNTER
Patient called and mention that he will need to have a dental procedure (2 cavity fill) this coming Tuesday 11/26/2019, he is wonder if will be ok to have this procedure done a week prior of his intervention with you  Please advice, thank you

## 2019-11-26 DIAGNOSIS — I10 ESSENTIAL HYPERTENSION: ICD-10-CM

## 2019-11-26 DIAGNOSIS — I48.0 PAROXYSMAL ATRIAL FIBRILLATION (HCC): ICD-10-CM

## 2019-11-26 LAB
LEFT EYE DIABETIC RETINOPATHY: NORMAL
RIGHT EYE DIABETIC RETINOPATHY: NORMAL

## 2019-11-26 RX ORDER — HYDROCHLOROTHIAZIDE 25 MG/1
TABLET ORAL
Qty: 90 TABLET | Refills: 3 | Status: SHIPPED | OUTPATIENT
Start: 2019-11-26 | End: 2019-12-06 | Stop reason: HOSPADM

## 2019-11-26 RX ORDER — RIVAROXABAN 20 MG/1
TABLET, FILM COATED ORAL
Qty: 90 TABLET | Refills: 3 | Status: SHIPPED | OUTPATIENT
Start: 2019-11-26 | End: 2020-11-30

## 2019-12-02 ENCOUNTER — TELEPHONE (OUTPATIENT)
Dept: SURGERY | Facility: HOSPITAL | Age: 69
End: 2019-12-02

## 2019-12-02 RX ORDER — PANTOPRAZOLE SODIUM 40 MG/1
40 INJECTION, POWDER, FOR SOLUTION INTRAVENOUS ONCE
Status: CANCELLED | OUTPATIENT
Start: 2019-12-02 | End: 2019-12-02

## 2019-12-02 NOTE — TELEPHONE ENCOUNTER
Spoke w Branda Cabot today,  He has not yet  tried to schedule an appt with a therapist as he is having an ablation on his heart tomorrow

## 2019-12-03 ENCOUNTER — HOSPITAL ENCOUNTER (INPATIENT)
Dept: NON INVASIVE DIAGNOSTICS | Facility: HOSPITAL | Age: 69
LOS: 2 days | Discharge: HOME | DRG: 274 | End: 2019-12-06
Attending: INTERNAL MEDICINE | Admitting: INTERNAL MEDICINE
Payer: MEDICARE

## 2019-12-03 ENCOUNTER — APPOINTMENT (OUTPATIENT)
Dept: NON INVASIVE DIAGNOSTICS | Facility: HOSPITAL | Age: 69
DRG: 274 | End: 2019-12-03
Attending: INTERNAL MEDICINE
Payer: MEDICARE

## 2019-12-03 ENCOUNTER — ANESTHESIA EVENT (OUTPATIENT)
Dept: NON INVASIVE DIAGNOSTICS | Facility: HOSPITAL | Age: 69
DRG: 274 | End: 2019-12-03
Payer: MEDICARE

## 2019-12-03 ENCOUNTER — HOSPITAL ENCOUNTER (OUTPATIENT)
Dept: NON INVASIVE DIAGNOSTICS | Facility: HOSPITAL | Age: 69
Discharge: HOME | DRG: 274 | End: 2019-12-03
Attending: INTERNAL MEDICINE
Payer: MEDICARE

## 2019-12-03 VITALS
RESPIRATION RATE: 16 BRPM | OXYGEN SATURATION: 98 % | TEMPERATURE: 98.1 F | HEART RATE: 78 BPM | DIASTOLIC BLOOD PRESSURE: 91 MMHG | SYSTOLIC BLOOD PRESSURE: 145 MMHG

## 2019-12-03 DIAGNOSIS — I48.0 PAROXYSMAL ATRIAL FIBRILLATION (HCC): Primary | ICD-10-CM

## 2019-12-03 DIAGNOSIS — I48.0 PAROXYSMAL ATRIAL FIBRILLATION (HCC): ICD-10-CM

## 2019-12-03 LAB
ANION GAP SERPL CALCULATED.3IONS-SCNC: 7 MMOL/L (ref 4–13)
ATRIAL RATE: 416 BPM
ATRIAL RATE: 57 BPM
BASOPHILS # BLD MANUAL: 0.27 THOUSAND/UL (ref 0–0.1)
BASOPHILS NFR MAR MANUAL: 2 % (ref 0–1)
BUN SERPL-MCNC: 15 MG/DL (ref 5–25)
CALCIUM SERPL-MCNC: 9.3 MG/DL (ref 8.3–10.1)
CHLORIDE SERPL-SCNC: 100 MMOL/L (ref 100–108)
CO2 SERPL-SCNC: 25 MMOL/L (ref 21–32)
CREAT SERPL-MCNC: 1.17 MG/DL (ref 0.6–1.3)
EOSINOPHIL # BLD MANUAL: 0.4 THOUSAND/UL (ref 0–0.4)
EOSINOPHIL NFR BLD MANUAL: 3 % (ref 0–6)
ERYTHROCYTE [DISTWIDTH] IN BLOOD BY AUTOMATED COUNT: 12 % (ref 11.6–15.1)
GFR SERPL CREATININE-BSD FRML MDRD: 62 ML/MIN/1.73SQ M
GLUCOSE P FAST SERPL-MCNC: 141 MG/DL (ref 65–99)
GLUCOSE SERPL-MCNC: 141 MG/DL (ref 65–140)
HCT VFR BLD AUTO: 43.8 % (ref 36.5–49.3)
HGB BLD-MCNC: 14.9 G/DL (ref 12–17)
LYMPHOCYTES # BLD AUTO: 1.73 THOUSAND/UL (ref 0.6–4.47)
LYMPHOCYTES # BLD AUTO: 13 % (ref 14–44)
MAGNESIUM SERPL-MCNC: 1.6 MG/DL (ref 1.6–2.6)
MCH RBC QN AUTO: 30.6 PG (ref 26.8–34.3)
MCHC RBC AUTO-ENTMCNC: 34 G/DL (ref 31.4–37.4)
MCV RBC AUTO: 90 FL (ref 82–98)
MONOCYTES # BLD AUTO: 0.8 THOUSAND/UL (ref 0–1.22)
MONOCYTES NFR BLD: 6 % (ref 4–12)
NEUTROPHILS # BLD MANUAL: 6 THOUSAND/UL (ref 1.85–7.62)
NEUTS SEG NFR BLD AUTO: 45 % (ref 43–75)
NRBC BLD AUTO-RTO: 0 /100 WBCS
P AXIS: 14 DEGREES
PLATELET # BLD AUTO: 244 THOUSANDS/UL (ref 149–390)
PLATELET BLD QL SMEAR: ADEQUATE
PMV BLD AUTO: 10 FL (ref 8.9–12.7)
POLYCHROMASIA BLD QL SMEAR: PRESENT
POTASSIUM SERPL-SCNC: 3.9 MMOL/L (ref 3.5–5.3)
PR INTERVAL: 172 MS
QRS AXIS: 12 DEGREES
QRS AXIS: 32 DEGREES
QRSD INTERVAL: 102 MS
QRSD INTERVAL: 102 MS
QT INTERVAL: 392 MS
QT INTERVAL: 426 MS
QTC INTERVAL: 415 MS
QTC INTERVAL: 435 MS
RBC # BLD AUTO: 4.87 MILLION/UL (ref 3.88–5.62)
RBC MORPH BLD: PRESENT
SODIUM SERPL-SCNC: 132 MMOL/L (ref 136–145)
T WAVE AXIS: -12 DEGREES
T WAVE AXIS: 166 DEGREES
VARIANT LYMPHS # BLD AUTO: 31 %
VENTRICULAR RATE: 57 BPM
VENTRICULAR RATE: 74 BPM
WBC # BLD AUTO: 13.34 THOUSAND/UL (ref 4.31–10.16)

## 2019-12-03 PROCEDURE — C1894 INTRO/SHEATH, NON-LASER: HCPCS | Performed by: INTERNAL MEDICINE

## 2019-12-03 PROCEDURE — 33285 INSJ SUBQ CAR RHYTHM MNTR: CPT | Performed by: INTERNAL MEDICINE

## 2019-12-03 PROCEDURE — 93010 ELECTROCARDIOGRAM REPORT: CPT | Performed by: INTERNAL MEDICINE

## 2019-12-03 PROCEDURE — 0JH602Z INSERTION OF MONITORING DEVICE INTO CHEST SUBCUTANEOUS TISSUE AND FASCIA, OPEN APPROACH: ICD-10-PCS | Performed by: INTERNAL MEDICINE

## 2019-12-03 PROCEDURE — 02K83ZZ MAP CONDUCTION MECHANISM, PERCUTANEOUS APPROACH: ICD-10-PCS | Performed by: INTERNAL MEDICINE

## 2019-12-03 PROCEDURE — 85027 COMPLETE CBC AUTOMATED: CPT | Performed by: PHYSICIAN ASSISTANT

## 2019-12-03 PROCEDURE — 92960 CARDIOVERSION ELECTRIC EXT: CPT | Performed by: INTERNAL MEDICINE

## 2019-12-03 PROCEDURE — C1893 INTRO/SHEATH, FIXED,NON-PEEL: HCPCS | Performed by: INTERNAL MEDICINE

## 2019-12-03 PROCEDURE — C1769 GUIDE WIRE: HCPCS | Performed by: INTERNAL MEDICINE

## 2019-12-03 PROCEDURE — 85347 COAGULATION TIME ACTIVATED: CPT

## 2019-12-03 PROCEDURE — C1764 EVENT RECORDER, CARDIAC: HCPCS

## 2019-12-03 PROCEDURE — 85007 BL SMEAR W/DIFF WBC COUNT: CPT | Performed by: PHYSICIAN ASSISTANT

## 2019-12-03 PROCEDURE — 93613 INTRACARDIAC EPHYS 3D MAPG: CPT | Performed by: INTERNAL MEDICINE

## 2019-12-03 PROCEDURE — 93005 ELECTROCARDIOGRAM TRACING: CPT

## 2019-12-03 PROCEDURE — 83735 ASSAY OF MAGNESIUM: CPT | Performed by: PHYSICIAN ASSISTANT

## 2019-12-03 PROCEDURE — C9113 INJ PANTOPRAZOLE SODIUM, VIA: HCPCS | Performed by: PHYSICIAN ASSISTANT

## 2019-12-03 PROCEDURE — NC001 PR NO CHARGE: Performed by: PHYSICIAN ASSISTANT

## 2019-12-03 PROCEDURE — 93662 INTRACARDIAC ECG (ICE): CPT | Performed by: INTERNAL MEDICINE

## 2019-12-03 PROCEDURE — C1759 CATH, INTRA ECHOCARDIOGRAPHY: HCPCS | Performed by: INTERNAL MEDICINE

## 2019-12-03 PROCEDURE — C1730 CATH, EP, 19 OR FEW ELECT: HCPCS | Performed by: INTERNAL MEDICINE

## 2019-12-03 PROCEDURE — 93656 COMPRE EP EVAL ABLTJ ATR FIB: CPT | Performed by: INTERNAL MEDICINE

## 2019-12-03 PROCEDURE — 93312 ECHO TRANSESOPHAGEAL: CPT

## 2019-12-03 PROCEDURE — C1726 CATH, BAL DIL, NON-VASCULAR: HCPCS | Performed by: INTERNAL MEDICINE

## 2019-12-03 PROCEDURE — 80048 BASIC METABOLIC PNL TOTAL CA: CPT | Performed by: PHYSICIAN ASSISTANT

## 2019-12-03 PROCEDURE — 02583ZZ DESTRUCTION OF CONDUCTION MECHANISM, PERCUTANEOUS APPROACH: ICD-10-PCS | Performed by: INTERNAL MEDICINE

## 2019-12-03 RX ORDER — DEXAMETHASONE SODIUM PHOSPHATE 4 MG/ML
INJECTION, SOLUTION INTRA-ARTICULAR; INTRALESIONAL; INTRAMUSCULAR; INTRAVENOUS; SOFT TISSUE AS NEEDED
Status: DISCONTINUED | OUTPATIENT
Start: 2019-12-03 | End: 2019-12-03 | Stop reason: SURG

## 2019-12-03 RX ORDER — PROTAMINE SULFATE 10 MG/ML
INJECTION, SOLUTION INTRAVENOUS AS NEEDED
Status: DISCONTINUED | OUTPATIENT
Start: 2019-12-03 | End: 2019-12-03 | Stop reason: SURG

## 2019-12-03 RX ORDER — HEPARIN SODIUM 10000 [USP'U]/100ML
INJECTION, SOLUTION INTRAVENOUS
Status: COMPLETED | OUTPATIENT
Start: 2019-12-03 | End: 2019-12-03

## 2019-12-03 RX ORDER — ALLOPURINOL 300 MG/1
300 TABLET ORAL DAILY
Status: DISCONTINUED | OUTPATIENT
Start: 2019-12-03 | End: 2019-12-06 | Stop reason: HOSPADM

## 2019-12-03 RX ORDER — EPHEDRINE SULFATE 50 MG/ML
INJECTION INTRAVENOUS AS NEEDED
Status: DISCONTINUED | OUTPATIENT
Start: 2019-12-03 | End: 2019-12-03 | Stop reason: SURG

## 2019-12-03 RX ORDER — HEPARIN SODIUM 1000 [USP'U]/ML
INJECTION, SOLUTION INTRAVENOUS; SUBCUTANEOUS CODE/TRAUMA/SEDATION MEDICATION
Status: COMPLETED | OUTPATIENT
Start: 2019-12-03 | End: 2019-12-03

## 2019-12-03 RX ORDER — OXYCODONE HYDROCHLORIDE 5 MG/1
5 TABLET ORAL EVERY 4 HOURS PRN
Status: DISCONTINUED | OUTPATIENT
Start: 2019-12-03 | End: 2019-12-06 | Stop reason: HOSPADM

## 2019-12-03 RX ORDER — DIPHENHYDRAMINE HYDROCHLORIDE 50 MG/ML
INJECTION INTRAMUSCULAR; INTRAVENOUS AS NEEDED
Status: DISCONTINUED | OUTPATIENT
Start: 2019-12-03 | End: 2019-12-03 | Stop reason: SURG

## 2019-12-03 RX ORDER — CEFAZOLIN SODIUM 1 G/3ML
INJECTION, POWDER, FOR SOLUTION INTRAMUSCULAR; INTRAVENOUS AS NEEDED
Status: DISCONTINUED | OUTPATIENT
Start: 2019-12-03 | End: 2019-12-03 | Stop reason: SURG

## 2019-12-03 RX ORDER — ZOLPIDEM TARTRATE 5 MG/1
5 TABLET ORAL
Status: DISCONTINUED | OUTPATIENT
Start: 2019-12-03 | End: 2019-12-06 | Stop reason: HOSPADM

## 2019-12-03 RX ORDER — FLUOXETINE HYDROCHLORIDE 20 MG/1
20 CAPSULE ORAL DAILY
Status: DISCONTINUED | OUTPATIENT
Start: 2019-12-03 | End: 2019-12-06 | Stop reason: HOSPADM

## 2019-12-03 RX ORDER — NEBIVOLOL 10 MG/1
10 TABLET ORAL DAILY
Status: DISCONTINUED | OUTPATIENT
Start: 2019-12-03 | End: 2019-12-05

## 2019-12-03 RX ORDER — FENTANYL CITRATE/PF 50 MCG/ML
25 SYRINGE (ML) INJECTION
Status: DISCONTINUED | OUTPATIENT
Start: 2019-12-03 | End: 2019-12-04 | Stop reason: HOSPADM

## 2019-12-03 RX ORDER — AMLODIPINE BESYLATE 5 MG/1
5 TABLET ORAL DAILY
Status: DISCONTINUED | OUTPATIENT
Start: 2019-12-03 | End: 2019-12-06 | Stop reason: HOSPADM

## 2019-12-03 RX ORDER — PROPOFOL 10 MG/ML
INJECTION, EMULSION INTRAVENOUS AS NEEDED
Status: DISCONTINUED | OUTPATIENT
Start: 2019-12-03 | End: 2019-12-03 | Stop reason: SURG

## 2019-12-03 RX ORDER — LIDOCAINE HYDROCHLORIDE 10 MG/ML
INJECTION, SOLUTION EPIDURAL; INFILTRATION; INTRACAUDAL; PERINEURAL CODE/TRAUMA/SEDATION MEDICATION
Status: COMPLETED | OUTPATIENT
Start: 2019-12-03 | End: 2019-12-03

## 2019-12-03 RX ORDER — LIDOCAINE 40 MG/G
CREAM TOPICAL 4 TIMES DAILY PRN
Status: DISCONTINUED | OUTPATIENT
Start: 2019-12-03 | End: 2019-12-04

## 2019-12-03 RX ORDER — KETAMINE HCL IN NACL, ISO-OSM 100MG/10ML
SYRINGE (ML) INJECTION AS NEEDED
Status: DISCONTINUED | OUTPATIENT
Start: 2019-12-03 | End: 2019-12-03 | Stop reason: SURG

## 2019-12-03 RX ORDER — SODIUM CHLORIDE 9 MG/ML
INJECTION, SOLUTION INTRAVENOUS CONTINUOUS PRN
Status: DISCONTINUED | OUTPATIENT
Start: 2019-12-03 | End: 2019-12-03 | Stop reason: SURG

## 2019-12-03 RX ORDER — ONDANSETRON 2 MG/ML
INJECTION INTRAMUSCULAR; INTRAVENOUS AS NEEDED
Status: DISCONTINUED | OUTPATIENT
Start: 2019-12-03 | End: 2019-12-03 | Stop reason: SURG

## 2019-12-03 RX ORDER — FUROSEMIDE 20 MG/1
20 TABLET ORAL DAILY
Status: DISCONTINUED | OUTPATIENT
Start: 2019-12-03 | End: 2019-12-05

## 2019-12-03 RX ORDER — MAGNESIUM SULFATE HEPTAHYDRATE 40 MG/ML
2 INJECTION, SOLUTION INTRAVENOUS ONCE
Status: COMPLETED | OUTPATIENT
Start: 2019-12-03 | End: 2019-12-04

## 2019-12-03 RX ORDER — PRAVASTATIN SODIUM 10 MG
10 TABLET ORAL
Status: DISCONTINUED | OUTPATIENT
Start: 2019-12-03 | End: 2019-12-06 | Stop reason: HOSPADM

## 2019-12-03 RX ORDER — FENTANYL CITRATE 50 UG/ML
INJECTION, SOLUTION INTRAMUSCULAR; INTRAVENOUS AS NEEDED
Status: DISCONTINUED | OUTPATIENT
Start: 2019-12-03 | End: 2019-12-03 | Stop reason: SURG

## 2019-12-03 RX ORDER — ACETAMINOPHEN 325 MG/1
650 TABLET ORAL EVERY 4 HOURS PRN
Status: DISCONTINUED | OUTPATIENT
Start: 2019-12-03 | End: 2019-12-06 | Stop reason: HOSPADM

## 2019-12-03 RX ORDER — PANTOPRAZOLE SODIUM 40 MG/1
40 INJECTION, POWDER, FOR SOLUTION INTRAVENOUS ONCE
Status: COMPLETED | OUTPATIENT
Start: 2019-12-03 | End: 2019-12-03

## 2019-12-03 RX ORDER — ONDANSETRON 2 MG/ML
4 INJECTION INTRAMUSCULAR; INTRAVENOUS ONCE AS NEEDED
Status: DISCONTINUED | OUTPATIENT
Start: 2019-12-03 | End: 2019-12-04 | Stop reason: HOSPADM

## 2019-12-03 RX ORDER — SUCCINYLCHOLINE/SOD CL,ISO/PF 100 MG/5ML
SYRINGE (ML) INTRAVENOUS AS NEEDED
Status: DISCONTINUED | OUTPATIENT
Start: 2019-12-03 | End: 2019-12-03 | Stop reason: SURG

## 2019-12-03 RX ORDER — MAGNESIUM SULFATE HEPTAHYDRATE 40 MG/ML
2 INJECTION, SOLUTION INTRAVENOUS CONTINUOUS
Status: DISCONTINUED | OUTPATIENT
Start: 2019-12-03 | End: 2019-12-03

## 2019-12-03 RX ORDER — DOFETILIDE 0.5 MG/1
500 CAPSULE ORAL EVERY 12 HOURS SCHEDULED
Status: DISCONTINUED | OUTPATIENT
Start: 2019-12-03 | End: 2019-12-03

## 2019-12-03 RX ORDER — LOSARTAN POTASSIUM 50 MG/1
50 TABLET ORAL 2 TIMES DAILY
Status: DISCONTINUED | OUTPATIENT
Start: 2019-12-03 | End: 2019-12-05

## 2019-12-03 RX ORDER — PANTOPRAZOLE SODIUM 40 MG/1
40 TABLET, DELAYED RELEASE ORAL
Status: DISCONTINUED | OUTPATIENT
Start: 2019-12-03 | End: 2019-12-05

## 2019-12-03 RX ORDER — POTASSIUM CHLORIDE 750 MG/1
10 TABLET, EXTENDED RELEASE ORAL DAILY
Status: DISCONTINUED | OUTPATIENT
Start: 2019-12-03 | End: 2019-12-05

## 2019-12-03 RX ADMIN — PROPOFOL 200 MG: 10 INJECTION, EMULSION INTRAVENOUS at 08:20

## 2019-12-03 RX ADMIN — DEXAMETHASONE SODIUM PHOSPHATE 10 MG: 4 INJECTION, SOLUTION INTRAMUSCULAR; INTRAVENOUS at 08:34

## 2019-12-03 RX ADMIN — ONDANSETRON 4 MG: 2 INJECTION INTRAMUSCULAR; INTRAVENOUS at 11:28

## 2019-12-03 RX ADMIN — LIDOCAINE HYDROCHLORIDE 8 ML: 10 INJECTION, SOLUTION EPIDURAL; INFILTRATION; INTRACAUDAL; PERINEURAL at 11:26

## 2019-12-03 RX ADMIN — IOHEXOL 55 ML: 350 INJECTION, SOLUTION INTRAVENOUS at 11:31

## 2019-12-03 RX ADMIN — LOSARTAN POTASSIUM 50 MG: 50 TABLET, FILM COATED ORAL at 17:15

## 2019-12-03 RX ADMIN — DOFETILIDE 500 MCG: 0.5 CAPSULE ORAL at 18:56

## 2019-12-03 RX ADMIN — SODIUM CHLORIDE: 0.9 INJECTION, SOLUTION INTRAVENOUS at 08:08

## 2019-12-03 RX ADMIN — PHENYLEPHRINE HYDROCHLORIDE 30 MCG/MIN: 10 INJECTION INTRAVENOUS at 08:45

## 2019-12-03 RX ADMIN — HEPARIN SODIUM 2500 UNITS: 1000 INJECTION INTRAVENOUS; SUBCUTANEOUS at 10:14

## 2019-12-03 RX ADMIN — HEPARIN SODIUM 25000 UNITS: 1000 INJECTION INTRAVENOUS; SUBCUTANEOUS at 09:36

## 2019-12-03 RX ADMIN — PRAVASTATIN SODIUM 10 MG: 10 TABLET ORAL at 21:24

## 2019-12-03 RX ADMIN — LIDOCAINE HYDROCHLORIDE 50 MG: 20 INJECTION, SOLUTION INTRAVENOUS at 08:20

## 2019-12-03 RX ADMIN — HEPARIN SODIUM AND DEXTROSE 1300 UNITS/HR: 10000; 5 INJECTION INTRAVENOUS at 09:36

## 2019-12-03 RX ADMIN — Medication 140 MG: at 08:21

## 2019-12-03 RX ADMIN — Medication 10 MG: at 10:16

## 2019-12-03 RX ADMIN — Medication 20 MG: at 08:52

## 2019-12-03 RX ADMIN — FENTANYL CITRATE 50 MCG: 50 INJECTION, SOLUTION INTRAMUSCULAR; INTRAVENOUS at 08:37

## 2019-12-03 RX ADMIN — FLUOXETINE 20 MG: 20 CAPSULE ORAL at 13:54

## 2019-12-03 RX ADMIN — FUROSEMIDE 20 MG: 20 TABLET ORAL at 13:54

## 2019-12-03 RX ADMIN — PROTAMINE SULFATE 40 MG: 10 INJECTION, SOLUTION INTRAVENOUS at 11:16

## 2019-12-03 RX ADMIN — MAGNESIUM SULFATE HEPTAHYDRATE 2 G: 40 INJECTION, SOLUTION INTRAVENOUS at 22:31

## 2019-12-03 RX ADMIN — AMLODIPINE BESYLATE 5 MG: 5 TABLET ORAL at 13:54

## 2019-12-03 RX ADMIN — DIPHENHYDRAMINE HYDROCHLORIDE 25 MG: 50 INJECTION, SOLUTION INTRAMUSCULAR; INTRAVENOUS at 08:16

## 2019-12-03 RX ADMIN — FENTANYL CITRATE 50 MCG: 50 INJECTION, SOLUTION INTRAMUSCULAR; INTRAVENOUS at 08:20

## 2019-12-03 RX ADMIN — CEFAZOLIN SODIUM 3000 MG: 1 INJECTION, POWDER, FOR SOLUTION INTRAMUSCULAR; INTRAVENOUS at 08:42

## 2019-12-03 RX ADMIN — SODIUM CHLORIDE: 0.9 INJECTION, SOLUTION INTRAVENOUS at 10:27

## 2019-12-03 RX ADMIN — RIVAROXABAN 20 MG: 20 TABLET, FILM COATED ORAL at 13:54

## 2019-12-03 RX ADMIN — PANTOPRAZOLE SODIUM 40 MG: 40 INJECTION, POWDER, FOR SOLUTION INTRAVENOUS at 08:49

## 2019-12-03 RX ADMIN — HEPARIN SODIUM 5000 UNITS: 1000 INJECTION INTRAVENOUS; SUBCUTANEOUS at 10:59

## 2019-12-03 RX ADMIN — PROPOFOL 40 MG: 10 INJECTION, EMULSION INTRAVENOUS at 08:37

## 2019-12-03 RX ADMIN — EPHEDRINE SULFATE 5 MG: 50 INJECTION, SOLUTION INTRAVENOUS at 08:58

## 2019-12-03 NOTE — ANESTHESIA POSTPROCEDURE EVALUATION
Post-Op Assessment Note    CV Status:  Stable    Pain management: adequate     Mental Status:  Alert and awake   Hydration Status:  Euvolemic   PONV Controlled:  Controlled   Airway Patency:  Patent   Post Op Vitals Reviewed: Yes      Staff: CRNA, Anesthesiologist   Comments: report given to Mark VALE,           BP   128/82   Temp      Pulse     Resp      SpO2   98

## 2019-12-03 NOTE — ANESTHESIA PROCEDURE NOTES
Arterial Line Insertion  Performed by: Rick Lockhart CRNA  Authorized by: Sonu Moseley MD   Consent: Written consent obtained  Risks and benefits: risks, benefits and alternatives were discussed  Consent given by: patient  Patient understanding: patient states understanding of the procedure being performed  Patient consent: the patient's understanding of the procedure matches consent given  Patient identity confirmed: arm band  Time out: Immediately prior to procedure a "time out" was called to verify the correct patient, procedure, equipment, support staff and site/side marked as required  Preparation: Patient was prepped and draped in the usual sterile fashion  Indications: multiple ABGs and hemodynamic monitoring  Orientation:  Right  Location: radial artery  Sedation:  Patient sedated: geta      Procedure Details:  Marco A's test normal: yes  Needle gauge: 20  Seldinger technique: Seldinger technique used  Number of attempts: 1    Post-procedure:  Post-procedure: dressing applied  Waveform: good waveform and waveform confirmed  Post-procedure CNS: normal  Patient tolerance: Patient tolerated the procedure well with no immediate complications

## 2019-12-03 NOTE — ANESTHESIA PREPROCEDURE EVALUATION
Review of Systems/Medical History  Patient summary reviewed  Chart reviewed  No history of anesthetic complications     Cardiovascular  Negative cardio ROS Hyperlipidemia, Hypertension , History of percutaneous transluminal coronary angioplasty, Dysrhythmias , atrial fibrillation, CHF ,    Pulmonary  Negative pulmonary ROS Sleep apnea ,   Comment: Chronic rhinitis     GI/Hepatic  Negative GI/hepatic ROS   GERD ,        Negative  ROS Prostatic disorder, benign prostatic hyperplasia       Endo/Other  Negative endo/other ROS Diabetes type 2 Diet controlled,   Obesity  morbid obesity   GYN  Negative gynecology ROS          Hematology  Negative hematology ROS   Coagulation disorder currently taking oral anticoagulants,    Musculoskeletal  Negative musculoskeletal ROS Gout,        Neurology  Negative neurology ROS      Psychology   Negative psychology ROS Depression , depressed,          SANDRITA 11/2 - IVGA with natural airway    EKG 10/25/19: a fib with non-specific ST changes    TTE 11/1/18: LVEF 55%, mildly increased wall thickness, LA mildly dilated, poorly visualized valves, mild-mod TR (mild MR seen on SANDRITA)    Recent Results (from the past 672 hour(s))    Diabetes Eye Exam    Collection Time: 11/26/19  2:35 PM   Result Value Ref Range    Right Eye Diabetic Retinopathy None     Left Eye Diabetic Retinopathy None    CBC and differential    Collection Time: 12/03/19  6:52 AM   Result Value Ref Range    WBC 13 34 (H) 4 31 - 10 16 Thousand/uL    RBC 4 87 3 88 - 5 62 Million/uL    Hemoglobin 14 9 12 0 - 17 0 g/dL    Hematocrit 43 8 36 5 - 49 3 %    MCV 90 82 - 98 fL    MCH 30 6 26 8 - 34 3 pg    MCHC 34 0 31 4 - 37 4 g/dL    RDW 12 0 11 6 - 15 1 %    MPV 10 0 8 9 - 12 7 fL    Platelets 453 245 - 761 Thousands/uL   ECG 12 lead    Collection Time: 12/03/19  8:06 AM   Result Value Ref Range    Ventricular Rate 74 BPM    Atrial Rate 416 BPM    NH Interval  ms    QRSD Interval 102 ms    QT Interval 392 ms    QTC Interval 435 ms    P Axis  degrees    QRS Axis 32 degrees    T Wave Axis -12 degrees       Physical Exam    Airway    Mallampati score: II  TM Distance: >3 FB  Neck ROM: full     Dental       Cardiovascular  Comment: Negative ROS, Rhythm: irregular, Rate: normal, No murmur,     Pulmonary  Breath sounds clear to auscultation,     Other Findings        Anesthesia Plan  ASA Score- 3     Anesthesia Type- general with ASA Monitors  Additional Monitors:   Airway Plan: ETT  Plan Factors-    Induction- intravenous  Postoperative Plan-   Planned trial extubation    Informed Consent- Anesthetic plan and risks discussed with patient, spouse and daughter  I personally reviewed this patient with the CRNA  Discussed and agreed on the Anesthesia Plan with the CRNA  Korin Kate

## 2019-12-03 NOTE — OP NOTE
Placement of cardiac event recorder    History and physical were reviewed  Patient was examined and history was reviewed  No change in patient's condition Since history and physical has been completed        The pre- operative diagnosis:  Symptomatic persistent atrial fibrillation with symptoms of shortness of breath and fatigue  Patient requires management of his anti arrhythmics  He does not feel palpitations but rather presents with shortness of breath and fatigue  He underwent an atrial fibrillation ablation requires placement of a cardiac event recorder to manage his antiarrhythmic therapy and to determine whether he requires repeat ablation          Postoperative diagnosis:  same        Procedure: placement of cardiac event recorder          Surgeon: Kusum Sanders DO    Assistants -none    Specimens - none    Estimated blood loss- none    Findings-none    Complications none    Anesthesia-  local lidocaine by myself      Details of the device [employer]      Description of procedure: The patient was seen before the procedure  The details of the procedure was explained and patient agreed to the same  Appropriate consent was signed  The patient was brought to the electrophysiologic laboratory  Proper time out was done  Sterile dressing and draping was done  Local lidocaine was infiltrated, in the left fourth intercostal space, about 2 cm lateral to the sternal edge  Using the stab knife an incision was made  Thereafter the  was used, moved around to form a pocket, along the long axis of the heart, in the fourth intercostal space region  Then plunger was used to deploy the device  The plunger was disengaged and removed  The  was pulled back  Pressure was held and hemostasis was obtained  Pocket closed with interrupted 4-0 ethilon    Dressing was done Telfa and Tegaderm      Summary of the procedure:   The patient came in to the laboratory for cardiac event recorder placement   The device has been placed and patient tolerated the procedure well

## 2019-12-03 NOTE — H&P
H&P Exam - Cardiology   Mayi Davis  68 y o  male MRN: [id number]  Unit/Bed#: [address] [facility] [address] OVR Encounter: [id number]    Assessment/Plan     Assessment:  1  Symptomatic persistent atrial fibrillation   A ) prior DCCV 11/2018   B ) on Xarelto anticoagulation  2  Normal LV systolic function with EF 55% per echo 11/2018  3  Obstructive sleep apnea, maintained on CPAP  4  Hypertension  5  Hyperlipidemia  6  Obesity with BMI 49      Plan:  1  SANDRITA/afib ablation + loop recorder implantation, followed by dofetilide initiation  History of Present Illness   HPI:  Mayi Davis  is a 68y o  year old male with a history of persistent atrial fibrillation, preserved LV systolic function, obstructive sleep apnea on CPAP, hypertension, and hyperlipidemia  He previously underwent a cardioversion 11/2018, and per reports he maintained sinus rhythm for several months before reverting back to atrial fibrillation  He has been maintained on digoxin and Xarelto, but reports feeling better when he was in sinus rhythm  Thus, he was seen in consultation by Dr Jeannette Morrissey and an atrial fibrillation ablation was recommended  He presents to undergo that procedure, no significant changes since he was last seen  Review of Systems  ROS as noted above, otherwise 12 point review of systems was performed and is negative         Historical Information   Past Medical History:   Diagnosis Date    Atrial fibrillation ([address] )     BPH (benign prostatic hyperplasia)     Chronic diastolic (congestive) heart failure (HCC)     Depression     Diabetes mellitus (HCC)     GERD (gastroesophageal reflux disease)     Gout     Hyperlipidemia     Hypertension     Hyponatremia     last assessed: 09/08/2014    Leukocytosis     Liver function abnormality     Morbid obesity (HCC)     Obstructive sleep apnea     Sleep apnea      Past Surgical History:   Procedure Laterality Date    CARDIAC CATHETERIZATION      outcome: Neg    CARDIAC ELECTROPHYSIOLOGY STUDY AND ABLATION      CARDIOVASCULAR STRESS TEST      resolved: 10/27/2010; negative    COLONOSCOPY  11/2013    polyp; complete    HERNIA REPAIR      resolved: 11/1999    REPLACEMENT TOTAL KNEE Left 2010    REPLACEMENT TOTAL KNEE Right 2003    THYROGLOSSAL DUCT EXCISION      TONSILLECTOMY      last assessed: 06/02/2014     Family History:   Family History   Problem Relation Age of Onset    Diabetes Mother     Heart attack Mother     Hypertension Mother     Heart attack Sister        Social History   Social History     Substance and Sexual Activity   Alcohol Use Yes    Comment: Social: 5-6 on the weekends     Social History     Substance and Sexual Activity   Drug Use No     Social History     Tobacco Use   Smoking Status Never Smoker   Smokeless Tobacco Never Used         Meds/Allergies   all medications and allergies reviewed  Home Medications:   Medications Prior to Admission   Medication    allopurinol (ZYLOPRIM) 300 mg tablet    amLODIPine (NORVASC) 5 mg tablet    BYSTOLIC 10 MG tablet    digoxin (LANOXIN) 0 25 mg tablet    FLUoxetine (PROzac) 40 MG capsule    furosemide (LASIX) 20 mg tablet    hydrochlorothiazide (HYDRODIURIL) 25 mg tablet    lidocaine (XYLOCAINE) 5 % ointment    losartan (COZAAR) 50 mg tablet    omeprazole (PriLOSEC) 40 MG capsule    potassium chloride (K-DUR,KLOR-CON) 10 mEq tablet    pravastatin (PRAVACHOL) 10 mg tablet    XARELTO 20 MG tablet    zolpidem (AMBIEN) 10 mg tablet       Allergies   Allergen Reactions    Metoprolol Shortness Of Breath     Tolerates Bystolic    Benazepril Cough    Clonidine Fatigue    Diltiazem Bradycardia    Entex T  [Pseudoephedrine-Guaifenesin]      Annotation - [id number]: LEG SWELLING       Objective   Vitals: There were no vitals taken for this visit        No intake or output data in the 24 hours ending 12/03/19 0739    Invasive Devices     Peripheral Intravenous Line            Peripheral IV 12/03/19 Left Antecubital less than 1 day    Peripheral IV 19 Right Antecubital less than 1 day                Physical Exam  GEN: NAD, alert and oriented, well appearing  SKIN: dry without significant lesions or rashes  HEENT: NCAT, PERRL, EOMs intact  NECK: No JVD appreciated  CARDIOVASCULAR: irregularly irregular, normal S1, S2 without murmurs, rubs, or gallops appreciated  LUNGS: Clear to auscultation bilaterally without wheezes, rhonchi, or rales  ABDOMEN: Soft, nontender, nondistended  EXTREMITIES/VASCULAR: perfused without clubbing, cyanosis, or edema b/l  PSYCH: Normal mood and affect  NEURO: CN ll-Xll grossly intact      Lab Results: I have personally reviewed pertinent lab results  Results from last 7 days   Lab Units 19  0652   WBC Thousand/uL 13 34*   HEMOGLOBIN g/dL 14 9   HEMATOCRIT % 43 8   PLATELETS Thousands/uL 244           Invalid input(s): LABGLOM              Imaging: I have personally reviewed pertinent reports  Results for orders placed during the hospital encounter of 10/30/18   Echo complete with contrast if indicated    [address], [address]  [phone]    Transthoracic Echocardiogram  2D, M-mode, Doppler, and Color Doppler    Study date:  2018    Patient: Jenny Parish  MR number: [id number]  Account number: [de-identified]  : 1950  Age: 77 years  Gender: Male  Status: Inpatient  Location: Bedside  Height: 70 in  Weight: 332 lb  BP: 134/ 98 mmHg    Indications: Atrial fibrillation  Diagnoses: I48 0 - Atrial fibrillation    Sonographer:  Amor Berry RDCS  Primary Physician:  Santino Bright MD  Referring Physician:  Juanita Mcmahon MD  Group:  [facility]  Interpreting Physician:  Brooklyn Don DO    SUMMARY    PROCEDURE INFORMATION:  This was a very technically difficult and poor quality study    Echocardiographic views were limited due to decreased penetration and lung interference  Intravenous contrast ( 1 2 mL of definity) was administered  LEFT VENTRICLE:  Systolic function was normal  Ejection fraction was estimated to be 55 %  This study was inadequate for the evaluation of regional wall motion  Wall thickness was mildly increased  LEFT ATRIUM:  The atrium was mildly dilated  RIGHT ATRIUM:  The atrium was poorly visualized  MITRAL VALVE:  Grossly normal but very poorly visualized mitral valve with possible mild annular calcification  AORTIC VALVE:  The valve was not well visualized  TRICUSPID VALVE:  Not well visualized  There was mild to moderate regurgitation  IVC, HEPATIC VEINS:  The inferior vena cava was mildly dilated  HISTORY: PRIOR HISTORY: DM2  Hyperlipidemia  Hypertension  Morbid obesity  CHF  PROCEDURE: The procedure was performed at the bedside  This was a routine study  The transthoracic approach was used  The study included complete 2D imaging, M-mode, complete spectral Doppler, and color Doppler  The heart rate was 102 bpm,  at the start of the study  Intravenous contrast ( 1 2 mL of definity) was administered  Echocardiographic views were limited due to decreased penetration and lung interference  This was a very technically difficult and poor quality study  LEFT VENTRICLE: Size was normal  Systolic function was normal  Ejection fraction was estimated to be 55 %  This study was inadequate for the evaluation of regional wall motion  Wall thickness was mildly increased  DOPPLER: Transmitral flow  pattern: atrial fibrillation  The study was not technically sufficient to allow evaluation of LV diastolic function  RIGHT VENTRICLE: The size was normal  Systolic function was normal  Wall thickness was normal     LEFT ATRIUM: The atrium was mildly dilated  RIGHT ATRIUM: The atrium was poorly visualized  MITRAL VALVE: Grossly normal but very poorly visualized mitral valve with possible mild annular calcification   DOPPLER: There was no evidence for stenosis  There was no significant regurgitation  AORTIC VALVE: The valve was not well visualized  DOPPLER: Transaortic velocity was within the normal range  There was no evidence for stenosis  There was no significant regurgitation, by limited doppler study  TRICUSPID VALVE: Not well visualized  DOPPLER: There was mild to moderate regurgitation  PULMONIC VALVE: Not well visualized  PERICARDIUM: There was no pericardial effusion  AORTA: The root exhibited normal size  SYSTEMIC VEINS: IVC: The inferior vena cava was mildly dilated   Respirophasic changes were normal     SYSTEM MEASUREMENT TABLES    2D  %FS: 40 6 %  Ao Diam: 4 1 cm  EDV(Teich): 136 4 ml  EF(Teich): 70 9 %  ESV(Teich): 39 8 ml  IVSd: 1 7 cm  LA Area: 40 8 cm2  LA Diam: 5 1 cm  LVIDd: 5 3 cm  LVIDs: 3 2 cm  LVPWd: 1 7 cm  RA Area: 30 2 cm2  SV(Teich): 96 6 ml    [facility] Accredited Echocardiography Laboratory    Prepared and electronically signed by    Jessica Encinas DO  Signed 01-Nov-2018 12:13:25         EKG: atrial fibrillation with controlled ventricular response         Code Status: Prior

## 2019-12-03 NOTE — PROGRESS NOTES
Pastoral Care Progress Note    12/3/2019  Patient: Gina Fabian   : 1950  Admission Date & Time: 12/3/2019 7797  MRN: [id number] [facility]: [id number]                     Chaplaincy Interventions Utilized:                   Ritual: Provided prayer            Chaplaincy Outcomes Achieved:  Expressed humor          Spiritual Coping Strategies Utilized:   Spiritual gratitude       19 1400   Clinical Encounter Type   Visited With Patient   Routine Visit Introduction   Continue Visiting No   Congregational Encounters   Congregational Needs Prayer

## 2019-12-04 LAB
ANION GAP SERPL CALCULATED.3IONS-SCNC: 9 MMOL/L (ref 4–13)
ATRIAL RATE: 61 BPM
ATRIAL RATE: 61 BPM
BUN SERPL-MCNC: 17 MG/DL (ref 5–25)
CALCIUM SERPL-MCNC: 9 MG/DL (ref 8.3–10.1)
CHLORIDE SERPL-SCNC: 101 MMOL/L (ref 100–108)
CO2 SERPL-SCNC: 23 MMOL/L (ref 21–32)
CREAT SERPL-MCNC: 1.07 MG/DL (ref 0.6–1.3)
ERYTHROCYTE [DISTWIDTH] IN BLOOD BY AUTOMATED COUNT: 11.9 % (ref 11.6–15.1)
GFR SERPL CREATININE-BSD FRML MDRD: 69 ML/MIN/1.73SQ M
GLUCOSE SERPL-MCNC: 159 MG/DL (ref 65–140)
HCT VFR BLD AUTO: 35.6 % (ref 36.5–49.3)
HGB BLD-MCNC: 12.4 G/DL (ref 12–17)
KCT BLD-ACNC: 341 SEC (ref 89–137)
KCT BLD-ACNC: 361 SEC (ref 89–137)
KCT BLD-ACNC: 374 SEC (ref 89–137)
KCT BLD-ACNC: 386 SEC (ref 89–137)
MCH RBC QN AUTO: 31.2 PG (ref 26.8–34.3)
MCHC RBC AUTO-ENTMCNC: 34.8 G/DL (ref 31.4–37.4)
MCV RBC AUTO: 89 FL (ref 82–98)
P AXIS: 35 DEGREES
P AXIS: 4 DEGREES
PLATELET # BLD AUTO: 232 THOUSANDS/UL (ref 149–390)
PMV BLD AUTO: 10.3 FL (ref 8.9–12.7)
POTASSIUM SERPL-SCNC: 3.7 MMOL/L (ref 3.5–5.3)
PR INTERVAL: 174 MS
PR INTERVAL: 186 MS
QRS AXIS: 11 DEGREES
QRS AXIS: 7 DEGREES
QRSD INTERVAL: 108 MS
QRSD INTERVAL: 112 MS
QT INTERVAL: 452 MS
QT INTERVAL: 464 MS
QTC INTERVAL: 456 MS
QTC INTERVAL: 467 MS
RBC # BLD AUTO: 3.98 MILLION/UL (ref 3.88–5.62)
SODIUM SERPL-SCNC: 133 MMOL/L (ref 136–145)
SPECIMEN SOURCE: ABNORMAL
T WAVE AXIS: 69 DEGREES
T WAVE AXIS: 70 DEGREES
VENTRICULAR RATE: 61 BPM
VENTRICULAR RATE: 61 BPM
WBC # BLD AUTO: 17.24 THOUSAND/UL (ref 4.31–10.16)

## 2019-12-04 PROCEDURE — 93010 ELECTROCARDIOGRAM REPORT: CPT | Performed by: INTERNAL MEDICINE

## 2019-12-04 PROCEDURE — 99232 SBSQ HOSP IP/OBS MODERATE 35: CPT | Performed by: PHYSICIAN ASSISTANT

## 2019-12-04 PROCEDURE — 93005 ELECTROCARDIOGRAM TRACING: CPT

## 2019-12-04 PROCEDURE — 85027 COMPLETE CBC AUTOMATED: CPT | Performed by: PHYSICIAN ASSISTANT

## 2019-12-04 PROCEDURE — 80048 BASIC METABOLIC PNL TOTAL CA: CPT | Performed by: PHYSICIAN ASSISTANT

## 2019-12-04 RX ORDER — LIDOCAINE 40 MG/G
CREAM TOPICAL 4 TIMES DAILY PRN
Status: DISCONTINUED | OUTPATIENT
Start: 2019-12-04 | End: 2019-12-06 | Stop reason: HOSPADM

## 2019-12-04 RX ORDER — DOFETILIDE 0.25 MG/1
250 CAPSULE ORAL EVERY 12 HOURS SCHEDULED
Status: DISCONTINUED | OUTPATIENT
Start: 2019-12-04 | End: 2019-12-05

## 2019-12-04 RX ADMIN — LOSARTAN POTASSIUM 50 MG: 50 TABLET, FILM COATED ORAL at 18:00

## 2019-12-04 RX ADMIN — LOSARTAN POTASSIUM 50 MG: 50 TABLET, FILM COATED ORAL at 08:35

## 2019-12-04 RX ADMIN — FUROSEMIDE 20 MG: 20 TABLET ORAL at 08:34

## 2019-12-04 RX ADMIN — DOFETILIDE 250 MCG: 0.25 CAPSULE ORAL at 22:03

## 2019-12-04 RX ADMIN — POTASSIUM CHLORIDE 10 MEQ: 750 TABLET, EXTENDED RELEASE ORAL at 08:35

## 2019-12-04 RX ADMIN — FLUOXETINE 20 MG: 20 CAPSULE ORAL at 08:35

## 2019-12-04 RX ADMIN — PANTOPRAZOLE SODIUM 40 MG: 40 TABLET, DELAYED RELEASE ORAL at 06:31

## 2019-12-04 RX ADMIN — RIVAROXABAN 20 MG: 20 TABLET, FILM COATED ORAL at 18:00

## 2019-12-04 RX ADMIN — ALLOPURINOL 300 MG: 300 TABLET ORAL at 08:34

## 2019-12-04 RX ADMIN — DOFETILIDE 250 MCG: 0.25 CAPSULE ORAL at 11:00

## 2019-12-04 RX ADMIN — AMLODIPINE BESYLATE 5 MG: 5 TABLET ORAL at 08:35

## 2019-12-04 RX ADMIN — PRAVASTATIN SODIUM 10 MG: 10 TABLET ORAL at 22:03

## 2019-12-04 RX ADMIN — NEBIVOLOL HYDROCHLORIDE 10 MG: 10 TABLET ORAL at 08:34

## 2019-12-04 RX ADMIN — ZOLPIDEM TARTRATE 5 MG: 5 TABLET, FILM COATED ORAL at 01:22

## 2019-12-04 NOTE — SOCIAL WORK
CM met with Pt, spouse Radha and adult daughter with an introduction and explanation of role  Pt reported residing with Radha in a ranch style home with no use of DME but has access to 2 roller walker, bedside commode and 2 steps to enter the home  Pt reported being independent with ADLs, had VNA in the past but cannot remember the name of the provider agency and was at [facility] in the past but no hx of mental health or drug/alcohol placements  Pt denied having a living will, reported the use of [employer] on [employer] and [facility], and has Cathryne Scale as a PCP  CM reviewed d/c planning process including the following: identifying help at home, patient preference for d/c planning needs, Discharge Lounge, [facility] Meds to Bed program, availability of treatment team to discuss questions or concerns patient and/or family may have regarding understanding medications and recognizing signs and symptoms once discharged  CM also encouraged patient to follow up with all recommended appointments after discharge  Patient advised of importance for patient and family to participate in managing patients medical well being

## 2019-12-04 NOTE — PROGRESS NOTES
Progress Note - Electrophysiology-Cardiology (EP)   Merline Jacob  68 y o  male MRN: [id number]  Unit/Bed#: CW2 214-02 Encounter: [id number]      Assessment/Plan:   1  Persistent atrial fibrillation, symptomatic    * patient was admitted to [facility] EP service on 12-3-19 after undergoing cryo PVI, ILR and tikosyn initiation    * this AM patient is in NSR without any concerns or complaints, tele showing no ectopy    * labs this AM showing stable electrolytes    * last PM's QTc was manually calculated as over 500ms but not reviewed by a physician, his dose was held this AM but then restarted at 250mcg PO BID, will continue to monitor ECG's and tele    * plan for discharge this Friday 12-6-19 pending any complications    * continue AC w/ xarelto    * modifiable AF risk factors     1  HTN - controlled      2  T2DM - N/A     3  MARISSA - on CPAP     4  Alcohol abuse - does not regularly use alcohol     5  Obesity - discussed importance of weight loss, patient is down to 303lbs AM of his ablation but was 320lbs a few weeks ago, he understands AF's relation to obesity and is willing to lose weight  2  S/P ILR - site without hematoma   3  MARISSA on CPAP   4  HTN  5  HLD     EKG (12-3-19, after 1st tikosyn dose): Subjective/Objective   Subjective:   FH is a 68year old male w/ morbid obesity, MARISSA on CPAP, persistent atrial fibrillation s/p DCCV () AC w/ xarelto, HTN, HLD who was admitted to [facility] on 12-3-19 under direction of Dr Terrial Opitz for cryo PVI, ILR and tikosyn initiation  12-4-19: This AM patient has no major concerns or complaints  Ambulating without difficulty  Vitals: /72   Pulse 62   Temp 98 2 °F (36 8 °C)   Resp 18   SpO2 97%   There were no vitals filed for this visit    Orthostatic Blood Pressures      Most Recent Value   Blood Pressure  153/72 filed at 12/04/2019 2176            Intake/Output Summary (Last 24 hours) at 12/4/2019 1034  Last data filed at 12/4/2019 0500  Gross per 24 hour   Intake 1600 ml   Output 400 ml   Net 1200 ml       Invasive Devices     Peripheral Intravenous Line            Peripheral IV 12/03/19 Left Antecubital 1 day          Arterial Line            Arterial Line 12/03/19 Right Radial 1 day                            Scheduled Meds:  Current Facility-Administered Medications:  acetaminophen 650 mg Oral Q4H PRN Yanet Gang, PA-C   allopurinol 300 mg Oral Daily Yanet Gang, PA-C   amLODIPine 5 mg Oral Daily Yanet Gang, PA-C   dofetilide 250 mcg Oral Q12H [address] Jose Friend, PA-C   FLUoxetine 20 mg Oral Daily Yanet Gang, PA-C   furosemide 20 mg Oral Daily Yanet Gang, PA-C   lidocaine  Topical 4x Daily PRN Yanet Gang, PA-C   losartan 50 mg Oral BID Yanet Gang, PA-C   nebivolol 10 mg Oral Daily Yanet Gang, PA-C   oxyCODONE 5 mg Oral Q4H PRN Yanet Gang, PA-C   pantoprazole 40 mg Oral Early Morning Yanet Gang, PA-C   potassium chloride 10 mEq Oral Daily Yanet Gang, PA-C   pravastatin 10 mg Oral HS Yanet Gang, PA-C   rivaroxaban 20 mg Oral Daily With Magno, PA-C   zolpidem 5 mg Oral HS PRN Yanet Gang, PA-C     Continuous Infusions:   PRN Meds:   acetaminophen    lidocaine    oxyCODONE    zolpidem    Physical Exam:   GEN: NAD, alert and oriented, well appearing  SKIN: dry without significant lesions or rashes  HEENT: NCAT, PERRL, EOMs intact  NECK: No JVD or carotid bruits appreciated  CARDIOVASCULAR: RRR, normal S1, S2 without murmurs, rubs, or gallops appreciated  LUNGS: Clear to auscultation bilaterally without wheezes, rhonchi, or rales  ABDOMEN: Soft, nontender, nondistended  EXTREMITIES/VASCULAR: perfused without clubbing, cyanosis, or edema b/l  PSYCH: Normal mood and affect  NEURO: CN ll-Xll grossly intact                Lab Results: I have personally reviewed pertinent lab results      Results from last 7 days   Lab Units 12/04/19  7390 12/03/19  0652   WBC Thousand/uL 17 24* 13 34*   HEMOGLOBIN g/dL 12 4 14 9   HEMATOCRIT % 35 6* 43 8   PLATELETS Thousands/uL 232 244     Results from last 7 days   Lab Units 19  0450 19  0652   POTASSIUM mmol/L 3 7 3 9   CHLORIDE mmol/L 101 100   CO2 mmol/L 23 25   BUN mg/dL 17 15   CREATININE mg/dL 1 07 1 17   CALCIUM mg/dL 9 0 9 3         Results from last 7 days   Lab Units 19  0652   MAGNESIUM mg/dL 1 6         Imaging: I have personally reviewed pertinent reports  Results for orders placed during the hospital encounter of 10/30/18   Echo complete with contrast if indicated    Narrative [address]  [address]  [place], [address]  [phone]    Transthoracic Echocardiogram  2D, M-mode, Doppler, and Color Doppler    Study date:  2018    Patient: Tavia Regan  MR number: [id number]  Account number: [de-identified]  : 1950  Age: 77 years  Gender: Male  Status: Inpatient  Location: Bedside  Height: 70 in  Weight: 332 lb  BP: 134/ 98 mmHg    Indications: Atrial fibrillation  Diagnoses: I48 0 - Atrial fibrillation    Sonographer:  Tamara Niño RDCS  Primary Physician:  Carol Lake MD  Referring Physician:  Remedios Hudson MD  Group:  [facility]  Interpreting Physician:  Mee Mohan DO    SUMMARY    PROCEDURE INFORMATION:  This was a very technically difficult and poor quality study  Echocardiographic views were limited due to decreased penetration and lung interference  Intravenous contrast ( 1 2 mL of definity) was administered  LEFT VENTRICLE:  Systolic function was normal  Ejection fraction was estimated to be 55 %  This study was inadequate for the evaluation of regional wall motion  Wall thickness was mildly increased  LEFT ATRIUM:  The atrium was mildly dilated  RIGHT ATRIUM:  The atrium was poorly visualized      MITRAL VALVE:  Grossly normal but very poorly visualized mitral valve with possible mild annular calcification  AORTIC VALVE:  The valve was not well visualized  TRICUSPID VALVE:  Not well visualized  There was mild to moderate regurgitation  IVC, HEPATIC VEINS:  The inferior vena cava was mildly dilated  HISTORY: PRIOR HISTORY: DM2  Hyperlipidemia  Hypertension  Morbid obesity  CHF  PROCEDURE: The procedure was performed at the bedside  This was a routine study  The transthoracic approach was used  The study included complete 2D imaging, M-mode, complete spectral Doppler, and color Doppler  The heart rate was 102 bpm,  at the start of the study  Intravenous contrast ( 1 2 mL of definity) was administered  Echocardiographic views were limited due to decreased penetration and lung interference  This was a very technically difficult and poor quality study  LEFT VENTRICLE: Size was normal  Systolic function was normal  Ejection fraction was estimated to be 55 %  This study was inadequate for the evaluation of regional wall motion  Wall thickness was mildly increased  DOPPLER: Transmitral flow  pattern: atrial fibrillation  The study was not technically sufficient to allow evaluation of LV diastolic function  RIGHT VENTRICLE: The size was normal  Systolic function was normal  Wall thickness was normal     LEFT ATRIUM: The atrium was mildly dilated  RIGHT ATRIUM: The atrium was poorly visualized  MITRAL VALVE: Grossly normal but very poorly visualized mitral valve with possible mild annular calcification  DOPPLER: There was no evidence for stenosis  There was no significant regurgitation  AORTIC VALVE: The valve was not well visualized  DOPPLER: Transaortic velocity was within the normal range  There was no evidence for stenosis  There was no significant regurgitation, by limited doppler study  TRICUSPID VALVE: Not well visualized  DOPPLER: There was mild to moderate regurgitation  PULMONIC VALVE: Not well visualized      PERICARDIUM: There was no pericardial effusion  AORTA: The root exhibited normal size  SYSTEMIC VEINS: IVC: The inferior vena cava was mildly dilated   Respirophasic changes were normal     SYSTEM MEASUREMENT TABLES    2D  %FS: 40 6 %  Ao Diam: 4 1 cm  EDV(Teich): 136 4 ml  EF(Teich): 70 9 %  ESV(Teich): 39 8 ml  IVSd: 1 7 cm  LA Area: 40 8 cm2  LA Diam: 5 1 cm  LVIDd: 5 3 cm  LVIDs: 3 2 cm  LVPWd: 1 7 cm  RA Area: 30 2 cm2  SV(Teich): 96 6 ml    [employer] Accredited Echocardiography Laboratory    Prepared and electronically signed by    Joe Magana DO  Signed 01-Nov-2018 12:13:25

## 2019-12-04 NOTE — PROGRESS NOTES
EKG reported by RN has QTc reading above 500msec  Unable to review EKG due to technical difficulties with MUSE viewer  Will hold AM dofetilide dose given scheduled for 9am  Mg 1 6 this morning so giving additional 2mg IV now  Discussed plan with nurse verbally  Please call with any questions overnight      Josiah Alegre MD  - PGY-4 Cardiology Fellow  - Tiger text enabled

## 2019-12-05 LAB
ANION GAP SERPL CALCULATED.3IONS-SCNC: 6 MMOL/L (ref 4–13)
ANISOCYTOSIS BLD QL SMEAR: PRESENT
ATRIAL RATE: 56 BPM
BASOPHILS # BLD MANUAL: 0 THOUSAND/UL (ref 0–0.1)
BASOPHILS NFR MAR MANUAL: 0 % (ref 0–1)
BUN SERPL-MCNC: 17 MG/DL (ref 5–25)
CALCIUM SERPL-MCNC: 9 MG/DL (ref 8.3–10.1)
CHLORIDE SERPL-SCNC: 99 MMOL/L (ref 100–108)
CO2 SERPL-SCNC: 27 MMOL/L (ref 21–32)
CREAT SERPL-MCNC: 0.98 MG/DL (ref 0.6–1.3)
EOSINOPHIL # BLD MANUAL: 0 THOUSAND/UL (ref 0–0.4)
EOSINOPHIL NFR BLD MANUAL: 0 % (ref 0–6)
ERYTHROCYTE [DISTWIDTH] IN BLOOD BY AUTOMATED COUNT: 12.2 % (ref 11.6–15.1)
GFR SERPL CREATININE-BSD FRML MDRD: 77 ML/MIN/1.73SQ M
GIANT PLATELETS BLD QL SMEAR: PRESENT
GLUCOSE SERPL-MCNC: 117 MG/DL (ref 65–140)
HCT VFR BLD AUTO: 38.3 % (ref 36.5–49.3)
HGB BLD-MCNC: 13 G/DL (ref 12–17)
LYMPHOCYTES # BLD AUTO: 1.97 THOUSAND/UL (ref 0.6–4.47)
LYMPHOCYTES # BLD AUTO: 12 % (ref 14–44)
MCH RBC QN AUTO: 31 PG (ref 26.8–34.3)
MCHC RBC AUTO-ENTMCNC: 33.9 G/DL (ref 31.4–37.4)
MCV RBC AUTO: 91 FL (ref 82–98)
MONOCYTES # BLD AUTO: 2.46 THOUSAND/UL (ref 0–1.22)
MONOCYTES NFR BLD: 15 % (ref 4–12)
NEUTROPHILS # BLD MANUAL: 9.02 THOUSAND/UL (ref 1.85–7.62)
NEUTS SEG NFR BLD AUTO: 55 % (ref 43–75)
NRBC BLD AUTO-RTO: 0 /100 WBCS
P AXIS: 8 DEGREES
PLATELET # BLD AUTO: 224 THOUSANDS/UL (ref 149–390)
PLATELET BLD QL SMEAR: ADEQUATE
PMV BLD AUTO: 10 FL (ref 8.9–12.7)
POLYCHROMASIA BLD QL SMEAR: PRESENT
POTASSIUM SERPL-SCNC: 3.4 MMOL/L (ref 3.5–5.3)
PR INTERVAL: 164 MS
QRS AXIS: 5 DEGREES
QRSD INTERVAL: 108 MS
QT INTERVAL: 464 MS
QTC INTERVAL: 448 MS
RBC # BLD AUTO: 4.2 MILLION/UL (ref 3.88–5.62)
RBC MORPH BLD: PRESENT
SODIUM SERPL-SCNC: 132 MMOL/L (ref 136–145)
T WAVE AXIS: 43 DEGREES
VARIANT LYMPHS # BLD AUTO: 18 %
VENTRICULAR RATE: 56 BPM
WBC # BLD AUTO: 16.4 THOUSAND/UL (ref 4.31–10.16)

## 2019-12-05 PROCEDURE — 93005 ELECTROCARDIOGRAM TRACING: CPT

## 2019-12-05 PROCEDURE — 85007 BL SMEAR W/DIFF WBC COUNT: CPT | Performed by: PHYSICIAN ASSISTANT

## 2019-12-05 PROCEDURE — 93010 ELECTROCARDIOGRAM REPORT: CPT | Performed by: INTERNAL MEDICINE

## 2019-12-05 PROCEDURE — 85027 COMPLETE CBC AUTOMATED: CPT | Performed by: PHYSICIAN ASSISTANT

## 2019-12-05 PROCEDURE — 80048 BASIC METABOLIC PNL TOTAL CA: CPT | Performed by: PHYSICIAN ASSISTANT

## 2019-12-05 PROCEDURE — 99232 SBSQ HOSP IP/OBS MODERATE 35: CPT | Performed by: PHYSICIAN ASSISTANT

## 2019-12-05 RX ORDER — POTASSIUM CHLORIDE 750 MG/1
10 TABLET, EXTENDED RELEASE ORAL ONCE
Status: COMPLETED | OUTPATIENT
Start: 2019-12-05 | End: 2019-12-05

## 2019-12-05 RX ORDER — NEBIVOLOL 5 MG/1
5 TABLET ORAL DAILY
Status: DISCONTINUED | OUTPATIENT
Start: 2019-12-06 | End: 2019-12-06 | Stop reason: HOSPADM

## 2019-12-05 RX ORDER — LOSARTAN POTASSIUM 50 MG/1
100 TABLET ORAL 2 TIMES DAILY
Status: DISCONTINUED | OUTPATIENT
Start: 2019-12-05 | End: 2019-12-06 | Stop reason: HOSPADM

## 2019-12-05 RX ORDER — POTASSIUM CHLORIDE 20 MEQ/1
20 TABLET, EXTENDED RELEASE ORAL DAILY
Status: DISCONTINUED | OUTPATIENT
Start: 2019-12-06 | End: 2019-12-05

## 2019-12-05 RX ORDER — DOFETILIDE 0.25 MG/1
250 CAPSULE ORAL EVERY 12 HOURS SCHEDULED
Status: DISCONTINUED | OUTPATIENT
Start: 2019-12-05 | End: 2019-12-06 | Stop reason: HOSPADM

## 2019-12-05 RX ORDER — POTASSIUM CHLORIDE 20 MEQ/1
40 TABLET, EXTENDED RELEASE ORAL ONCE
Status: COMPLETED | OUTPATIENT
Start: 2019-12-05 | End: 2019-12-05

## 2019-12-05 RX ORDER — DOFETILIDE 0.25 MG/1
250 CAPSULE ORAL EVERY 12 HOURS SCHEDULED
Qty: 60 CAPSULE | Refills: 11 | Status: SHIPPED | OUTPATIENT
Start: 2019-12-05 | End: 2020-01-28 | Stop reason: SDUPTHER

## 2019-12-05 RX ORDER — FUROSEMIDE 40 MG/1
40 TABLET ORAL DAILY
Status: DISCONTINUED | OUTPATIENT
Start: 2019-12-06 | End: 2019-12-06 | Stop reason: HOSPADM

## 2019-12-05 RX ORDER — FAMOTIDINE 20 MG/1
20 TABLET, FILM COATED ORAL 2 TIMES DAILY
Status: DISCONTINUED | OUTPATIENT
Start: 2019-12-05 | End: 2019-12-06 | Stop reason: HOSPADM

## 2019-12-05 RX ADMIN — DOFETILIDE 250 MCG: 0.25 CAPSULE ORAL at 09:02

## 2019-12-05 RX ADMIN — POTASSIUM CHLORIDE 10 MEQ: 750 TABLET, EXTENDED RELEASE ORAL at 12:03

## 2019-12-05 RX ADMIN — NEBIVOLOL HYDROCHLORIDE 10 MG: 10 TABLET ORAL at 09:02

## 2019-12-05 RX ADMIN — FUROSEMIDE 20 MG: 20 TABLET ORAL at 09:02

## 2019-12-05 RX ADMIN — POTASSIUM CHLORIDE 40 MEQ: 1500 TABLET, EXTENDED RELEASE ORAL at 13:10

## 2019-12-05 RX ADMIN — LOSARTAN POTASSIUM 50 MG: 50 TABLET, FILM COATED ORAL at 09:02

## 2019-12-05 RX ADMIN — LOSARTAN POTASSIUM 100 MG: 50 TABLET, FILM COATED ORAL at 17:07

## 2019-12-05 RX ADMIN — PRAVASTATIN SODIUM 10 MG: 10 TABLET ORAL at 21:19

## 2019-12-05 RX ADMIN — FLUOXETINE 20 MG: 20 CAPSULE ORAL at 09:02

## 2019-12-05 RX ADMIN — ALLOPURINOL 300 MG: 300 TABLET ORAL at 09:02

## 2019-12-05 RX ADMIN — DOFETILIDE 250 MCG: 0.25 CAPSULE ORAL at 21:19

## 2019-12-05 RX ADMIN — POTASSIUM CHLORIDE 10 MEQ: 750 TABLET, EXTENDED RELEASE ORAL at 09:02

## 2019-12-05 RX ADMIN — AMLODIPINE BESYLATE 5 MG: 5 TABLET ORAL at 09:02

## 2019-12-05 RX ADMIN — RIVAROXABAN 20 MG: 20 TABLET, FILM COATED ORAL at 17:07

## 2019-12-05 RX ADMIN — PANTOPRAZOLE SODIUM 40 MG: 40 TABLET, DELAYED RELEASE ORAL at 06:19

## 2019-12-05 NOTE — PLAN OF CARE
Problem: Potential for Falls  Goal: Patient will remain free of falls  Description  INTERVENTIONS:  - Assess patient frequently for physical needs  -  Identify cognitive and physical deficits and behaviors that affect risk of falls    -  [place] fall precautions as indicated by assessment   - Educate patient/family on patient safety including physical limitations  - Instruct patient to call for assistance with activity based on assessment  - Modify environment to reduce risk of injury  - Consider OT/PT consult to assist with strengthening/mobility  Outcome: Progressing

## 2019-12-05 NOTE — SOCIAL WORK
CM received script for Tikosyn from [facility]  Meds to beds  Tubed (11) to [facility]  Await callback w/ copays  1115  Per lb/[place]len[place] monthly copay is $135 72  Judie Garcia via tiger text

## 2019-12-05 NOTE — PROGRESS NOTES
Progress Note - Electrophysiology-Cardiology (EP)   Vinod Moss  68 y o  male MRN: [id number]  Unit/Bed#: CW2 214-02 Encounter: [id number]      Assessment/Plan:   1  Persistent atrial fibrillation, symptomatic    * patient was admitted to [facility] EP service on 12-3-19 after undergoing cryo PVI, ILR and tikosyn initiation    * this AM patient is in NSR without any concerns or complaints, tele showing no ectopy    * labs this AM showing stable electrolytes    * last PM's QTc was 440ms, this AM ECG is pending, script for tikosyn 250mcg PO BID sent to pharmacy for anticipated discharge tomorrow    * need labs today CBC and BMP pending    * continue AC w/ xarelto    * modifiable AF risk factors     1  HTN - controlled      2  T2DM - N/A     3  MARISSA - on CPAP     4  Alcohol abuse - does not regularly use alcohol     5  Obesity - discussed importance of weight loss, patient is down to 303lbs AM of his ablation but was 320lbs a few weeks ago, he understands AF's relation to obesity and is willing to lose weight  2  S/P ILR - site without hematoma   3  MARISSA on CPAP   4  HTN   * increase lasix to 40mg PO QDay    * stop HCTZ due to interaction with tikosyn   5  HLD   6  Sinus bradycardia    * asymptomatic    * HR's into mid 40's    * decrease bystolic    * digoxin was discontinued on admission     EKG (12-3-19, after 1st tikosyn dose): Subjective/Objective   Subjective:   FH is a 68year old male w/ morbid obesity, MARISSA on CPAP, persistent atrial fibrillation s/p DCCV () AC w/ xarelto, HTN, HLD who was admitted to [facility] on 12-3-19 under direction of Dr Ld Sanchez for cryo PVI, ILR and tikosyn initiation  12-4-19: This AM patient has no major concerns or complaints  Ambulating without difficulty  12-5-19: This AM patient has no concerns or complaints  Not sleeping well       Vitals: /68 (BP Location: Right arm)   Pulse 57   Temp (!) 97 4 °F (36 3 °C) (Oral)   Resp 18   SpO2 94%   There were no vitals filed for this visit    Orthostatic Blood Pressures      Most Recent Value   Blood Pressure  145/68 filed at 12/05/2019 0705   Patient Position - Orthostatic VS  Lying filed at 12/05/2019 5012            Intake/Output Summary (Last 24 hours) at 12/5/2019 1037  Last data filed at 12/4/2019 1758  Gross per 24 hour   Intake 840 ml   Output 400 ml   Net 440 ml       Invasive Devices     Peripheral Intravenous Line            Peripheral IV 12/03/19 Left Antecubital 2 days          Arterial Line            Arterial Line 12/03/19 Right Radial 2 days                            Scheduled Meds:    Current Facility-Administered Medications:  acetaminophen 650 mg Oral Q4H PRN Hilario Orn, PA-C   allopurinol 300 mg Oral Daily Hilario Orn, PA-C   amLODIPine 5 mg Oral Daily Hilario Orn, PA-C   dofetilide 250 mcg Oral Q12H [facility] Jose Friend, PA-C   FLUoxetine 20 mg Oral Daily Hilario Orn, PA-C   furosemide 20 mg Oral Daily Hilario Orn, PA-C   lidocaine  Topical 4x Daily PRN Hilario Orn, PA-C   losartan 50 mg Oral BID Hilario Orn, PA-C   nebivolol 10 mg Oral Daily Hilario Orn, PA-C   oxyCODONE 5 mg Oral Q4H PRN Hilario Orn, PA-C   pantoprazole 40 mg Oral Early Morning Hilario Orn, PA-C   potassium chloride 10 mEq Oral Daily Hilario Orn, PA-C   pravastatin 10 mg Oral HS Hilario Orn, PA-C   rivaroxaban 20 mg Oral Daily With Magno, PA-C   zolpidem 5 mg Oral HS PRN Hilario Orn, PA-C     Continuous Infusions:   PRN Meds:   acetaminophen    lidocaine    oxyCODONE    zolpidem    Physical Exam:   GEN: NAD, alert and oriented, well appearing  SKIN: dry without significant lesions or rashes  HEENT: NCAT, PERRL, EOMs intact  NECK: No JVD or carotid bruits appreciated  CARDIOVASCULAR: RRR, normal S1, S2 without murmurs, rubs, or gallops appreciated  LUNGS: Clear to auscultation bilaterally without wheezes, rhonchi, or rales  ABDOMEN: Soft, nontender, nondistended  EXTREMITIES/VASCULAR: perfused without clubbing, cyanosis, or edema b/l  PSYCH: Normal mood and affect  NEURO: CN ll-Xll grossly intact                Lab Results: I have personally reviewed pertinent lab results  Results from last 7 days   Lab Units 19  0450 19  0652   WBC Thousand/uL 17 24* 13 34*   HEMOGLOBIN g/dL 12 4 14 9   HEMATOCRIT % 35 6* 43 8   PLATELETS Thousands/uL 232 244     Results from last 7 days   Lab Units 19  0450 19  0652   POTASSIUM mmol/L 3 7 3 9   CHLORIDE mmol/L 101 100   CO2 mmol/L 23 25   BUN mg/dL 17 15   CREATININE mg/dL 1 07 1 17   CALCIUM mg/dL 9 0 9 3         Results from last 7 days   Lab Units 19  0652   MAGNESIUM mg/dL 1 6         Imaging: I have personally reviewed pertinent reports  Results for orders placed during the hospital encounter of 10/30/18   Echo complete with contrast if indicated    Narrative Carol [address] [place] [address] [facility]  [place], [address]  [phone]    Transthoracic Echocardiogram  2D, M-mode, Doppler, and Color Doppler    Study date:  2018    Patient: Bijan Rouse  MR number: [id number]  Account number: [de-identified]  : 1950  Age: 77 years  Gender: Male  Status: Inpatient  Location: Bedside  Height: 70 in  Weight: 332 lb  BP: 134/ 98 mmHg    Indications: Atrial fibrillation  Diagnoses: I48 0 - Atrial fibrillation    Sonographer:  Malik Bundy RDCS  Primary Physician:  Kelsi Saeed MD  Referring Physician:  Cinthya Rincon MD  Group:  [facility]  Interpreting Physician:  Zabrina Balderrama DO    SUMMARY    PROCEDURE INFORMATION:  This was a very technically difficult and poor quality study  Echocardiographic views were limited due to decreased penetration and lung interference  Intravenous contrast ( 1 2 mL of definity) was administered      LEFT VENTRICLE:  Systolic function was normal  Ejection fraction was estimated to be 55 %  This study was inadequate for the evaluation of regional wall motion  Wall thickness was mildly increased  LEFT ATRIUM:  The atrium was mildly dilated  RIGHT ATRIUM:  The atrium was poorly visualized  MITRAL VALVE:  Grossly normal but very poorly visualized mitral valve with possible mild annular calcification  AORTIC VALVE:  The valve was not well visualized  TRICUSPID VALVE:  Not well visualized  There was mild to moderate regurgitation  IVC, HEPATIC VEINS:  The inferior vena cava was mildly dilated  HISTORY: PRIOR HISTORY: DM2  Hyperlipidemia  Hypertension  Morbid obesity  CHF  PROCEDURE: The procedure was performed at the bedside  This was a routine study  The transthoracic approach was used  The study included complete 2D imaging, M-mode, complete spectral Doppler, and color Doppler  The heart rate was 102 bpm,  at the start of the study  Intravenous contrast ( 1 2 mL of definity) was administered  Echocardiographic views were limited due to decreased penetration and lung interference  This was a very technically difficult and poor quality study  LEFT VENTRICLE: Size was normal  Systolic function was normal  Ejection fraction was estimated to be 55 %  This study was inadequate for the evaluation of regional wall motion  Wall thickness was mildly increased  DOPPLER: Transmitral flow  pattern: atrial fibrillation  The study was not technically sufficient to allow evaluation of LV diastolic function  RIGHT VENTRICLE: The size was normal  Systolic function was normal  Wall thickness was normal     LEFT ATRIUM: The atrium was mildly dilated  RIGHT ATRIUM: The atrium was poorly visualized  MITRAL VALVE: Grossly normal but very poorly visualized mitral valve with possible mild annular calcification  DOPPLER: There was no evidence for stenosis  There was no significant regurgitation  AORTIC VALVE: The valve was not well visualized   DOPPLER: Transaortic velocity was within the normal range  There was no evidence for stenosis  There was no significant regurgitation, by limited doppler study  TRICUSPID VALVE: Not well visualized  DOPPLER: There was mild to moderate regurgitation  PULMONIC VALVE: Not well visualized  PERICARDIUM: There was no pericardial effusion  AORTA: The root exhibited normal size  SYSTEMIC VEINS: IVC: The inferior vena cava was mildly dilated   Respirophasic changes were normal     SYSTEM MEASUREMENT TABLES    2D  %FS: 40 6 %  Ao Diam: 4 1 cm  EDV(Teich): 136 4 ml  EF(Teich): 70 9 %  ESV(Teich): 39 8 ml  IVSd: 1 7 cm  LA Area: 40 8 cm2  LA Diam: 5 1 cm  LVIDd: 5 3 cm  LVIDs: 3 2 cm  LVPWd: 1 7 cm  RA Area: 30 2 cm2  SV(Teich): 96 6 ml    [employer] Accredited Echocardiography Laboratory    Prepared and electronically signed by    Po Jeffrey DO  Signed 01-Nov-2018 12:13:25

## 2019-12-06 VITALS
RESPIRATION RATE: 17 BRPM | TEMPERATURE: 98.7 F | DIASTOLIC BLOOD PRESSURE: 64 MMHG | HEART RATE: 60 BPM | SYSTOLIC BLOOD PRESSURE: 140 MMHG | OXYGEN SATURATION: 93 %

## 2019-12-06 LAB
ANION GAP SERPL CALCULATED.3IONS-SCNC: 8 MMOL/L (ref 4–13)
ATRIAL RATE: 54 BPM
ATRIAL RATE: 59 BPM
BUN SERPL-MCNC: 15 MG/DL (ref 5–25)
CALCIUM SERPL-MCNC: 8.9 MG/DL (ref 8.3–10.1)
CHLORIDE SERPL-SCNC: 101 MMOL/L (ref 100–108)
CO2 SERPL-SCNC: 25 MMOL/L (ref 21–32)
CREAT SERPL-MCNC: 1.03 MG/DL (ref 0.6–1.3)
GFR SERPL CREATININE-BSD FRML MDRD: 73 ML/MIN/1.73SQ M
GLUCOSE SERPL-MCNC: 112 MG/DL (ref 65–140)
P AXIS: 15 DEGREES
P AXIS: 33 DEGREES
POTASSIUM SERPL-SCNC: 4 MMOL/L (ref 3.5–5.3)
PR INTERVAL: 158 MS
PR INTERVAL: 170 MS
QRS AXIS: 2 DEGREES
QRS AXIS: 7 DEGREES
QRSD INTERVAL: 100 MS
QRSD INTERVAL: 108 MS
QT INTERVAL: 422 MS
QT INTERVAL: 468 MS
QTC INTERVAL: 417 MS
QTC INTERVAL: 443 MS
SODIUM SERPL-SCNC: 134 MMOL/L (ref 136–145)
T WAVE AXIS: 45 DEGREES
T WAVE AXIS: 50 DEGREES
VENTRICULAR RATE: 54 BPM
VENTRICULAR RATE: 59 BPM

## 2019-12-06 PROCEDURE — 80048 BASIC METABOLIC PNL TOTAL CA: CPT | Performed by: PHYSICIAN ASSISTANT

## 2019-12-06 PROCEDURE — 93010 ELECTROCARDIOGRAM REPORT: CPT | Performed by: INTERNAL MEDICINE

## 2019-12-06 PROCEDURE — 99238 HOSP IP/OBS DSCHRG MGMT 30/<: CPT | Performed by: PHYSICIAN ASSISTANT

## 2019-12-06 RX ORDER — NEBIVOLOL 5 MG/1
5 TABLET ORAL DAILY
Qty: 30 TABLET | Refills: 0 | Status: SHIPPED | OUTPATIENT
Start: 2019-12-07 | End: 2020-05-26 | Stop reason: SDUPTHER

## 2019-12-06 RX ORDER — LOSARTAN POTASSIUM 100 MG/1
100 TABLET ORAL 2 TIMES DAILY
Qty: 60 TABLET | Refills: 11 | Status: SHIPPED | OUTPATIENT
Start: 2019-12-06 | End: 2019-12-06

## 2019-12-06 RX ORDER — LOSARTAN POTASSIUM 100 MG/1
100 TABLET ORAL 2 TIMES DAILY
Qty: 60 TABLET | Refills: 0 | Status: SHIPPED | OUTPATIENT
Start: 2019-12-06 | End: 2020-01-06 | Stop reason: SDUPTHER

## 2019-12-06 RX ORDER — FUROSEMIDE 40 MG/1
40 TABLET ORAL DAILY
Qty: 30 TABLET | Refills: 11 | Status: SHIPPED | OUTPATIENT
Start: 2019-12-07 | End: 2019-12-06

## 2019-12-06 RX ORDER — FLUOXETINE HYDROCHLORIDE 20 MG/1
20 CAPSULE ORAL DAILY
Qty: 30 CAPSULE | Refills: 0 | Status: SHIPPED | OUTPATIENT
Start: 2019-12-07 | End: 2019-12-19 | Stop reason: SDUPTHER

## 2019-12-06 RX ORDER — FLUOXETINE HYDROCHLORIDE 20 MG/1
20 CAPSULE ORAL DAILY
Qty: 30 CAPSULE | Refills: 11 | Status: SHIPPED | OUTPATIENT
Start: 2019-12-07 | End: 2019-12-06

## 2019-12-06 RX ORDER — NEBIVOLOL 5 MG/1
5 TABLET ORAL DAILY
Qty: 30 TABLET | Refills: 11 | Status: SHIPPED | OUTPATIENT
Start: 2019-12-07 | End: 2019-12-06

## 2019-12-06 RX ORDER — POTASSIUM CHLORIDE 1125 MG/1
30 TABLET, EXTENDED RELEASE ORAL DAILY
Qty: 60 TABLET | Refills: 11 | Status: SHIPPED | OUTPATIENT
Start: 2019-12-07 | End: 2019-12-06

## 2019-12-06 RX ORDER — FUROSEMIDE 40 MG/1
40 TABLET ORAL DAILY
Qty: 30 TABLET | Refills: 0 | Status: SHIPPED | OUTPATIENT
Start: 2019-12-07 | End: 2020-05-26 | Stop reason: SDUPTHER

## 2019-12-06 RX ORDER — POTASSIUM CHLORIDE 1125 MG/1
30 TABLET, EXTENDED RELEASE ORAL DAILY
Qty: 60 TABLET | Refills: 0 | Status: SHIPPED | OUTPATIENT
Start: 2019-12-07 | End: 2020-01-03 | Stop reason: SDUPTHER

## 2019-12-06 RX ADMIN — LOSARTAN POTASSIUM 100 MG: 50 TABLET, FILM COATED ORAL at 09:27

## 2019-12-06 RX ADMIN — DOFETILIDE 250 MCG: 0.25 CAPSULE ORAL at 09:29

## 2019-12-06 RX ADMIN — FAMOTIDINE 20 MG: 20 TABLET ORAL at 09:26

## 2019-12-06 RX ADMIN — NEBIVOLOL HYDROCHLORIDE 5 MG: 5 TABLET ORAL at 09:26

## 2019-12-06 RX ADMIN — ALLOPURINOL 300 MG: 300 TABLET ORAL at 09:26

## 2019-12-06 RX ADMIN — AMLODIPINE BESYLATE 5 MG: 5 TABLET ORAL at 09:26

## 2019-12-06 RX ADMIN — POTASSIUM CHLORIDE 30 MEQ: 750 TABLET, EXTENDED RELEASE ORAL at 09:27

## 2019-12-06 RX ADMIN — FLUOXETINE 20 MG: 20 CAPSULE ORAL at 09:26

## 2019-12-06 RX ADMIN — FUROSEMIDE 40 MG: 40 TABLET ORAL at 09:26

## 2019-12-06 NOTE — DISCHARGE SUMMARY
Discharge Summary - Tameka Curran  68 y o  male MRN: [id number]    Unit/Bed#: DISCHARGE POOL Encounter: [id number]      Admission Date: 12/3/2019   Discharge Date: 12-6-19    Discharge Diagnosis:   1  Persistent atrial fibrillation s/p cryo PVI and tikosyn loading   2  S/p ILR   3  Morbid obesity   5  MARISSA on CPAP    Procedures Performed:   Orders Placed This Encounter   Procedures    Cardiac loop recorder implant   PROCEDURE PERFORMED:   1) atrial fibrillation ablation  2) intracardiac echo  3) 3D electro anatomic mapping using the [facility] two mapping system    Consultants:     HPI: Please refer to the initial history and physical as well as procedure notes for full details  Briefly, Tameka Curran  is a 68y o  year old male with a history of persistent atrial fibrillation    He was seen by Dr Elicia Pollack as an outpatient, and cryo PVI was recommended  He presented this hospital admission to undergo this procedure and initiation of tikosyn  Hospital Course: Tameka Curran  presented at his baseline state of health  After the procedure was explained in detail and consent was obtained, he underwent cryo PVI without complications  He tolerated the procedure well  He was then monitored overnight for further observation  In the postprocedure setting, he was initiated on tikosyn 500mcg due to his QTC interval and creatinine clearance, however after his first dose his tikosyn was reduced to 250mcg PO BID due to possible QTc prolongation  On his first night, there were no acute issues or events overnight  The following morning he denied all cardiac complaints, including chest pain/pressure, dyspnea, palpitations, dizziness, lightheadedness, or syncope  His vital signs were reviewed and labs are stable  Telemetry showed NSR/sinus bradycardia  His groins were soft without significant hematoma or recurrent bleeding      He continued his stay in the hospital to finish undergoing antiarrhythmic therapy initiation of tikosyn  Upon starting this medication, he was in NSR  Patient underwent first 5 doses of tikosyn 549OHW without complications  Serial EKGs were performed 2 hours after each dose of AAD  There were no significant changes in QT/QTc with intiating doses  There were no significant occurrence of ventricular ectopy on telemetry  Electrolytes and renal function were monitored throughout their stay  On the day of discharge, physical exam was as follows:  GEN: NAD, alert and oriented, well appearing  SKIN: dry without significant lesions or rashes  HEENT: NCAT, PERRL, EOMs intact  NECK: No JVD or carotid bruits appreciated  CARDIOVASCULAR: RRR, normal S1, S2 without murmurs, rubs, or gallops appreciated  LUNGS: Clear to auscultation bilaterally without wheezes, rhonchi, or rales  ABDOMEN: Soft, nontender, nondistended  EXTREMITIES/VASCULAR: perfused without clubbing, cyanosis, or edema b/l  PSYCH: Normal mood and affect  NEURO: CN ll-Xll grossly intact    EKG after 5th tikosyn dose: At time of discharge, patient was ambulating the cardiac unit without complaints of  lightheadedness, presyncope, syncope, chest pain, shortness of breath, orthopnea, PND, palpitations, or bleeding problems  Patient received education regarding tiksoyn - avoiding missed doses, pharmacy moitoring for drug to drug interactions, and concerning signs and symptoms that would prompt urgent evaluation  He was given routine post ablation discharge instructions and restrictions, and these were explained in detail  He was given a one-week EKG appointment after starting tikosyn along with a follow up with f/u in 4-6 weeks in the office  He was also instructed to follow up with his primary cardiologist as previously instructed  In terms of his medications,   1  He is to stop HCTZ completely     2  Increase lasix to 40mg once daily     3  Increase Klorcon to 30mEq once daily     4  Increase losartan to 100mg PO BID     5  Decrease prozac to 20mg PO QDay     6  Decrease bystolic to 5mg PO QDay     7  Continue all other medications as previously prescribed in addition to his tikosyn     8  F/u with BMP in 1 week to assess K+     He is stable for discharge at this time with all questions answered  He was discussed in detail with Dr Ramírez aDvis who is in agreement with this discharge summary  Discharge Medications:  See after visit summary for reconciled discharge medications provided to patient and family      Medications Prior to Admission   Medication    allopurinol (ZYLOPRIM) 300 mg tablet    amLODIPine (NORVASC) 5 mg tablet    BYSTOLIC 10 MG tablet    digoxin (LANOXIN) 0 25 mg tablet    FLUoxetine (PROzac) 40 MG capsule    furosemide (LASIX) 20 mg tablet    hydrochlorothiazide (HYDRODIURIL) 25 mg tablet    lidocaine (XYLOCAINE) 5 % ointment    losartan (COZAAR) 50 mg tablet    omeprazole (PriLOSEC) 40 MG capsule    potassium chloride (K-DUR,KLOR-CON) 10 mEq tablet    pravastatin (PRAVACHOL) 10 mg tablet    XARELTO 20 MG tablet    zolpidem (AMBIEN) 10 mg tablet         [facility] Labs/diagnostics:  CBC with diff:   Results from last 7 days   Lab Units 12/05/19  1049 12/04/19  0450 12/03/19  0652   WBC Thousand/uL 16 40* 17 24* 13 34*   HEMOGLOBIN g/dL 13 0 12 4 14 9   HEMATOCRIT % 38 3 35 6* 43 8   MCV fL 91 89 90   PLATELETS Thousands/uL 224 232 244   MCH pg 31 0 31 2 30 6   MCHC g/dL 33 9 34 8 34 0   RDW % 12 2 11 9 12 0   MPV fL 10 0 10 3 10 0   NRBC AUTO /100 WBCs 0  --  0       BMP:  Results from last 7 days   Lab Units 12/06/19  0544 12/05/19  1049 12/04/19  0450 12/03/19  0652   POTASSIUM mmol/L 4 0 3 4* 3 7 3 9   CHLORIDE mmol/L 101 99* 101 100   CO2 mmol/L 25 27 23 25   BUN mg/dL 15 17 17 15   CREATININE mg/dL 1 03 0 98 1 07 1 17   CALCIUM mg/dL 8 9 9 0 9 0 9 3       Magnesium:   Results from last 7 days   Lab Units 12/03/19  0652   MAGNESIUM mg/dL 1 6       Coags: Complications: none    Condition at Discharge: good     Discharge instructions/Information to patient and family:   See after visit summary for information provided to patient and family  Provisions for Follow-Up Care:  See after visit summary for information related to follow-up care and any pertinent home health orders  Disposition: Home    Planned Readmission: No    Discharge Statement   I spent 45 minutes minutes discharging the patient  This time was spent on the day of discharge  I had direct contact with the patient on the day of discharge  Additional documentation is required if more than 30 minutes were spent on discharge   Evaluating the incision, discussing discharge instructions and restrictions, arranging follow up appointments, discussing medications

## 2019-12-06 NOTE — DISCHARGE INSTRUCTIONS
Change how you take the following medications:   1  Losartan is to be 100mg twice daily instead of 50mg twice daily     2  Potassium is going to be increased to 30mEq daily     3  bystolic is to be decreased to 5mg once daily     4  Please obtain lab work on your potassium levels in 1 week     5  Continue omeprazole     6  Decrease prozac to 20mg once daily, too much prozac will interfere with your tikosyn     7  Take tikosyn 250mcg twice daily       No heavy lifting or strenuous activity for one week  No soaking in a bath tub/hot tub/swimming pool for one week or until groin heals  If you notice ongoing bleeding, swelling, or firm lumps in groin near ablation incision, please contact Dr Mario Cox office - [phone]  Keep loop recorder incision dry for one week  Do not use lotions/powders/creams on incision  Remove outer bandage 48 hours after procedure - if present, leave underlying steri-strips in place, they will either fall off on their own or will be removed at 2 week follow up appointment  Please call the office [phone] if you notice redness, swelling, bleeding, or drainage from incision or if you develop fevers  Cardiac Loop Recorder Insertion      WHAT YOU SHOULD KNOW:    A cardiac loop recorder is a device used to diagnose heart rhythm problems, such as a fast or irregular heartbeat  It is implanted in your left chest, just under the skin  The device records a pattern of your heart's rhythm, called an EKG  Your device records automatic EKGs, depending on how your caregiver programs it  You may also receive a handheld controller  You press a button on the controller when you have symptoms, such as dizziness, lightheadedness, or palpitations  The device will record an EKG at that moment  The recording can help your caregiver see if your symptoms may be caused by heart rhythm problems  Your caregiver will remove the device after it has collected enough data   You may need the device for up to 3 years  The procedure to remove the device is similar to the procedure used to implant it       AFTER YOU LEAVE:    Follow up with your cardiologist as directed: You will need to return in 1 to 2 weeks  Your cardiologist will check your incision  He may also program your device settings again  He will retrieve data from the device every 1 to 3 months with a monitor held over your skin  You may be able to transmit data from your device from home as well  You will do this by calling a number provided by your cardiologist, or as they have instructed you  Ask for information about this process  Write down your questions so you remember to ask them during your visits       Wound care: Keep loop recorder incision dry for one week  Do not use lotions/powders/creams on incision  Remove outer bandage 48 hours after procedure - leave underlying steri-strips in place, they will either fall off on their own or will be removed at 2 week follow up appointment  Please call the office if you notice redness, swelling, bleeding, or drainage from incision or if you develop fevers  After that first week, carefully wash your incision with soap and water  Keep the area clean and dry until it heals       Return to activity: If you received anesthesia, you will not be able to drive for 24 hours  Otherwise, most people can return to normal activities soon after the procedure  Your cardiologist may want to know if your work involves electrical current or high-voltage equipment  Ask about other electrical items that could interfere with your cardiac loop recorder       Contact your cardiologist if:   · You have a fever or chills  · Your wound is red, swollen, or draining pus  · You have questions or concerns about your condition or care  Seek care immediately or call 911 if:   · You feel weak, dizzy, or faint  · You lose consciousness        © 2014 [employer] is for End User's use only and may not be sold, redistributed or otherwise used for commercial purposes  All illustrations and images included in CareNotes® are the copyrighted property of A D A M , Inc  or [employer] Parra  The above information is an  only  It is not intended as medical advice for individual conditions or treatments  Talk to your doctor, nurse or pharmacist before following any medical regimen to see if it is safe and effective for you

## 2019-12-09 ENCOUNTER — TRANSITIONAL CARE MANAGEMENT (OUTPATIENT)
Dept: FAMILY MEDICINE CLINIC | Facility: CLINIC | Age: 69
End: 2019-12-09

## 2019-12-10 ENCOUNTER — TELEPHONE (OUTPATIENT)
Dept: CARDIOLOGY CLINIC | Facility: CLINIC | Age: 69
End: 2019-12-10

## 2019-12-10 DIAGNOSIS — D72.9 ABNORMAL WHITE BLOOD CELL (WBC): Primary | ICD-10-CM

## 2019-12-10 NOTE — TELEPHONE ENCOUNTER
Pt was recently d/cd and would like to discuss medications with Dr Durbin Six   the patient states he is fatigue, has headaches, BP seems elevated whtn he check it 160-180 /   He says he feels like a Zombie  Please advise

## 2019-12-12 NOTE — TELEPHONE ENCOUNTER
Called pt    LM  Unfortunately cant use HCTZ   I doubt fatigue due to higher BP or tikosyn  See that he has RN visit tomorrow and will assess after that

## 2019-12-13 ENCOUNTER — TELEPHONE (OUTPATIENT)
Dept: CARDIOLOGY CLINIC | Facility: CLINIC | Age: 69
End: 2019-12-13

## 2019-12-13 ENCOUNTER — CLINICAL SUPPORT (OUTPATIENT)
Dept: CARDIOLOGY CLINIC | Facility: CLINIC | Age: 69
End: 2019-12-13
Payer: MEDICARE

## 2019-12-13 ENCOUNTER — IN-CLINIC DEVICE VISIT (OUTPATIENT)
Dept: CARDIOLOGY CLINIC | Facility: CLINIC | Age: 69
End: 2019-12-13
Payer: MEDICARE

## 2019-12-13 DIAGNOSIS — Z95.818 PRESENCE OF OTHER CARDIAC IMPLANTS AND GRAFTS: Primary | ICD-10-CM

## 2019-12-13 DIAGNOSIS — I48.19 OTHER PERSISTENT ATRIAL FIBRILLATION (HCC): Primary | ICD-10-CM

## 2019-12-13 PROCEDURE — 93000 ELECTROCARDIOGRAM COMPLETE: CPT | Performed by: INTERNAL MEDICINE

## 2019-12-13 PROCEDURE — 93291 INTERROG DEV EVAL SCRMS IP: CPT | Performed by: INTERNAL MEDICINE

## 2019-12-13 NOTE — TELEPHONE ENCOUNTER
Pt came in for his ekg and blood pressure check  Pt was very aggravated  He said his losartin which was increased to 100mg bid katina friend the PA was sitting at the pharmacy waiting to get a prior auth  He said he called [facility] and they told him dr Amber Richmond was doing the prior Fay Shaquille  I called the pharmacy and they told me that they sent the prior auth to katina friend at [facility]  When I did his blood pressure it was 160/105 and he was very aggravated and said his blood was boiling about this losartin and he walked out

## 2019-12-13 NOTE — TELEPHONE ENCOUNTER
I was off yesterday received message today would you like BP check and EKG for nurse visit? I will call him and have him come in

## 2019-12-13 NOTE — TELEPHONE ENCOUNTER
Called pharmacy about the increase in losartin 100mg bid  They said the prior auth was sent to [facility] to katina romero the pa   And [facility] is working on it

## 2019-12-13 NOTE — PROGRESS NOTES
Pt was in for ekg per dr Karlie Montano because he is on dofetilide  Pt is taking his medication as perscribed by dr Karlie Montano

## 2019-12-13 NOTE — PATIENT INSTRUCTIONS
Pt was in for ekg per dr Tutu Holguin because he is on dofetilide  Pt is taking his medication as perscribed by dr Tutu Holguin

## 2019-12-13 NOTE — PROGRESS NOTES
Results for orders placed or performed in visit on 12/13/19   Cardiac EP device report    Narrative    MDT LOOP  DEVICE INTERROGATED IN THE [place] OFFICE  WOUND CHECK: INCISION CLEAN AND DRY WITH EDGES APPROXIMATED; SUTURES REMOVED; WOUND CARE AND RESTRICTIONS REVIEWED WITH PATIENT  BATTERY VOLTAGE "OK"  PRESENTING RHYTHM: NSR @ 64 BPM   R-WAVES: 0 40mV  NO PATIENT OR DEVICE ACTIVATED EPISODES  NO PROGRAMMING CHANGES MADE TO DEVICE PARAMETERS  NORMAL DEVICE FUNCTION    [facility]

## 2019-12-13 NOTE — TELEPHONE ENCOUNTER
Looks like device visit-I saw nurse visit  Any Pimentel  He has loop in that is why he is coming in this am    Just have them check BP on him and send me a note

## 2019-12-16 NOTE — TELEPHONE ENCOUNTER
Called and LM  Juan Ramon Gu[employer]    Likely losartan 100 BID wont work    Needs thiazide    Will see if tikosyn a short term thing and we can add HCTZ again

## 2019-12-19 ENCOUNTER — OFFICE VISIT (OUTPATIENT)
Dept: FAMILY MEDICINE CLINIC | Facility: CLINIC | Age: 69
End: 2019-12-19
Payer: MEDICARE

## 2019-12-19 VITALS
DIASTOLIC BLOOD PRESSURE: 84 MMHG | BODY MASS INDEX: 44.25 KG/M2 | WEIGHT: 292 LBS | HEIGHT: 68 IN | OXYGEN SATURATION: 98 % | HEART RATE: 76 BPM | RESPIRATION RATE: 18 BRPM | TEMPERATURE: 97.7 F | SYSTOLIC BLOOD PRESSURE: 146 MMHG

## 2019-12-19 DIAGNOSIS — I48.0 PAROXYSMAL ATRIAL FIBRILLATION (HCC): ICD-10-CM

## 2019-12-19 DIAGNOSIS — G47.33 OSA (OBSTRUCTIVE SLEEP APNEA): ICD-10-CM

## 2019-12-19 DIAGNOSIS — I10 ESSENTIAL HYPERTENSION: ICD-10-CM

## 2019-12-19 DIAGNOSIS — E11.9 TYPE 2 DIABETES MELLITUS WITHOUT COMPLICATION, WITHOUT LONG-TERM CURRENT USE OF INSULIN (HCC): ICD-10-CM

## 2019-12-19 DIAGNOSIS — F32.0 MAJOR DEPRESSIVE DISORDER, SINGLE EPISODE, MILD (HCC): Primary | ICD-10-CM

## 2019-12-19 PROCEDURE — 99214 OFFICE O/P EST MOD 30 MIN: CPT | Performed by: INTERNAL MEDICINE

## 2019-12-19 RX ORDER — AMLODIPINE BESYLATE 10 MG/1
10 TABLET ORAL DAILY
Start: 2019-12-19 | End: 2020-05-26 | Stop reason: SDUPTHER

## 2019-12-19 RX ORDER — FLUOXETINE HYDROCHLORIDE 40 MG/1
40 CAPSULE ORAL DAILY
Qty: 30 CAPSULE | Refills: 0
Start: 2019-12-19 | End: 2020-04-06 | Stop reason: SDUPTHER

## 2019-12-19 NOTE — PROGRESS NOTES
Assessment/Plan:     Diagnoses and all orders for this visit:    Major depressive disorder, single episode, mild (HCC)  -Currently on Prozac  Will remain on this and monitor his mood  Essential hypertension  -     amLODIPine (NORVASC) 10 mg tablet; Take 1 tablet (10 mg total) by mouth daily  Improved on recheck  Reports mornings tend to be the highest in terms of readings  Paroxysmal atrial fibrillation (HCC)  -     FLUoxetine (PROzac) 40 MG capsule; Take 1 capsule (40 mg total) by mouth daily  S/p ablation on tikosyn now  Has cardiology follow up in January as well  MARISSA (obstructive sleep apnea)  -Compliant with his mask   -Has noted some sleep trouble but will monitor his mood  Type 2 diabetes mellitus without complication, without long-term current use of insulin (HCC)    -Not currently on therapy  Will continue to monitor glucose readings  Subjective:      Patient ID: Jana Jenkins  is a 68 y o  male  Nora Welch is here today for a hospital follow up  He has known atrial fibrillation with previous cardioversion  He reverted back into it a few months later  He reported feeling unwell and generally not himself well in atrial fibrillation  He was seen by Dr Katharina Larson and ablation was planned along with tikosyn initiation  He was admitted and underwent the procedure  Reports that he has been feeling okay  He has noticed some fatigue and at times lower mood but feels it is improving  He has noticed some sleep trouble as well  He closely monitors his BP and tends to get better throughout the day  Also notes some sinus congestion over the last few days without fevers  He is otherwise largely without complaints          The following portions of the patient's history were reviewed and updated as appropriate: allergies, current medications, past family history, past medical history, past social history, past surgical history and problem list     Review of Systems   Constitutional: Positive for fatigue  Negative for chills, fever and unexpected weight change  HENT: Positive for congestion  Respiratory: Negative for cough, chest tightness and shortness of breath  Cardiovascular: Negative for chest pain, palpitations and leg swelling  Gastrointestinal: Negative for abdominal pain, diarrhea, nausea and vomiting  Musculoskeletal: Negative for arthralgias and myalgias  Neurological: Negative for dizziness, numbness and headaches  Psychiatric/Behavioral: Positive for sleep disturbance  Negative for dysphoric mood  The patient is not nervous/anxious  Objective:      /80   Pulse 76   Temp 97 7 °F (36 5 °C)   Resp 18   Ht 5' 7 5" (1 715 m)   Wt 132 kg (292 lb)   SpO2 98%   BMI 45 06 kg/m²          Physical Exam   Constitutional: He is oriented to person, place, and time  He appears well-developed and well-nourished  No distress  HENT:   Head: Normocephalic and atraumatic  Right Ear: External ear normal    Left Ear: External ear normal    Mouth/Throat: Oropharynx is clear and moist    Eyes: Conjunctivae and EOM are normal  Right eye exhibits no discharge  Left eye exhibits no discharge  No scleral icterus  Neck: Normal range of motion  Cardiovascular: Normal rate, regular rhythm and normal heart sounds  No murmur heard  Pulmonary/Chest: Effort normal and breath sounds normal  No respiratory distress  He has no wheezes  He exhibits no tenderness  Musculoskeletal: Normal range of motion  Lymphadenopathy:     He has no cervical adenopathy  Neurological: He is alert and oriented to person, place, and time  He has normal reflexes  Skin: Skin is warm and dry  He is not diaphoretic  No erythema  Psychiatric: He has a normal mood and affect  His speech is normal and behavior is normal  Judgment and thought content normal    Vitals reviewed          TCM Call (since 11/18/2019)     Date and time call was made  12/9/2019  4:53 PM    Hospital care reviewed  Records reviewed    Patient was hospitialized at  [facility]    Date of Admission  12/03/19    Date of discharge  12/06/19    Diagnosis  afib    Disposition  Home    Were the patients medications reviewed and updated  No    Current Symptoms  None      TCM Call (since 11/18/2019)     Post hospital issues  None    Should patient be enrolled in anticoag monitoring?   Yes    Did you obtain your prescribed medications  Yes    Do you need help managing your prescriptions or medications  No    Is transportation to your appointment needed  No    I have advised the patient to call PCP with any new or worsening symptoms  lhunter    Living Arrangements  Spouse or Significiant other    Are you recieving any outpatient services  No    Are you recieving home care services  No    Are you using any community resources  No    Current waiver services  No    Have you fallen in the last 12 months  No    Interperter language line needed  No    Comments  scheduled 12/19

## 2020-01-03 DIAGNOSIS — I48.0 PAROXYSMAL ATRIAL FIBRILLATION (HCC): ICD-10-CM

## 2020-01-03 RX ORDER — POTASSIUM CHLORIDE 1125 MG/1
30 TABLET, EXTENDED RELEASE ORAL DAILY
Qty: 180 TABLET | Refills: 3 | Status: SHIPPED | OUTPATIENT
Start: 2020-01-03 | End: 2021-01-17

## 2020-01-06 DIAGNOSIS — I48.0 PAROXYSMAL ATRIAL FIBRILLATION (HCC): ICD-10-CM

## 2020-01-06 RX ORDER — LOSARTAN POTASSIUM 100 MG/1
100 TABLET ORAL 2 TIMES DAILY
Qty: 180 TABLET | Refills: 3 | Status: SHIPPED | OUTPATIENT
Start: 2020-01-06 | End: 2020-01-10 | Stop reason: SDUPTHER

## 2020-01-08 ENCOUNTER — TELEPHONE (OUTPATIENT)
Dept: CARDIOLOGY CLINIC | Facility: CLINIC | Age: 70
End: 2020-01-08

## 2020-01-08 NOTE — TELEPHONE ENCOUNTER
Pt called, needs prior auth is needed for losartan  Called [facility], pt does need a prior auth due to requiring over the number of recommended tablets  Phone # [phone] and ID [id number]  Called the prior auth line, spoke to prior Sean Powell initiated   Ref#: [id number]

## 2020-01-10 DIAGNOSIS — I48.0 PAROXYSMAL ATRIAL FIBRILLATION (HCC): ICD-10-CM

## 2020-01-10 RX ORDER — LOSARTAN POTASSIUM 100 MG/1
100 TABLET ORAL DAILY
Qty: 90 TABLET | Refills: 3 | Status: SHIPPED | OUTPATIENT
Start: 2020-01-10 | End: 2020-05-26 | Stop reason: SDUPTHER

## 2020-01-10 NOTE — TELEPHONE ENCOUNTER
Some confusion about patient's losartan dosing  Was instructed on discharge to increase losartan to 100mg b i d  And has been taking this since hospitalization 12/3/19  Prior Adan Wintersayra was denied due to quantity and dosing  Spoke with Dr Shantelle Alva who felt patient should only be taking losartan 100mg daily  Patient was instructed and Rx was sent to [facility] today  Instructed patient to continue monitoring his BP  Patient asked, since newly on dofetilide, if it can cause irritability as  Nighttime as he has had difficulty sleeping since on dofetilide  I have not had this complaint from a patient before

## 2020-01-10 NOTE — TELEPHONE ENCOUNTER
Prior Auth for Losartan 100mg bid was denied by Medicare, mainly because it's not recommended by   However, his BP is well controlled he states and his HR is in the 60-66 bpm range  I can try to appeal the decision or would you like to just change his Losartan to another agent  He is also taking amlodipine 73SI daily and Bystolic 5mg daily  The Losartan was increased by you to 100mg bid  Please advise

## 2020-01-13 ENCOUNTER — OFFICE VISIT (OUTPATIENT)
Dept: FAMILY MEDICINE CLINIC | Facility: CLINIC | Age: 70
End: 2020-01-13
Payer: MEDICARE

## 2020-01-13 VITALS
BODY MASS INDEX: 44.5 KG/M2 | WEIGHT: 293.6 LBS | DIASTOLIC BLOOD PRESSURE: 84 MMHG | HEART RATE: 67 BPM | OXYGEN SATURATION: 95 % | SYSTOLIC BLOOD PRESSURE: 160 MMHG | HEIGHT: 68 IN | TEMPERATURE: 97.7 F

## 2020-01-13 DIAGNOSIS — G47.33 OSA (OBSTRUCTIVE SLEEP APNEA): ICD-10-CM

## 2020-01-13 DIAGNOSIS — I11.0 HYPERTENSIVE HEART DISEASE WITH CHRONIC COMBINED SYSTOLIC AND DIASTOLIC CONGESTIVE HEART FAILURE (HCC): ICD-10-CM

## 2020-01-13 DIAGNOSIS — I48.19 OTHER PERSISTENT ATRIAL FIBRILLATION (HCC): ICD-10-CM

## 2020-01-13 DIAGNOSIS — I73.9 PERIPHERAL VASCULAR DISEASE (HCC): ICD-10-CM

## 2020-01-13 DIAGNOSIS — I50.32 CHRONIC DIASTOLIC (CONGESTIVE) HEART FAILURE (HCC): ICD-10-CM

## 2020-01-13 DIAGNOSIS — E11.9 TYPE 2 DIABETES MELLITUS WITHOUT COMPLICATION, WITHOUT LONG-TERM CURRENT USE OF INSULIN (HCC): Primary | ICD-10-CM

## 2020-01-13 DIAGNOSIS — I10 ESSENTIAL HYPERTENSION: ICD-10-CM

## 2020-01-13 DIAGNOSIS — F32.A MILD DEPRESSIVE DISORDER: ICD-10-CM

## 2020-01-13 DIAGNOSIS — I50.42 HYPERTENSIVE HEART DISEASE WITH CHRONIC COMBINED SYSTOLIC AND DIASTOLIC CONGESTIVE HEART FAILURE (HCC): ICD-10-CM

## 2020-01-13 DIAGNOSIS — K21.9 GASTROESOPHAGEAL REFLUX DISEASE WITHOUT ESOPHAGITIS: ICD-10-CM

## 2020-01-13 DIAGNOSIS — E66.01 MORBID OBESITY (HCC): ICD-10-CM

## 2020-01-13 PROCEDURE — 99214 OFFICE O/P EST MOD 30 MIN: CPT | Performed by: FAMILY MEDICINE

## 2020-01-13 NOTE — ASSESSMENT & PLAN NOTE
I am going to reach out to Cardiology tomorrow in regards to his dizziness on tikosyn  Blood pressure remains quite elevated here today, but home readings remain excellent  Bystolic was decreased while hospitalized and I would like to know if we have the ability to bump this up in the future pressures continue to run high

## 2020-01-13 NOTE — PROGRESS NOTES
Assessment/Plan:       Problem List Items Addressed This Visit        Digestive    Gastroesophageal reflux disease without esophagitis     Currently well controlled with Prilosec daily  Endocrine    Type 2 diabetes mellitus without complication, without long-term current use of insulin ([facility] [address] ) - Primary       Lab Results   Component Value Date    HGBA1C 6 1 10/16/2019   Repeat A1c  Check microalbumin as well  Relevant Orders    Comprehensive metabolic panel    CBC and differential    Hemoglobin A1C    Microalbumin / creatinine urine ratio       Respiratory    MARISSA (obstructive sleep apnea)     Continue on CPAP  Cardiovascular and Mediastinum    Essential hypertension     I am going to reach out to Cardiology tomorrow in regards to his dizziness on tikosyn  Blood pressure remains quite elevated here today, but home readings remain excellent  Bystolic was decreased while hospitalized and I would like to know if we have the ability to bump this up in the future pressures continue to run high  Relevant Orders    Comprehensive metabolic panel    CBC and differential    Chronic diastolic (congestive) heart failure (HCC)     Wt Readings from Last 3 Encounters:   01/13/20 133 kg (293 lb 9 6 oz)   12/19/19 132 kg (292 lb)   10/25/19 (!) 145 kg (320 lb)     He this seems improved since he is on high-dose furosemide  Relevant Orders    Comprehensive metabolic panel    A-fib ([facility] [address] )    Hypertensive heart disease with congestive heart failure ([facility] [address] )     Wt Readings from Last 3 Encounters:   01/13/20 133 kg (293 lb 9 6 oz)   12/19/19 132 kg (292 lb)   10/25/19 (!) 145 kg (320 lb)         Weight is down from previous weight significantly  Continue on higher dose furosemide 40 mg daily  He has stop hydrochlorothiazide             Relevant Orders    Comprehensive metabolic panel    CBC and differential    Peripheral vascular disease ([facility] [address] )     He denies significant claudication currently  Other    Morbid obesity ([facility] [address] )     Continue to work on weight loss  Mild depressive disorder (HCC)     Mood seems stable  He is frustrated regarding his recent medical issues  Subjective:      Patient ID: Mary Tubbs  is a 68 y o  male  HPI patient presents today for follow-up for chronic health issues  He has history of type 2 diabetes  He denies excessive polyuria or polydipsia  He has a history of atrial fibrillation and was recently hospitalized for initiation ofTikosyn induction  He also remains on anticoagulation  He was initially placed on losartan 100 mg twice daily prior to discharge  He felt very poorly while taking this but finally was told to stop it just a few days ago  He feels he is already starting to feel somewhat better  He has history of congestive heart failure  He does continue on furosemide now higher dose of 40 mg daily  His lower extremity swelling has improved significantly on furosemide 40 mg daily  He denies orthopnea        The following portions of the patient's history were reviewed and updated as appropriate: allergies, current medications, past family history, past medical history, past social history, past surgical history and problem list       Current Outpatient Medications:     allopurinol (ZYLOPRIM) 300 mg tablet, TAKE 1 TABLET BY MOUTH  DAILY, Disp: 90 tablet, Rfl: 3    amLODIPine (NORVASC) 10 mg tablet, Take 1 tablet (10 mg total) by mouth daily, Disp: , Rfl:     dofetilide (TIKOSYN) 250 mcg capsule, Take 1 capsule (250 mcg total) by mouth every 12 (twelve) hours, Disp: 60 capsule, Rfl: 11    FLUoxetine (PROzac) 40 MG capsule, Take 1 capsule (40 mg total) by mouth daily, Disp: 30 capsule, Rfl: 0    furosemide (LASIX) 40 mg tablet, Take 1 tablet (40 mg total) by mouth daily, Disp: 30 tablet, Rfl: 0    lidocaine (XYLOCAINE) 5 % ointment, 2 (two) times a day as needed , Disp: , Rfl:     losartan (COZAAR) 100 MG tablet, Take 1 tablet (100 mg total) by mouth daily, Disp: 90 tablet, Rfl: 3    nebivolol (BYSTOLIC) 5 mg tablet, Take 1 tablet (5 mg total) by mouth daily, Disp: 30 tablet, Rfl: 0    omeprazole (PriLOSEC) 40 MG capsule, TAKE 1 CAPSULE BY MOUTH  EVERY DAY, Disp: 90 capsule, Rfl: 3    potassium chloride SA (KLOR-CON M15) 15 MEQ tablet, Take 2 tablets (30 mEq total) by mouth daily, Disp: 180 tablet, Rfl: 3    pravastatin (PRAVACHOL) 10 mg tablet, TAKE 1 TABLET BY MOUTH  DAILY AT BEDTIME, Disp: 90 tablet, Rfl: 3    XARELTO 20 MG tablet, TAKE 1 TABLET BY MOUTH  DAILY WITH BREAKFAST, Disp: 90 tablet, Rfl: 3     Review of Systems   Constitutional: Negative for appetite change, chills, fatigue, fever and unexpected weight change  HENT: Negative for congestion and trouble swallowing  Eyes: Negative for visual disturbance  Respiratory: Negative for cough, chest tightness, shortness of breath and wheezing  Cardiovascular: Positive for leg swelling (Minimal )  Negative for chest pain and palpitations  Gastrointestinal: Negative for abdominal distention, abdominal pain, blood in stool, constipation and diarrhea  Endocrine: Negative for polyuria  Genitourinary: Negative for difficulty urinating and flank pain  Musculoskeletal: Negative for arthralgias and myalgias  Skin: Negative for rash  Neurological: Negative for dizziness and light-headedness  Hematological: Negative for adenopathy  Does not bruise/bleed easily  Psychiatric/Behavioral: Negative for sleep disturbance  Objective:      /84 (BP Location: Left arm, Patient Position: Sitting, Cuff Size: Large)   Pulse 67   Temp 97 7 °F (36 5 °C) (Tympanic)   Ht 5' 7 5" (1 715 m)   Wt 133 kg (293 lb 9 6 oz)   SpO2 95%   BMI 45 31 kg/m²          Physical Exam   Constitutional: He is oriented to person, place, and time  He appears well-developed and well-nourished  No distress     He is obese   HENT:   Head: Normocephalic  Eyes: Pupils are equal, round, and reactive to light  Right eye exhibits no discharge  Left eye exhibits no discharge  Neck: No tracheal deviation present  No thyromegaly present  Cardiovascular: Normal rate, regular rhythm and normal heart sounds  No murmur heard  Pulmonary/Chest: Effort normal  No respiratory distress  He has no wheezes  He has no rales  Abdominal: Soft  He exhibits no distension  There is no tenderness  Musculoskeletal: Normal range of motion  He exhibits no edema  Lymphadenopathy:     He has no cervical adenopathy  Neurological: He is alert and oriented to person, place, and time  No cranial nerve deficit  Skin: Skin is warm  He is not diaphoretic  No erythema  Psychiatric: He has a normal mood and affect  Judgment and thought content normal    Vitals reviewed          Joyce Leal MD

## 2020-01-13 NOTE — ASSESSMENT & PLAN NOTE
Wt Readings from Last 3 Encounters:   01/13/20 133 kg (293 lb 9 6 oz)   12/19/19 132 kg (292 lb)   10/25/19 (!) 145 kg (320 lb)         Weight is down from previous weight significantly  Continue on higher dose furosemide 40 mg daily  He has stop hydrochlorothiazide

## 2020-01-14 LAB
CREAT ?TM UR-SCNC: 173 UMOL/L
EXT MICROALBUMIN URINE RANDOM: 1.6
HBA1C MFR BLD HPLC: 6.1 %
MICROALBUMIN/CREAT UR: 9.2 MG/G{CREAT}

## 2020-01-14 NOTE — ASSESSMENT & PLAN NOTE
Wt Readings from Last 3 Encounters:   01/13/20 133 kg (293 lb 9 6 oz)   12/19/19 132 kg (292 lb)   10/25/19 (!) 145 kg (320 lb)     He this seems improved since he is on high-dose furosemide

## 2020-01-14 NOTE — ASSESSMENT & PLAN NOTE
Lab Results   Component Value Date    HGBA1C 6 1 10/16/2019   Repeat A1c  Check microalbumin as well

## 2020-01-18 DIAGNOSIS — M1A.00X0 IDIOPATHIC CHRONIC GOUT WITHOUT TOPHUS, UNSPECIFIED SITE: ICD-10-CM

## 2020-01-20 ENCOUNTER — TELEPHONE (OUTPATIENT)
Dept: SLEEP CENTER | Facility: CLINIC | Age: 70
End: 2020-01-20

## 2020-01-20 NOTE — TELEPHONE ENCOUNTER
Patient left message, requesting script for CPAP supplies  I spoke with patient, he would like script to go to [facility] I will send over today  I spoke with Gregory ortega in our office, she will download order

## 2020-01-21 RX ORDER — ALLOPURINOL 300 MG/1
TABLET ORAL DAILY
Qty: 90 TABLET | Refills: 3 | Status: SHIPPED | OUTPATIENT
Start: 2020-01-21 | End: 2020-12-22

## 2020-01-27 ENCOUNTER — TELEPHONE (OUTPATIENT)
Dept: CARDIOLOGY CLINIC | Facility: CLINIC | Age: 70
End: 2020-01-27

## 2020-01-27 DIAGNOSIS — L30.8 OTHER ECZEMA: Primary | ICD-10-CM

## 2020-01-27 NOTE — TELEPHONE ENCOUNTER
Pt called-has been having issues with an intermittent rash since being discharged from the hospital   He saw his PCP on 1/13/2020 but did not have the rash then  It started as a rash on his ankles and legs, now progressed up to his abdomen, arms and back  He feels as though he will "jump out of" his skin, and is described as itchy and red, but smooth  He tried benadryl 25 mg earlier today and is asking what the max dose per day he can take  Pt is asking if this may be from an increased potassium dose  He is presently on 30 mEq daily  Most recent labs drawn at \[facility]\"" on 1/14/2020  Please advise, thanks

## 2020-01-28 DIAGNOSIS — I48.0 PAROXYSMAL ATRIAL FIBRILLATION (HCC): ICD-10-CM

## 2020-01-28 DIAGNOSIS — R21 RASH OF BODY: Primary | ICD-10-CM

## 2020-01-28 RX ORDER — DOFETILIDE 0.25 MG/1
250 CAPSULE ORAL EVERY 12 HOURS SCHEDULED
Qty: 180 CAPSULE | Refills: 3 | Status: SHIPPED | OUTPATIENT
Start: 2020-01-28 | End: 2020-04-22 | Stop reason: SDUPTHER

## 2020-01-28 NOTE — TELEPHONE ENCOUNTER
I called the patient back to ask him to contact his PCP about the rash  Also, he had the same problems with sleeping last night  He said he discussed a sleep aid with you-is it possible to prescribe something for him? And do you want the patient to still F/U with hematology for his WBC? Thank you

## 2020-01-30 ENCOUNTER — OFFICE VISIT (OUTPATIENT)
Dept: DERMATOLOGY | Facility: CLINIC | Age: 70
End: 2020-01-30
Payer: MEDICARE

## 2020-01-30 ENCOUNTER — TELEPHONE (OUTPATIENT)
Dept: HEMATOLOGY ONCOLOGY | Facility: CLINIC | Age: 70
End: 2020-01-30

## 2020-01-30 VITALS — BODY MASS INDEX: 44.77 KG/M2 | HEIGHT: 68 IN | WEIGHT: 295.4 LBS | TEMPERATURE: 99.4 F

## 2020-01-30 DIAGNOSIS — R21 RASH: Primary | ICD-10-CM

## 2020-01-30 DIAGNOSIS — R21 RASH: ICD-10-CM

## 2020-01-30 PROCEDURE — 17110 DESTRUCTION B9 LES UP TO 14: CPT | Performed by: DERMATOLOGY

## 2020-01-30 PROCEDURE — 99204 OFFICE O/P NEW MOD 45 MIN: CPT | Performed by: DERMATOLOGY

## 2020-01-30 NOTE — TELEPHONE ENCOUNTER
Patient called states it will cost him cheaper if the triamcinolone ointment is sent to [facility] for a 90 days supply  Advised patient will have Dr Rosas resend the script

## 2020-01-30 NOTE — PROGRESS NOTES
[address] [facility] Note     Patient Name: Wood Peoples  Encounter Date: 01/30/2020    Today's Chief Concerns:   Concern #1:  Rash    Concern #2:  Full body exam       Past Medical History:  Have you ever had or currently have any of the following medical conditions or treatments? · HIV/AIDS: No  · Hepatitis B: No  · Hepatitis C: No   · Diabetes: YES  · Tuberculosis: No  · Biologic Therapy/Chemotherapy: No  · Organ or Bone Marrow Transplantation: No  · Radiation Treatment: No  · Cancer (If Yes, which types)- No      Have you ever had any of the following skin conditions? · Melanoma? (If Yes, please provide more detail)- No  · Basal Cell Carcinoma: No  · Squamous Cell Carcinoma: No  · Sebaceous Cell Carcinoma: No  · Merkel Cell Carcinoma: No  · Angiosarcoma: No  · Blistering Sunburns: YES  · Eczema: No  · Psoriasis: No    Social History:    What is your current Smoking Status? Never smoker    What is/was your primary occupation? Retired     What are your hobbies/past-times? n/a    Family history:  Do any of your "first degree relatives" (parent, brother, sister, or child) have any of the following conditions? · Melanoma? (If Yes, which relatives?) No  · Eczema: YES, daughter  · Asthma: No  · Hay Fever/Seasonal Allergies: YES  · Psoriasis: No  · Arthritis: YES  · Thyroid Problems: YES, sister  · Lupus/Connective Tissue Disease: No  · Diabetes: YES  · Stroke: No  · Blood Clots: YES  · IBD/Crohn's/Ulcerative Colitis: No  · Vitiligo: No  · Scarring/Keloids: No  · Severe Acne: No  · Pancreatic Cancer: No  · Other known Skin Condition? If Yes, what condition and which relatives?   No    Current Medications:    Current Outpatient Medications:     allopurinol (ZYLOPRIM) 300 mg tablet, TAKE 1 TABLET BY MOUTH  DAILY, Disp: 90 tablet, Rfl: 3    amLODIPine (NORVASC) 10 mg tablet, Take 1 tablet (10 mg total) by mouth daily, Disp: , Rfl:     dofetilide (TIKOSYN) 250 mcg capsule, Take 1 capsule (250 mcg total) by mouth every 12 (twelve) hours, Disp: 180 capsule, Rfl: 3    FLUoxetine (PROzac) 40 MG capsule, Take 1 capsule (40 mg total) by mouth daily, Disp: 30 capsule, Rfl: 0    furosemide (LASIX) 40 mg tablet, Take 1 tablet (40 mg total) by mouth daily, Disp: 30 tablet, Rfl: 0    lidocaine (XYLOCAINE) 5 % ointment, 2 (two) times a day as needed , Disp: , Rfl:     losartan (COZAAR) 100 MG tablet, Take 1 tablet (100 mg total) by mouth daily, Disp: 90 tablet, Rfl: 3    nebivolol (BYSTOLIC) 5 mg tablet, Take 1 tablet (5 mg total) by mouth daily, Disp: 30 tablet, Rfl: 0    omeprazole (PriLOSEC) 40 MG capsule, TAKE 1 CAPSULE BY MOUTH  EVERY DAY, Disp: 90 capsule, Rfl: 3    potassium chloride SA (KLOR-CON M15) 15 MEQ tablet, Take 2 tablets (30 mEq total) by mouth daily, Disp: 180 tablet, Rfl: 3    pravastatin (PRAVACHOL) 10 mg tablet, TAKE 1 TABLET BY MOUTH  DAILY AT BEDTIME, Disp: 90 tablet, Rfl: 3    XARELTO 20 MG tablet, TAKE 1 TABLET BY MOUTH  DAILY WITH BREAKFAST, Disp: 90 tablet, Rfl: 3    betamethasone valerate (VALISONE) 0 1 % cream, Apply topically 2 (two) times a day, Disp: 30 g, Rfl: 0    Specific Alerts:    Have you been seen by a [facility]atologist in the last 3 years? No    Are you pregnant or planning to become pregnant? No    Are you currently or planning to be nursing or breast feeding? No    Allergies   Allergen Reactions    Metoprolol Shortness Of Breath     Tolerates Bystolic    Benazepril Cough    Clonidine Fatigue    Diltiazem Bradycardia    Entex T  [Pseudoephedrine-Guaifenesin]      [facility] - [id number]: LEG SWELLING       May we call your Preferred Phone number to discuss your specific medical information? YES    May we leave a detailed message that includes your specific medical information? YES    Have you traveled outside of the [facility] in the past 3 months? No    Do you currently have a pacemaker or defibrillator?  YES    Do you have any artificial heart valves, joints, plates, screws, rods, stents, pins, etc? YES   - If Yes, were any placed within the last 2 years? Knee replacement  2013     Do you require any medications prior to a surgical procedure? YES   - If Yes, for which procedure? n/a   - If Yes, what medications to you require? n/a    Are you taking any medications that cause you to bleed more easily ("blood thinners") YES    Have you ever experienced a rapid heartbeat with epinephrine? No    Have you ever been treated with "gold" (gold sodium thiomalate) therapy? No    [facility] can help with wrinkles, "laugh lines," facial volume loss, "double chin," "love handles," age spots, and more  Are you interested in learning today about some of the skin enhancement procedures that we offer? (If Yes, please provide more detail) No    Review of Systems:  Have you recently had or currently have any of the following? · Fever or chills: No  · Night Sweats: No  · Headaches: YES  · Weight Gain: No  · Weight Loss: No  · Blurry Vision: No  · Nausea: No  · Vomiting: No  · Diarrhea: No  · Blood in Stool: No  · Abdominal Pain: No  · Itchy Skin: YES  · Painful Joints: No  · Swollen Joints: YES  · Muscle Pain: No  · Irregular Mole: YES  · Sun Burn: No  · Dry Skin: YES  · Skin Color Changes: No  · Scar or Keloid: No  · Cold Sores/Fever Blisters: No  · Bacterial Infections/MRSA: No  · Anxiety: No  · Depression: YES, family doctor  · Suicidal or Homicidal Thoughts: No      PHYSICAL EXAM:      Was a chaperone (Derm Clinical Assistant) present for the entirety of the Physical Exam? YES    Did the Dermatology Team specifically ask and  the patient on the importance of a Full Skin Exam to be sure that nothing is missed clinically?  YES    Did the patient request or accept a Full Skin Exam?  YES    Did the patient specifically refuse to have the areas "under-the-bra" examined by the Dermatologist? Yes    Did the patient specifically refuse to have the areas "under-the-underwear" examined by the Dermatologist? YES      CONSTITUTIONAL:   Vitals:    01/30/20 1127   Temp: 99 4 °F (37 4 °C)   TempSrc: Tympanic   Weight: 134 kg (295 lb 6 4 oz)   Height: 5' 8" (1 727 m)       PSYCH: Normal mood and affect  EYES: Normal conjunctiva  ENT: Normal lips and oral mucosa  CARDIOVASCULAR: No edema  RESPIRATORY: Normal respirations  HEME/LYMPH/IMMUNO:  No regional lymphadenopathy except as noted below in ASSESSMENT AND PLAN BY DIAGNOSIS    FULL ORGAN SYSTEM SKIN EXAM (SKIN)  Hair, Scalp, Ears, Face Normal except as noted below in Assessment   Neck, Cervical Chain Nodes Normal except as noted below in Assessment   Right Arm/Hand/Fingers Normal except as noted below in Assessment   Left Arm/Hand/Fingers Normal except as noted below in Assessment   Chest/Breasts/Axillae Viewed areas Normal except as noted below in Assessment   Abdomen, Umbilicus Normal except as noted below in Assessment   Back/Spine Normal except as noted below in Assessment       Right Leg, Foot, Toes Normal except as noted below in Assessment   Left Leg, Foot, Toes Normal except as noted below in Assessment        ASSESSMENT AND PLAN BY DIAGNOSIS:    History of Present Condition:     Duration:  How long has this been an issue for you? o  weeks ago   Location Affected:  Where on the body is this affecting you?    o  Finger, arms, thighs, and legs   Quality:  Is there any bleeding, pain, itch, burning/irritation, or redness associated with the skin lesion? o  Bleeding, irritation, scratching , really itchy   Severity:  Describe any bleeding, pain, itch, burning/irritation, or redness on a scale of 1 to 10 (with 10 being the worst)    o  9   Timing:  Does this condition seem to be there pretty constantly or do you notice it more at specific times throughout the day?    o  Constantly all day     Context:  Have you ever noticed that this condition seems to be associated with specific activities you do?    o  Denies   Modifying Factors:    o Anything that seems to make the condition worse?    -  Denies  o What have you tried to do to make the condition better? -  Hydrocortisone    Associated Signs and Symptoms:  Does this skin lesion seem to be associated with any of the following:  o  DERM ASSOCIATED SIGNS AND SYMPTOMS: Redness, Itching and Scratching, Bleeding, Skin color changes and Crusting       1  VERRUCA VULGARIS ("Common Warts")    Physical Exam:   Anatomic Location Affected:  Left anterior hair line   Morphological Description:  verrucous papule   Pertinent Positives:   Pertinent Negatives: Additional History of Present Condition:  Present for a while  Assessment and Plan:  Based on a thorough discussion of this condition and the management approach to it (including a comprehensive discussion of the known risks, side effects and potential benefits of treatment), the patient (family) agrees to implement the following specific plan:   cryotherapy    Verruca Vulgaris  A verruca is a common growth of the skin caused by infection by human papilloma virus (HPV)  There are many strains of the virus that cause different types of warts on the body  The virus infects the most superficial layers of the skin, causing increased production of skin cells and thickening  Warts can be spread through direct contact with infected skin and may spread to other parts of the body if scratched or picked  A verruca is more commonly called a "wart " Warts are particularly common in school-aged children but can arise at any age  Patients who have a history of eczema are especially prone due to impaired skin barrier  Those taking immunosuppressive drugs or with HIV infections may experience prolonged symptoms despite treatment  Warts generally have a rough surface with a tiny black dot sometimes observed in the middle of each scaly spot  They can range in size from a small bump to large scaly growths  Common warts are often found on the backs of fingers or toes, around the nails, and on the knees  Plantar warts can grow inwardly on the soles of the feet causing pain  There are many possible ways to treat warts and sometimes several different treatments are needed to get the warts to go away completely  There is no single perfect treatment for warts, and successful treatment can take many months  In-office treatments usually require multiple visits, and include:  1) Cryotherapy  a cold spray with liquid nitrogen will destroy the infected cells but may lead to discomfort and blistering  It may also leave a permanent white sage or scar  2) Electrosurgery (curettage and cautery) can be used for large resistant warts which involves shaving the growth down and burning the base  It is performed under local anesthesia and may leave a permanent scar    3) Candida (yeast) antigen injections  These are extracts of the common yeast (Candida) that cannot cause an infection  The medication is injected into/under the wart  It is thought to stimulate the immune system to recognize the wart virus and attack it  Multiple injections are often needed about one month apart  There are also several at-home wart treatments:    1) Soak the warts in warm water for 5 minutes every night followed by gentle filing with a nail file or pumice stone  2) Topical salicylic acid or similar compounds work by removing the dead surface skin cells  a  Apply the medicine directly to the wart, wait for it to dry completely, then cover with duct tape overnight   b  Repeat until the wart is gone, which can take 2-4 months  c  Do not use on the face or groin area   d  If the wart paint makes the skin sore, stop treatment until the discomfort has settled, then recommence as above   e  Take care to keep the chemical off normal skin      3) Podophyllin is a cytotoxic agent used in some products and must not be used in pregnancy or women considering pregnancy  4) Some prescription medications include   a  Topical retinoids (adapalene, tretinoin, tazarotene), 5-fluorouracil (Efudex) or imiquimod (Aldara) creams are sometimes used to treat flat warts or warts on the face and other sensitive anatomical areas  They are usually applied directly to the warts once a day for 2-4 months and can be irritating  These treatments should only be used as directed by your health care provider  b  Systemic treatment with oral cimetidine (Tagamet) may help boost the immune system against the wart virus in patients, some of the time  Initiation of cimetidine therapy should ONLY be done under the supervision of your health care provider, who can discuss possible side effects and drug-to-drug interactions of this specific treatment  PROCEDURE:  DESTRUCTION OF BENIGN LESIONS  After a thorough discussion of treatment options and risk/benefits/alternatives (including but not limited to local pain, scarring, dyspigmentation, blistering, and possible superinfection), verbal and written consent were obtained and the aforementioned lesions were treated on with cryotherapy using liquid nitrogen x 1 cycle for 5-10 seconds   TOTAL NUMBER of 1 lesions were treated today on the ANATOMIC LOCATION: left  Anterior scalp  The patient tolerated the procedure well, and after-care instructions were provided  2  DERMATOGRAPHISM     Physical Exam:   (Anatomic Location); (Size and Morphological Description); (Differential Diagnosis):  o Bilateral legs, arms, and hands prominent Dermatographism no primary dermatitis prominent wheel and flare response with stroking of skin in generalized distrubiton  No primary dermatitis  Additional History of Present Condition:  Patient states his body has been itching non stop for several weeks and his legs been itching with a rash for over a year  Family doctor prescribed vaisone cream but did not begin topical cream yet  He's been using hydrocortisone to help relieve symptoms of rash and itching, He recently had cardioversion for atrial fibrillation and was placed on tikosyn  All other medication long standing  At time of hostpitalization he wa noted to have WBC 16,400 with 18% atypical lymphocytes  Assessment and Plan:  Based on a thorough discussion of this condition and the management approach to it (including a comprehensive discussion of the known risks, side effects and potential benefits of treatment), the patient (family) agrees to implement the following specific plan:   I NOTED THAT HIS GENERALIZED DERMATOGRAPHISM IS A POTENTIAL SIGN OF UNDERYLING DISEASE OR LOW GRADE DRUG REACTION  Ther is a potential that tikosyn is triggering itch but I noted that it likely would be difficult stopping this med  Would try low dose antihistamine first with continue tikosyn if ok with cardiology  Importance follow up with hematology also noted   Prescribed Triamcinolone 0 1% ointment  Apply topically to affected areas Twice  a day for two weeks interval to legs   Recommend patient to use Allegra (regular allegra) but must check with  cardiologist first if its ok  He is seeing cardiology tomorrow   I emphasized  To patient that it is clinically important that he followup with hematologist as itching and dermatographism can be a sign blood dyscrasia           Scribe Attestation    I,:   Erika Bernstein MA am acting as a scribe while in the presence of the attending physician :        I,:   Jennifer Cunningham MD personally performed the services described in this documentation    as scribed in my presence :

## 2020-01-30 NOTE — LETTER
January 30, 2020     Roland Redmond MD  [address]  [facility]    Patient: Kirt Abernathy  YOB: 1950   Date of Visit: 1/30/2020       Dear Dr Micheline Perales: Thank you for referring Walter Guzman to me for evaluation  Below are my notes for this consultation  If you have questions, please do not hesitate to call me  I look forward to following your patient along with you  Sincerely,        Vandana Schreiber MD        CC: DO Vandana Edwards MD  1/30/2020  6:14 PM  [facility]  [facility] Note     Patient Name: Kirt Abernathy  Encounter Date: 01/30/2020    Today's Chief Concerns:   Concern #1:  Rash    Concern #2:  Full body exam       Past Medical History:  Have you ever had or currently have any of the following medical conditions or treatments? · HIV/AIDS: No  · Hepatitis B: No  · Hepatitis C: No   · Diabetes: YES  · Tuberculosis: No  · Biologic Therapy/Chemotherapy: No  · Organ or Bone Marrow Transplantation: No  · Radiation Treatment: No  · Cancer (If Yes, which types)- No      Have you ever had any of the following skin conditions? · Melanoma? (If Yes, please provide more detail)- No  · Basal Cell Carcinoma: No  · Squamous Cell Carcinoma: No  · Sebaceous Cell Carcinoma: No  · Merkel Cell Carcinoma: No  · Angiosarcoma: No  · Blistering Sunburns: YES  · Eczema: No  · Psoriasis: No    Social History:    What is your current Smoking Status? Never smoker    What is/was your primary occupation? Retired     What are your hobbies/past-times? n/a    Family history:  Do any of your "first degree relatives" (parent, brother, sister, or child) have any of the following conditions?     · Melanoma? (If Yes, which relatives?) No  · Eczema: YES, daughter  · Asthma: No  · Hay Fever/Seasonal Allergies: YES  · Psoriasis: No  · Arthritis: YES  · Thyroid Problems: YES, sister  · Lupus/Connective Tissue Disease: No  · Diabetes: YES  · Stroke: No  · Blood Clots: YES  · IBD/Crohn's/Ulcerative Colitis: No  · Vitiligo: No  · Scarring/Keloids: No  · Severe Acne: No  · Pancreatic Cancer: No  · Other known Skin Condition? If Yes, what condition and which relatives? No    Current Medications:    Current Outpatient Medications:     allopurinol (ZYLOPRIM) 300 mg tablet, TAKE 1 TABLET BY MOUTH  DAILY, Disp: 90 tablet, Rfl: 3    amLODIPine (NORVASC) 10 mg tablet, Take 1 tablet (10 mg total) by mouth daily, Disp: , Rfl:     dofetilide (TIKOSYN) 250 mcg capsule, Take 1 capsule (250 mcg total) by mouth every 12 (twelve) hours, Disp: 180 capsule, Rfl: 3    FLUoxetine (PROzac) 40 MG capsule, Take 1 capsule (40 mg total) by mouth daily, Disp: 30 capsule, Rfl: 0    furosemide (LASIX) 40 mg tablet, Take 1 tablet (40 mg total) by mouth daily, Disp: 30 tablet, Rfl: 0    lidocaine (XYLOCAINE) 5 % ointment, 2 (two) times a day as needed , Disp: , Rfl:     losartan (COZAAR) 100 MG tablet, Take 1 tablet (100 mg total) by mouth daily, Disp: 90 tablet, Rfl: 3    nebivolol (BYSTOLIC) 5 mg tablet, Take 1 tablet (5 mg total) by mouth daily, Disp: 30 tablet, Rfl: 0    omeprazole (PriLOSEC) 40 MG capsule, TAKE 1 CAPSULE BY MOUTH  EVERY DAY, Disp: 90 capsule, Rfl: 3    potassium chloride SA (KLOR-CON M15) 15 MEQ tablet, Take 2 tablets (30 mEq total) by mouth daily, Disp: 180 tablet, Rfl: 3    pravastatin (PRAVACHOL) 10 mg tablet, TAKE 1 TABLET BY MOUTH  DAILY AT BEDTIME, Disp: 90 tablet, Rfl: 3    XARELTO 20 MG tablet, TAKE 1 TABLET BY MOUTH  DAILY WITH BREAKFAST, Disp: 90 tablet, Rfl: 3    betamethasone valerate (VALISONE) 0 1 % cream, Apply topically 2 (two) times a day, Disp: 30 g, Rfl: 0    Specific Alerts:    Have you been seen by a [facility] Dermatologist in the last 3 years? No    Are you pregnant or planning to become pregnant? No    Are you currently or planning to be nursing or breast feeding?  No    Allergies   Allergen Reactions    Metoprolol Shortness Of Breath     Tolerates Bystolic    Benazepril Cough    Clonidine Fatigue    Diltiazem Bradycardia    Entex T  [Pseudoephedrine-Guaifenesin]      [facility] - [id number]: LEG SWELLING       May we call your Preferred Phone number to discuss your specific medical information? YES    May we leave a detailed message that includes your specific medical information? YES    Have you traveled outside of the [facility] in the past 3 months? No    Do you currently have a pacemaker or defibrillator? YES    Do you have any artificial heart valves, joints, plates, screws, rods, stents, pins, etc? YES   - If Yes, were any placed within the last 2 years? Knee replacement  2013     Do you require any medications prior to a surgical procedure? YES   - If Yes, for which procedure? n/a   - If Yes, what medications to you require? n/a    Are you taking any medications that cause you to bleed more easily ("blood thinners") YES    Have you ever experienced a rapid heartbeat with epinephrine? No    Have you ever been treated with "gold" (gold sodium thiomalate) therapy? No    [facility] can help with wrinkles, "laugh lines," facial volume loss, "double chin," "love handles," age spots, and more  Are you interested in learning today about some of the skin enhancement procedures that we offer? (If Yes, please provide more detail) No    Review of Systems:  Have you recently had or currently have any of the following?     · Fever or chills: No  · Night Sweats: No  · Headaches: YES  · Weight Gain: No  · Weight Loss: No  · Blurry Vision: No  · Nausea: No  · Vomiting: No  · Diarrhea: No  · Blood in Stool: No  · Abdominal Pain: No  · Itchy Skin: YES  · Painful Joints: No  · Swollen Joints: YES  · Muscle Pain: No  · Irregular Mole: YES  · Sun Burn: No  · Dry Skin: YES  · Skin Color Changes: No  · Scar or Keloid: No  · Cold Sores/Fever Blisters: No  · Bacterial Infections/MRSA: No  · Anxiety: No  · Depression: YES, family doctor  · Suicidal or Homicidal Thoughts: No      PHYSICAL EXAM:      Was a chaperone (Derm Clinical Assistant) present for the entirety of the Physical Exam? YES    Did the Dermatology Team specifically ask and  the patient on the importance of a Full Skin Exam to be sure that nothing is missed clinically? YES    Did the patient request or accept a Full Skin Exam?  YES    Did the patient specifically refuse to have the areas "under-the-bra" examined by the Dermatologist? Yes    Did the patient specifically refuse to have the areas "under-the-underwear" examined by the Dermatologist? YES      CONSTITUTIONAL:   Vitals:    01/30/20 1127   Temp: 99 4 °F (37 4 °C)   TempSrc: Tympanic   Weight: 134 kg (295 lb 6 4 oz)   Height: 5' 8" (1 727 m)       PSYCH: Normal mood and affect  EYES: Normal conjunctiva  ENT: Normal lips and oral mucosa  CARDIOVASCULAR: No edema  RESPIRATORY: Normal respirations  HEME/LYMPH/IMMUNO:  No regional lymphadenopathy except as noted below in [address] (SKIN)  Hair, Scalp, Ears, Face Normal except as noted below in Assessment   Neck, Cervical Chain Nodes Normal except as noted below in Assessment   Right Arm/Hand/Fingers Normal except as noted below in Assessment   Left Arm/Hand/Fingers Normal except as noted below in Assessment   Chest/Breasts/Axillae Viewed areas Normal except as noted below in Assessment   Abdomen, Umbilicus Normal except as noted below in Assessment   Back/Spine Normal except as noted below in Assessment       Right Leg, Foot, Toes Normal except as noted below in Assessment   Left Leg, Foot, Toes Normal except as noted below in Assessment        ASSESSMENT AND PLAN BY DIAGNOSIS:    History of Present Condition:     Duration:  How long has this been an issue for you?     o  weeks ago   Location Affected:  Where on the body is this affecting you?    o  Finger, arms, thighs, and legs   Quality:  Is there any bleeding, pain, itch, burning/irritation, or redness associated with the skin lesion? o  Bleeding, irritation, scratching , really itchy   Severity:  Describe any bleeding, pain, itch, burning/irritation, or redness on a scale of 1 to 10 (with 10 being the worst)  o  9   Timing:  Does this condition seem to be there pretty constantly or do you notice it more at specific times throughout the day?    o  Constantly all day     Context:  Have you ever noticed that this condition seems to be associated with specific activities you do?    o  Denies   Modifying Factors:    o Anything that seems to make the condition worse?    -  Denies  o What have you tried to do to make the condition better? -  Hydrocortisone    Associated Signs and Symptoms:  Does this skin lesion seem to be associated with any of the following:  o  DERM ASSOCIATED SIGNS AND SYMPTOMS: Redness, Itching and Scratching, Bleeding, Skin color changes and Crusting       1  VERRUCA VULGARIS ("Common Warts")    Physical Exam:   Anatomic Location Affected:  Left anterior hair line   Morphological Description:  verrucous papule   Pertinent Positives:   Pertinent Negatives: Additional History of Present Condition:  Present for a while  Assessment and Plan:  Based on a thorough discussion of this condition and the management approach to it (including a comprehensive discussion of the known risks, side effects and potential benefits of treatment), the patient (family) agrees to implement the following specific plan:   cryotherapy    Verruca Vulgaris  A verruca is a common growth of the skin caused by infection by human papilloma virus (HPV)  There are many strains of the virus that cause different types of warts on the body  The virus infects the most superficial layers of the skin, causing increased production of skin cells and thickening   Warts can be spread through direct contact with infected skin and may spread to other parts of the body if scratched or picked  A verruca is more commonly called a "wart " Warts are particularly common in school-aged children but can arise at any age  Patients who have a history of eczema are especially prone due to impaired skin barrier  Those taking immunosuppressive drugs or with HIV infections may experience prolonged symptoms despite treatment  Warts generally have a rough surface with a tiny black dot sometimes observed in the middle of each scaly spot  They can range in size from a small bump to large scaly growths  Common warts are often found on the backs of fingers or toes, around the nails, and on the knees  Plantar warts can grow inwardly on the soles of the feet causing pain  There are many possible ways to treat warts and sometimes several different treatments are needed to get the warts to go away completely  There is no single perfect treatment for warts, and successful treatment can take many months  In-office treatments usually require multiple visits, and include:  1) Cryotherapy  a cold spray with liquid nitrogen will destroy the infected cells but may lead to discomfort and blistering  It may also leave a permanent white sage or scar  2) Electrosurgery (curettage and cautery) can be used for large resistant warts which involves shaving the growth down and burning the base  It is performed under local anesthesia and may leave a permanent scar    3) Candida (yeast) antigen injections  These are extracts of the common yeast (Candida) that cannot cause an infection  The medication is injected into/under the wart  It is thought to stimulate the immune system to recognize the wart virus and attack it  Multiple injections are often needed about one month apart      There are also several at-home wart treatments:    1) Soak the warts in warm water for 5 minutes every night followed by gentle filing with a nail file or pumice stone  2) Topical salicylic acid or similar compounds work by removing the dead surface skin cells  a  Apply the medicine directly to the wart, wait for it to dry completely, then cover with duct tape overnight   b  Repeat until the wart is gone, which can take 2-4 months  c  Do not use on the face or groin area   d  If the wart paint makes the skin sore, stop treatment until the discomfort has settled, then recommence as above   e  Take care to keep the chemical off normal skin  3) Podophyllin is a cytotoxic agent used in some products and must not be used in pregnancy or women considering pregnancy  4) Some prescription medications include   a  Topical retinoids (adapalene, tretinoin, tazarotene), 5-fluorouracil (Efudex) or imiquimod (Aldara) creams are sometimes used to treat flat warts or warts on the face and other sensitive anatomical areas  They are usually applied directly to the warts once a day for 2-4 months and can be irritating  These treatments should only be used as directed by your health care provider  b  Systemic treatment with oral cimetidine (Tagamet) may help boost the immune system against the wart virus in patients, some of the time  Initiation of cimetidine therapy should ONLY be done under the supervision of your health care provider, who can discuss possible side effects and drug-to-drug interactions of this specific treatment  PROCEDURE:  DESTRUCTION OF BENIGN LESIONS  After a thorough discussion of treatment options and risk/benefits/alternatives (including but not limited to local pain, scarring, dyspigmentation, blistering, and possible superinfection), verbal and written consent were obtained and the aforementioned lesions were treated on with cryotherapy using liquid nitrogen x 1 cycle for 5-10 seconds   TOTAL NUMBER of 1 lesions were treated today on the ANATOMIC LOCATION: left  Anterior scalp       The patient tolerated the procedure well, and after-care instructions were provided  2  DERMATOGRAPHISM     Physical Exam:   (Anatomic Location); (Size and Morphological Description); (Differential Diagnosis):  o Bilateral legs, arms, and hands prominent Dermatographism no primary dermatitis prominent wheel and flare response with stroking of skin in generalized distrubiton  No primary dermatitis  Additional History of Present Condition:  Patient states his body has been itching non stop for several weeks and his legs been itching with a rash for over a year  Family doctor prescribed vaisone cream but did not begin topical cream yet  He's been using hydrocortisone to help relieve symptoms of rash and itching, He recently had cardioversion for atrial fibrillation and was placed on tikosyn  All other medication long standing  At time of hostpitalization he wa noted to have WBC 16,400 with 18% atypical lymphocytes  Assessment and Plan:  Based on a thorough discussion of this condition and the management approach to it (including a comprehensive discussion of the known risks, side effects and potential benefits of treatment), the patient (family) agrees to implement the following specific plan:   I NOTED THAT HIS GENERALIZED DERMATOGRAPHISM IS A POTENTIAL SIGN OF UNDERYLING DISEASE OR LOW GRADE DRUG REACTION  Ther is a potential that tikosyn is triggering itch but I noted that it likely would be difficult stopping this med  Would try low dose antihistamine first with continue tikosyn if ok with cardiology  Importance follow up with hematology also noted   Prescribed Triamcinolone 0 1% ointment  Apply topically to affected areas Twice  a day for two weeks interval to legs   Recommend patient to use Allegra (regular allegra) but must check with  cardiologist first if its ok  He is seeing cardiology tomorrow     I emphasized  To patient that it is clinically important that he followup with hematologist as itching and dermatographism can be a sign blood dyscrasia  Scribe Attestation    I,:   Marybeth Aaron MA am acting as a scribe while in the presence of the attending physician :        I,:   Jeff Goldberg MD personally performed the services described in this documentation    as scribed in my presence :                  Jeff Goldberg MD  1/30/2020  5:58 PM  Sign at close encounter  [facility] Note     Patient Name: Fadumo Marcano  Encounter Date: 01/30/2020    Today's Chief Concerns:   Concern #1:  Rash    Concern #2:  Full body exam       Past Medical History:  Have you ever had or currently have any of the following medical conditions or treatments? · HIV/AIDS: No  · Hepatitis B: No  · Hepatitis C: No   · Diabetes: YES  · Tuberculosis: No  · Biologic Therapy/Chemotherapy: No  · Organ or Bone Marrow Transplantation: No  · Radiation Treatment: No  · Cancer (If Yes, which types)- No      Have you ever had any of the following skin conditions? · Melanoma? (If Yes, please provide more detail)- No  · Basal Cell Carcinoma: No  · Squamous Cell Carcinoma: No  · Sebaceous Cell Carcinoma: No  · Merkel Cell Carcinoma: No  · Angiosarcoma: No  · Blistering Sunburns: YES  · Eczema: No  · Psoriasis: No    Social History:    What is your current Smoking Status? Never smoker    What is/was your primary occupation? Retired     What are your hobbies/past-times? n/a    Family history:  Do any of your "first degree relatives" (parent, brother, sister, or child) have any of the following conditions? · Melanoma? (If Yes, which relatives?) No  · Eczema: YES, daughter  · Asthma: No  · Hay Fever/Seasonal Allergies: YES  · Psoriasis: No  · Arthritis: YES  · Thyroid Problems: YES, sister  · Lupus/Connective Tissue Disease: No  · Diabetes: YES  · Stroke: No  · Blood Clots: YES  · IBD/Crohn's/Ulcerative Colitis: No  · Vitiligo: No  · Scarring/Keloids: No  · Severe Acne: No  · Pancreatic Cancer: No  · Other known Skin Condition?   If Yes, what condition and which relatives? No    Current Medications:    Current Outpatient Medications:     allopurinol (ZYLOPRIM) 300 mg tablet, TAKE 1 TABLET BY MOUTH  DAILY, Disp: 90 tablet, Rfl: 3    amLODIPine (NORVASC) 10 mg tablet, Take 1 tablet (10 mg total) by mouth daily, Disp: , Rfl:     dofetilide (TIKOSYN) 250 mcg capsule, Take 1 capsule (250 mcg total) by mouth every 12 (twelve) hours, Disp: 180 capsule, Rfl: 3    FLUoxetine (PROzac) 40 MG capsule, Take 1 capsule (40 mg total) by mouth daily, Disp: 30 capsule, Rfl: 0    furosemide (LASIX) 40 mg tablet, Take 1 tablet (40 mg total) by mouth daily, Disp: 30 tablet, Rfl: 0    lidocaine (XYLOCAINE) 5 % ointment, 2 (two) times a day as needed , Disp: , Rfl:     losartan (COZAAR) 100 MG tablet, Take 1 tablet (100 mg total) by mouth daily, Disp: 90 tablet, Rfl: 3    nebivolol (BYSTOLIC) 5 mg tablet, Take 1 tablet (5 mg total) by mouth daily, Disp: 30 tablet, Rfl: 0    omeprazole (PriLOSEC) 40 MG capsule, TAKE 1 CAPSULE BY MOUTH  EVERY DAY, Disp: 90 capsule, Rfl: 3    potassium chloride SA (KLOR-CON M15) 15 MEQ tablet, Take 2 tablets (30 mEq total) by mouth daily, Disp: 180 tablet, Rfl: 3    pravastatin (PRAVACHOL) 10 mg tablet, TAKE 1 TABLET BY MOUTH  DAILY AT BEDTIME, Disp: 90 tablet, Rfl: 3    XARELTO 20 MG tablet, TAKE 1 TABLET BY MOUTH  DAILY WITH BREAKFAST, Disp: 90 tablet, Rfl: 3    betamethasone valerate (VALISONE) 0 1 % cream, Apply topically 2 (two) times a day, Disp: 30 g, Rfl: 0    Specific Alerts:    Have you been seen by a [facility]ologist in the last 3 years? No    Are you pregnant or planning to become pregnant? No    Are you currently or planning to be nursing or breast feeding?  No    Allergies   Allergen Reactions    Metoprolol Shortness Of Breath     Tolerates Bystolic    Benazepril Cough    Clonidine Fatigue    Diltiazem Bradycardia    Entex T  [Pseudoephedrine-Guaifenesin]      [facility] - [id number]: LEG SWELLING       May we call your Preferred Phone number to discuss your specific medical information? YES    May we leave a detailed message that includes your specific medical information? YES    Have you traveled outside of the [facility] in the past 3 months? No    Do you currently have a pacemaker or defibrillator? YES    Do you have any artificial heart valves, joints, plates, screws, rods, stents, pins, etc? YES   - If Yes, were any placed within the last 2 years? Knee replacement  2013     Do you require any medications prior to a surgical procedure? YES   - If Yes, for which procedure? n/a   - If Yes, what medications to you require? n/a    Are you taking any medications that cause you to bleed more easily ("blood thinners") YES    Have you ever experienced a rapid heartbeat with epinephrine? No    Have you ever been treated with "gold" (gold sodium thiomalate) therapy? No    [facility] can help with wrinkles, "laugh lines," facial volume loss, "double chin," "love handles," age spots, and more  Are you interested in learning today about some of the skin enhancement procedures that we offer? (If Yes, please provide more detail) No    Review of Systems:  Have you recently had or currently have any of the following?     · Fever or chills: No  · Night Sweats: No  · Headaches: YES  · Weight Gain: No  · Weight Loss: No  · Blurry Vision: No  · Nausea: No  · Vomiting: No  · Diarrhea: No  · Blood in Stool: No  · Abdominal Pain: No  · Itchy Skin: YES  · Painful Joints: No  · Swollen Joints: YES  · Muscle Pain: No  · Irregular Mole: YES  · Sun Burn: No  · Dry Skin: YES  · Skin Color Changes: No  · Scar or Keloid: No  · Cold Sores/Fever Blisters: No  · Bacterial Infections/MRSA: No  · Anxiety: No  · Depression: YES, family doctor  · Suicidal or Homicidal Thoughts: No      PHYSICAL EXAM:      Was a chaperone (Derm Clinical Assistant) present for the entirety of the Physical Exam? YES    Did the Dermatology Team specifically ask and  the patient on the importance of a Full Skin Exam to be sure that nothing is missed clinically? YES    Did the patient request or accept a Full Skin Exam?  YES    Did the patient specifically refuse to have the areas "under-the-bra" examined by the Dermatologist? Yes    Did the patient specifically refuse to have the areas "under-the-underwear" examined by the Dermatologist? YES      CONSTITUTIONAL:   Vitals:    01/30/20 1127   Temp: 99 4 °F (37 4 °C)   TempSrc: Tympanic   Weight: 134 kg (295 lb 6 4 oz)   Height: 5' 8" (1 727 m)       PSYCH: Normal mood and affect  EYES: Normal conjunctiva  ENT: Normal lips and oral mucosa  CARDIOVASCULAR: No edema  RESPIRATORY: Normal respirations  HEME/LYMPH/IMMUNO:  No regional lymphadenopathy except as noted below in [facility] (SKIN)  Hair, Scalp, Ears, Face Normal except as noted below in Assessment   Neck, Cervical Chain Nodes Normal except as noted below in Assessment   Right Arm/Hand/Fingers Normal except as noted below in Assessment   Left Arm/Hand/Fingers Normal except as noted below in Assessment   Chest/Breasts/Axillae Viewed areas Normal except as noted below in Assessment   Abdomen, Umbilicus Normal except as noted below in Assessment   Back/Spine Normal except as noted below in Assessment       Right Leg, Foot, Toes Normal except as noted below in Assessment   Left Leg, Foot, Toes Normal except as noted below in Assessment        ASSESSMENT AND PLAN BY DIAGNOSIS:    History of Present Condition:     Duration:  How long has this been an issue for you? o  weeks ago   Location Affected:  Where on the body is this affecting you?    o  Finger, arms, thighs, and legs   Quality:  Is there any bleeding, pain, itch, burning/irritation, or redness associated with the skin lesion?     o  Bleeding, irritation, scratching , really itchy   Severity:  Describe any bleeding, pain, itch, burning/irritation, or redness on a scale of 1 to 10 (with 10 being the worst)  o  9   Timing:  Does this condition seem to be there pretty constantly or do you notice it more at specific times throughout the day?    o  Constantly all day     Context:  Have you ever noticed that this condition seems to be associated with specific activities you do?    o  Denies   Modifying Factors:    o Anything that seems to make the condition worse?    -  Denies  o What have you tried to do to make the condition better? -  Hydrocortisone    Associated Signs and Symptoms:  Does this skin lesion seem to be associated with any of the following:  o  DERM ASSOCIATED SIGNS AND SYMPTOMS: Redness, Itching and Scratching, Bleeding, Skin color changes and Crusting       1  VERRUCA VULGARIS ("Common Warts")    Physical Exam:   Anatomic Location Affected:  Left anterior hair line   Morphological Description:  verrucous papule   Pertinent Positives:   Pertinent Negatives: Additional History of Present Condition:  Present for a while  Assessment and Plan:  Based on a thorough discussion of this condition and the management approach to it (including a comprehensive discussion of the known risks, side effects and potential benefits of treatment), the patient (family) agrees to implement the following specific plan:   cryotherapy    Verruca Vulgaris  A verruca is a common growth of the skin caused by infection by human papilloma virus (HPV)  There are many strains of the virus that cause different types of warts on the body  The virus infects the most superficial layers of the skin, causing increased production of skin cells and thickening  Warts can be spread through direct contact with infected skin and may spread to other parts of the body if scratched or picked  A verruca is more commonly called a "wart " Warts are particularly common in school-aged children but can arise at any age   Patients who have a history of eczema are especially prone due to impaired skin barrier  Those taking immunosuppressive drugs or with HIV infections may experience prolonged symptoms despite treatment  Warts generally have a rough surface with a tiny black dot sometimes observed in the middle of each scaly spot  They can range in size from a small bump to large scaly growths  Common warts are often found on the backs of fingers or toes, around the nails, and on the knees  Plantar warts can grow inwardly on the soles of the feet causing pain  There are many possible ways to treat warts and sometimes several different treatments are needed to get the warts to go away completely  There is no single perfect treatment for warts, and successful treatment can take many months  In-office treatments usually require multiple visits, and include:  4) Cryotherapy  a cold spray with liquid nitrogen will destroy the infected cells but may lead to discomfort and blistering  It may also leave a permanent white sage or scar  5) Electrosurgery (curettage and cautery) can be used for large resistant warts which involves shaving the growth down and burning the base  It is performed under local anesthesia and may leave a permanent scar    6) Candida (yeast) antigen injections  These are extracts of the common yeast (Candida) that cannot cause an infection  The medication is injected into/under the wart  It is thought to stimulate the immune system to recognize the wart virus and attack it  Multiple injections are often needed about one month apart  There are also several at-home wart treatments:    5) Soak the warts in warm water for 5 minutes every night followed by gentle filing with a nail file or pumice stone  6) Topical salicylic acid or similar compounds work by removing the dead surface skin cells  a  Apply the medicine directly to the wart, wait for it to dry completely, then cover with duct tape overnight   b   Repeat until the wart is gone, which can take 2-4 months  c  Do not use on the face or groin area   d  If the wart paint makes the skin sore, stop treatment until the discomfort has settled, then recommence as above   e  Take care to keep the chemical off normal skin  7) Podophyllin is a cytotoxic agent used in some products and must not be used in pregnancy or women considering pregnancy  8) Some prescription medications include   a  Topical retinoids (adapalene, tretinoin, tazarotene), 5-fluorouracil (Efudex) or imiquimod (Aldara) creams are sometimes used to treat flat warts or warts on the face and other sensitive anatomical areas  They are usually applied directly to the warts once a day for 2-4 months and can be irritating  These treatments should only be used as directed by your health care provider  b  Systemic treatment with oral cimetidine (Tagamet) may help boost the immune system against the wart virus in patients, some of the time  Initiation of cimetidine therapy should ONLY be done under the supervision of your health care provider, who can discuss possible side effects and drug-to-drug interactions of this specific treatment  PROCEDURE:  DESTRUCTION OF BENIGN LESIONS  After a thorough discussion of treatment options and risk/benefits/alternatives (including but not limited to local pain, scarring, dyspigmentation, blistering, and possible superinfection), verbal and written consent were obtained and the aforementioned lesions were treated on with cryotherapy using liquid nitrogen x 1 cycle for 5-10 seconds   TOTAL NUMBER of 1 lesions were treated today on the ANATOMIC LOCATION: left  Anterior scalp  The patient tolerated the procedure well, and after-care instructions were provided        2  DERMATOGRAPHISM     Physical Exam:   (Anatomic Location); (Size and Morphological Description); (Differential Diagnosis):  o Bilateral legs, arms, and hands prominent Dermatographism no primary dermatitis prominent wheel and flare response with stroking of skin Differential diagnosis dermal statists   Pertinent Positives:   Pertinent Negatives: No lymphadenopathy      Additional History of Present Condition:  Patient states his body has been itching non stop for several weeks and his legs been itching with a rash for over a year  Family doctor prescribed vaisone cream but did not begin topical cream yet  He's been using hydrocortisone to help relieve symptoms of rash and itching,     Assessment and Plan:  Based on a thorough discussion of this condition and the management approach to it (including a comprehensive discussion of the known risks, side effects and potential benefits of treatment), the patient (family) agrees to implement the following specific plan:   Prescribed Triamcinolone 0 1% ointment  Apply topically to affected areas Twice  a day for two weeks interval    Recommend patient to use Allegra (regular allegra) but must check with  cardiologist first if its ok     Referred patient to see a hematologist         Scribe Attestation    I,:   Keenan Chanel MA am acting as a scribe while in the presence of the attending physician :        I,:   Elisha Barrios MD personally performed the services described in this documentation    as scribed in my presence :

## 2020-01-30 NOTE — PATIENT INSTRUCTIONS
1  VERRUCA VULGARIS ("Common Warts")      Assessment and Plan:  Based on a thorough discussion of this condition and the management approach to it (including a comprehensive discussion of the known risks, side effects and potential benefits of treatment), the patient (family) agrees to implement the following specific plan:   cryotherapy    Verruca Vulgaris  A verruca is a common growth of the skin caused by infection by human papilloma virus (HPV)  There are many strains of the virus that cause different types of warts on the body  The virus infects the most superficial layers of the skin, causing increased production of skin cells and thickening  Warts can be spread through direct contact with infected skin and may spread to other parts of the body if scratched or picked  A verruca is more commonly called a "wart " Warts are particularly common in school-aged children but can arise at any age  Patients who have a history of eczema are especially prone due to impaired skin barrier  Those taking immunosuppressive drugs or with HIV infections may experience prolonged symptoms despite treatment  Warts generally have a rough surface with a tiny black dot sometimes observed in the middle of each scaly spot  They can range in size from a small bump to large scaly growths  Common warts are often found on the backs of fingers or toes, around the nails, and on the knees  Plantar warts can grow inwardly on the soles of the feet causing pain  There are many possible ways to treat warts and sometimes several different treatments are needed to get the warts to go away completely  There is no single perfect treatment for warts, and successful treatment can take many months  In-office treatments usually require multiple visits, and include:  1) Cryotherapy  a cold spray with liquid nitrogen will destroy the infected cells but may lead to discomfort and blistering  It may also leave a permanent white sage or scar  There are also several at-home wart treatments:    1) Soak the warts in warm water for 5 minutes every night followed by gentle filing with a nail file or pumice stone  2) Topical salicylic acid or similar compounds work by removing the dead surface skin cells  a  Apply the medicine directly to the wart, wait for it to dry completely, then cover with duct tape overnight   b  Repeat until the wart is gone, which can take 2-4 months  c  Do not use on the face or groin area   d  If the wart paint makes the skin sore, stop treatment until the discomfort has settled, then recommence as above   e  Take care to keep the chemical off normal skin  3) Podophyllin is a cytotoxic agent used in some products and must not be used in pregnancy or women considering pregnancy  4) Some prescription medications include   a  Topical retinoids (adapalene, tretinoin, tazarotene), 5-fluorouracil (Efudex) or imiquimod (Aldara) creams are sometimes used to treat flat warts or warts on the face and other sensitive anatomical areas  They are usually applied directly to the warts once a day for 2-4 months and can be irritating  These treatments should only be used as directed by your health care provider  b  Systemic treatment with oral cimetidine (Tagamet) may help boost the immune system against the wart virus in patients, some of the time  Initiation of cimetidine therapy should ONLY be done under the supervision of your health care provider, who can discuss possible side effects and drug-to-drug interactions of this specific treatment       PROCEDURE:  DESTRUCTION OF BENIGN LESIONS  After a thorough discussion of treatment options and risk/benefits/alternatives (including but not limited to local pain, scarring, dyspigmentation, blistering, and possible superinfection), verbal and written consent were obtained and the aforementioned lesions were treated on with cryotherapy using liquid nitrogen x 1 cycle for 5-10 seconds  The patient tolerated the procedure well, and after-care instructions were provided  2  RASH      Assessment and Plan:  Based on a thorough discussion of this condition and the management approach to it (including a comprehensive discussion of the known risks, side effects and potential benefits of treatment), the patient (family) agrees to implement the following specific plan:   Prescribed Triamcinolone 0 1% ointment  Apply topically to affected areas Twice a day for two weeks interval    Recommend patient to use Allegra (regular allegra) but must check with cardiologist first if its ok   Referred patient to see a hematologist to make sure there's no abnormal blood results concerning with his medications

## 2020-01-31 ENCOUNTER — OFFICE VISIT (OUTPATIENT)
Dept: CARDIOLOGY CLINIC | Facility: CLINIC | Age: 70
End: 2020-01-31
Payer: MEDICARE

## 2020-01-31 VITALS
HEART RATE: 62 BPM | DIASTOLIC BLOOD PRESSURE: 90 MMHG | BODY MASS INDEX: 45.01 KG/M2 | SYSTOLIC BLOOD PRESSURE: 160 MMHG | HEIGHT: 68 IN | WEIGHT: 297 LBS

## 2020-01-31 DIAGNOSIS — Z98.890 S/P ABLATION OF ATRIAL FIBRILLATION: ICD-10-CM

## 2020-01-31 DIAGNOSIS — Z86.79 S/P ABLATION OF ATRIAL FIBRILLATION: ICD-10-CM

## 2020-01-31 DIAGNOSIS — E66.01 MORBID OBESITY (HCC): ICD-10-CM

## 2020-01-31 DIAGNOSIS — I10 ESSENTIAL HYPERTENSION: ICD-10-CM

## 2020-01-31 DIAGNOSIS — I48.19 OTHER PERSISTENT ATRIAL FIBRILLATION (HCC): Primary | ICD-10-CM

## 2020-01-31 DIAGNOSIS — L30.8 OTHER ECZEMA: ICD-10-CM

## 2020-01-31 DIAGNOSIS — F19.982 DRUG-INDUCED INSOMNIA (HCC): ICD-10-CM

## 2020-01-31 DIAGNOSIS — Z79.01 ANTICOAGULATION ADEQUATE: ICD-10-CM

## 2020-01-31 DIAGNOSIS — Z79.899 LONG TERM CURRENT USE OF ANTIARRHYTHMIC DRUG: ICD-10-CM

## 2020-01-31 PROCEDURE — 99214 OFFICE O/P EST MOD 30 MIN: CPT | Performed by: INTERNAL MEDICINE

## 2020-01-31 PROCEDURE — 1036F TOBACCO NON-USER: CPT | Performed by: INTERNAL MEDICINE

## 2020-01-31 PROCEDURE — 1111F DSCHRG MED/CURRENT MED MERGE: CPT | Performed by: INTERNAL MEDICINE

## 2020-01-31 PROCEDURE — 3080F DIAST BP >= 90 MM HG: CPT | Performed by: INTERNAL MEDICINE

## 2020-01-31 PROCEDURE — 4040F PNEUMOC VAC/ADMIN/RCVD: CPT | Performed by: INTERNAL MEDICINE

## 2020-01-31 PROCEDURE — 3077F SYST BP >= 140 MM HG: CPT | Performed by: INTERNAL MEDICINE

## 2020-01-31 PROCEDURE — 3044F HG A1C LEVEL LT 7.0%: CPT | Performed by: INTERNAL MEDICINE

## 2020-01-31 PROCEDURE — 1160F RVW MEDS BY RX/DR IN RCRD: CPT | Performed by: INTERNAL MEDICINE

## 2020-01-31 PROCEDURE — 2022F DILAT RTA XM EVC RTNOPTHY: CPT | Performed by: INTERNAL MEDICINE

## 2020-01-31 RX ORDER — CLONIDINE HYDROCHLORIDE 0.1 MG/1
0.1 TABLET ORAL ONCE
Qty: 90 TABLET | Refills: 3 | Status: SHIPPED | OUTPATIENT
Start: 2020-01-31 | End: 2020-05-26 | Stop reason: SDUPTHER

## 2020-01-31 RX ORDER — CLONIDINE HYDROCHLORIDE 0.1 MG/1
0.1 TABLET ORAL ONCE
Qty: 90 TABLET | Refills: 3 | Status: SHIPPED | OUTPATIENT
Start: 2020-01-31 | End: 2020-01-31 | Stop reason: SDUPTHER

## 2020-01-31 NOTE — PATIENT INSTRUCTIONS
Ok to take allegra     Follow up 3 months with me    Clonidine 0 1 mg 30 minutes to one hour prior to bed

## 2020-02-02 ENCOUNTER — TELEPHONE (OUTPATIENT)
Dept: OTHER | Facility: OTHER | Age: 70
End: 2020-02-02

## 2020-02-03 PROBLEM — Z79.899 LONG TERM CURRENT USE OF ANTIARRHYTHMIC DRUG: Status: ACTIVE | Noted: 2020-02-03

## 2020-02-03 PROBLEM — F19.982 DRUG-INDUCED INSOMNIA (HCC): Status: ACTIVE | Noted: 2020-02-03

## 2020-02-03 PROBLEM — Z86.79 S/P ABLATION OF ATRIAL FIBRILLATION: Status: ACTIVE | Noted: 2020-02-03

## 2020-02-03 PROBLEM — Z98.890 S/P ABLATION OF ATRIAL FIBRILLATION: Status: ACTIVE | Noted: 2020-02-03

## 2020-02-03 PROCEDURE — 93000 ELECTROCARDIOGRAM COMPLETE: CPT | Performed by: INTERNAL MEDICINE

## 2020-02-03 NOTE — PROGRESS NOTES
EPS Progress Note - Fadumo Marcano  68 y o  male MRN: [id number]           ASSESSMENT:  1  Other persistent atrial fibrillation  POCT ECG   2  Essential hypertension  cloNIDine (CATAPRES) 0 1 mg tablet    DISCONTINUED: cloNIDine (CATAPRES) 0 1 mg tablet   3  Other eczema     4  Anticoagulation adequate     5  Morbid obesity ([facility] )     6  Long term current use of antiarrhythmic drug     7  S/P ablation of atrial fibrillation     8  Drug-induced insomnia ([facility] )             PLAN:   1  Symptomatic persistent atrial fibrillation -maintaining sinus rhythm status post ablation and tolerating dofetilide therapy with the exception of insomnia     2  Anticoagulation on appropriately dosed Xarelto    3  Obesity encouraged patient to lose weight he is working on it he is going away to [facility] for a few months and hopefully he will exercise more    4  Antiarrhythmic therapy EKG acceptable on dofetilide  millisecond    5  Hypertension borderline control he tends to have white coat hypertension some of his readings are normal at home    6  Insomnia trial of clonidine to be taken 30 minutes to 1-1/2 hour prior to bedtime to help with his hypertension and his insomnia    Follow-up 3-4 months    HPI:   Interim history He underwent an atrial fibrillation ablation and loop recorder implantation 12/03/2019  He was started on dofetilide  He believes the dofetilide is causing insomnia  He is not experiencing shortness of breath or chest pain lightheadedness or dizziness  No palpitation  ROS:   12 point ROS negative with the exception of insomnia       Objective:     Vitals: Blood pressure 160/90, pulse 62, height 5' 7 5" (1 715 m), weight 135 kg (297 lb)  , Body mass index is 45 83 kg/m²  ,        Physical Exam:    GEN: Fadumo Marcano   appears well, alert and oriented x 3, pleasant and cooperative   HEENT: pupils equal, round, and reactive to light; extraocular muscles intact  NECK: supple, no carotid bruits   HEART: regular rhythm, normal S1 and S2, no murmurs, clicks, gallops or rubs   LUNGS: clear to auscultation bilaterally; no wheezes, rales, or rhonchi   ABDOMEN: normal bowel sounds, soft, no tenderness, no distention  EXTREMITIES: peripheral pulses normal; no clubbing, cyanosis, or edema  NEURO: no focal findings   SKIN: normal without suspicious lesions on exposed skin    Medications:      Current Outpatient Medications:     allopurinol (ZYLOPRIM) 300 mg tablet, TAKE 1 TABLET BY MOUTH  DAILY, Disp: 90 tablet, Rfl: 3    amLODIPine (NORVASC) 10 mg tablet, Take 1 tablet (10 mg total) by mouth daily, Disp: , Rfl:     dofetilide (TIKOSYN) 250 mcg capsule, Take 1 capsule (250 mcg total) by mouth every 12 (twelve) hours, Disp: 180 capsule, Rfl: 3    FLUoxetine (PROzac) 40 MG capsule, Take 1 capsule (40 mg total) by mouth daily, Disp: 30 capsule, Rfl: 0    furosemide (LASIX) 40 mg tablet, Take 1 tablet (40 mg total) by mouth daily, Disp: 30 tablet, Rfl: 0    lidocaine (XYLOCAINE) 5 % ointment, 2 (two) times a day as needed , Disp: , Rfl:     losartan (COZAAR) 100 MG tablet, Take 1 tablet (100 mg total) by mouth daily, Disp: 90 tablet, Rfl: 3    nebivolol (BYSTOLIC) 5 mg tablet, Take 1 tablet (5 mg total) by mouth daily, Disp: 30 tablet, Rfl: 0    omeprazole (PriLOSEC) 40 MG capsule, TAKE 1 CAPSULE BY MOUTH  EVERY DAY, Disp: 90 capsule, Rfl: 3    potassium chloride SA (KLOR-CON M15) 15 MEQ tablet, Take 2 tablets (30 mEq total) by mouth daily, Disp: 180 tablet, Rfl: 3    pravastatin (PRAVACHOL) 10 mg tablet, TAKE 1 TABLET BY MOUTH  DAILY AT BEDTIME, Disp: 90 tablet, Rfl: 3    triamcinolone (KENALOG) 0 1 % ointment, Apply topically TWICE a day to affected areas in Weeks interval , Disp: 90 g, Rfl: 6    XARELTO 20 MG tablet, TAKE 1 TABLET BY MOUTH  DAILY WITH BREAKFAST, Disp: 90 tablet, Rfl: 3    betamethasone valerate (VALISONE) 0 1 % cream, Apply topically 2 (two) times a day (Patient not taking: Reported on 1/31/2020), Disp: 30 g, Rfl: 0    cloNIDine (CATAPRES) 0 1 mg tablet, Take 1 tablet (0 1 mg total) by mouth once for 1 dose One hour prior to bed, Disp: 90 tablet, Rfl: 3     Family History   Problem Relation Age of Onset    Diabetes Mother     Heart attack Mother     Hypertension Mother     Heart attack Sister      Social History     Socioeconomic History    Marital status: /Civil Union     Spouse name: Not on file    Number of children: Not on file    Years of education: Not on file    Highest education level: Not on file   Occupational History    Occupation: Disability    Occupation:      Comment: significant asbestos and silica exposure in the past   Social Needs    Financial resource strain: Not on file    Food insecurity:     Worry: Not on file     Inability: Not on file   Clipmarks needs:     Medical: Not on file     Non-medical: Not on file   Tobacco Use    Smoking status: Never Smoker    Smokeless tobacco: Never Used   Substance and Sexual Activity    Alcohol use:  Yes     Alcohol/week: 3 0 - 4 0 standard drinks     Types: 3 - 4 Glasses of wine per week    Drug use: Never    Sexual activity: Not Currently   Lifestyle    Physical activity:     Days per week: Not on file     Minutes per session: Not on file    Stress: Not on file   Relationships    Social connections:     Talks on phone: Not on file     Gets together: Not on file     Attends Sabianist service: Not on file     Active member of club or organization: Not on file     Attends meetings of clubs or organizations: Not on file     Relationship status: Not on file    Intimate partner violence:     Fear of current or ex partner: Not on file     Emotionally abused: Not on file     Physically abused: Not on file     Forced sexual activity: Not on file   Other Topics Concern    Not on file   Social History Narrative    4 cups of coffee/day     Social History     Tobacco Use   Smoking Status Never Smoker   Smokeless Tobacco Never Used     Social History     Substance and Sexual Activity   Alcohol Use Yes    Alcohol/week: 3 0 - 4 0 standard drinks    Types: 3 - 4 Glasses of wine per week       Labs & Results:  Below is the patient's most recent value for Albumin, ALT, AST, BUN, Calcium, Chloride, Cholesterol, CO2, Creatinine, GFR, Glucose, HDL, Hematocrit, Hemoglobin, Hemoglobin A1C, LDL, Magnesium, Phosphorus, Platelets, Potassium, PSA, Sodium, Triglycerides, and WBC  Lab Results   Component Value Date    ALT 57 10/30/2018    AST 36 10/30/2018    BUN 15 2019    CALCIUM 8 9 2019     2019    CO2 25 2019    CREATININE 1 03 2019    HDL 42 10/31/2018    HCT 38 3 2019    HGB 13 0 2019    HGBA1C 6 1 2020    MG 1 6 2019     2019    K 4 0 2019    TRIG 138 10/31/2018    WBC 16 40 (H) 2019     Note: for a comprehensive list of the patient's lab results, access the Results Review activity  Cardiac testing:   Results for orders placed during the hospital encounter of 10/30/18   Echo complete with contrast if indicated    Narrative [address]  [facility]  [place], [address]  [phone]    Transthoracic Echocardiogram  2D, M-mode, Doppler, and Color Doppler    Study date:  2018    Patient: Kriss Maya  MR number: [id number]  Account number: [de-identified]  : 1950  Age: 77 years  Gender: Male  Status: Inpatient  Location: Bedside  Height: 70 in  Weight: 332 lb  BP: 134/ 98 mmHg    Indications: Atrial fibrillation      Diagnoses: I48 0 - Atrial fibrillation    Sonographer:  Pedro Tidwell RDCS  Primary Physician:  Aislinn Martinez MD  Referring Physician:  Sariah Tapia MD  Group:  [facility]  Interpreting Physician:  Richard Yang DO    SUMMARY    PROCEDURE INFORMATION:  This was a very technically difficult and poor quality study   Echocardiographic views were limited due to decreased penetration and lung interference  Intravenous contrast ( 1 2 mL of definity) was administered  LEFT VENTRICLE:  Systolic function was normal  Ejection fraction was estimated to be 55 %  This study was inadequate for the evaluation of regional wall motion  Wall thickness was mildly increased  LEFT ATRIUM:  The atrium was mildly dilated  RIGHT ATRIUM:  The atrium was poorly visualized  MITRAL VALVE:  Grossly normal but very poorly visualized mitral valve with possible mild annular calcification  AORTIC VALVE:  The valve was not well visualized  TRICUSPID VALVE:  Not well visualized  There was mild to moderate regurgitation  IVC, HEPATIC VEINS:  The inferior vena cava was mildly dilated  HISTORY: PRIOR HISTORY: DM2  Hyperlipidemia  Hypertension  Morbid obesity  CHF  PROCEDURE: The procedure was performed at the bedside  This was a routine study  The transthoracic approach was used  The study included complete 2D imaging, M-mode, complete spectral Doppler, and color Doppler  The heart rate was 102 bpm,  at the start of the study  Intravenous contrast ( 1 2 mL of definity) was administered  Echocardiographic views were limited due to decreased penetration and lung interference  This was a very technically difficult and poor quality study  LEFT VENTRICLE: Size was normal  Systolic function was normal  Ejection fraction was estimated to be 55 %  This study was inadequate for the evaluation of regional wall motion  Wall thickness was mildly increased  DOPPLER: Transmitral flow  pattern: atrial fibrillation  The study was not technically sufficient to allow evaluation of LV diastolic function  RIGHT VENTRICLE: The size was normal  Systolic function was normal  Wall thickness was normal     LEFT ATRIUM: The atrium was mildly dilated  RIGHT ATRIUM: The atrium was poorly visualized      MITRAL VALVE: Grossly normal but very poorly visualized mitral valve with possible mild annular calcification  DOPPLER: There was no evidence for stenosis  There was no significant regurgitation  AORTIC VALVE: The valve was not well visualized  DOPPLER: Transaortic velocity was within the normal range  There was no evidence for stenosis  There was no significant regurgitation, by limited doppler study  TRICUSPID VALVE: Not well visualized  DOPPLER: There was mild to moderate regurgitation  PULMONIC VALVE: Not well visualized  PERICARDIUM: There was no pericardial effusion  AORTA: The root exhibited normal size  SYSTEMIC VEINS: IVC: The inferior vena cava was mildly dilated  Respirophasic changes were normal     SYSTEM MEASUREMENT TABLES    2D  %FS: 40 6 %  Ao Diam: 4 1 cm  EDV(Teich): 136 4 ml  EF(Teich): 70 9 %  ESV(Teich): 39 8 ml  IVSd: 1 7 cm  LA Area: 40 8 cm2  LA Diam: 5 1 cm  LVIDd: 5 3 cm  LVIDs: 3 2 cm  LVPWd: 1 7 cm  RA Area: 30 2 cm2  SV(Teich): 96 6 ml    [facility]    Prepared and electronically signed by    Kosta Bob DO  Signed 2018 12:13:25       Results for orders placed during the hospital encounter of 19   SANDRITA    Narrative [address]  [facility], [address] [facility]  [phone]    Transesophageal Echocardiogram  Limited 2D, Doppler, and Color Doppler    Study date:  03-Dec-2019    Patient: Madonna Gil  MR number: [id number]  Account number: [de-identified]  : 1950  Age: 68 years  Gender: Male  Status: Inpatient  Location: Cath lab  Height: 67 in  Weight: 320 lb  BP: 138/ 82 mmHg    Indications: Evaluate for suspected cardiac source of embolism      Diagnoses: I48 1 - Atrial flutter    Sonographer:  MAYI Win  Interpreting Physician:  Kusum Sanders DO  Primary Physician:  Kristi Pena MD  Referring Physician:  Kusum Sanders DO  Group:  [facility]    SUMMARY    LEFT VENTRICLE:  Systolic function was at the lower limits of normal by visual assessment  Ejection fraction was estimated to be 50 %  RIGHT VENTRICLE:  The ventricle was mildly dilated  LEFT ATRIUM:  The atrium was mildly to moderately dilated  ATRIAL SEPTUM:  No defect or patent foramen ovale was identified  There was no left-to-right shunt and no right-to-left shunt  MITRAL VALVE:  There was trace regurgitation  TRICUSPID VALVE:  There was trace regurgitation  HISTORY: PRIOR HISTORY: GERD; DM2; HLD; HTN;    PROCEDURE: The procedure was performed in the catheterization laboratory  This was a routine study  The risks and alternatives of the procedure were explained to the patient and informed consent was obtained  The transesophageal approach  was used  The study included limited 2D imaging, limited spectral Doppler, and color Doppler  The heart rate was 75 bpm, at the start of the study  An adult omniplane probe was inserted by the attending cardiologist  Intubated with ease  One intubation attempt(s)  There was no blood detected on the probe  This was a technically difficult study  MEDICATIONS: SBE prophylaxis  General anesthesia administered by anesthesia team     LEFT VENTRICLE: Size was normal  Systolic function was at the lower limits of normal by visual assessment  Ejection fraction was estimated to be 50 %  Wall thickness was normal     RIGHT VENTRICLE: The ventricle was mildly dilated  Systolic function was low normal  Wall thickness was normal     LEFT ATRIUM: The atrium was mildly to moderately dilated  APPENDAGE: No thrombus was identified  ATRIAL SEPTUM: No defect or patent foramen ovale was identified  There was no left-to-right shunt and no right-to-left shunt  MITRAL VALVE: Valve structure was normal  There was normal leaflet separation  There was no echocardiographic evidence of vegetation  DOPPLER: There was trace regurgitation      AORTIC VALVE: The valve was trileaflet  Leaflets exhibited mildly increased thickness, mild calcification, and normal cuspal separation  There was no echocardiographic evidence of vegetation  DOPPLER: There was no regurgitation  TRICUSPID VALVE: The valve structure was normal  There was normal leaflet separation  There was no echocardiographic evidence of vegetation  DOPPLER: There was trace regurgitation  MEASUREMENT TABLES    DOPPLER MEASUREMENTS  Left atrium   (Reference normals)  DARIO peak comfort   50 cm/s   (--)    [facility]    Prepared and electronically signed by    Ozzy Rodriguez DO  Signed 04-Dec-2019 15:12:59       No results found for this or any previous visit  No results found for this or any previous visit

## 2020-02-04 DIAGNOSIS — I10 ESSENTIAL HYPERTENSION: ICD-10-CM

## 2020-02-04 RX ORDER — CLONIDINE HYDROCHLORIDE 0.1 MG/1
TABLET ORAL
Qty: 90 TABLET | Refills: 3 | Status: CANCELLED | OUTPATIENT
Start: 2020-02-04

## 2020-03-04 ENCOUNTER — REMOTE DEVICE CLINIC VISIT (OUTPATIENT)
Dept: CARDIOLOGY CLINIC | Facility: CLINIC | Age: 70
End: 2020-03-04
Payer: MEDICARE

## 2020-03-04 DIAGNOSIS — Z95.818 PRESENCE OF OTHER CARDIAC IMPLANTS AND GRAFTS: Primary | ICD-10-CM

## 2020-03-04 PROCEDURE — 93298 REM INTERROG DEV EVAL SCRMS: CPT | Performed by: INTERNAL MEDICINE

## 2020-03-04 PROCEDURE — G2066 INTER DEVC REMOTE 30D: HCPCS | Performed by: INTERNAL MEDICINE

## 2020-03-04 NOTE — PROGRESS NOTES
MDT LOOP  CARELINK TRANSMISSION: LOOP RECORDER  PRESENTING RHYTHM NSR @ 67 BPM  BATTERY STATUS "OK"  NO PATIENT OR DEVICE ACTIVATED EPISODES  NORMAL DEVICE FUNCTION   DL

## 2020-03-16 DIAGNOSIS — I50.32 CHRONIC DIASTOLIC (CONGESTIVE) HEART FAILURE (HCC): Primary | ICD-10-CM

## 2020-03-16 DIAGNOSIS — E78.00 HYPERCHOLESTEROLEMIA: ICD-10-CM

## 2020-03-16 RX ORDER — PRAVASTATIN SODIUM 10 MG
TABLET ORAL
Qty: 90 TABLET | Refills: 3 | Status: SHIPPED | OUTPATIENT
Start: 2020-03-16 | End: 2021-02-17

## 2020-03-16 RX ORDER — PRAVASTATIN SODIUM 10 MG
10 TABLET ORAL
Qty: 90 TABLET | Refills: 3 | Status: CANCELLED | OUTPATIENT
Start: 2020-03-16

## 2020-03-16 RX ORDER — FUROSEMIDE 20 MG/1
TABLET ORAL
Qty: 90 TABLET | Refills: 3 | Status: SHIPPED | OUTPATIENT
Start: 2020-03-16 | End: 2020-05-26 | Stop reason: ALTCHOICE

## 2020-03-23 ENCOUNTER — TELEPHONE (OUTPATIENT)
Dept: CARDIOLOGY CLINIC | Facility: CLINIC | Age: 70
End: 2020-03-23

## 2020-03-23 NOTE — TELEPHONE ENCOUNTER
Pt called, stated that he had a stinging feeling in legs and he checked his EKG on the [facility]  Says it was irregular, had him send a transmission from his loop   Mar reviewed the transmission and everything was normal      Called pt back, told him as per device clinic his transmission is normal

## 2020-03-31 PROBLEM — I70.0 ATHEROSCLEROSIS OF AORTA (HCC): Status: ACTIVE | Noted: 2020-03-31

## 2020-04-06 ENCOUNTER — TELEMEDICINE (OUTPATIENT)
Dept: FAMILY MEDICINE CLINIC | Facility: CLINIC | Age: 70
End: 2020-04-06
Payer: MEDICARE

## 2020-04-06 VITALS
WEIGHT: 301 LBS | SYSTOLIC BLOOD PRESSURE: 120 MMHG | HEART RATE: 60 BPM | BODY MASS INDEX: 46.45 KG/M2 | DIASTOLIC BLOOD PRESSURE: 70 MMHG

## 2020-04-06 DIAGNOSIS — E11.51 TYPE 2 DIABETES MELLITUS WITH DIABETIC PERIPHERAL ANGIOPATHY WITHOUT GANGRENE, WITHOUT LONG-TERM CURRENT USE OF INSULIN (HCC): ICD-10-CM

## 2020-04-06 DIAGNOSIS — Z98.890 S/P ABLATION OF ATRIAL FIBRILLATION: ICD-10-CM

## 2020-04-06 DIAGNOSIS — I50.32 CHRONIC DIASTOLIC (CONGESTIVE) HEART FAILURE (HCC): ICD-10-CM

## 2020-04-06 DIAGNOSIS — I48.0 PAROXYSMAL ATRIAL FIBRILLATION (HCC): ICD-10-CM

## 2020-04-06 DIAGNOSIS — Z12.5 PROSTATE CANCER SCREENING: ICD-10-CM

## 2020-04-06 DIAGNOSIS — E66.01 MORBID OBESITY (HCC): ICD-10-CM

## 2020-04-06 DIAGNOSIS — I73.9 PERIPHERAL VASCULAR DISEASE (HCC): ICD-10-CM

## 2020-04-06 DIAGNOSIS — I10 ESSENTIAL HYPERTENSION: ICD-10-CM

## 2020-04-06 DIAGNOSIS — Z86.79 S/P ABLATION OF ATRIAL FIBRILLATION: ICD-10-CM

## 2020-04-06 DIAGNOSIS — F32.0 MAJOR DEPRESSIVE DISORDER, SINGLE EPISODE, MILD (HCC): ICD-10-CM

## 2020-04-06 DIAGNOSIS — F32.A MILD DEPRESSIVE DISORDER: Primary | ICD-10-CM

## 2020-04-06 PROCEDURE — 99213 OFFICE O/P EST LOW 20 MIN: CPT | Performed by: FAMILY MEDICINE

## 2020-04-06 RX ORDER — FLUOXETINE HYDROCHLORIDE 40 MG/1
80 CAPSULE ORAL DAILY
Qty: 180 CAPSULE | Refills: 1
Start: 2020-04-06 | End: 2021-07-11 | Stop reason: SDUPTHER

## 2020-04-22 DIAGNOSIS — I48.0 PAROXYSMAL ATRIAL FIBRILLATION (HCC): ICD-10-CM

## 2020-04-22 RX ORDER — DOFETILIDE 0.25 MG/1
250 CAPSULE ORAL EVERY 12 HOURS SCHEDULED
Qty: 180 CAPSULE | Refills: 3 | Status: SHIPPED | OUTPATIENT
Start: 2020-04-22 | End: 2021-06-15

## 2020-05-22 ENCOUNTER — TELEPHONE (OUTPATIENT)
Dept: CARDIOLOGY CLINIC | Facility: CLINIC | Age: 70
End: 2020-05-22

## 2020-05-23 LAB — HBA1C MFR BLD HPLC: 6 %

## 2020-05-26 ENCOUNTER — OFFICE VISIT (OUTPATIENT)
Dept: CARDIOLOGY CLINIC | Facility: CLINIC | Age: 70
End: 2020-05-26
Payer: MEDICARE

## 2020-05-26 VITALS
RESPIRATION RATE: 18 BRPM | BODY MASS INDEX: 45.62 KG/M2 | DIASTOLIC BLOOD PRESSURE: 82 MMHG | HEART RATE: 64 BPM | WEIGHT: 308 LBS | SYSTOLIC BLOOD PRESSURE: 144 MMHG | HEIGHT: 69 IN

## 2020-05-26 DIAGNOSIS — I48.0 PAROXYSMAL ATRIAL FIBRILLATION (HCC): Primary | ICD-10-CM

## 2020-05-26 DIAGNOSIS — E78.2 MIXED HYPERLIPIDEMIA: ICD-10-CM

## 2020-05-26 DIAGNOSIS — I50.32 CHRONIC DIASTOLIC (CONGESTIVE) HEART FAILURE (HCC): ICD-10-CM

## 2020-05-26 DIAGNOSIS — I10 ESSENTIAL HYPERTENSION: ICD-10-CM

## 2020-05-26 PROCEDURE — 3079F DIAST BP 80-89 MM HG: CPT | Performed by: INTERNAL MEDICINE

## 2020-05-26 PROCEDURE — 99214 OFFICE O/P EST MOD 30 MIN: CPT | Performed by: INTERNAL MEDICINE

## 2020-05-26 PROCEDURE — 1036F TOBACCO NON-USER: CPT | Performed by: INTERNAL MEDICINE

## 2020-05-26 PROCEDURE — 3077F SYST BP >= 140 MM HG: CPT | Performed by: INTERNAL MEDICINE

## 2020-05-26 PROCEDURE — 3008F BODY MASS INDEX DOCD: CPT | Performed by: INTERNAL MEDICINE

## 2020-05-26 PROCEDURE — 1160F RVW MEDS BY RX/DR IN RCRD: CPT | Performed by: INTERNAL MEDICINE

## 2020-05-26 PROCEDURE — 93000 ELECTROCARDIOGRAM COMPLETE: CPT | Performed by: INTERNAL MEDICINE

## 2020-05-26 PROCEDURE — 4040F PNEUMOC VAC/ADMIN/RCVD: CPT | Performed by: INTERNAL MEDICINE

## 2020-05-26 PROCEDURE — 4010F ACE/ARB THERAPY RXD/TAKEN: CPT | Performed by: INTERNAL MEDICINE

## 2020-05-26 PROCEDURE — 2022F DILAT RTA XM EVC RTNOPTHY: CPT | Performed by: INTERNAL MEDICINE

## 2020-05-26 PROCEDURE — 3044F HG A1C LEVEL LT 7.0%: CPT | Performed by: INTERNAL MEDICINE

## 2020-05-26 RX ORDER — LOSARTAN POTASSIUM 100 MG/1
100 TABLET ORAL DAILY
Qty: 90 TABLET | Refills: 3 | Status: SHIPPED | OUTPATIENT
Start: 2020-05-26 | End: 2021-05-17

## 2020-05-26 RX ORDER — NEBIVOLOL 5 MG/1
5 TABLET ORAL DAILY
Qty: 90 TABLET | Refills: 3 | Status: SHIPPED | OUTPATIENT
Start: 2020-05-26 | End: 2020-10-12 | Stop reason: SDUPTHER

## 2020-05-26 RX ORDER — FUROSEMIDE 40 MG/1
40 TABLET ORAL DAILY
Qty: 90 TABLET | Refills: 3 | Status: SHIPPED | OUTPATIENT
Start: 2020-05-26 | End: 2021-10-01

## 2020-05-26 RX ORDER — AMLODIPINE BESYLATE 5 MG/1
5 TABLET ORAL DAILY
Qty: 90 TABLET | Refills: 3 | Status: SHIPPED | OUTPATIENT
Start: 2020-05-26 | End: 2020-10-12 | Stop reason: SDUPTHER

## 2020-05-26 RX ORDER — CLONIDINE HYDROCHLORIDE 0.1 MG/1
0.1 TABLET ORAL DAILY
Qty: 90 TABLET | Refills: 3
Start: 2020-05-26 | End: 2020-10-12 | Stop reason: SDUPTHER

## 2020-06-23 ENCOUNTER — REMOTE DEVICE CLINIC VISIT (OUTPATIENT)
Dept: CARDIOLOGY CLINIC | Facility: CLINIC | Age: 70
End: 2020-06-23
Payer: MEDICARE

## 2020-06-23 DIAGNOSIS — Z95.818 PRESENCE OF OTHER CARDIAC IMPLANTS AND GRAFTS: Primary | ICD-10-CM

## 2020-06-23 PROCEDURE — 93298 REM INTERROG DEV EVAL SCRMS: CPT | Performed by: INTERNAL MEDICINE

## 2020-06-23 PROCEDURE — G2066 INTER DEVC REMOTE 30D: HCPCS | Performed by: INTERNAL MEDICINE

## 2020-08-03 ENCOUNTER — TELEPHONE (OUTPATIENT)
Dept: FAMILY MEDICINE CLINIC | Facility: CLINIC | Age: 70
End: 2020-08-03

## 2020-08-03 NOTE — TELEPHONE ENCOUNTER
Notified by  that patient had called twice in last few days to request that we re-code his PSA test he had done at [facility]  Spoke with Patricio Erazo at [facility] coding department - [phone]  Relayed that we had associated the Z12 5 dx on the order; she did not see this come up  She added it on and resubmitted  She stated to tell the patient to disregard his current bill and should he get another one, he should call them and they would figure it out  Advised patient, and told him if we could help further to please give us a call

## 2020-09-02 DIAGNOSIS — E11.51 TYPE 2 DIABETES MELLITUS WITH DIABETIC PERIPHERAL ANGIOPATHY WITHOUT GANGRENE, WITHOUT LONG-TERM CURRENT USE OF INSULIN (HCC): Primary | ICD-10-CM

## 2020-09-22 ENCOUNTER — REMOTE DEVICE CLINIC VISIT (OUTPATIENT)
Dept: CARDIOLOGY CLINIC | Facility: CLINIC | Age: 70
End: 2020-09-22
Payer: MEDICARE

## 2020-09-22 DIAGNOSIS — Z95.818 PRESENCE OF OTHER CARDIAC IMPLANTS AND GRAFTS: Primary | ICD-10-CM

## 2020-09-22 PROCEDURE — G2066 INTER DEVC REMOTE 30D: HCPCS | Performed by: INTERNAL MEDICINE

## 2020-09-22 PROCEDURE — 93298 REM INTERROG DEV EVAL SCRMS: CPT | Performed by: INTERNAL MEDICINE

## 2020-09-22 NOTE — PROGRESS NOTES
MDT LOOP   CARELINK TRANSMISSION: LOOP RECORDER  PRESENTING RHYTHM NSR @ 64 BPM  BATTERY STATUS "OK"  6 AF EPISODES W/ EGRAMS SUGGESTING SR W/ PACs  CAN NOT R/O AF  AF BURDEN - 0%  HX OF PAF  PT TAKES TIKOSYN, [address] [facility]  NO PATIENT ACTIVATED EPISODES  NORMAL DEVICE FUNCTION   DL

## 2020-10-09 ENCOUNTER — OFFICE VISIT (OUTPATIENT)
Dept: SLEEP CENTER | Facility: CLINIC | Age: 70
End: 2020-10-09
Payer: MEDICARE

## 2020-10-09 VITALS
TEMPERATURE: 96.4 F | SYSTOLIC BLOOD PRESSURE: 182 MMHG | BODY MASS INDEX: 43.92 KG/M2 | DIASTOLIC BLOOD PRESSURE: 84 MMHG | HEART RATE: 58 BPM | WEIGHT: 306.8 LBS | OXYGEN SATURATION: 99 % | HEIGHT: 70 IN

## 2020-10-09 DIAGNOSIS — E66.01 MORBID OBESITY (HCC): ICD-10-CM

## 2020-10-09 DIAGNOSIS — I50.32 CHRONIC DIASTOLIC (CONGESTIVE) HEART FAILURE (HCC): ICD-10-CM

## 2020-10-09 DIAGNOSIS — I48.0 PAROXYSMAL ATRIAL FIBRILLATION (HCC): ICD-10-CM

## 2020-10-09 DIAGNOSIS — F41.8 DEPRESSION WITH ANXIETY: ICD-10-CM

## 2020-10-09 DIAGNOSIS — J31.0 CHRONIC RHINITIS: ICD-10-CM

## 2020-10-09 DIAGNOSIS — K21.9 GASTROESOPHAGEAL REFLUX DISEASE WITHOUT ESOPHAGITIS: ICD-10-CM

## 2020-10-09 DIAGNOSIS — I10 ESSENTIAL HYPERTENSION: ICD-10-CM

## 2020-10-09 DIAGNOSIS — G47.33 OSA (OBSTRUCTIVE SLEEP APNEA): Primary | ICD-10-CM

## 2020-10-09 DIAGNOSIS — E11.9 TYPE 2 DIABETES MELLITUS WITHOUT COMPLICATION, WITHOUT LONG-TERM CURRENT USE OF INSULIN (HCC): ICD-10-CM

## 2020-10-09 DIAGNOSIS — G47.9 SLEEP DISTURBANCE: ICD-10-CM

## 2020-10-09 PROCEDURE — 99214 OFFICE O/P EST MOD 30 MIN: CPT | Performed by: INTERNAL MEDICINE

## 2020-10-12 ENCOUNTER — OFFICE VISIT (OUTPATIENT)
Dept: FAMILY MEDICINE CLINIC | Facility: CLINIC | Age: 70
End: 2020-10-12
Payer: MEDICARE

## 2020-10-12 ENCOUNTER — TELEPHONE (OUTPATIENT)
Dept: SLEEP CENTER | Facility: CLINIC | Age: 70
End: 2020-10-12

## 2020-10-12 VITALS
WEIGHT: 310 LBS | SYSTOLIC BLOOD PRESSURE: 170 MMHG | TEMPERATURE: 97.6 F | BODY MASS INDEX: 44.38 KG/M2 | DIASTOLIC BLOOD PRESSURE: 78 MMHG | HEIGHT: 70 IN | OXYGEN SATURATION: 97 % | HEART RATE: 64 BPM | RESPIRATION RATE: 18 BRPM

## 2020-10-12 DIAGNOSIS — F32.A MILD DEPRESSIVE DISORDER: ICD-10-CM

## 2020-10-12 DIAGNOSIS — I70.0 ATHEROSCLEROSIS OF AORTA (HCC): ICD-10-CM

## 2020-10-12 DIAGNOSIS — E66.01 OBESITY, CLASS III, BMI 40-49.9 (MORBID OBESITY) (HCC): ICD-10-CM

## 2020-10-12 DIAGNOSIS — I73.9 PERIPHERAL VASCULAR DISEASE (HCC): ICD-10-CM

## 2020-10-12 DIAGNOSIS — E11.51 TYPE 2 DIABETES MELLITUS WITH DIABETIC PERIPHERAL ANGIOPATHY WITHOUT GANGRENE, WITHOUT LONG-TERM CURRENT USE OF INSULIN (HCC): ICD-10-CM

## 2020-10-12 DIAGNOSIS — K21.9 GASTROESOPHAGEAL REFLUX DISEASE WITHOUT ESOPHAGITIS: ICD-10-CM

## 2020-10-12 DIAGNOSIS — E78.2 MIXED HYPERLIPIDEMIA: ICD-10-CM

## 2020-10-12 DIAGNOSIS — Z86.79 S/P ABLATION OF ATRIAL FIBRILLATION: ICD-10-CM

## 2020-10-12 DIAGNOSIS — E66.01 MORBID OBESITY (HCC): ICD-10-CM

## 2020-10-12 DIAGNOSIS — I10 ESSENTIAL HYPERTENSION: ICD-10-CM

## 2020-10-12 DIAGNOSIS — F41.9 ANXIETY: ICD-10-CM

## 2020-10-12 DIAGNOSIS — I48.0 PAROXYSMAL ATRIAL FIBRILLATION (HCC): ICD-10-CM

## 2020-10-12 DIAGNOSIS — Z98.890 S/P ABLATION OF ATRIAL FIBRILLATION: ICD-10-CM

## 2020-10-12 DIAGNOSIS — Z00.00 MEDICARE ANNUAL WELLNESS VISIT, SUBSEQUENT: Primary | ICD-10-CM

## 2020-10-12 DIAGNOSIS — K76.0 FATTY LIVER: ICD-10-CM

## 2020-10-12 DIAGNOSIS — F32.0 MAJOR DEPRESSIVE DISORDER, SINGLE EPISODE, MILD (HCC): ICD-10-CM

## 2020-10-12 DIAGNOSIS — G47.33 OSA (OBSTRUCTIVE SLEEP APNEA): ICD-10-CM

## 2020-10-12 DIAGNOSIS — I50.32 CHRONIC DIASTOLIC (CONGESTIVE) HEART FAILURE (HCC): ICD-10-CM

## 2020-10-12 DIAGNOSIS — Z23 FLU VACCINE NEED: ICD-10-CM

## 2020-10-12 LAB — SL AMB POCT HEMOGLOBIN AIC: 5.9 (ref ?–6.5)

## 2020-10-12 PROCEDURE — 99214 OFFICE O/P EST MOD 30 MIN: CPT | Performed by: FAMILY MEDICINE

## 2020-10-12 PROCEDURE — G0008 ADMIN INFLUENZA VIRUS VAC: HCPCS

## 2020-10-12 PROCEDURE — 90662 IIV NO PRSV INCREASED AG IM: CPT

## 2020-10-12 PROCEDURE — G0439 PPPS, SUBSEQ VISIT: HCPCS | Performed by: FAMILY MEDICINE

## 2020-10-12 PROCEDURE — 83036 HEMOGLOBIN GLYCOSYLATED A1C: CPT | Performed by: FAMILY MEDICINE

## 2020-10-12 RX ORDER — LORAZEPAM 0.5 MG/1
0.5 TABLET ORAL EVERY 8 HOURS PRN
Qty: 30 TABLET | Refills: 1 | Status: SHIPPED | OUTPATIENT
Start: 2020-10-12 | End: 2021-07-26 | Stop reason: ALTCHOICE

## 2020-10-12 RX ORDER — CLONIDINE HYDROCHLORIDE 0.2 MG/1
0.2 TABLET ORAL DAILY
Qty: 90 TABLET | Refills: 3 | Status: SHIPPED | OUTPATIENT
Start: 2020-10-12 | End: 2021-03-01 | Stop reason: SDUPTHER

## 2020-10-12 RX ORDER — AMLODIPINE BESYLATE 5 MG/1
10 TABLET ORAL DAILY
Qty: 90 TABLET | Refills: 3
Start: 2020-10-12 | End: 2021-01-20 | Stop reason: SDUPTHER

## 2020-10-12 RX ORDER — NEBIVOLOL 10 MG/1
10 TABLET ORAL DAILY
Qty: 90 TABLET | Refills: 3 | Status: SHIPPED | OUTPATIENT
Start: 2020-10-12 | End: 2021-04-23 | Stop reason: SDUPTHER

## 2020-11-30 DIAGNOSIS — I48.0 PAROXYSMAL ATRIAL FIBRILLATION (HCC): ICD-10-CM

## 2020-11-30 RX ORDER — RIVAROXABAN 20 MG/1
TABLET, FILM COATED ORAL
Qty: 90 TABLET | Refills: 3 | Status: SHIPPED | OUTPATIENT
Start: 2020-11-30 | End: 2021-10-31

## 2020-12-22 DIAGNOSIS — M1A.00X0 IDIOPATHIC CHRONIC GOUT WITHOUT TOPHUS, UNSPECIFIED SITE: ICD-10-CM

## 2020-12-22 RX ORDER — ALLOPURINOL 300 MG/1
TABLET ORAL DAILY
Qty: 90 TABLET | Refills: 3 | Status: SHIPPED | OUTPATIENT
Start: 2020-12-22 | End: 2022-01-02

## 2020-12-28 ENCOUNTER — REMOTE DEVICE CLINIC VISIT (OUTPATIENT)
Dept: CARDIOLOGY CLINIC | Facility: CLINIC | Age: 70
End: 2020-12-28
Payer: MEDICARE

## 2020-12-28 DIAGNOSIS — Z95.818 PRESENCE OF OTHER CARDIAC IMPLANTS AND GRAFTS: Primary | ICD-10-CM

## 2020-12-28 PROCEDURE — 93296 REM INTERROG EVL PM/IDS: CPT | Performed by: INTERNAL MEDICINE

## 2020-12-28 PROCEDURE — 93298 REM INTERROG DEV EVAL SCRMS: CPT | Performed by: INTERNAL MEDICINE

## 2021-01-15 DIAGNOSIS — K21.9 GASTROESOPHAGEAL REFLUX DISEASE WITHOUT ESOPHAGITIS: ICD-10-CM

## 2021-01-15 DIAGNOSIS — I48.0 PAROXYSMAL ATRIAL FIBRILLATION (HCC): ICD-10-CM

## 2021-01-17 RX ORDER — POTASSIUM CHLORIDE 1125 MG/1
TABLET, EXTENDED RELEASE ORAL
Qty: 180 TABLET | Refills: 3 | Status: SHIPPED | OUTPATIENT
Start: 2021-01-17 | End: 2022-04-04 | Stop reason: SDUPTHER

## 2021-01-19 RX ORDER — OMEPRAZOLE 40 MG/1
CAPSULE, DELAYED RELEASE ORAL
Qty: 90 CAPSULE | Refills: 3 | Status: SHIPPED | OUTPATIENT
Start: 2021-01-19 | End: 2022-04-04 | Stop reason: SDUPTHER

## 2021-01-20 ENCOUNTER — TELEMEDICINE (OUTPATIENT)
Dept: FAMILY MEDICINE CLINIC | Facility: CLINIC | Age: 71
End: 2021-01-20
Payer: MEDICARE

## 2021-01-20 VITALS — SYSTOLIC BLOOD PRESSURE: 145 MMHG | DIASTOLIC BLOOD PRESSURE: 84 MMHG

## 2021-01-20 DIAGNOSIS — K76.0 FATTY LIVER: ICD-10-CM

## 2021-01-20 DIAGNOSIS — E11.51 TYPE 2 DIABETES MELLITUS WITH DIABETIC PERIPHERAL ANGIOPATHY WITHOUT GANGRENE, WITHOUT LONG-TERM CURRENT USE OF INSULIN (HCC): Primary | ICD-10-CM

## 2021-01-20 DIAGNOSIS — I48.0 PAROXYSMAL ATRIAL FIBRILLATION (HCC): ICD-10-CM

## 2021-01-20 DIAGNOSIS — I10 ESSENTIAL HYPERTENSION: ICD-10-CM

## 2021-01-20 DIAGNOSIS — G47.33 OSA (OBSTRUCTIVE SLEEP APNEA): ICD-10-CM

## 2021-01-20 DIAGNOSIS — K21.9 GASTROESOPHAGEAL REFLUX DISEASE WITHOUT ESOPHAGITIS: ICD-10-CM

## 2021-01-20 DIAGNOSIS — F32.0 MAJOR DEPRESSIVE DISORDER, SINGLE EPISODE, MILD (HCC): ICD-10-CM

## 2021-01-20 DIAGNOSIS — I73.9 PERIPHERAL VASCULAR DISEASE (HCC): ICD-10-CM

## 2021-01-20 DIAGNOSIS — Z79.899 LONG TERM CURRENT USE OF ANTIARRHYTHMIC DRUG: ICD-10-CM

## 2021-01-20 DIAGNOSIS — I50.32 CHRONIC DIASTOLIC (CONGESTIVE) HEART FAILURE (HCC): ICD-10-CM

## 2021-01-20 DIAGNOSIS — E78.2 MIXED HYPERLIPIDEMIA: ICD-10-CM

## 2021-01-20 DIAGNOSIS — E66.01 MORBID OBESITY (HCC): ICD-10-CM

## 2021-01-20 PROBLEM — H33.312 HORSESHOE TEAR OF RETINA OF LEFT EYE WITHOUT DETACHMENT: Status: ACTIVE | Noted: 2018-11-28

## 2021-01-20 PROCEDURE — 99214 OFFICE O/P EST MOD 30 MIN: CPT | Performed by: FAMILY MEDICINE

## 2021-01-20 RX ORDER — AMLODIPINE BESYLATE 5 MG/1
TABLET ORAL
Qty: 135 TABLET | Refills: 3
Start: 2021-01-20 | End: 2021-01-21 | Stop reason: SDUPTHER

## 2021-01-20 NOTE — PROGRESS NOTES
Virtual Regular Visit      Assessment/Plan:    Problem List Items Addressed This Visit        Digestive    Gastroesophageal reflux disease without esophagitis     Reflux is stable at this time  Fatty liver     Unfortunately, he has gained some weight  Keep an eye on his LFTs upon return  Work on weight loss  Endocrine    Type 2 diabetes mellitus with diabetic peripheral angiopathy without gangrene [facility]) - Primary       Lab Results   Component Value Date    HGBA1C 5 9 10/12/2020   He will be getting blood work done upon his return from [facility]  Respiratory    MARISSA (obstructive sleep apnea)     He will continue on CPAP            Cardiovascular and Mediastinum    Essential hypertension     We are going to try to bump his amlodipine up to 7 5 mg as he did well as far as blood pressure control and 10 mg but did have some increased persistent lower extremity swelling  He may certainly stop this higher dose is swelling comes back         Relevant Medications    amLODIPine (NORVASC) 5 mg tablet    Chronic diastolic (congestive) heart failure (HCC)     Wt Readings from Last 3 Encounters:   10/12/20 (!) 141 kg (310 lb)   10/09/20 (!) 139 kg (306 lb 12 8 oz)   10/08/20 (!) 139 kg (307 lb)       He appears to be stable at this time clinically  He has gained some weight but notes his diet has been poor  Relevant Medications    amLODIPine (NORVASC) 5 mg tablet    A-fib (HCC)    Relevant Medications    amLODIPine (NORVASC) 5 mg tablet    Peripheral vascular disease (HCC)       Other    Major depressive disorder, single episode, mild ([address] )     He seems to be stable at this time  Hyperlipidemia     Continue on pravastatin  Morbid obesity ([address] )     Work on weight loss  He is going to try to get a COVID vaccine out in [facility]           Long term current use of antiarrhythmic drug               Reason for visit is   Chief Complaint   Patient presents with    Virtual Regular Visit        Encounter provider Kristie Huggins MD    Provider located at [address] [place] [address] Dr Jha [id number]      Recent Visits  No visits were found meeting these conditions  Showing recent visits within past 7 days and meeting all other requirements     Today's Visits  Date Type Provider Dept   01/20/21 Telemedicine Kristie Huggins MD [facility]   Showing today's visits and meeting all other requirements     Future Appointments  No visits were found meeting these conditions  Showing future appointments within next 150 days and meeting all other requirements        The patient was identified by name and date of birth  Noraskyler Kim  was informed that this is a telemedicine visit and that the visit is being conducted through zerved and patient was informed that this is a secure, HIPAA-compliant platform  He agrees to proceed     My office door was closed  No one else was in the room  He acknowledged consent and understanding of privacy and security of the video platform  The patient has agreed to participate and understands they can discontinue the visit at any time  Patient is aware this is a billable service  Cierra Vizcarra  is a 65 y o  male    HPI the patient presents today for follow-up for multiple chronic health issues  He did have some significant swelling on amlodipine 10 mg daily but did note significant improvement in his blood pressure readings  He currently denies headache, blurry vision, chest pain or shortness of breath  He has a history of atrial fibrillation remains on anticoagulation as well as Bystolic  He does note his pulse was in the 50s on occasion  He denies excessive lightheadedness  He has had no presyncopal episode  He has a history of significant depression which remains stable at this time on Prozac    He has history of gout which has had no recent recurrences  He is morbidly obese and unfortunate has gained some weight as he is not exercising as much  He is living at his daughter's house in [place] at this time      Past Medical History:   Diagnosis Date    Atrial fibrillation (HCC)     BPH (benign prostatic hyperplasia)     Chronic diastolic (congestive) heart failure (HCC)     Depression     Diabetes mellitus (HCC)     GERD (gastroesophageal reflux disease)     Gout     Hyperlipidemia     Hypertension     Hyponatremia     last assessed: 09/08/2014    Leukocytosis     Liver function abnormality     Morbid obesity ([address] )     Obstructive sleep apnea     Sleep apnea        Past Surgical History:   Procedure Laterality Date    CARDIAC CATHETERIZATION      outcome: Neg    CARDIAC ELECTROPHYSIOLOGY STUDY AND ABLATION      CARDIOVASCULAR STRESS TEST      resolved: 10/27/2010; negative    COLONOSCOPY  11/2013    polyp; complete    HERNIA REPAIR      resolved: 11/1999    REPLACEMENT TOTAL KNEE Left 2010    REPLACEMENT TOTAL KNEE Right 2003    THYROGLOSSAL DUCT EXCISION      TONSILLECTOMY      last assessed: 06/02/2014       Current Outpatient Medications   Medication Sig Dispense Refill    allopurinol (ZYLOPRIM) 300 mg tablet TAKE 1 TABLET BY MOUTH  DAILY 90 tablet 3    amLODIPine (NORVASC) 5 mg tablet Take 1 except factors problems and 1/2 tablet daily 135 tablet 3    cloNIDine (CATAPRES) 0 2 mg tablet Take 1 tablet (0 2 mg total) by mouth daily One hour prior to bed 90 tablet 3    dofetilide (TIKOSYN) 250 mcg capsule Take 1 capsule (250 mcg total) by mouth every 12 (twelve) hours 180 capsule 3    FLUoxetine (PROzac) 40 MG capsule Take 2 capsules (80 mg total) by mouth daily 180 capsule 1    furosemide (LASIX) 40 mg tablet Take 1 tablet (40 mg total) by mouth daily 90 tablet 3    Klor-Con M15 15 MEQ tablet TAKE 2 TABLETS BY MOUTH  DAILY 180 tablet 3    lidocaine (XYLOCAINE) 5 % ointment 2 (two) times a day as needed       LORazepam (ATIVAN) 0 5 mg tablet Take 1 tablet (0 5 mg total) by mouth every 8 (eight) hours as needed for anxiety 30 tablet 1    losartan (COZAAR) 100 MG tablet Take 1 tablet (100 mg total) by mouth daily 90 tablet 3    mometasone (ELOCON) 0 1 % cream Apply to external ear BID for 10 days then once weekly at bedtime 45 g 3    nebivolol (BYSTOLIC) 10 mg tablet Take 1 tablet (10 mg total) by mouth daily 90 tablet 3    omeprazole (PriLOSEC) 40 MG capsule TAKE 1 CAPSULE BY MOUTH  DAILY 90 capsule 3    pravastatin (PRAVACHOL) 10 mg tablet TAKE 1 TABLET BY MOUTH  DAILY AT BEDTIME 90 tablet 3    triamcinolone (KENALOG) 0 1 % ointment Apply topically TWICE a day to affected areas in Weeks interval  90 g 6    Xarelto 20 MG tablet TAKE 1 TABLET BY MOUTH  DAILY WITH BREAKFAST 90 tablet 3     No current facility-administered medications for this visit  Allergies   Allergen Reactions    Metoprolol Shortness Of Breath     Tolerates Bystolic    Benazepril Cough    Clonidine Fatigue    Diltiazem Bradycardia    Entex T  [Pseudoephedrine-Guaifenesin]      Annotation - [id number]: LEG SWELLING    Tizanidine Other (See Comments)       Review of Systems   Constitutional: Negative for appetite change, chills, fatigue, fever and unexpected weight change  HENT: Negative for trouble swallowing  Eyes: Negative for visual disturbance  Respiratory: Negative for cough, chest tightness, shortness of breath and wheezing  Cardiovascular: Positive for leg swelling  Negative for chest pain and palpitations  Gastrointestinal: Negative for abdominal distention, abdominal pain, blood in stool, constipation and diarrhea  Endocrine: Negative for polyuria  Genitourinary: Negative for difficulty urinating and flank pain  Musculoskeletal: Positive for neck pain and neck stiffness  Negative for arthralgias and myalgias  Skin: Negative for rash     Neurological: Negative for dizziness and light-headedness  Hematological: Negative for adenopathy  Does not bruise/bleed easily  Psychiatric/Behavioral: Negative for dysphoric mood and sleep disturbance  The patient is not nervous/anxious  Video Exam    Vitals:    01/20/21 1536   BP: 145/84       Physical Exam  Constitutional:       Appearance: He is well-developed  HENT:      Head: Normocephalic  Eyes:      Pupils: Pupils are equal, round, and reactive to light  Neck:      Musculoskeletal: Normal range of motion  Pulmonary:      Effort: Pulmonary effort is normal    Skin:     Findings: No rash  Neurological:      Mental Status: He is alert  I spent 20 minutes directly with the patient during this visit      [address]  acknowledges that he has consented to an online visit or consultation  He understands that the online visit is based solely on information provided by him, and that, in the absence of a face-to-face physical evaluation by the physician, the diagnosis he receives is both limited and provisional in terms of accuracy and completeness  This is not intended to replace a full medical face-to-face evaluation by the physician  Bertram Nguyen  understands and accepts these terms

## 2021-01-21 ENCOUNTER — TELEPHONE (OUTPATIENT)
Dept: FAMILY MEDICINE CLINIC | Facility: CLINIC | Age: 71
End: 2021-01-21

## 2021-01-21 DIAGNOSIS — I10 ESSENTIAL HYPERTENSION: ICD-10-CM

## 2021-01-21 RX ORDER — AMLODIPINE BESYLATE 5 MG/1
TABLET ORAL
Qty: 90 TABLET | Refills: 3
Start: 2021-01-21 | End: 2021-01-25 | Stop reason: SDUPTHER

## 2021-01-21 RX ORDER — AMLODIPINE BESYLATE 2.5 MG/1
TABLET ORAL
Qty: 90 TABLET | Refills: 3 | Status: SHIPPED | OUTPATIENT
Start: 2021-01-21 | End: 2021-01-25 | Stop reason: SDUPTHER

## 2021-01-21 NOTE — TELEPHONE ENCOUNTER
Pt called and wanted to know if you can send a 5mg and 2 5mg of amlodipine to the pharmacy? He says it would be hard for him to cut the pills he has now and it will break into pieces   If you can please send it to [facility]

## 2021-01-21 NOTE — PROGRESS NOTES
Patient requested a 5 mg and a 2 5 mg tablet instead of taking 1-1/2 5 mg tablet  Prescription sent

## 2021-01-22 ENCOUNTER — TELEPHONE (OUTPATIENT)
Dept: FAMILY MEDICINE CLINIC | Facility: CLINIC | Age: 71
End: 2021-01-22

## 2021-01-22 NOTE — TELEPHONE ENCOUNTER
----- Message from Gavino Gavin MD sent at 1/20/2021  3:45 PM EST -----  Please call for follow up in May when he returns

## 2021-01-25 DIAGNOSIS — I10 ESSENTIAL HYPERTENSION: ICD-10-CM

## 2021-01-25 RX ORDER — AMLODIPINE BESYLATE 5 MG/1
TABLET ORAL
Qty: 90 TABLET | Refills: 3 | Status: SHIPPED | OUTPATIENT
Start: 2021-01-25 | End: 2022-04-04 | Stop reason: SDUPTHER

## 2021-01-25 RX ORDER — AMLODIPINE BESYLATE 2.5 MG/1
TABLET ORAL
Qty: 90 TABLET | Refills: 3 | Status: SHIPPED | OUTPATIENT
Start: 2021-01-25 | End: 2022-04-04 | Stop reason: SDUPTHER

## 2021-01-25 NOTE — TELEPHONE ENCOUNTER
Pt called requesting fills to go to his mail order pharmacy  They were previously approved to [employer]  I have went ahead called and canceled Rx   Orders put in pending approval

## 2021-02-17 DIAGNOSIS — E78.00 HYPERCHOLESTEROLEMIA: ICD-10-CM

## 2021-02-17 DIAGNOSIS — I10 ESSENTIAL HYPERTENSION: ICD-10-CM

## 2021-02-17 RX ORDER — PRAVASTATIN SODIUM 10 MG
TABLET ORAL
Qty: 90 TABLET | Refills: 3 | Status: SHIPPED | OUTPATIENT
Start: 2021-02-17 | End: 2022-04-04 | Stop reason: SDUPTHER

## 2021-02-17 RX ORDER — CLONIDINE HYDROCHLORIDE 0.1 MG/1
TABLET ORAL
Qty: 90 TABLET | Refills: 3 | Status: SHIPPED | OUTPATIENT
Start: 2021-02-17 | End: 2021-07-26 | Stop reason: ALTCHOICE

## 2021-03-01 ENCOUNTER — TELEPHONE (OUTPATIENT)
Dept: FAMILY MEDICINE CLINIC | Facility: CLINIC | Age: 71
End: 2021-03-01

## 2021-03-01 DIAGNOSIS — I10 ESSENTIAL HYPERTENSION: ICD-10-CM

## 2021-03-01 RX ORDER — CLONIDINE HYDROCHLORIDE 0.2 MG/1
0.2 TABLET ORAL DAILY
Qty: 90 TABLET | Refills: 3 | Status: SHIPPED | OUTPATIENT
Start: 2021-03-01 | End: 2022-04-04 | Stop reason: SDUPTHER

## 2021-03-01 NOTE — TELEPHONE ENCOUNTER
Patient is in [place]  He has had BW done from [facility] and wants to know if we have rec'd the results as he cannot get them  Please advise

## 2021-03-03 NOTE — TELEPHONE ENCOUNTER
Blood work looks stable  White blood cell  Count is just slightly elevated which we will continue to monitor at routine follow-up visits

## 2021-03-29 ENCOUNTER — REMOTE DEVICE CLINIC VISIT (OUTPATIENT)
Dept: CARDIOLOGY CLINIC | Facility: CLINIC | Age: 71
End: 2021-03-29
Payer: MEDICARE

## 2021-03-29 DIAGNOSIS — Z95.818 PRESENCE OF OTHER CARDIAC IMPLANTS AND GRAFTS: Primary | ICD-10-CM

## 2021-03-29 PROCEDURE — 93298 REM INTERROG DEV EVAL SCRMS: CPT | Performed by: INTERNAL MEDICINE

## 2021-03-29 PROCEDURE — G2066 INTER DEVC REMOTE 30D: HCPCS | Performed by: INTERNAL MEDICINE

## 2021-03-29 NOTE — PROGRESS NOTES
MDT LOOP   [facility] TRANSMISSION (LOOP); BATTERY STATUS "OK"  PRESENTING RHYTHM SHOWS NSR ~ 60 BPM  NO PATIENT OR DEVICE ACTIVATED EPISODES  HX: PAF & PT TAKES JOSIE Morgan   NORMAL DEVICE FUNCTION   EBS

## 2021-04-23 DIAGNOSIS — I48.0 PAROXYSMAL ATRIAL FIBRILLATION (HCC): ICD-10-CM

## 2021-04-23 DIAGNOSIS — I10 ESSENTIAL HYPERTENSION: ICD-10-CM

## 2021-04-23 RX ORDER — NEBIVOLOL 10 MG/1
10 TABLET ORAL DAILY
Qty: 90 TABLET | Refills: 3 | Status: SHIPPED | OUTPATIENT
Start: 2021-04-23 | End: 2022-04-04 | Stop reason: SDUPTHER

## 2021-05-16 DIAGNOSIS — I10 ESSENTIAL HYPERTENSION: ICD-10-CM

## 2021-05-17 ENCOUNTER — TELEPHONE (OUTPATIENT)
Dept: CARDIOLOGY CLINIC | Facility: CLINIC | Age: 71
End: 2021-05-17

## 2021-05-17 RX ORDER — LOSARTAN POTASSIUM 100 MG/1
TABLET ORAL
Qty: 90 TABLET | Refills: 0 | Status: SHIPPED | OUTPATIENT
Start: 2021-05-17 | End: 2021-08-05

## 2021-05-20 ENCOUNTER — TELEPHONE (OUTPATIENT)
Dept: CARDIOLOGY CLINIC | Facility: CLINIC | Age: 71
End: 2021-05-20

## 2021-05-20 NOTE — TELEPHONE ENCOUNTER
Called pt left voice message that Dr Malvin Dotson would like to see him in the next 4-6 weeks pt is past due also mailing appt letter

## 2021-06-15 DIAGNOSIS — I48.0 PAROXYSMAL ATRIAL FIBRILLATION (HCC): ICD-10-CM

## 2021-06-15 RX ORDER — DOFETILIDE 0.25 MG/1
CAPSULE ORAL
Qty: 180 CAPSULE | Refills: 3 | Status: SHIPPED | OUTPATIENT
Start: 2021-06-15 | End: 2021-06-16 | Stop reason: SDUPTHER

## 2021-06-16 DIAGNOSIS — I48.0 PAROXYSMAL ATRIAL FIBRILLATION (HCC): ICD-10-CM

## 2021-06-16 RX ORDER — DOFETILIDE 0.25 MG/1
250 CAPSULE ORAL EVERY 12 HOURS
Qty: 180 CAPSULE | Refills: 3 | Status: SHIPPED | OUTPATIENT
Start: 2021-06-16 | End: 2021-09-27 | Stop reason: SDUPTHER

## 2021-06-29 ENCOUNTER — REMOTE DEVICE CLINIC VISIT (OUTPATIENT)
Dept: CARDIOLOGY CLINIC | Facility: CLINIC | Age: 71
End: 2021-06-29
Payer: MEDICARE

## 2021-06-29 DIAGNOSIS — Z95.818 PRESENCE OF OTHER CARDIAC IMPLANTS AND GRAFTS: Primary | ICD-10-CM

## 2021-06-29 PROCEDURE — G2066 INTER DEVC REMOTE 30D: HCPCS | Performed by: INTERNAL MEDICINE

## 2021-06-29 PROCEDURE — 93298 REM INTERROG DEV EVAL SCRMS: CPT | Performed by: INTERNAL MEDICINE

## 2021-06-29 NOTE — PROGRESS NOTES
MDT LOOP   [facility] TRANSMISSION: LOOP RECORDER  PRESENTING RHYTHM NSR @ 65 BPM  BATTERY STATUS "OK " NO PATIENT OR DEVICE ACTIVATED EPISODES  NORMAL DEVICE FUNCTION   DL

## 2021-07-16 ENCOUNTER — OFFICE VISIT (OUTPATIENT)
Dept: URGENT CARE | Age: 71
End: 2021-07-16
Payer: MEDICARE

## 2021-07-16 VITALS — HEART RATE: 90 BPM | OXYGEN SATURATION: 95 % | RESPIRATION RATE: 20 BRPM | TEMPERATURE: 98 F

## 2021-07-16 DIAGNOSIS — R35.0 URINE FREQUENCY: Primary | ICD-10-CM

## 2021-07-16 DIAGNOSIS — M79.10 MYALGIA: ICD-10-CM

## 2021-07-16 LAB
SL AMB  POCT GLUCOSE, UA: NEGATIVE
SL AMB LEUKOCYTE ESTERASE,UA: NEGATIVE
SL AMB POCT BILIRUBIN,UA: NEGATIVE
SL AMB POCT BLOOD,UA: ABNORMAL
SL AMB POCT CLARITY,UA: CLEAR
SL AMB POCT COLOR,UA: ABNORMAL
SL AMB POCT KETONES,UA: NEGATIVE
SL AMB POCT NITRITE,UA: NEGATIVE
SL AMB POCT PH,UA: 7
SL AMB POCT SPECIFIC GRAVITY,UA: 1
SL AMB POCT URINE PROTEIN: ABNORMAL
SL AMB POCT UROBILINOGEN: 0.2

## 2021-07-16 PROCEDURE — 87086 URINE CULTURE/COLONY COUNT: CPT | Performed by: PHYSICIAN ASSISTANT

## 2021-07-16 PROCEDURE — G0463 HOSPITAL OUTPT CLINIC VISIT: HCPCS | Performed by: PHYSICIAN ASSISTANT

## 2021-07-16 PROCEDURE — 81002 URINALYSIS NONAUTO W/O SCOPE: CPT | Performed by: PHYSICIAN ASSISTANT

## 2021-07-16 PROCEDURE — 99203 OFFICE O/P NEW LOW 30 MIN: CPT | Performed by: PHYSICIAN ASSISTANT

## 2021-07-16 NOTE — PROGRESS NOTES
[facility]        NAME: Lisa Flower  is a 66 y o  male  : 1950    MRN: [id number]  DATE: 2021  TIME: 4:21 PM     Assessment and Plan   Urine frequency [R35 0]  1  Urine frequency  POCT urine dip   2  Myalgia           Patient Instructions       Patient declined COVID testing  Will send urine culture     Follow up with PCP in 3-5 days  Proceed to  ER if symptoms worsen  Chief Complaint     Chief Complaint   Patient presents with    Fatigue     pt states started with legs feeling sore about 1 week ago, 4 days ago started with fatigue and nausea, no known covid exposures, pt is vaccinated    Generalized Body Aches    Nausea         History of Present Illness         She is patient presents with fatigue and body aches and nausea  He states he wakes up in the morning sweaty and nauseous  His is mainly in the mornings  He was treated for prostatitis a month ago in [place]  He states that he noticed his urine had an odor and he is going to the bathroom more frequently but states he also takes water pills  He is fully vaccinated for covid  Review of Systems   Review of Systems   Constitutional: Positive for fatigue  HENT: Negative  Respiratory: Negative  Cardiovascular: Negative  Gastrointestinal: Negative  Genitourinary: Positive for frequency  Negative for dysuria, flank pain, hematuria, penile pain and urgency  Musculoskeletal: Positive for myalgias  Neurological: Negative  Psychiatric/Behavioral: Negative            Current Medications       Current Outpatient Medications:     allopurinol (ZYLOPRIM) 300 mg tablet, TAKE 1 TABLET BY MOUTH  DAILY, Disp: 90 tablet, Rfl: 3    amLODIPine (NORVASC) 2 5 mg tablet, Take 1 tablet daily in addition to a 5 mg tablet , Disp: 90 tablet, Rfl: 3    amLODIPine (NORVASC) 5 mg tablet, Take 1 tablet daily in addition to 2 5 mg tablet, Disp: 90 tablet, Rfl: 3    cloNIDine (CATAPRES) 0 1 mg tablet, TAKE 1 TABLET BY MOUTH  DAILY ONE HOUR PRIOR TO BED, Disp: 90 tablet, Rfl: 3    cloNIDine (CATAPRES) 0 2 mg tablet, Take 1 tablet (0 2 mg total) by mouth daily One hour prior to bed, Disp: 90 tablet, Rfl: 3    FLUoxetine (PROzac) 40 MG capsule, Take 2 capsules (80 mg total) by mouth daily, Disp: 180 capsule, Rfl: 1    Klor-Con M15 15 MEQ tablet, TAKE 2 TABLETS BY MOUTH  DAILY, Disp: 180 tablet, Rfl: 3    LORazepam (ATIVAN) 0 5 mg tablet, Take 1 tablet (0 5 mg total) by mouth every 8 (eight) hours as needed for anxiety, Disp: 30 tablet, Rfl: 1    losartan (COZAAR) 100 MG tablet, TAKE 1 TABLET BY MOUTH  DAILY, Disp: 90 tablet, Rfl: 0    mometasone (ELOCON) 0 1 % cream, Apply to external ear BID for 10 days then once weekly at bedtime, Disp: 45 g, Rfl: 3    nebivolol (BYSTOLIC) 10 mg tablet, Take 1 tablet (10 mg total) by mouth daily, Disp: 90 tablet, Rfl: 3    omeprazole (PriLOSEC) 40 MG capsule, TAKE 1 CAPSULE BY MOUTH  DAILY, Disp: 90 capsule, Rfl: 3    pravastatin (PRAVACHOL) 10 mg tablet, TAKE 1 TABLET BY MOUTH  DAILY AT BEDTIME, Disp: 90 tablet, Rfl: 3    triamcinolone (KENALOG) 0 1 % ointment, Apply topically TWICE a day to affected areas in Weeks interval , Disp: 90 g, Rfl: 6    Xarelto 20 MG tablet, TAKE 1 TABLET BY MOUTH  DAILY WITH BREAKFAST, Disp: 90 tablet, Rfl: 3    dofetilide (TIKOSYN) 250 mcg capsule, Take 1 capsule (250 mcg total) by mouth every 12 (twelve) hours (Patient not taking: Reported on 7/16/2021), Disp: 180 capsule, Rfl: 3    furosemide (LASIX) 40 mg tablet, Take 1 tablet (40 mg total) by mouth daily (Patient not taking: Reported on 7/16/2021), Disp: 90 tablet, Rfl: 3    lidocaine (XYLOCAINE) 5 % ointment, 2 (two) times a day as needed  (Patient not taking: Reported on 7/16/2021), Disp: , Rfl:     Current Allergies     Allergies as of 07/16/2021 - Reviewed 07/16/2021   Allergen Reaction Noted    Metoprolol Shortness Of Breath 11/09/2018    Benazepril Cough 04/07/2014    Clonidine Fatigue 07/03/2014    Diltiazem Bradycardia 07/20/2012    Entex t  [pseudoephedrine-guaifenesin]  07/20/2012    Tizanidine Other (See Comments) 05/26/2020            The following portions of the patient's history were reviewed and updated as appropriate: allergies, current medications, past family history, past medical history, past social history, past surgical history and problem list      Past Medical History:   Diagnosis Date    Atrial fibrillation ([place] [address] )     BPH (benign prostatic hyperplasia)     Chronic diastolic (congestive) heart failure ([place] [address] )     Depression     Diabetes mellitus ([place] [address] )     GERD (gastroesophageal reflux disease)     Gout     Hyperlipidemia     Hypertension     Hyponatremia     last assessed: 09/08/2014    Leukocytosis     Liver function abnormality     Morbid obesity ([place] [address] )     Obstructive sleep apnea     Sleep apnea        Past Surgical History:   Procedure Laterality Date    CARDIAC CATHETERIZATION      outcome: Neg    CARDIAC ELECTROPHYSIOLOGY STUDY AND ABLATION      CARDIOVASCULAR STRESS TEST      resolved: 10/27/2010; negative    COLONOSCOPY  11/2013    polyp; complete    HERNIA REPAIR      resolved: 11/1999    REPLACEMENT TOTAL KNEE Left 2010    REPLACEMENT TOTAL KNEE Right 2003    THYROGLOSSAL DUCT EXCISION      TONSILLECTOMY      last assessed: 06/02/2014       Family History   Problem Relation Age of Onset    Diabetes Mother     Heart attack Mother     Hypertension Mother     Heart attack Sister          Medications have been verified  Objective   Pulse 90   Temp 98 °F (36 7 °C)   Resp 20   SpO2 95%        Physical Exam     Physical Exam  Vitals and nursing note reviewed  Constitutional:       General: He is not in acute distress  Appearance: Normal appearance  He is not ill-appearing, toxic-appearing or diaphoretic  HENT:      Right Ear: Tympanic membrane and ear canal normal  There is no impacted cerumen        Left Ear: Tympanic membrane and ear canal normal  There is no impacted cerumen  Mouth/Throat:      Mouth: Mucous membranes are moist       Pharynx: No posterior oropharyngeal erythema  Cardiovascular:      Rate and Rhythm: Normal rate and regular rhythm  Pulses: Normal pulses  Pulmonary:      Effort: Pulmonary effort is normal       Breath sounds: Normal breath sounds  No wheezing  Abdominal:      General: Abdomen is flat  Tenderness: There is no abdominal tenderness  There is no right CVA tenderness or left CVA tenderness  Lymphadenopathy:      Cervical: No cervical adenopathy  Skin:     General: Skin is warm and dry  Neurological:      General: No focal deficit present  Mental Status: He is alert and oriented to person, place, and time     Psychiatric:         Mood and Affect: Mood normal          Behavior: Behavior normal

## 2021-07-18 LAB — BACTERIA UR CULT: NORMAL

## 2021-07-19 ENCOUNTER — TELEPHONE (OUTPATIENT)
Dept: SLEEP CENTER | Facility: CLINIC | Age: 71
End: 2021-07-19

## 2021-07-19 DIAGNOSIS — N30.00 ACUTE CYSTITIS WITHOUT HEMATURIA: Primary | ICD-10-CM

## 2021-07-19 RX ORDER — CIPROFLOXACIN 250 MG/1
250 TABLET, FILM COATED ORAL EVERY 12 HOURS SCHEDULED
Qty: 14 TABLET | Refills: 0 | Status: SHIPPED | OUTPATIENT
Start: 2021-07-19 | End: 2021-07-26

## 2021-07-19 NOTE — TELEPHONE ENCOUNTER
Patient called, about recall  I advised of [facility] recommendations:  Continuous Positive Airway Pressure (CPAP) therapy was prescribed to you as a medical necessity and there are risks of discontinuing  use of the device, some of which may be long term  Symptoms you experienced before using CPAP may return such as snoring, apneas, excessive daytime sleepiness, hypertension, cardiac arrhythmias, risk of stroke, congestive heart-failure, exacerbation of COPD and potential respiratory failure  Ultimately, it is a personal decision for you to make if you continue use of an affected device or discontinue until a replacement is provided  Unfortunately, [employer] has not yet provided us with information about available devices  You can visit their website at [email or web address] to register your device and to learn more about how [employer] plans to replace your device  Another option would be to check with your medical equipment provider to determine if you are eligible for a new machine through your insurance, if not you can pay out of pocket for a new machine  We are able to provide you with a script, please include your mask type  Patient states he will try to call medicare and DME provider to see if they will cover a new machine    He will call back if he needs script

## 2021-07-26 ENCOUNTER — OFFICE VISIT (OUTPATIENT)
Dept: FAMILY MEDICINE CLINIC | Facility: CLINIC | Age: 71
End: 2021-07-26
Payer: MEDICARE

## 2021-07-26 VITALS
TEMPERATURE: 98 F | WEIGHT: 305 LBS | HEIGHT: 70 IN | SYSTOLIC BLOOD PRESSURE: 160 MMHG | BODY MASS INDEX: 43.67 KG/M2 | DIASTOLIC BLOOD PRESSURE: 70 MMHG | HEART RATE: 55 BPM | OXYGEN SATURATION: 98 %

## 2021-07-26 DIAGNOSIS — R35.0 BENIGN PROSTATIC HYPERPLASIA WITH URINARY FREQUENCY: ICD-10-CM

## 2021-07-26 DIAGNOSIS — I10 ESSENTIAL HYPERTENSION: ICD-10-CM

## 2021-07-26 DIAGNOSIS — K59.09 CHRONIC CONSTIPATION: ICD-10-CM

## 2021-07-26 DIAGNOSIS — K63.5 BENIGN COLON POLYP: ICD-10-CM

## 2021-07-26 DIAGNOSIS — I50.32 CHRONIC DIASTOLIC (CONGESTIVE) HEART FAILURE (HCC): ICD-10-CM

## 2021-07-26 DIAGNOSIS — F19.982 DRUG-INDUCED INSOMNIA (HCC): ICD-10-CM

## 2021-07-26 DIAGNOSIS — F32.0 MAJOR DEPRESSIVE DISORDER, SINGLE EPISODE, MILD (HCC): ICD-10-CM

## 2021-07-26 DIAGNOSIS — N41.0 ACUTE PROSTATITIS: ICD-10-CM

## 2021-07-26 DIAGNOSIS — K76.0 FATTY LIVER: ICD-10-CM

## 2021-07-26 DIAGNOSIS — K21.9 GASTROESOPHAGEAL REFLUX DISEASE WITHOUT ESOPHAGITIS: ICD-10-CM

## 2021-07-26 DIAGNOSIS — G62.9 PERIPHERAL POLYNEUROPATHY: ICD-10-CM

## 2021-07-26 DIAGNOSIS — E11.51 TYPE 2 DIABETES MELLITUS WITH DIABETIC PERIPHERAL ANGIOPATHY WITHOUT GANGRENE, WITHOUT LONG-TERM CURRENT USE OF INSULIN (HCC): Primary | ICD-10-CM

## 2021-07-26 DIAGNOSIS — E66.01 MORBID OBESITY (HCC): ICD-10-CM

## 2021-07-26 DIAGNOSIS — I48.0 PAROXYSMAL ATRIAL FIBRILLATION (HCC): ICD-10-CM

## 2021-07-26 DIAGNOSIS — N40.1 BENIGN PROSTATIC HYPERPLASIA WITH URINARY FREQUENCY: ICD-10-CM

## 2021-07-26 LAB — SL AMB POCT HEMOGLOBIN AIC: 5.8 (ref ?–6.5)

## 2021-07-26 PROCEDURE — 83036 HEMOGLOBIN GLYCOSYLATED A1C: CPT | Performed by: FAMILY MEDICINE

## 2021-07-26 PROCEDURE — 99215 OFFICE O/P EST HI 40 MIN: CPT | Performed by: FAMILY MEDICINE

## 2021-07-26 RX ORDER — CIPROFLOXACIN 500 MG/1
500 TABLET, FILM COATED ORAL EVERY 12 HOURS SCHEDULED
Qty: 14 TABLET | Refills: 0 | Status: SHIPPED | OUTPATIENT
Start: 2021-07-26 | End: 2021-08-02

## 2021-07-26 NOTE — ASSESSMENT & PLAN NOTE
Notes some significant constipation coming on over the past 9 months or so  I would like him to start MiraLax 1 scoop daily  I am also going to have him see GI    His last colonoscopy was aborted in October of 2018 due to rapid atrial fibrillation

## 2021-07-26 NOTE — ASSESSMENT & PLAN NOTE
Blood pressure is elevated today  Does have a history of significant hypertension on multiple medications  Blood pressures at home have not been bad so I am going ask him to check his pressure consistently at least once a day and follow-up within the next month    He should bring his blood pressure cuff

## 2021-07-26 NOTE — ASSESSMENT & PLAN NOTE
He is finishing his Cipro at this time and has 1 day left  He does not have tenderness on exam but does have a diffusely boggy prostate  He has been having some intermittent voiding issues as well  I will refill Cipro for 1 more week and set up Urology consultation in light of his ongoing voiding issues  He had a significant elevation of his white count in [place] at 23,000  He should have a repeat CBC

## 2021-07-26 NOTE — ASSESSMENT & PLAN NOTE
I am going to refer him to GI for repeat colonoscopy especially in light of his change in bowel habits    Last colonoscopy was aborted due to rapid atrial fibrillation in 10/18

## 2021-07-26 NOTE — ASSESSMENT & PLAN NOTE
He is having some intermittent symptoms of urinary hesitancy and dysuria I am going to have him see Urology    I am treating him with another week of Cipro for questionable prostatitis

## 2021-07-26 NOTE — ASSESSMENT & PLAN NOTE
He is having some peripheral neuropathy symptoms of his fingers  Believe this may be carpal tunnel syndrome but it seems somewhat atypical as he was not sure if his 5th digit is involved  Check EMG

## 2021-07-26 NOTE — PROGRESS NOTES
Assessment/Plan:       Problem List Items Addressed This Visit        Digestive    Gastroesophageal reflux disease without esophagitis     Stable at this time  Benign colon polyp     I am going to refer him to GI for repeat colonoscopy especially in light of his change in bowel habits  Last colonoscopy was aborted due to rapid atrial fibrillation in 10/18         Fatty liver     Check LFTs  Relevant Orders    Comprehensive metabolic panel    Chronic constipation     Notes some significant constipation coming on over the past 9 months or so  I would like him to start MiraLax 1 scoop daily  I am also going to have him see GI  His last colonoscopy was aborted in October of 2018 due to rapid atrial fibrillation            Endocrine    Type 2 diabetes mellitus with diabetic peripheral angiopathy without gangrene ([facility] ) - Primary    Relevant Orders    POCT hemoglobin A1c (Completed)    Ambulatory referral to Gastroenterology    CBC and differential    Comprehensive metabolic panel       Cardiovascular and Mediastinum    Essential hypertension     Blood pressure is elevated today  Does have a history of significant hypertension on multiple medications  Blood pressures at home have not been bad so I am going ask him to check his pressure consistently at least once a day and follow-up within the next month  He should bring his blood pressure cuff         Chronic diastolic (congestive) heart failure (HCC)    A-fib ([facility] )     Continue close follow-up with Cardiology  Relevant Orders    Comprehensive metabolic panel       Nervous and Auditory    Peripheral neuropathy     He is having some peripheral neuropathy symptoms of his fingers  Believe this may be carpal tunnel syndrome but it seems somewhat atypical as he was not sure if his 5th digit is involved  Check EMG           Relevant Orders    EMG 2 Limb Upper Extremity       Genitourinary    Acute prostatitis     He is finishing his Cipro at this time and has 1 day left  He does not have tenderness on exam but does have a diffusely boggy prostate  He has been having some intermittent voiding issues as well  I will refill Cipro for 1 more week and set up Urology consultation in light of his ongoing voiding issues  He had a significant elevation of his white count in [place] at 23,000  He should have a repeat CBC  Relevant Medications    ciprofloxacin (CIPRO) 500 mg tablet    Other Relevant Orders    CBC and differential    Ambulatory referral to Urology       Other    Major depressive disorder, single episode, mild ([address] )    Morbid obesity ([address] )     Continue to work on weight loss         Relevant Orders    Comprehensive metabolic panel    Benign prostatic hyperplasia with urinary frequency     He is having some intermittent symptoms of urinary hesitancy and dysuria I am going to have him see Urology  I am treating him with another week of Cipro for questionable prostatitis         Drug-induced insomnia (HCC)            Subjective:      Patient ID: Amelie Victoria  is a 66 y o  male  HPI patient presents today after being in [facility] at his daughter's house for the past 8 months  He was treated out therefore prostatitis after presenting feeling very poorly and having some dysuria and urinary hesitancy  He was treated with Cipro twice daily for 2 weeks with improving symptoms  He was noted to have a significant leukocytosis at the time of 24,000  He had similar symptoms starting about 2 weeks ago and was seen at our urgent care and is on his last day of 7 days of Cipro  His urinalysis did not reveal a UTI  He feels that he is improved on the Cipro but is still have some slight burning when voiding and notes some discoloration of his urine  He also has been having some intermittent issues with constipation    He has been trying a probiotic as since early June but continues to have issues with having significant straining after not going for 1-2 days  He has noted some blood on toilet tissue  He has history of atrial fibrillation  He did have an ablation but this recurred  He remains on Tikosyn, beta blockade as well as anticoagulation  He has a significant history of hypertension and is on multiple medications  Blood pressure was elevated upon arrival on did come down 160/70  Home blood pressures are much better than today  He has a history of depression and anxiety which remain present but overall pretty stable  He remains on Prozac for this  He notes some worsening tingling in both hands on his fingertips  He believes it comes down to his 1st PIP joint  When questioned he is not sure if it is present intermittently in his 5th finger  He denies any hand weakness or numbness  He has find this more bothersome over the past several weeks in particular      The following portions of the patient's history were reviewed and updated as appropriate: allergies, current medications, past family history, past medical history, past social history, past surgical history and problem list       Current Outpatient Medications:     allopurinol (ZYLOPRIM) 300 mg tablet, TAKE 1 TABLET BY MOUTH  DAILY, Disp: 90 tablet, Rfl: 3    amLODIPine (NORVASC) 2 5 mg tablet, Take 1 tablet daily in addition to a 5 mg tablet , Disp: 90 tablet, Rfl: 3    amLODIPine (NORVASC) 5 mg tablet, Take 1 tablet daily in addition to 2 5 mg tablet, Disp: 90 tablet, Rfl: 3    cloNIDine (CATAPRES) 0 2 mg tablet, Take 1 tablet (0 2 mg total) by mouth daily One hour prior to bed, Disp: 90 tablet, Rfl: 3    dofetilide (TIKOSYN) 250 mcg capsule, Take 1 capsule (250 mcg total) by mouth every 12 (twelve) hours, Disp: 180 capsule, Rfl: 3    FLUoxetine (PROzac) 40 MG capsule, Take 2 capsules (80 mg total) by mouth daily, Disp: 180 capsule, Rfl: 1    furosemide (LASIX) 40 mg tablet, Take 1 tablet (40 mg total) by mouth daily, Disp: 90 tablet, Rfl: 3    Klor-Con M15 15 MEQ tablet, TAKE 2 TABLETS BY MOUTH  DAILY, Disp: 180 tablet, Rfl: 3    losartan (COZAAR) 100 MG tablet, TAKE 1 TABLET BY MOUTH  DAILY, Disp: 90 tablet, Rfl: 0    nebivolol (BYSTOLIC) 10 mg tablet, Take 1 tablet (10 mg total) by mouth daily, Disp: 90 tablet, Rfl: 3    omeprazole (PriLOSEC) 40 MG capsule, TAKE 1 CAPSULE BY MOUTH  DAILY, Disp: 90 capsule, Rfl: 3    pravastatin (PRAVACHOL) 10 mg tablet, TAKE 1 TABLET BY MOUTH  DAILY AT BEDTIME, Disp: 90 tablet, Rfl: 3    Xarelto 20 MG tablet, TAKE 1 TABLET BY MOUTH  DAILY WITH BREAKFAST, Disp: 90 tablet, Rfl: 3    ciprofloxacin (CIPRO) 500 mg tablet, Take 1 tablet (500 mg total) by mouth every 12 (twelve) hours for 7 days, Disp: 14 tablet, Rfl: 0    mometasone (ELOCON) 0 1 % cream, Apply to external ear BID for 10 days then once weekly at bedtime (Patient not taking: Reported on 7/26/2021), Disp: 45 g, Rfl: 3    triamcinolone (KENALOG) 0 1 % ointment, Apply topically TWICE a day to affected areas in Weeks interval  (Patient not taking: Reported on 7/26/2021), Disp: 90 g, Rfl: 6     Review of Systems      Objective:      /70 (BP Location: Left arm, Patient Position: Sitting, Cuff Size: Large)   Pulse 55   Temp 98 °F (36 7 °C)   Ht 5' 9 5" (1 765 m)   Wt (!) 138 kg (305 lb)   SpO2 98%   BMI 44 39 kg/m²          Physical Exam  Vitals reviewed  Constitutional:       Appearance: He is well-developed  HENT:      Head: Normocephalic  Cardiovascular:      Rate and Rhythm: Regular rhythm  Heart sounds: Normal heart sounds  No murmur heard  Pulmonary:      Effort: No respiratory distress  Breath sounds: No wheezing or rales  Abdominal:      General: There is no distension  Tenderness: There is no abdominal tenderness  Genitourinary:     Prostate: Enlarged (  Mildly enlarged no masses  )  Not tender ( Nontender but diffusely boggy ) and no nodules present  Musculoskeletal:         General: No swelling or tenderness        Right lower leg: Edema present  Left lower leg: Edema (1+ edema bilateral ankles) present  Skin:     Findings: No erythema or rash  Neurological:      Mental Status: He is alert and oriented to person, place, and time             Rena Dominguez MD

## 2021-07-27 ENCOUNTER — OFFICE VISIT (OUTPATIENT)
Dept: CARDIOLOGY CLINIC | Facility: CLINIC | Age: 71
End: 2021-07-27
Payer: MEDICARE

## 2021-07-27 VITALS
BODY MASS INDEX: 43.32 KG/M2 | DIASTOLIC BLOOD PRESSURE: 82 MMHG | SYSTOLIC BLOOD PRESSURE: 146 MMHG | HEIGHT: 70 IN | WEIGHT: 302.6 LBS | HEART RATE: 50 BPM

## 2021-07-27 DIAGNOSIS — I10 ESSENTIAL HYPERTENSION: ICD-10-CM

## 2021-07-27 DIAGNOSIS — E78.2 MIXED HYPERLIPIDEMIA: ICD-10-CM

## 2021-07-27 DIAGNOSIS — I50.32 CHRONIC DIASTOLIC (CONGESTIVE) HEART FAILURE (HCC): ICD-10-CM

## 2021-07-27 DIAGNOSIS — I48.0 PAROXYSMAL ATRIAL FIBRILLATION (HCC): Primary | ICD-10-CM

## 2021-07-27 PROCEDURE — 93000 ELECTROCARDIOGRAM COMPLETE: CPT | Performed by: INTERNAL MEDICINE

## 2021-07-27 PROCEDURE — 99214 OFFICE O/P EST MOD 30 MIN: CPT | Performed by: INTERNAL MEDICINE

## 2021-07-27 NOTE — PROGRESS NOTES
Cardiology Follow Up    Holly Delgado   1950  [id number]  [address]  [address] [facility] [address] [phone]    Reason for visit: 6 month FU for PAF s/p ablation in 12/2019, HTN, HLP and chronic diastolic CHF      1  Paroxysmal atrial fibrillation (HCC)  POCT ECG   2  Chronic diastolic (congestive) heart failure ([facility] )     3  Essential hypertension     4  Mixed hyperlipidemia         Interval History: Since his last visit he denies palpitations  He does get random dyspnea but not necessarily ABURTO  Dubon Lacey He denies chest pain  He has ongoing LE edema  He denies dizziness       Patient Active Problem List   Diagnosis    Gastroesophageal reflux disease without esophagitis    Benign colon polyp    Major depressive disorder, single episode, mild (HCC)    Type 2 diabetes mellitus with diabetic peripheral angiopathy without gangrene ([facility] )    Gout    Hyperlipidemia    Essential hypertension    Microscopic hematuria    Morbid obesity (HCC)    Peripheral neuropathy    MARISSA (obstructive sleep apnea)    Testicular hypogonadism    Thyroglossal duct cyst    Benign prostatic hyperplasia with urinary frequency    Chronic rhinitis    Chronic diastolic (congestive) heart failure (HCC)    A-fib (HCC)    Anticoagulation adequate    Peripheral vascular disease ([facility] )    Fatty liver    Long term current use of antiarrhythmic drug    S/P ablation of atrial fibrillation    Drug-induced insomnia (HCC)    Atherosclerosis of aorta (HCC)    Anxiety    Horseshoe tear of retina of left eye without detachment    Chronic constipation    Acute prostatitis     Past Medical History:   Diagnosis Date    Atrial fibrillation (HCC)     BPH (benign prostatic hyperplasia)     Chronic diastolic (congestive) heart failure (HCC)     Depression     Diabetes mellitus (HCC)     GERD (gastroesophageal reflux disease)     Gout     Hyperlipidemia     Hypertension     Hyponatremia     last assessed: 09/08/2014    Leukocytosis     Liver function abnormality     Morbid obesity (HCC)     Obstructive sleep apnea     Sleep apnea      Social History     Socioeconomic History    Marital status: /Civil Union     Spouse name: Not on file    Number of children: Not on file    Years of education: Not on file    Highest education level: Not on file   Occupational History    Occupation: Disability    Occupation:      Comment: significant asbestos and silica exposure in the past   Tobacco Use    Smoking status: Never Smoker    Smokeless tobacco: Never Used   Vaping Use    Vaping Use: Never used   Substance and Sexual Activity    Alcohol use: Yes     Alcohol/week: 3 0 - 4 0 standard drinks     Types: 3 - 4 Glasses of wine per week    Drug use: Never    Sexual activity: Not Currently   Other Topics Concern    Not on file   Social History Narrative    4 cups of coffee/day     Social Determinants of Health     Financial Resource Strain:     Difficulty of Paying Living Expenses:    Food Insecurity:     Worried About Running Out of Food in the Last Year:     Ran Out of Food in the Last Year:    Transportation Needs:     Lack of Transportation (Medical):      Lack of Transportation (Non-Medical):    Physical Activity:     Days of Exercise per Week:     Minutes of Exercise per Session:    Stress:     Feeling of Stress :    Social Connections:     Frequency of Communication with Friends and Family:     Frequency of Social Gatherings with Friends and Family:     Attends Buddhist Services:     Active Member of Clubs or Organizations:     Attends Club or Organization Meetings:     Marital Status:    Intimate Partner Violence:     Fear of Current or Ex-Partner:     Emotionally Abused:     Physically Abused:     Sexually Abused:       Family History   Problem Relation Age of Onset    Diabetes Mother     Heart attack Mother     Hypertension Mother     Heart attack Sister      Past Surgical History:   Procedure Laterality Date    CARDIAC CATHETERIZATION      outcome: Neg    CARDIAC ELECTROPHYSIOLOGY STUDY AND ABLATION      CARDIOVASCULAR STRESS TEST      resolved: 10/27/2010; negative    COLONOSCOPY  11/2013    polyp; complete    HERNIA REPAIR      resolved: 11/1999    REPLACEMENT TOTAL KNEE Left 2010    REPLACEMENT TOTAL KNEE Right 2003    THYROGLOSSAL DUCT EXCISION      TONSILLECTOMY      last assessed: 06/02/2014       Current Outpatient Medications:     allopurinol (ZYLOPRIM) 300 mg tablet, TAKE 1 TABLET BY MOUTH  DAILY, Disp: 90 tablet, Rfl: 3    amLODIPine (NORVASC) 2 5 mg tablet, Take 1 tablet daily in addition to a 5 mg tablet , Disp: 90 tablet, Rfl: 3    amLODIPine (NORVASC) 5 mg tablet, Take 1 tablet daily in addition to 2 5 mg tablet, Disp: 90 tablet, Rfl: 3    ciprofloxacin (CIPRO) 500 mg tablet, Take 1 tablet (500 mg total) by mouth every 12 (twelve) hours for 7 days, Disp: 14 tablet, Rfl: 0    cloNIDine (CATAPRES) 0 2 mg tablet, Take 1 tablet (0 2 mg total) by mouth daily One hour prior to bed, Disp: 90 tablet, Rfl: 3    dofetilide (TIKOSYN) 250 mcg capsule, Take 1 capsule (250 mcg total) by mouth every 12 (twelve) hours, Disp: 180 capsule, Rfl: 3    FLUoxetine (PROzac) 40 MG capsule, Take 2 capsules (80 mg total) by mouth daily, Disp: 180 capsule, Rfl: 1    furosemide (LASIX) 40 mg tablet, Take 1 tablet (40 mg total) by mouth daily, Disp: 90 tablet, Rfl: 3    Klor-Con M15 15 MEQ tablet, TAKE 2 TABLETS BY MOUTH  DAILY, Disp: 180 tablet, Rfl: 3    losartan (COZAAR) 100 MG tablet, TAKE 1 TABLET BY MOUTH  DAILY, Disp: 90 tablet, Rfl: 0    nebivolol (BYSTOLIC) 10 mg tablet, Take 1 tablet (10 mg total) by mouth daily, Disp: 90 tablet, Rfl: 3    omeprazole (PriLOSEC) 40 MG capsule, TAKE 1 CAPSULE BY MOUTH  DAILY, Disp: 90 capsule, Rfl: 3    pravastatin (PRAVACHOL) 10 mg tablet, TAKE 1 TABLET BY MOUTH  DAILY AT BEDTIME, Disp: 90 tablet, Rfl: 3    Xarelto 20 MG tablet, TAKE 1 TABLET BY MOUTH  DAILY WITH BREAKFAST, Disp: 90 tablet, Rfl: 3    mometasone (ELOCON) 0 1 % cream, Apply to external ear BID for 10 days then once weekly at bedtime (Patient not taking: Reported on 7/26/2021), Disp: 45 g, Rfl: 3    triamcinolone (KENALOG) 0 1 % ointment, Apply topically TWICE a day to affected areas in Weeks interval  (Patient not taking: Reported on 7/26/2021), Disp: 90 g, Rfl: 6  Allergies   Allergen Reactions    Metoprolol Shortness Of Breath     Tolerates Bystolic    Benazepril Cough    Clonidine Fatigue    Diltiazem Bradycardia    Entex T  [Pseudoephedrine-Guaifenesin]      Annotation - [id number]: LEG SWELLING    Tizanidine Other (See Comments)     Result Date: 6/29/2021  Narrative: MDT LOOP CARELINK TRANSMISSION: LOOP RECORDER  PRESENTING RHYTHM NSR @ 65 BPM  BATTERY STATUS "OK " NO PATIENT OR DEVICE ACTIVATED EPISODES  NORMAL DEVICE FUNCTION  DL       Review of Systems:  Review of Systems   Constitutional: Negative for activity change, appetite change, fatigue and unexpected weight change  Respiratory: Positive for cough and shortness of breath  Negative for chest tightness and wheezing  Cardiovascular: Positive for leg swelling  Negative for chest pain and palpitations  Gastrointestinal: Positive for constipation  Negative for abdominal pain, blood in stool and diarrhea  Genitourinary: Positive for dysuria and frequency  Negative for hematuria and urgency  Musculoskeletal: Positive for arthralgias  Negative for back pain, gait problem and joint swelling  Neurological: Positive for headaches  Negative for dizziness, syncope, speech difficulty and light-headedness  Psychiatric/Behavioral: Negative for agitation, behavioral problems, confusion and decreased concentration         Physical Exam:  Vitals:    07/27/21 1300   BP: 146/82   BP Location: Right arm   Patient Position: Sitting Pulse: (!) 50   Weight: (!) 137 kg (302 lb 9 6 oz)   Height: 5' 9 5" (1 765 m)       Physical Exam  Constitutional:       General: He is not in acute distress  Appearance: He is obese  He is not ill-appearing  HENT:      Head: Normocephalic and atraumatic  Mouth/Throat:      Mouth: Mucous membranes are moist       Pharynx: No oropharyngeal exudate or posterior oropharyngeal erythema  Eyes:      General: No scleral icterus  Conjunctiva/sclera: Conjunctivae normal    Neck:      Thyroid: No thyroid mass or thyromegaly  Vascular: Normal carotid pulses  No carotid bruit or JVD  Cardiovascular:      Rate and Rhythm: Regular rhythm  Bradycardia present  Pulses:           Dorsalis pedis pulses are 2+ on the right side and 2+ on the left side  Posterior tibial pulses are 1+ on the right side and 1+ on the left side  Heart sounds: No murmur heard  No friction rub  No gallop  Pulmonary:      Breath sounds: Decreased breath sounds present  No wheezing, rhonchi or rales  Abdominal:      Palpations: There is no hepatomegaly, splenomegaly or mass  Tenderness: There is no abdominal tenderness  Musculoskeletal:         General: Swelling (1+ in LEs) present  Cervical back: Neck supple  Skin:     General: Skin is warm  Coloration: Skin is not jaundiced or pale  Findings: No bruising, erythema, lesion or rash  Neurological:      Mental Status: He is oriented to person, place, and time  Cranial Nerves: No cranial nerve deficit  Sensory: No sensory deficit  Motor: No weakness  Psychiatric:         Mood and Affect: Mood normal          Behavior: Behavior normal          Thought Content: Thought content normal          Judgment: Judgment normal          Discussion/Summary:  1  PAF- s/p ablation in 12/2019  David Mares no AFIB on interrogations  David Mares on tikosyn 250 mcgs BID  David Mares ECG ok      2  Chronic diastolic CHF  Appears compensated on lasix 40 mg daily    continue same  3  Hypertension    BP a bit up on amlodipine 7 5 mg daily in divided dosages, nebivolol 10 mg daily, losartan 100 mg and clonidine 0 2 mg PO daily    unfortunately can not use thiazides due to tikosyn  4  Mixed HLP  Ruiz Adame patient on pravastastin 10 mg daily  Ruiz Ritogeorge LDL in May 2020 was 81  Ruiz Adame LFTS ok yesterday    FU 6 months    consider stopping Xarelto and or tikosyn at that time depending on interrogations        Sara Rasmussen MD

## 2021-08-02 ENCOUNTER — TELEPHONE (OUTPATIENT)
Dept: SLEEP CENTER | Facility: CLINIC | Age: 71
End: 2021-08-02

## 2021-08-02 NOTE — TELEPHONE ENCOUNTER
Patient called and requested a script for an antibacterial Inline filter to [facility]

## 2021-08-04 ENCOUNTER — TELEPHONE (OUTPATIENT)
Dept: UROLOGY | Facility: AMBULATORY SURGERY CENTER | Age: 71
End: 2021-08-04

## 2021-08-04 NOTE — TELEPHONE ENCOUNTER
Please Triage - [place]  New Patient- Ref in [facility]    What is the reason for the patients appointment? Acute prostatitis -pt is currently on abx which he will be taking his last pill today  Patient stated he does not feel 100%     Imaging/Lab Results: Lab (7/26/21)      Do we accept the patient's insurance or is the patient Self-Pay?   Provider & Plan: MEDICARE A AND B  Member ID#: [id number]     Has the patient had any previous urologist(s)? no       Have patient records been requested? epic       Has the patient had any outside testing done? epic      Does the patient have a personal history of cancer? no      Patient can be reached at :[phone]

## 2021-08-05 NOTE — TELEPHONE ENCOUNTER
Spoke to patient regarding his symptoms  Patient states he was recently in the ER and they had told him he has acute Prostatitis  He was started on antibiotic that he has finished  He states he has some urinary issues at times  He does have burning that comes and goes and has noted at time he has some blood in his urine  Patient states he has no pain or pressure  Patient has been scheduled for 8/10 at the [facility]

## 2021-08-09 NOTE — PROGRESS NOTES
8/10/2021    Melissa Bowers    1950  [id number]      Assessment  -History of urinary tract infection    Discussion/Plan  Radha Hernandez is a 66 y o  male who presents in consultation     1  History of urinary tract infection- urine dip in the office today identified trace blood, will send for urinalysis with microscopic and culture and call with any significant findings  PVR today is 0 mL  Patient currently asymptomatic  No significant findings noted on VAN today  I recommend obtaining a renal ultrasound to ensure there is no anatomical cause for his prior urinary tract infections  His UTI may have been secondary to BPH  He will return in follow up after ultrasound to discuss results and re-evaluate his urinary pattern  If patient reports increased lower urinary tract symptoms, consider starting tamsulosin  Will also order PSA for next visit    Follow up in 4 weeks to review renal ultrasound and PSA results  He was instructed to call sooner with any issues    -All questions answered, patient agrees with plan      History of Present Illness  66 y o  male who presents in consultation today for evaluation of urinary tract infection  He was referred by his PCP  Patient recently experienced an episode of dysuria and gross hematuria while living in [place] at his daughter's house for the last 9 months  He had been treated for prostatitis with 2 week course of Cipro  Urinalysis have been positive for urinary tract infection and WBC elevated  He reports recurrence of dysuria and foul smelling odor  Patient was evaluated at Urgent care center in the area and he was treated with 1 week course of presumed urinary tract infection  Final culture identified mixed contaminants  He denies any lower urinary tract symptoms at this time, except mild urinary frequency  Patient has had no gross hematuria, dysuria, or fever  He denies any prior urologic history, surgical intervention or instrumentation  Patient denies any strong family history of prostate cancer  Last PSA from May 2020 was 0 16  Review of Systems  Review of Systems   Constitutional: Negative  HENT: Negative  Respiratory: Negative  Cardiovascular: Negative  Gastrointestinal: Negative  Genitourinary: Negative for decreased urine volume, difficulty urinating, dysuria, flank pain, frequency, hematuria and urgency  Musculoskeletal: Negative  Skin: Negative  Neurological: Negative  Psychiatric/Behavioral: Negative          Past Medical History  Past Medical History:   Diagnosis Date    Atrial fibrillation (HCC)     BPH (benign prostatic hyperplasia)     Chronic diastolic (congestive) heart failure (HCC)     Depression     Diabetes mellitus (HCC)     GERD (gastroesophageal reflux disease)     Gout     Hyperlipidemia     Hypertension     Hyponatremia     last assessed: 09/08/2014    Leukocytosis     Liver function abnormality     Morbid obesity ([facility] [address] )     Obstructive sleep apnea     Sleep apnea        Past Social History  Past Surgical History:   Procedure Laterality Date    CARDIAC CATHETERIZATION      outcome: Neg    CARDIAC ELECTROPHYSIOLOGY STUDY AND ABLATION      CARDIOVASCULAR STRESS TEST      resolved: 10/27/2010; negative    COLONOSCOPY  11/2013    polyp; complete    HERNIA REPAIR      resolved: 11/1999    REPLACEMENT TOTAL KNEE Left 2010    REPLACEMENT TOTAL KNEE Right 2003    THYROGLOSSAL DUCT EXCISION      TONSILLECTOMY      last assessed: 06/02/2014       Past Family History  Family History   Problem Relation Age of Onset    Diabetes Mother     Heart attack Mother     Hypertension Mother     Heart attack Sister        Past Social history  Social History     Socioeconomic History    Marital status: /Civil Union     Spouse name: Not on file    Number of children: Not on file    Years of education: Not on file    Highest education level: Not on file   Occupational History    Occupation: Disability    Occupation:      Comment: significant asbestos and silica exposure in the past   Tobacco Use    Smoking status: Never Smoker    Smokeless tobacco: Never Used   Vaping Use    Vaping Use: Never used   Substance and Sexual Activity    Alcohol use: Yes     Alcohol/week: 3 0 - 4 0 standard drinks     Types: 3 - 4 Glasses of wine per week    Drug use: Never    Sexual activity: Not Currently   Other Topics Concern    Not on file   Social History Narrative    4 cups of coffee/day     Social Determinants of Health     Financial Resource Strain:     Difficulty of Paying Living Expenses:    Food Insecurity:     Worried About Running Out of Food in the Last Year:     Ran Out of Food in the Last Year:    Transportation Needs:     Lack of Transportation (Medical):  Lack of Transportation (Non-Medical):    Physical Activity:     Days of Exercise per Week:     Minutes of Exercise per Session:    Stress:     Feeling of Stress :    Social Connections:     Frequency of Communication with Friends and Family:     Frequency of Social Gatherings with Friends and Family:     Attends Nondenominational Services:     Active Member of Clubs or Organizations:     Attends Club or Organization Meetings:     Marital Status:    Intimate Partner Violence:     Fear of Current or Ex-Partner:     Emotionally Abused:     Physically Abused:     Sexually Abused:        Current Medications  Current Outpatient Medications   Medication Sig Dispense Refill    allopurinol (ZYLOPRIM) 300 mg tablet TAKE 1 TABLET BY MOUTH  DAILY 90 tablet 3    amLODIPine (NORVASC) 2 5 mg tablet Take 1 tablet daily in addition to a 5 mg tablet   90 tablet 3    amLODIPine (NORVASC) 5 mg tablet Take 1 tablet daily in addition to 2 5 mg tablet 90 tablet 3    cloNIDine (CATAPRES) 0 2 mg tablet Take 1 tablet (0 2 mg total) by mouth daily One hour prior to bed 90 tablet 3    dofetilide (TIKOSYN) 250 mcg capsule Take 1 capsule (250 mcg total) by mouth every 12 (twelve) hours 180 capsule 3    FLUoxetine (PROzac) 40 MG capsule Take 2 capsules (80 mg total) by mouth daily 180 capsule 1    furosemide (LASIX) 40 mg tablet Take 1 tablet (40 mg total) by mouth daily 90 tablet 3    Klor-Con M15 15 MEQ tablet TAKE 2 TABLETS BY MOUTH  DAILY 180 tablet 3    losartan (COZAAR) 100 MG tablet TAKE 1 TABLET BY MOUTH  DAILY 90 tablet 3    mometasone (ELOCON) 0 1 % cream Apply to external ear BID for 10 days then once weekly at bedtime (Patient not taking: Reported on 7/26/2021) 45 g 3    nebivolol (BYSTOLIC) 10 mg tablet Take 1 tablet (10 mg total) by mouth daily 90 tablet 3    omeprazole (PriLOSEC) 40 MG capsule TAKE 1 CAPSULE BY MOUTH  DAILY 90 capsule 3    pravastatin (PRAVACHOL) 10 mg tablet TAKE 1 TABLET BY MOUTH  DAILY AT BEDTIME 90 tablet 3    triamcinolone (KENALOG) 0 1 % ointment Apply topically TWICE a day to affected areas in Weeks interval  (Patient not taking: Reported on 7/26/2021) 90 g 6    Xarelto 20 MG tablet TAKE 1 TABLET BY MOUTH  DAILY WITH BREAKFAST 90 tablet 3     No current facility-administered medications for this visit  Allergies  Allergies   Allergen Reactions    Metoprolol Shortness Of Breath     Tolerates Bystolic    Benazepril Cough    Clonidine Fatigue    Diltiazem Bradycardia    Entex T  [Pseudoephedrine-Guaifenesin]      Annotation - [id number]: LEG SWELLING    Tizanidine Other (See Comments)       Past Medical History, Social History, Family History, medications and allergies were reviewed  Vitals  There were no vitals filed for this visit  Physical Exam  Physical Exam  Constitutional:       Appearance: Normal appearance  He is well-developed  HENT:      Head: Normocephalic  Eyes:      Pupils: Pupils are equal, round, and reactive to light  Pulmonary:      Effort: Pulmonary effort is normal    Abdominal:      Palpations: Abdomen is soft     Genitourinary:     Prostate: Normal  Rectum: Normal       Comments: Difficult to fully palpate prostate due to body habitus  Nontender, no significant nodules  Musculoskeletal:         General: Normal range of motion  Cervical back: Normal range of motion  Skin:     General: Skin is warm and dry  Neurological:      General: No focal deficit present  Mental Status: He is alert and oriented to person, place, and time  Psychiatric:         Mood and Affect: Mood normal          Behavior: Behavior normal          Thought Content: Thought content normal          Judgment: Judgment normal          Results    I have personally reviewed all pertinent lab results and reviewed with patient  No results found for: PSA  Lab Results   Component Value Date    CALCIUM 8 9 12/06/2019    K 4 0 12/06/2019    CO2 25 12/06/2019     12/06/2019    BUN 15 12/06/2019    CREATININE 1 03 12/06/2019     Lab Results   Component Value Date    WBC 16 40 (H) 12/05/2019    HGB 13 0 12/05/2019    HCT 38 3 12/05/2019    MCV 91 12/05/2019     12/05/2019     No results found for this or any previous visit (from the past 1 hour(s))

## 2021-08-10 ENCOUNTER — OFFICE VISIT (OUTPATIENT)
Dept: UROLOGY | Facility: AMBULATORY SURGERY CENTER | Age: 71
End: 2021-08-10
Payer: MEDICARE

## 2021-08-10 VITALS
HEIGHT: 70 IN | WEIGHT: 307 LBS | DIASTOLIC BLOOD PRESSURE: 82 MMHG | HEART RATE: 49 BPM | SYSTOLIC BLOOD PRESSURE: 150 MMHG | BODY MASS INDEX: 43.95 KG/M2

## 2021-08-10 DIAGNOSIS — Z87.440 HISTORY OF UTI: ICD-10-CM

## 2021-08-10 DIAGNOSIS — Z12.5 SCREENING FOR PROSTATE CANCER: ICD-10-CM

## 2021-08-10 DIAGNOSIS — R35.0 URINARY FREQUENCY: Primary | ICD-10-CM

## 2021-08-10 LAB
BACTERIA UR QL AUTO: NORMAL /HPF
BILIRUB UR QL STRIP: NEGATIVE
CLARITY UR: CLEAR
COLOR UR: YELLOW
GLUCOSE UR STRIP-MCNC: NEGATIVE MG/DL
HGB UR QL STRIP.AUTO: NEGATIVE
HYALINE CASTS #/AREA URNS LPF: NORMAL /LPF
KETONES UR STRIP-MCNC: NEGATIVE MG/DL
LEUKOCYTE ESTERASE UR QL STRIP: NEGATIVE
NITRITE UR QL STRIP: NEGATIVE
NON-SQ EPI CELLS URNS QL MICRO: NORMAL /HPF
PH UR STRIP.AUTO: 6.5 [PH]
POST-VOID RESIDUAL VOLUME, ML POC: 0 ML
PROT UR STRIP-MCNC: NEGATIVE MG/DL
RBC #/AREA URNS AUTO: NORMAL /HPF
SL AMB  POCT GLUCOSE, UA: ABNORMAL
SL AMB LEUKOCYTE ESTERASE,UA: ABNORMAL
SL AMB POCT BILIRUBIN,UA: ABNORMAL
SL AMB POCT BLOOD,UA: ABNORMAL
SL AMB POCT CLARITY,UA: CLEAR
SL AMB POCT COLOR,UA: YELLOW
SL AMB POCT KETONES,UA: ABNORMAL
SL AMB POCT NITRITE,UA: ABNORMAL
SL AMB POCT PH,UA: 7
SL AMB POCT SPECIFIC GRAVITY,UA: 1.01
SL AMB POCT URINE PROTEIN: ABNORMAL
SL AMB POCT UROBILINOGEN: ABNORMAL
SP GR UR STRIP.AUTO: 1.01 (ref 1–1.03)
UROBILINOGEN UR QL STRIP.AUTO: 0.2 E.U./DL
WBC #/AREA URNS AUTO: NORMAL /HPF

## 2021-08-10 PROCEDURE — 51798 US URINE CAPACITY MEASURE: CPT | Performed by: NURSE PRACTITIONER

## 2021-08-10 PROCEDURE — 99204 OFFICE O/P NEW MOD 45 MIN: CPT | Performed by: NURSE PRACTITIONER

## 2021-08-10 PROCEDURE — 81002 URINALYSIS NONAUTO W/O SCOPE: CPT | Performed by: NURSE PRACTITIONER

## 2021-08-10 PROCEDURE — 87086 URINE CULTURE/COLONY COUNT: CPT | Performed by: NURSE PRACTITIONER

## 2021-08-10 PROCEDURE — 81001 URINALYSIS AUTO W/SCOPE: CPT | Performed by: NURSE PRACTITIONER

## 2021-08-12 ENCOUNTER — HOSPITAL ENCOUNTER (OUTPATIENT)
Dept: RADIOLOGY | Age: 71
Discharge: HOME | End: 2021-08-12
Payer: MEDICARE

## 2021-08-12 DIAGNOSIS — Z87.440 HISTORY OF UTI: ICD-10-CM

## 2021-08-12 LAB — BACTERIA UR CULT: NORMAL

## 2021-08-12 PROCEDURE — 76770 US EXAM ABDO BACK WALL COMP: CPT

## 2021-08-13 ENCOUNTER — TELEPHONE (OUTPATIENT)
Dept: UROLOGY | Facility: CLINIC | Age: 71
End: 2021-08-13

## 2021-08-13 DIAGNOSIS — Z12.5 SCREENING FOR PROSTATE CANCER: Primary | ICD-10-CM

## 2021-08-13 NOTE — TELEPHONE ENCOUNTER
Scheduled for 9/13/21 at 3:00 in [place] with Rohan Reddy  Per her note she wanted US and PSA prior to appointment  Order for PSA not previously placed, added to chart       Please call patient to confirm appointment

## 2021-08-13 NOTE — TELEPHONE ENCOUNTER
Hi,    Can you please assist? I tried to schedule pt, but schedule template is changing, so unsure when to schedule him  Pt needs Return in about 4 weeks (around 9/7/2021) for Follow up  Thank you!

## 2021-08-17 ENCOUNTER — TELEPHONE (OUTPATIENT)
Dept: GASTROENTEROLOGY | Facility: CLINIC | Age: 71
End: 2021-08-17

## 2021-08-17 ENCOUNTER — APPOINTMENT (OUTPATIENT)
Dept: LAB | Age: 71
End: 2021-08-17
Payer: MEDICARE

## 2021-08-17 ENCOUNTER — CONSULT (OUTPATIENT)
Dept: GASTROENTEROLOGY | Facility: CLINIC | Age: 71
End: 2021-08-17
Payer: MEDICARE

## 2021-08-17 VITALS
HEART RATE: 63 BPM | HEIGHT: 70 IN | WEIGHT: 304.2 LBS | BODY MASS INDEX: 43.55 KG/M2 | DIASTOLIC BLOOD PRESSURE: 98 MMHG | TEMPERATURE: 97.5 F | SYSTOLIC BLOOD PRESSURE: 168 MMHG

## 2021-08-17 DIAGNOSIS — Z12.5 SCREENING FOR PROSTATE CANCER: ICD-10-CM

## 2021-08-17 DIAGNOSIS — Z86.010 HISTORY OF COLON POLYPS: Primary | ICD-10-CM

## 2021-08-17 DIAGNOSIS — E11.51 TYPE 2 DIABETES MELLITUS WITH DIABETIC PERIPHERAL ANGIOPATHY WITHOUT GANGRENE, WITHOUT LONG-TERM CURRENT USE OF INSULIN (HCC): ICD-10-CM

## 2021-08-17 DIAGNOSIS — Z86.010 HISTORY OF COLON POLYPS: ICD-10-CM

## 2021-08-17 DIAGNOSIS — R10.13 DYSPEPSIA: Primary | ICD-10-CM

## 2021-08-17 LAB — PSA SERPL-MCNC: 0.2 NG/ML (ref 0–4)

## 2021-08-17 PROCEDURE — 36415 COLL VENOUS BLD VENIPUNCTURE: CPT

## 2021-08-17 PROCEDURE — G0103 PSA SCREENING: HCPCS

## 2021-08-17 PROCEDURE — 99204 OFFICE O/P NEW MOD 45 MIN: CPT | Performed by: INTERNAL MEDICINE

## 2021-08-17 RX ORDER — POLYETHYLENE GLYCOL 3350 17 G/17G
POWDER, FOR SOLUTION ORAL
Qty: 238 G | Refills: 0 | Status: SHIPPED | OUTPATIENT
Start: 2021-08-17 | End: 2021-10-13 | Stop reason: ALTCHOICE

## 2021-08-17 RX ORDER — POLYETHYLENE GLYCOL 3350, SODIUM SULFATE, SODIUM CHLORIDE, POTASSIUM CHLORIDE, ASCORBIC ACID, SODIUM ASCORBATE 140-9-5.2G
KIT ORAL
Qty: 1 EACH | Refills: 0 | Status: SHIPPED | COMMUNITY
Start: 2021-08-17 | End: 2021-10-13 | Stop reason: ALTCHOICE

## 2021-08-17 NOTE — TELEPHONE ENCOUNTER
Our mutual patient is scheduled for procedure:  Colonoscopy/EGD    On: __10/13/21     With: Dr Js Arteaga is taking the following blood thinner:   Xarelto       Can this be stopped ___2___ days prior to the procedure?       Physician Approving clearance: ________________________

## 2021-08-17 NOTE — PROGRESS NOTES
[facility] - Outpatient Consultation  Rosetta Kawasaki  66 y o  male MRN: [id number]  Encounter: [id number]          ASSESSMENT AND PLAN:        1  Dyspepsia  EGD   2  Type 2 diabetes mellitus with diabetic peripheral angiopathy without gangrene, without long-term current use of insulin ([facility] )  Ambulatory referral to Gastroenterology   3  History of colon polyps  Colonoscopy    polyethylene glycol (GLYCOLAX) 17 GM/SCOOP powder       Will proceed with upper endoscopy to evaluate for H pylori/gastritis, celiac disease, peptic ulcer disease  Also proceed with colonoscopy given his history of colon polyps and the fact that he is due for repeat colonoscopy at this time  The rationale for endoscopy and colonoscopy with possible biopsy, possible polypectomy, with IV sedation as well as all risks, benefits, and alternatives were discussed with the patient in detail  Risks including but not limited to perforation, bleeding, need for blood transfusion or emergent surgery, and missed neoplasm were reviewed in detail with the patient  The patient demonstrates full understanding and wishes to proceed  ______________________________________________________________    HPI:  Rosetta Kawasaki  is a 66y o  year old male who presents to the office for evaluation of abdominal discomfort, bloating, esophageal reflux, and constipation  Patient did have a colonoscopy done in 2017  Patient does have significant bloating after eating  Patient does not correlate this with any different types of foods  Patient notes regardless of what he eats will have abdominal discomfort  He noted this started about a year ago and has persisted  Patient does not have any relieving factors  He also is constipated and has history of colon polyps in the past   He was told to repeat colonoscopy in 3 years  He has not had any blood in the stool      They have a past medical history of obesity, hypertension, hyperlipidemia, gastroesophageal reflux disease, diastolic heart failure, PVD and follows with their PCP Dr Roman Tya MD    REVIEW OF SYSTEMS:    CONSTITUTIONAL: Denies any fever, chills, rigors, and weight loss  HEENT: No earache or tinnitus  Denies hearing loss or visual disturbances  CARDIOVASCULAR: No chest pain or palpitations  RESPIRATORY: Denies any cough, hemoptysis, shortness of breath or dyspnea on exertion  GASTROINTESTINAL: As noted in the History of Present Illness  GENITOURINARY: No problems with urination  Denies any hematuria or dysuria  NEUROLOGIC: No dizziness or vertigo, denies headaches  MUSCULOSKELETAL: Denies any muscle or joint pain  SKIN: Denies skin rashes or itching  ENDOCRINE: Denies excessive thirst  Denies intolerance to heat or cold  PSYCHOSOCIAL: Denies depression or anxiety  Denies any recent memory loss         Historical Information   Past Medical History:   Diagnosis Date    [facility]     BPH (benign prostatic hyperplasia)     Chronic diastolic (congestive) heart failure (HCC)     Depression     Diabetes mellitus (HCC)     GERD (gastroesophageal reflux disease)     Gout     Hyperlipidemia     Hypertension     Hyponatremia     last assessed: 09/08/2014    Leukocytosis     Liver function abnormality     Morbid [facility]     Obstructive sleep apnea     Sleep apnea      Past Surgical History:   Procedure Laterality Date    CARDIAC CATHETERIZATION      outcome: Neg    CARDIAC ELECTROPHYSIOLOGY STUDY AND ABLATION      CARDIOVASCULAR STRESS TEST      resolved: 10/27/2010; negative    COLONOSCOPY  11/2013    polyp; complete    COLONOSCOPY  05/26/2017    HERNIA REPAIR      resolved: 11/1999    REPLACEMENT TOTAL KNEE Left 2010    REPLACEMENT TOTAL KNEE Right 2003    THYROGLOSSAL DUCT EXCISION      TONSILLECTOMY      last assessed: 06/02/2014    UPPER GASTROINTESTINAL ENDOSCOPY       Social History   Social History     Substance and Sexual Activity   Alcohol Use Yes    Alcohol/week: 3 0 - 4 0 standard drinks    Types: 3 - 4 Glasses of wine per week     Social History     Substance and Sexual Activity   Drug Use Never     Social History     Tobacco Use   Smoking Status Never Smoker   Smokeless Tobacco Never Used     Family History   Problem Relation Age of Onset    Diabetes Mother     Heart attack Mother     Hypertension Mother     Heart attack Sister        Meds/Allergies       Current Outpatient Medications:     allopurinol (ZYLOPRIM) 300 mg tablet    amLODIPine (NORVASC) 2 5 mg tablet    amLODIPine (NORVASC) 5 mg tablet    cloNIDine (CATAPRES) 0 2 mg tablet    dofetilide (TIKOSYN) 250 mcg capsule    FLUoxetine (PROzac) 40 MG capsule    furosemide (LASIX) 40 mg tablet    Klor-Con M15 15 MEQ tablet    losartan (COZAAR) 100 MG tablet    nebivolol (BYSTOLIC) 10 mg tablet    omeprazole (PriLOSEC) 40 MG capsule    pravastatin (PRAVACHOL) 10 mg tablet    Xarelto 20 MG tablet    mometasone (ELOCON) 0 1 % cream    triamcinolone (KENALOG) 0 1 % ointment    Allergies   Allergen Reactions    Metoprolol Shortness Of Breath     Tolerates Bystolic    Benazepril Cough    Clonidine Fatigue    Diltiazem Bradycardia    Entex T  [Pseudoephedrine-Guaifenesin]      Annotation - [id number]: LEG SWELLING    Tizanidine Other (See Comments)           Objective     Blood pressure 168/98, pulse 63, temperature 97 5 °F (36 4 °C), temperature source Tympanic, height 5' 9 5" (1 765 m), weight (!) 138 kg (304 lb 3 2 oz)  Body mass index is 44 28 kg/m²  PHYSICAL EXAM:      General Appearance:   Alert, cooperative, no distress   HEENT:   Normocephalic, atraumatic, anicteric           Lungs:   Equal chest rise and unlabored breathing, normal cough   Heart:   No visualized JVD   Abdomen:   Soft, non-tender, non-distended; no masses, no organomegaly    Genitalia:   Deferred    Rectal:   Deferred    Extremities:  No cyanosis, clubbing or edema    Pulses:  Musculoskeletal:  2+ and symmetric  Normal range of motion visualized    Skin:  Neuro:  No jaundice, rashes, or lesions   Alert and appropriate       Lab Results:   No visits with results within 1 Day(s) from this visit  Latest known visit with results is:   Office Visit on 08/10/2021   Component Date Value    LEUKOCYTE ESTERASE,UA 08/10/2021 trace     NITRITE,UA 08/10/2021 -     SL AMB POCT UROBILINOGEN 08/10/2021 -     POCT URINE PROTEIN 08/10/2021 -      PH,UA 08/10/2021 7 0     BLOOD,UA 08/10/2021 hemolyzed trace     SPECIFIC GRAVITY,UA 08/10/2021 1 015     KETONES,UA 08/10/2021 -     BILIRUBIN,UA 08/10/2021 -     GLUCOSE, UA 08/10/2021 -      COLOR,UA 08/10/2021 yellow     CLARITY,UA 08/10/2021 clear     POST-VOID RESIDUAL VOLUM* 08/10/2021 0     Urine Culture 08/10/2021 No Growth <1000 cfu/mL     Clarity, UA 08/10/2021 Clear     Color, UA 08/10/2021 Yellow     Specific Gravity, UA 08/10/2021 1 012     pH, UA 08/10/2021 6 5     Glucose, UA 08/10/2021 Negative     Ketones, UA 08/10/2021 Negative     Blood, UA 08/10/2021 Negative     Protein, UA 08/10/2021 Negative     Nitrite, UA 08/10/2021 Negative     Bilirubin, UA 08/10/2021 Negative     Urobilinogen, UA 08/10/2021 0 2     Leukocytes, UA 08/10/2021 Negative     WBC, UA 08/10/2021 None Seen     RBC, UA 08/10/2021 None Seen     Hyaline Casts, UA 08/10/2021 None Seen     Bacteria, UA 08/10/2021 None Seen     Epithelial Cells 08/10/2021 None Seen          Radiology Results:   No results found    Answers for HPI/ROS submitted by the patient on 8/17/2021  Chronicity: chronic  Onset: more than 1 year ago  Onset quality: gradual  Frequency: intermittently  Episode duration: 1 hours  Progression since onset: rapidly improving  Pain location: left flank  Pain - numeric: 1/10  Pain quality: a sensation of fullness  Radiates to: does not radiate  anorexia: No  arthralgias: No  belching: Yes  constipation: Yes  diarrhea: No  dysuria: No  fever: No  flatus: Yes  frequency: Yes  headaches: No  hematochezia: No  hematuria: No  melena: No  myalgias: No  nausea: Yes  weight loss: No  vomiting: No  Aggravated by: eating  Relieved by: bowel movements  Diagnostic workup: ultrasound

## 2021-08-17 NOTE — PATIENT INSTRUCTIONS
EGD/colon scheduled for Wednesday, 10/13/21, at [facility] with Dr Radha Finch sample prep and instructions provided to patient in office by Tommy Jerez  Cardiologist will be contacted for Xarelto clearance

## 2021-08-18 NOTE — TELEPHONE ENCOUNTER
2 day hold on xarelto okay    resume post procedure at discretion of GI physician-patient is in sinus rhythm and low risk of stroke for short term interruption of xarelto

## 2021-08-30 ENCOUNTER — TELEPHONE (OUTPATIENT)
Dept: CARDIOLOGY CLINIC | Facility: CLINIC | Age: 71
End: 2021-08-30

## 2021-08-30 NOTE — TELEPHONE ENCOUNTER
Pt calling looking for samples of Xarelto,,,put one bottle up front (he is in the donut hole likeeveryone else currently  [place] )  would like to discuss the medication with Dr Barbour Officer and would like him to call to discuss

## 2021-08-30 NOTE — TELEPHONE ENCOUNTER
Called and LISSETT Mcneill only cheaper option is warfarin    that said he has loop in and if no afib again on next interrogation would approach EP about stopping xarelto    if he calls tell him continue xarelto    have sent mssg to dr spaulding to see what he thinks    would hold off on transition to warfarin at this point

## 2021-09-13 ENCOUNTER — TELEPHONE (OUTPATIENT)
Dept: UROLOGY | Facility: AMBULATORY SURGERY CENTER | Age: 71
End: 2021-09-13

## 2021-09-27 DIAGNOSIS — I48.0 PAROXYSMAL ATRIAL FIBRILLATION (HCC): ICD-10-CM

## 2021-09-27 RX ORDER — DOFETILIDE 0.25 MG/1
250 CAPSULE ORAL EVERY 12 HOURS
Qty: 180 CAPSULE | Refills: 0 | Status: SHIPPED | OUTPATIENT
Start: 2021-09-27 | End: 2021-09-28 | Stop reason: SDUPTHER

## 2021-09-27 NOTE — TELEPHONE ENCOUNTER
This patient refused any appts in October stating he just saw his cardio  He wanted November w/Dr Luh Diaz  He is scheduled on 11/9 w/dt      Thank you -   Zenia Miguelzen

## 2021-09-28 DIAGNOSIS — I48.0 PAROXYSMAL ATRIAL FIBRILLATION (HCC): ICD-10-CM

## 2021-09-28 RX ORDER — DOFETILIDE 0.25 MG/1
250 CAPSULE ORAL EVERY 12 HOURS
Qty: 180 CAPSULE | Refills: 0 | Status: SHIPPED | OUTPATIENT
Start: 2021-09-28 | End: 2021-09-29 | Stop reason: SDUPTHER

## 2021-09-29 DIAGNOSIS — I48.0 PAROXYSMAL ATRIAL FIBRILLATION (HCC): ICD-10-CM

## 2021-09-29 RX ORDER — DOFETILIDE 0.25 MG/1
250 CAPSULE ORAL EVERY 12 HOURS
Qty: 180 CAPSULE | Refills: 0 | Status: SHIPPED | OUTPATIENT
Start: 2021-09-29 | End: 2022-07-07

## 2021-09-30 ENCOUNTER — REMOTE DEVICE CLINIC VISIT (OUTPATIENT)
Dept: CARDIOLOGY CLINIC | Facility: CLINIC | Age: 71
End: 2021-09-30
Payer: MEDICARE

## 2021-09-30 DIAGNOSIS — Z95.818 PRESENCE OF OTHER CARDIAC IMPLANTS AND GRAFTS: Primary | ICD-10-CM

## 2021-09-30 PROCEDURE — G2066 INTER DEVC REMOTE 30D: HCPCS | Performed by: INTERNAL MEDICINE

## 2021-09-30 PROCEDURE — 93298 REM INTERROG DEV EVAL SCRMS: CPT | Performed by: INTERNAL MEDICINE

## 2021-09-30 NOTE — PROGRESS NOTES
MDT LOOP   CARELINK TRANSMISSION: LOOP RECORDER  PRESENTING RHYTHM NSR @ 63 BPM  BATTERY STATUS "OK " 3 AF EPISODES W/ EGRAMS SUGGESTING SR W/ PACs, PVCs AND OVERSENSING  NO PATIENT ACTIVATED EPISODES  NORMAL DEVICE FUNCTION   DL

## 2021-10-11 ENCOUNTER — TELEPHONE (OUTPATIENT)
Dept: GASTROENTEROLOGY | Facility: CLINIC | Age: 71
End: 2021-10-11

## 2021-10-12 ENCOUNTER — TELEPHONE (OUTPATIENT)
Dept: GASTROENTEROLOGY | Facility: HOSPITAL | Age: 71
End: 2021-10-12

## 2021-10-13 ENCOUNTER — HOSPITAL ENCOUNTER (OUTPATIENT)
Dept: GASTROENTEROLOGY | Facility: HOSPITAL | Age: 71
Setting detail: OUTPATIENT SURGERY
Discharge: HOME | End: 2021-10-13
Attending: INTERNAL MEDICINE | Admitting: INTERNAL MEDICINE
Payer: MEDICARE

## 2021-10-13 ENCOUNTER — ANESTHESIA EVENT (OUTPATIENT)
Dept: GASTROENTEROLOGY | Facility: HOSPITAL | Age: 71
End: 2021-10-13

## 2021-10-13 ENCOUNTER — ANESTHESIA (OUTPATIENT)
Dept: GASTROENTEROLOGY | Facility: HOSPITAL | Age: 71
End: 2021-10-13

## 2021-10-13 VITALS
SYSTOLIC BLOOD PRESSURE: 140 MMHG | HEIGHT: 69 IN | WEIGHT: 308 LBS | RESPIRATION RATE: 18 BRPM | BODY MASS INDEX: 45.62 KG/M2 | TEMPERATURE: 98.6 F | DIASTOLIC BLOOD PRESSURE: 70 MMHG | OXYGEN SATURATION: 96 % | HEART RATE: 54 BPM

## 2021-10-13 DIAGNOSIS — Z86.010 HISTORY OF COLON POLYPS: ICD-10-CM

## 2021-10-13 DIAGNOSIS — R10.13 DYSPEPSIA: ICD-10-CM

## 2021-10-13 PROCEDURE — 88305 TISSUE EXAM BY PATHOLOGIST: CPT | Performed by: PATHOLOGY

## 2021-10-13 PROCEDURE — 43239 EGD BIOPSY SINGLE/MULTIPLE: CPT | Performed by: INTERNAL MEDICINE

## 2021-10-13 PROCEDURE — 45385 COLONOSCOPY W/LESION REMOVAL: CPT | Performed by: INTERNAL MEDICINE

## 2021-10-13 RX ORDER — PROPOFOL 10 MG/ML
INJECTION, EMULSION INTRAVENOUS AS NEEDED
Status: DISCONTINUED | OUTPATIENT
Start: 2021-10-13 | End: 2021-10-13

## 2021-10-13 RX ORDER — EPHEDRINE SULFATE 50 MG/ML
INJECTION INTRAVENOUS AS NEEDED
Status: DISCONTINUED | OUTPATIENT
Start: 2021-10-13 | End: 2021-10-13

## 2021-10-13 RX ORDER — PROPOFOL 10 MG/ML
INJECTION, EMULSION INTRAVENOUS CONTINUOUS PRN
Status: DISCONTINUED | OUTPATIENT
Start: 2021-10-13 | End: 2021-10-13

## 2021-10-13 RX ORDER — SODIUM CHLORIDE 9 MG/ML
INJECTION, SOLUTION INTRAVENOUS CONTINUOUS PRN
Status: DISCONTINUED | OUTPATIENT
Start: 2021-10-13 | End: 2021-10-13

## 2021-10-13 RX ORDER — LIDOCAINE HYDROCHLORIDE 10 MG/ML
INJECTION, SOLUTION EPIDURAL; INFILTRATION; INTRACAUDAL; PERINEURAL AS NEEDED
Status: DISCONTINUED | OUTPATIENT
Start: 2021-10-13 | End: 2021-10-13

## 2021-10-13 RX ADMIN — LIDOCAINE HYDROCHLORIDE 100 MG: 10 INJECTION, SOLUTION EPIDURAL; INFILTRATION; INTRACAUDAL; PERINEURAL at 10:56

## 2021-10-13 RX ADMIN — EPHEDRINE SULFATE 10 MG: 50 INJECTION, SOLUTION INTRAVENOUS at 11:09

## 2021-10-13 RX ADMIN — SODIUM CHLORIDE: 0.9 INJECTION, SOLUTION INTRAVENOUS at 10:49

## 2021-10-13 RX ADMIN — PROPOFOL 80 MG: 10 INJECTION, EMULSION INTRAVENOUS at 10:56

## 2021-10-13 RX ADMIN — PROPOFOL 100 MCG/KG/MIN: 10 INJECTION, EMULSION INTRAVENOUS at 10:56

## 2021-10-15 ENCOUNTER — OFFICE VISIT (OUTPATIENT)
Dept: SLEEP CENTER | Facility: CLINIC | Age: 71
End: 2021-10-15
Payer: MEDICARE

## 2021-10-15 VITALS
BODY MASS INDEX: 46.39 KG/M2 | SYSTOLIC BLOOD PRESSURE: 196 MMHG | WEIGHT: 313.2 LBS | HEART RATE: 64 BPM | HEIGHT: 69 IN | DIASTOLIC BLOOD PRESSURE: 90 MMHG

## 2021-10-15 DIAGNOSIS — I10 ESSENTIAL HYPERTENSION: ICD-10-CM

## 2021-10-15 DIAGNOSIS — J31.0 CHRONIC RHINITIS: ICD-10-CM

## 2021-10-15 DIAGNOSIS — I48.0 PAROXYSMAL ATRIAL FIBRILLATION (HCC): ICD-10-CM

## 2021-10-15 DIAGNOSIS — K21.9 GASTROESOPHAGEAL REFLUX DISEASE WITHOUT ESOPHAGITIS: ICD-10-CM

## 2021-10-15 DIAGNOSIS — I50.32 CHRONIC DIASTOLIC (CONGESTIVE) HEART FAILURE (HCC): ICD-10-CM

## 2021-10-15 DIAGNOSIS — G47.33 OSA (OBSTRUCTIVE SLEEP APNEA): Primary | ICD-10-CM

## 2021-10-15 DIAGNOSIS — G47.9 SLEEP DISTURBANCE: ICD-10-CM

## 2021-10-15 DIAGNOSIS — E66.01 MORBID OBESITY (HCC): ICD-10-CM

## 2021-10-15 DIAGNOSIS — E11.9 TYPE 2 DIABETES MELLITUS WITHOUT COMPLICATION, WITHOUT LONG-TERM CURRENT USE OF INSULIN (HCC): ICD-10-CM

## 2021-10-15 DIAGNOSIS — F41.8 DEPRESSION WITH ANXIETY: ICD-10-CM

## 2021-10-15 PROCEDURE — 99214 OFFICE O/P EST MOD 30 MIN: CPT | Performed by: INTERNAL MEDICINE

## 2021-10-18 ENCOUNTER — TELEPHONE (OUTPATIENT)
Dept: SLEEP CENTER | Facility: CLINIC | Age: 71
End: 2021-10-18

## 2021-11-09 ENCOUNTER — OFFICE VISIT (OUTPATIENT)
Dept: CARDIOLOGY CLINIC | Facility: CLINIC | Age: 71
End: 2021-11-09
Payer: MEDICARE

## 2021-11-09 VITALS
HEIGHT: 69 IN | HEART RATE: 55 BPM | BODY MASS INDEX: 45.91 KG/M2 | WEIGHT: 310 LBS | SYSTOLIC BLOOD PRESSURE: 176 MMHG | DIASTOLIC BLOOD PRESSURE: 84 MMHG

## 2021-11-09 DIAGNOSIS — Z79.899 LONG TERM CURRENT USE OF ANTIARRHYTHMIC DRUG: ICD-10-CM

## 2021-11-09 DIAGNOSIS — I48.0 PAROXYSMAL ATRIAL FIBRILLATION (HCC): Primary | ICD-10-CM

## 2021-11-09 DIAGNOSIS — I50.32 CHRONIC DIASTOLIC (CONGESTIVE) HEART FAILURE (HCC): ICD-10-CM

## 2021-11-09 DIAGNOSIS — Z79.01 ANTICOAGULATION ADEQUATE: ICD-10-CM

## 2021-11-09 DIAGNOSIS — I10 ESSENTIAL HYPERTENSION: ICD-10-CM

## 2021-11-09 DIAGNOSIS — I73.9 PERIPHERAL VASCULAR DISEASE (HCC): ICD-10-CM

## 2021-11-09 DIAGNOSIS — I70.0 ATHEROSCLEROSIS OF AORTA (HCC): ICD-10-CM

## 2021-11-09 PROCEDURE — 99214 OFFICE O/P EST MOD 30 MIN: CPT | Performed by: INTERNAL MEDICINE

## 2021-11-09 PROCEDURE — 93000 ELECTROCARDIOGRAM COMPLETE: CPT | Performed by: INTERNAL MEDICINE

## 2021-11-09 RX ORDER — OLMESARTAN MEDOXOMIL 40 MG/1
40 TABLET ORAL DAILY
Qty: 90 TABLET | Refills: 3 | Status: SHIPPED | OUTPATIENT
Start: 2021-11-09 | End: 2022-02-23 | Stop reason: SDUPTHER

## 2021-11-09 RX ORDER — OLMESARTAN MEDOXOMIL 40 MG/1
40 TABLET ORAL DAILY
Qty: 90 TABLET | Refills: 3 | Status: SHIPPED | OUTPATIENT
Start: 2021-11-09 | End: 2021-11-09 | Stop reason: SDUPTHER

## 2021-11-09 NOTE — TELEPHONE ENCOUNTER
New Patient Encounter    New Patient Intake Form   Patient Details:  Odalis Marin  1950  [id number]    Background Information:  [address] starts by opening a telephone encounter and gathering the following information   Who is calling to schedule? If not self, relationship to patient? Self   Referring Provider Dr Darci Favre   What is the diagnosis? Abnormal white blood cell    Is this diagnosis confirmed Yes   Is there a confirmed diagnosis from a biopsy/tissue reviewed by pathology? No   Is there any prior history of Cancer? No   If yes, please explain N/A   When was the diagnosis? December 2019   Is patient aware of diagnosis? Yes   Reason for visit? NP DX   Have you had any testing done? If so: when, where? Yes   Are records in [facility]? yes   Was the patient told to bring a disk? no   Scheduling Information:   Preferred [place]: [facility] [place]     Requesting Specific Provider? Any   Are there any dates/time the patient cannot be seen? Any      Miscellaneous: Any   After completing the above information, please route to Financial Counselor and the appropriate Nurse Navigator for review  ekg  Subjective   Patient ID: Jazmin is a 38 year old female.    Chief Complaint   Patient presents with   • Chest Pain     right sided chest pain since yesterday   • Other     muscle spasm above right eyebrow since yesterday     38-year-old female presents to clinic for right-sided chest pain x1 day.  States it feels like a pulling sensation.  No injury or trauma per patient    Denies shortness of breath, dizziness, lightheadedness        Past Medical History:   Diagnosis Date   • Anemia    • RAD (reactive airway disease)        MEDICATIONS:  Current Outpatient Medications   Medication Sig   • cyclobenzaprine (FLEXERIL) 5 MG tablet Take 1 tablet by mouth at bedtime as needed for Muscle spasms.   • ibuprofen (MOTRIN) 600 MG tablet Take 1 tablet by mouth every 6 hours as needed for Pain.   • docusate sodium (Colace) 100 MG capsule Take 1 capsule by mouth 2 times daily.   • albuterol (VENTOLIN) (2.5 MG/3ML) 0.083% nebulizer solution Take 3 mLs by nebulization every 4 hours as needed for Wheezing or Shortness of Breath.   • albuterol 108 (90 Base) MCG/ACT inhaler Inhale 2 puffs into the lungs every 4 hours as needed for Shortness of Breath or Wheezing.   • famotidine (PEPCID) 20 MG tablet Take 1 tablet by mouth 2 times daily.   • ferrous sulfate 325 (65 FE) MG tablet Take 1 tablet by mouth daily (with breakfast).   • fluticasone-salmeterol (Advair Diskus) 250-50 MCG/DOSE inhaler Inhale 1 puff into the lungs two times daily. Rinse mouth after done instead q dilma   • montelukast (SINGULAIR) 10 MG tablet Take 1 tablet by mouth nightly.   • sodium chloride (OCEAN) 0.65 % nasal spray Spray 2 sprays in each nostril 4 times daily as needed for Congestion.   • fluticasone (FLONASE) 50 MCG/ACT nasal spray Spray 2 sprays in each nostril daily.   • phentermine (ADIPEX-P) 37.5 MG tablet Take 1 tablet by mouth daily (before breakfast).   • meclizine (ANTIVERT) 12.5 MG tablet Take 1 tablet by mouth 3 times daily as needed for  Dizziness or Nausea. Can cause drowsiness   • Spacer/Aero-Holding Chambers (AEROCHAMBER) PLEASE USE WITH YOUR INHALER   • ferrous sulfate 324 (65 Fe) MG EC tablet TAKE 1 TABLET BY MOUTH TWICE DAILY     No current facility-administered medications for this visit.       ALLERGIES:  ALLERGIES:   Allergen Reactions   • Banana   (Food And Med) Other (See Comments)     Mouth itches       PAST SURGICAL HISTORY:  Past Surgical History:   Procedure Laterality Date   • D and c  2004   • Myomectomy  10/22/2019    Symptomatic Uterine Fibroids       FAMILY HISTORY:  Family History   Problem Relation Age of Onset   • Hypertension Mother    • Hypertension Father        SOCIAL HISTORY:  Social History     Tobacco Use   • Smoking status: Former Smoker     Packs/day: 0.00   • Smokeless tobacco: Never Used   Substance Use Topics   • Alcohol use: Yes     Comment: socially   • Drug use: Never         Patient's medications, allergies, past medical, surgical, and social history  were reviewed and updated as appropriate.    Review of Systems   Constitutional: Negative.    HENT: Negative.    Eyes: Negative.    Respiratory: Negative.    Cardiovascular: Positive for chest pain.   Gastrointestinal: Negative.    Endocrine: Negative.    Genitourinary: Negative.    Musculoskeletal: Negative.    Skin: Negative.    Allergic/Immunologic: Negative.    Neurological: Negative.        Objective   Physical Exam  Vitals and nursing note reviewed.   Constitutional:       General: She is not in acute distress.     Appearance: Normal appearance. She is not ill-appearing, toxic-appearing or diaphoretic.   HENT:      Head: Normocephalic and atraumatic.      Right Ear: Tympanic membrane, ear canal and external ear normal.      Left Ear: Tympanic membrane, ear canal and external ear normal.      Nose: Nose normal. No congestion or rhinorrhea.      Mouth/Throat:      Mouth: Mucous membranes are moist.      Pharynx: Oropharynx is clear. No oropharyngeal exudate or  posterior oropharyngeal erythema.   Eyes:      Extraocular Movements: Extraocular movements intact.      Conjunctiva/sclera: Conjunctivae normal.      Pupils: Pupils are equal, round, and reactive to light.   Cardiovascular:      Rate and Rhythm: Normal rate and regular rhythm.      Pulses: Normal pulses.      Heart sounds: Normal heart sounds.   Pulmonary:      Effort: Pulmonary effort is normal. No respiratory distress.      Breath sounds: Normal breath sounds. No stridor. No wheezing, rhonchi or rales.   Chest:      Chest wall: No tenderness.   Abdominal:      General: Abdomen is flat. Bowel sounds are normal. There is no distension.      Palpations: Abdomen is soft.      Tenderness: There is no abdominal tenderness.   Musculoskeletal:         General: Tenderness present. Normal range of motion.        Arms:       Cervical back: Full passive range of motion without pain, normal range of motion and neck supple. No muscular tenderness.   Lymphadenopathy:      Cervical: No cervical adenopathy.   Skin:     General: Skin is warm.      Capillary Refill: Capillary refill takes less than 2 seconds.   Neurological:      General: No focal deficit present.      Mental Status: She is alert and oriented to person, place, and time.   Psychiatric:         Mood and Affect: Mood normal.       Visit Vitals  /81   Pulse 82   Temp 98 °F (36.7 °C) (Oral)   Resp 16   Ht 5' 7\" (1.702 m)   Wt 124.8 kg (275 lb 2.2 oz)   LMP 11/06/2021 (Exact Date)   SpO2 99%   BMI 43.09 kg/m²       Assessment   Problem List Items Addressed This Visit     None      Visit Diagnoses     Right-sided chest wall pain    -  Primary    Relevant Medications    cyclobenzaprine (FLEXERIL) 5 MG tablet    ibuprofen (MOTRIN) 600 MG tablet    Pain        Relevant Orders    POCT URINE PREGNANCY (Completed)    Chest pain, unspecified type        Relevant Orders    ELECTROCARDIOGRAM 12-LEAD (Completed)          This is a 38 year old year-old female who presents with  chest wall pain    PLAN  EKG no ischemic changes  Pregnancy neg  Medication sent.   Instructions provided as documented in the AVS.    Thank you for visiting [facility].  Please follow up with your PCP 1 week.

## 2021-12-03 ENCOUNTER — TELEPHONE (OUTPATIENT)
Dept: FAMILY MEDICINE CLINIC | Facility: CLINIC | Age: 71
End: 2021-12-03

## 2021-12-06 ENCOUNTER — REMOTE DEVICE CLINIC VISIT (OUTPATIENT)
Dept: CARDIOLOGY CLINIC | Facility: CLINIC | Age: 71
End: 2021-12-06
Payer: MEDICARE

## 2021-12-06 DIAGNOSIS — Z95.818 PRESENCE OF OTHER CARDIAC IMPLANTS AND GRAFTS: Primary | ICD-10-CM

## 2021-12-06 PROCEDURE — 93298 REM INTERROG DEV EVAL SCRMS: CPT | Performed by: INTERNAL MEDICINE

## 2021-12-06 PROCEDURE — G2066 INTER DEVC REMOTE 30D: HCPCS | Performed by: INTERNAL MEDICINE

## 2021-12-06 NOTE — ASSESSMENT & PLAN NOTE
Continue on pravastatin 
He seems to be stable at this time 
He will continue on CPAP
Lab Results   Component Value Date    HGBA1C 5 9 10/12/2020   He will be getting blood work done upon his return from [facility] 
Reflux is stable at this time 
Unfortunately, he has gained some weight  Keep an eye on his LFTs upon return  Work on weight loss 
We are going to try to bump his amlodipine up to 7 5 mg as he did well as far as blood pressure control and 10 mg but did have some increased persistent lower extremity swelling    He may certainly stop this higher dose is swelling comes back
Work on weight loss  He is going to try to get a COVID vaccine out in [facility] 
Wt Readings from Last 3 Encounters:   10/12/20 (!) 141 kg (310 lb)   10/09/20 (!) 139 kg (306 lb 12 8 oz)   10/08/20 (!) 139 kg (307 lb)       He appears to be stable at this time clinically  He has gained some weight but notes his diet has been poor 
None

## 2022-01-13 ENCOUNTER — VBI (OUTPATIENT)
Dept: ADMINISTRATIVE | Facility: OTHER | Age: 72
End: 2022-01-13

## 2022-01-21 ENCOUNTER — VBI (OUTPATIENT)
Dept: ADMINISTRATIVE | Facility: OTHER | Age: 72
End: 2022-01-21

## 2022-01-26 ENCOUNTER — VBI (OUTPATIENT)
Dept: ADMINISTRATIVE | Facility: OTHER | Age: 72
End: 2022-01-26

## 2022-01-28 ENCOUNTER — VBI (OUTPATIENT)
Dept: ADMINISTRATIVE | Facility: OTHER | Age: 72
End: 2022-01-28

## 2022-02-03 ENCOUNTER — TELEPHONE (OUTPATIENT)
Dept: ADMINISTRATIVE | Facility: OTHER | Age: 72
End: 2022-02-03

## 2022-02-23 DIAGNOSIS — I10 ESSENTIAL HYPERTENSION: ICD-10-CM

## 2022-02-23 RX ORDER — OLMESARTAN MEDOXOMIL 40 MG/1
40 TABLET ORAL DAILY
Qty: 90 TABLET | Refills: 3 | Status: SHIPPED | OUTPATIENT
Start: 2022-02-23

## 2022-03-07 ENCOUNTER — REMOTE DEVICE CLINIC VISIT (OUTPATIENT)
Dept: CARDIOLOGY CLINIC | Facility: CLINIC | Age: 72
End: 2022-03-07
Payer: MEDICARE

## 2022-03-07 DIAGNOSIS — Z95.818 PRESENCE OF OTHER CARDIAC IMPLANTS AND GRAFTS: Primary | ICD-10-CM

## 2022-03-07 PROCEDURE — G2066 INTER DEVC REMOTE 30D: HCPCS | Performed by: INTERNAL MEDICINE

## 2022-03-07 PROCEDURE — 93298 REM INTERROG DEV EVAL SCRMS: CPT | Performed by: INTERNAL MEDICINE

## 2022-04-04 ENCOUNTER — OFFICE VISIT (OUTPATIENT)
Dept: CARDIOLOGY CLINIC | Facility: CLINIC | Age: 72
End: 2022-04-04
Payer: MEDICARE

## 2022-04-04 ENCOUNTER — OFFICE VISIT (OUTPATIENT)
Dept: FAMILY MEDICINE CLINIC | Facility: CLINIC | Age: 72
End: 2022-04-04
Payer: MEDICARE

## 2022-04-04 VITALS
HEART RATE: 52 BPM | WEIGHT: 312 LBS | SYSTOLIC BLOOD PRESSURE: 152 MMHG | OXYGEN SATURATION: 96 % | RESPIRATION RATE: 18 BRPM | HEIGHT: 69 IN | BODY MASS INDEX: 46.21 KG/M2 | DIASTOLIC BLOOD PRESSURE: 80 MMHG

## 2022-04-04 VITALS
BODY MASS INDEX: 45.9 KG/M2 | SYSTOLIC BLOOD PRESSURE: 150 MMHG | DIASTOLIC BLOOD PRESSURE: 70 MMHG | WEIGHT: 310.8 LBS | HEART RATE: 54 BPM

## 2022-04-04 DIAGNOSIS — K21.9 GASTROESOPHAGEAL REFLUX DISEASE WITHOUT ESOPHAGITIS: ICD-10-CM

## 2022-04-04 DIAGNOSIS — I50.32 CHRONIC DIASTOLIC (CONGESTIVE) HEART FAILURE (HCC): ICD-10-CM

## 2022-04-04 DIAGNOSIS — I48.0 PAROXYSMAL ATRIAL FIBRILLATION (HCC): ICD-10-CM

## 2022-04-04 DIAGNOSIS — E11.51 TYPE 2 DIABETES MELLITUS WITH DIABETIC PERIPHERAL ANGIOPATHY WITHOUT GANGRENE, WITHOUT LONG-TERM CURRENT USE OF INSULIN (HCC): ICD-10-CM

## 2022-04-04 DIAGNOSIS — I10 ESSENTIAL HYPERTENSION: ICD-10-CM

## 2022-04-04 DIAGNOSIS — Z86.79 S/P ABLATION OF ATRIAL FIBRILLATION: ICD-10-CM

## 2022-04-04 DIAGNOSIS — K59.09 CHRONIC CONSTIPATION: ICD-10-CM

## 2022-04-04 DIAGNOSIS — I70.0 ATHEROSCLEROSIS OF AORTA (HCC): ICD-10-CM

## 2022-04-04 DIAGNOSIS — E78.00 HYPERCHOLESTEROLEMIA: ICD-10-CM

## 2022-04-04 DIAGNOSIS — F32.0 MAJOR DEPRESSIVE DISORDER, SINGLE EPISODE, MILD (HCC): ICD-10-CM

## 2022-04-04 DIAGNOSIS — E66.01 MORBID OBESITY (HCC): ICD-10-CM

## 2022-04-04 DIAGNOSIS — I73.9 PERIPHERAL VASCULAR DISEASE (HCC): ICD-10-CM

## 2022-04-04 DIAGNOSIS — I48.0 PAROXYSMAL ATRIAL FIBRILLATION (HCC): Primary | ICD-10-CM

## 2022-04-04 DIAGNOSIS — Z00.00 MEDICARE ANNUAL WELLNESS VISIT, SUBSEQUENT: Primary | ICD-10-CM

## 2022-04-04 DIAGNOSIS — G47.33 OSA (OBSTRUCTIVE SLEEP APNEA): ICD-10-CM

## 2022-04-04 DIAGNOSIS — Z98.890 S/P ABLATION OF ATRIAL FIBRILLATION: ICD-10-CM

## 2022-04-04 DIAGNOSIS — E78.2 MIXED HYPERLIPIDEMIA: ICD-10-CM

## 2022-04-04 DIAGNOSIS — K76.0 FATTY LIVER: ICD-10-CM

## 2022-04-04 DIAGNOSIS — M1A.09X0 IDIOPATHIC CHRONIC GOUT OF MULTIPLE SITES WITHOUT TOPHUS: ICD-10-CM

## 2022-04-04 PROBLEM — F19.982 DRUG-INDUCED INSOMNIA (HCC): Status: RESOLVED | Noted: 2020-02-03 | Resolved: 2022-04-04

## 2022-04-04 LAB — SL AMB POCT HEMOGLOBIN AIC: 5.9 (ref ?–6.5)

## 2022-04-04 PROCEDURE — 1123F ACP DISCUSS/DSCN MKR DOCD: CPT | Performed by: FAMILY MEDICINE

## 2022-04-04 PROCEDURE — 93000 ELECTROCARDIOGRAM COMPLETE: CPT | Performed by: INTERNAL MEDICINE

## 2022-04-04 PROCEDURE — 83036 HEMOGLOBIN GLYCOSYLATED A1C: CPT | Performed by: FAMILY MEDICINE

## 2022-04-04 PROCEDURE — 99214 OFFICE O/P EST MOD 30 MIN: CPT | Performed by: INTERNAL MEDICINE

## 2022-04-04 PROCEDURE — G0439 PPPS, SUBSEQ VISIT: HCPCS | Performed by: FAMILY MEDICINE

## 2022-04-04 PROCEDURE — 99214 OFFICE O/P EST MOD 30 MIN: CPT | Performed by: FAMILY MEDICINE

## 2022-04-04 RX ORDER — AMLODIPINE BESYLATE 5 MG/1
TABLET ORAL
Qty: 90 TABLET | Refills: 3 | Status: SHIPPED | OUTPATIENT
Start: 2022-04-04

## 2022-04-04 RX ORDER — CLONIDINE HYDROCHLORIDE 0.2 MG/1
0.2 TABLET ORAL DAILY
Qty: 90 TABLET | Refills: 3 | Status: SHIPPED | OUTPATIENT
Start: 2022-04-04

## 2022-04-04 RX ORDER — OMEPRAZOLE 40 MG/1
40 CAPSULE, DELAYED RELEASE ORAL DAILY
Qty: 90 CAPSULE | Refills: 3 | Status: SHIPPED | OUTPATIENT
Start: 2022-04-04

## 2022-04-04 RX ORDER — AMLODIPINE BESYLATE 2.5 MG/1
TABLET ORAL
Qty: 90 TABLET | Refills: 3 | Status: SHIPPED | OUTPATIENT
Start: 2022-04-04

## 2022-04-04 RX ORDER — NEBIVOLOL 10 MG/1
10 TABLET ORAL DAILY
Qty: 90 TABLET | Refills: 3 | Status: SHIPPED | OUTPATIENT
Start: 2022-04-04

## 2022-04-04 RX ORDER — PRAVASTATIN SODIUM 10 MG
10 TABLET ORAL
Qty: 90 TABLET | Refills: 3 | Status: SHIPPED | OUTPATIENT
Start: 2022-04-04

## 2022-04-04 RX ORDER — POTASSIUM CHLORIDE 1125 MG/1
30 TABLET, EXTENDED RELEASE ORAL DAILY
Qty: 180 TABLET | Refills: 3 | Status: SHIPPED | OUTPATIENT
Start: 2022-04-04

## 2022-04-04 RX ORDER — FLUOXETINE HYDROCHLORIDE 40 MG/1
80 CAPSULE ORAL DAILY
Qty: 180 CAPSULE | Refills: 3 | Status: SHIPPED | OUTPATIENT
Start: 2022-04-04

## 2022-04-04 NOTE — ASSESSMENT & PLAN NOTE
He has some occasional arthralgias but nothing has been a significant scalp    Continue routine clinical follow-up check uric acid

## 2022-04-04 NOTE — PROGRESS NOTES
Assessment/Plan:       Problem List Items Addressed This Visit        Digestive    Gastroesophageal reflux disease without esophagitis    Relevant Medications    omeprazole (PriLOSEC) 40 MG capsule    Fatty liver    Relevant Orders    Comprehensive metabolic panel    CBC and differential    Lipid Panel with Direct LDL reflex    Chronic constipation       Endocrine    Type 2 diabetes mellitus with diabetic peripheral angiopathy without gangrene (HCC)    Relevant Orders    POCT hemoglobin A1c (Completed)    Comprehensive metabolic panel    CBC and differential       Respiratory    MARISSA (obstructive sleep apnea)       Cardiovascular and Mediastinum    Essential hypertension     Blood pressure was a bit elevated today but his home blood pressure readings have been pretty good overall  He is going to continue to work on weight loss  Notify me if he is running above 140/90             Relevant Medications    cloNIDine (CATAPRES) 0 2 mg tablet    amLODIPine (NORVASC) 5 mg tablet    amLODIPine (NORVASC) 2 5 mg tablet    Other Relevant Orders    Comprehensive metabolic panel    CBC and differential    Chronic diastolic (congestive) heart failure (HCC)    Relevant Medications    amLODIPine (NORVASC) 5 mg tablet    amLODIPine (NORVASC) 2 5 mg tablet    Other Relevant Orders    Comprehensive metabolic panel    CBC and differential    A-fib (HCC)    Relevant Medications    potassium chloride SA (Klor-Con M15) 15 MEQ tablet    amLODIPine (NORVASC) 5 mg tablet    amLODIPine (NORVASC) 2 5 mg tablet    FLUoxetine (PROzac) 40 MG capsule    Other Relevant Orders    Comprehensive metabolic panel    CBC and differential    TSH, 3rd generation with Free T4 reflex    Peripheral vascular disease (HCC)    Atherosclerosis of aorta (HCC)       Other    Major depressive disorder, single episode, mild (HCC)    Relevant Medications    FLUoxetine (PROzac) 40 MG capsule    Other Relevant Orders    CBC and differential    TSH, 3rd generation with Free T4 reflex    Idiopathic chronic gout of multiple sites without tophus     He has some occasional arthralgias but nothing has been a significant scalp  Continue routine clinical follow-up check uric acid         Relevant Orders    Uric acid    Hyperlipidemia    Relevant Medications    pravastatin (PRAVACHOL) 10 mg tablet    Other Relevant Orders    Comprehensive metabolic panel    Lipid Panel with Direct LDL reflex    Morbid obesity (HCC)    S/P ablation of atrial fibrillation      Other Visit Diagnoses     Medicare annual wellness visit, subsequent    -  Primary    Hypercholesterolemia        Relevant Medications    pravastatin (PRAVACHOL) 10 mg tablet            Subjective:      Patient ID: Mayi Davis  is a 74 y o  male  HPI patient presents today for follow-up of chronic health issues  He seems to be stable  He spent several months in [place] with his daughter and has been on for a week  Blood pressures are mostly well controlled when he checks them at home  It was a bit elevated today  He has been compliant with medications  No CB not take furosemide today as he does get some polyuria from it  Does have some persistent intermittent ankle swelling mostly by the end of the day which seems resolved the next day  Fortunately, is not having chest pain or significant palpitations    Mood seems to be pretty well controlled    The following portions of the patient's history were reviewed and updated as appropriate: allergies, current medications, past family history, past medical history, past social history, past surgical history and problem list       Current Outpatient Medications:     allopurinol (ZYLOPRIM) 300 mg tablet, TAKE 1 TABLET BY MOUTH  DAILY, Disp: 90 tablet, Rfl: 0    amLODIPine (NORVASC) 2 5 mg tablet, Take 1 tablet daily in addition to a 5 mg tablet , Disp: 90 tablet, Rfl: 3    amLODIPine (NORVASC) 5 mg tablet, Take 1 tablet daily in addition to 2 5 mg tablet, Disp: 90 tablet, Rfl: 3    cloNIDine (CATAPRES) 0 2 mg tablet, Take 1 tablet (0 2 mg total) by mouth daily One hour prior to bed, Disp: 90 tablet, Rfl: 3    dofetilide (TIKOSYN) 250 mcg capsule, Take 1 capsule (250 mcg total) by mouth every 12 (twelve) hours, Disp: 180 capsule, Rfl: 0    FLUoxetine (PROzac) 40 MG capsule, Take 2 capsules (80 mg total) by mouth daily, Disp: 180 capsule, Rfl: 3    furosemide (LASIX) 40 mg tablet, TAKE 1 TABLET BY MOUTH  DAILY, Disp: 90 tablet, Rfl: 3    nebivolol (BYSTOLIC) 10 mg tablet, Take 1 tablet (10 mg total) by mouth daily, Disp: 90 tablet, Rfl: 3    olmesartan (BENICAR) 40 mg tablet, Take 1 tablet (40 mg total) by mouth daily, Disp: 90 tablet, Rfl: 3    omeprazole (PriLOSEC) 40 MG capsule, Take 1 capsule (40 mg total) by mouth daily, Disp: 90 capsule, Rfl: 3    potassium chloride SA (Klor-Con M15) 15 MEQ tablet, Take 2 tablets (30 mEq total) by mouth daily, Disp: 180 tablet, Rfl: 3    pravastatin (PRAVACHOL) 10 mg tablet, Take 1 tablet (10 mg total) by mouth daily at bedtime, Disp: 90 tablet, Rfl: 3    Xarelto 20 MG tablet, TAKE 1 TABLET BY MOUTH  DAILY WITH BREAKFAST, Disp: 90 tablet, Rfl: 3     Review of Systems   Constitutional: Negative for appetite change, chills, fatigue, fever and unexpected weight change  HENT: Negative for trouble swallowing  Eyes: Negative for visual disturbance  Respiratory: Negative for cough, chest tightness, shortness of breath and wheezing  Cardiovascular: Negative for chest pain, palpitations and leg swelling  Gastrointestinal: Negative for abdominal distention, abdominal pain, blood in stool, constipation and diarrhea  Endocrine: Negative for polyuria  Genitourinary: Negative for difficulty urinating, flank pain and urgency  Musculoskeletal: Negative for arthralgias, back pain and myalgias  Skin: Negative for rash  Neurological: Negative for dizziness and light-headedness  Hematological: Negative for adenopathy   Does not bruise/bleed easily  Psychiatric/Behavioral: Positive for dysphoric mood  Negative for sleep disturbance and suicidal ideas  The patient is not nervous/anxious  Objective:      /80 (BP Location: Left arm, Patient Position: Sitting, Cuff Size: Large)   Pulse (!) 52   Resp 18   Ht 5' 9" (1 753 m)   Wt (!) 142 kg (312 lb)   SpO2 96%   BMI 46 07 kg/m²          Physical Exam  Vitals reviewed  Constitutional:       General: He is not in acute distress  Appearance: He is well-developed  He is obese  He is not diaphoretic  HENT:      Head: Normocephalic  Eyes:      General:         Right eye: No discharge  Left eye: No discharge  Pupils: Pupils are equal, round, and reactive to light  Neck:      Thyroid: No thyromegaly  Trachea: No tracheal deviation  Cardiovascular:      Rate and Rhythm: Normal rate and regular rhythm  Heart sounds: Normal heart sounds  No murmur heard  Pulmonary:      Effort: Pulmonary effort is normal  No respiratory distress  Breath sounds: No wheezing or rales  Abdominal:      General: There is no distension  Palpations: Abdomen is soft  Tenderness: There is no abdominal tenderness  Musculoskeletal:         General: Normal range of motion  Right lower leg: No edema  Left lower leg: No edema  Lymphadenopathy:      Cervical: No cervical adenopathy  Skin:     General: Skin is warm  Findings: No erythema  Neurological:      General: No focal deficit present  Mental Status: He is alert and oriented to person, place, and time  Gait: Gait normal    Psychiatric:         Thought Content:  Thought content normal          Judgment: Judgment normal            Greg Rosen MD

## 2022-04-04 NOTE — PROGRESS NOTES
Cardiology Follow Up    Judge Hutchins   1950  [id number]  [address]  [address] [facility] [address] [phone]    Reason for visit: 6 month FU for PAF s/p ablation in 12/2019, HTN, HLP and chronic diastolic CHF      1  Paroxysmal atrial fibrillation (HCC)  POCT ECG   2  Chronic diastolic (congestive) heart failure ([address] )     3  Essential hypertension     4  Mixed hyperlipidemia         Interval History:   Since the patients last visit He continues to have some mild elevation of the systolic BP  Suzanne Garcia He does get some ABURTO but this is not worse  He denies chest pain  He does have moderate edema toward the end of the day  He denies palpitations  He has noticed some positional dizziness at times          Patient Active Problem List   Diagnosis    Gastroesophageal reflux disease without esophagitis    Benign colon polyp    Major depressive disorder, single episode, mild (HCC)    Type 2 diabetes mellitus with diabetic peripheral angiopathy without gangrene (HCC)    Gout    Hyperlipidemia    Essential hypertension    Microscopic hematuria    Morbid obesity (HCC)    Peripheral neuropathy    MARISSA (obstructive sleep apnea)    Testicular hypogonadism    Thyroglossal duct cyst    Benign prostatic hyperplasia with urinary frequency    Chronic rhinitis    Chronic diastolic (congestive) heart failure (HCC)    A-fib (HCC)    Anticoagulation adequate    Peripheral vascular disease [facility]    Fatty liver    Long term current use of antiarrhythmic drug    S/P ablation of atrial fibrillation    Drug-induced insomnia (HCC)    Atherosclerosis of aorta (HCC)    Anxiety    Horseshoe tear of retina of left eye without detachment    Chronic constipation    Acute prostatitis     Past Medical History:   Diagnosis Date    Atrial fibrillation (HCC)     BPH (benign prostatic hyperplasia)     Chronic diastolic (congestive) heart failure (HCC)     Depression     Diabetes mellitus [facility] )     GERD (gastroesophageal reflux disease)     Gout     Hyperlipidemia     Hypertension     Hyponatremia     last assessed: 09/08/2014    Leukocytosis     Liver function abnormality     Morbid obesity (HCC)     Obstructive sleep apnea     Sleep apnea      Social History     Socioeconomic History    Marital status: /Civil Union     Spouse name: Not on file    Number of children: Not on file    Years of education: Not on file    Highest education level: Not on file   Occupational History    Occupation: Disability    Occupation:      Comment: significant asbestos and silica exposure in the past   Tobacco Use    Smoking status: Never Smoker    Smokeless tobacco: Never Used   Vaping Use    Vaping Use: Never used   Substance and Sexual Activity    Alcohol use:  Yes     Alcohol/week: 3 0 - 4 0 standard drinks     Types: 3 - 4 Glasses of wine per week    Drug use: Never    Sexual activity: Not Currently   Other Topics Concern    Not on file   Social History Narrative    4 cups of coffee/day     Social Determinants of Health     Financial Resource Strain: Not on file   Food Insecurity: Not on file   Transportation Needs: Not on file   Physical Activity: Not on file   Stress: Not on file   Social Connections: Not on file   Intimate Partner Violence: Not on file   Housing Stability: Not on file      Family History   Problem Relation Age of Onset    Diabetes Mother     Heart attack Mother     Hypertension Mother     Heart attack Sister      Past Surgical History:   Procedure Laterality Date    CARDIAC CATHETERIZATION      outcome: Neg    CARDIAC ELECTROPHYSIOLOGY STUDY AND ABLATION      CARDIAC LOOP RECORDER  2019    CARDIOVASCULAR STRESS TEST      resolved: 10/27/2010; negative    COLONOSCOPY  11/2013    polyp; complete    COLONOSCOPY  05/26/2017    HERNIA REPAIR      resolved: 11/1999    REPLACEMENT TOTAL KNEE Left 2010    REPLACEMENT TOTAL KNEE Right 2003    THYROGLOSSAL DUCT EXCISION      TONSILLECTOMY      last assessed: 06/02/2014    UPPER GASTROINTESTINAL ENDOSCOPY         Current Outpatient Medications:     allopurinol (ZYLOPRIM) 300 mg tablet, TAKE 1 TABLET BY MOUTH  DAILY, Disp: 90 tablet, Rfl: 0    amLODIPine (NORVASC) 2 5 mg tablet, Take 1 tablet daily in addition to a 5 mg tablet , Disp: 90 tablet, Rfl: 3    amLODIPine (NORVASC) 5 mg tablet, Take 1 tablet daily in addition to 2 5 mg tablet, Disp: 90 tablet, Rfl: 3    cloNIDine (CATAPRES) 0 2 mg tablet, Take 1 tablet (0 2 mg total) by mouth daily One hour prior to bed, Disp: 90 tablet, Rfl: 3    dofetilide (TIKOSYN) 250 mcg capsule, Take 1 capsule (250 mcg total) by mouth every 12 (twelve) hours, Disp: 180 capsule, Rfl: 0    FLUoxetine (PROzac) 40 MG capsule, Take 2 capsules (80 mg total) by mouth daily, Disp: 180 capsule, Rfl: 1    furosemide (LASIX) 40 mg tablet, TAKE 1 TABLET BY MOUTH  DAILY, Disp: 90 tablet, Rfl: 3    Klor-Con M15 15 MEQ tablet, TAKE 2 TABLETS BY MOUTH  DAILY, Disp: 180 tablet, Rfl: 3    nebivolol (BYSTOLIC) 10 mg tablet, Take 1 tablet (10 mg total) by mouth daily, Disp: 90 tablet, Rfl: 3    olmesartan (BENICAR) 40 mg tablet, Take 1 tablet (40 mg total) by mouth daily, Disp: 90 tablet, Rfl: 3    omeprazole (PriLOSEC) 40 MG capsule, TAKE 1 CAPSULE BY MOUTH  DAILY, Disp: 90 capsule, Rfl: 3    pravastatin (PRAVACHOL) 10 mg tablet, TAKE 1 TABLET BY MOUTH  DAILY AT BEDTIME, Disp: 90 tablet, Rfl: 3    Xarelto 20 MG tablet, TAKE 1 TABLET BY MOUTH  DAILY WITH BREAKFAST, Disp: 90 tablet, Rfl: 3    mometasone (ELOCON) 0 1 % cream, Apply to external ear BID for 10 days then once weekly at bedtime, Disp: 45 g, Rfl: 3  Allergies   Allergen Reactions    Metoprolol Shortness Of Breath     Tolerates Bystolic    Benazepril Cough    Clonidine Fatigue    Diltiazem Bradycardia    Entex T [Pseudoephedrine-Guaifenesin]      Annotation - [id number]: LEG SWELLING    Tizanidine Other (See Comments)       Result Date: 3/7/2022  Narrative: MDT LOOP CARELINK TRANSMISSION: LOOP RECORDER  PRESENTING RHYTHM VS @ 56 BPM  BATTERY STATUS "OK " NO PATIENT OR DEVICE ACTIVATED EPISODES  NORMAL DEVICE FUNCTION  DL       Review of Systems:  Review of Systems   Constitutional: Negative for activity change, appetite change, fatigue and unexpected weight change  Respiratory: Positive for cough (occ  ) and shortness of breath  Negative for chest tightness and wheezing  Cardiovascular: Positive for leg swelling  Negative for chest pain and palpitations  Gastrointestinal: Positive for constipation  Negative for abdominal pain, blood in stool and diarrhea  Genitourinary: Positive for frequency  Negative for dysuria, hematuria and urgency  Musculoskeletal: Positive for arthralgias and back pain  Negative for gait problem and joint swelling  Neurological: Positive for dizziness, light-headedness and headaches (occ  slight)  Negative for syncope and speech difficulty  Psychiatric/Behavioral: Negative for agitation, behavioral problems, confusion and decreased concentration  Physical Exam:  Vitals:    04/04/22 1300   BP: 150/70   BP Location: Left arm   Patient Position: Sitting   Cuff Size: Large   Pulse: (!) 54   Weight: (!) 141 kg (310 lb 12 8 oz)       Physical Exam  Constitutional:       General: He is not in acute distress  Appearance: He is obese  He is not ill-appearing  HENT:      Head: Normocephalic and atraumatic  Mouth/Throat:      Mouth: Mucous membranes are moist       Pharynx: No oropharyngeal exudate or posterior oropharyngeal erythema  Eyes:      General: No scleral icterus  Conjunctiva/sclera: Conjunctivae normal    Neck:      Thyroid: No thyroid mass or thyromegaly  Vascular: Normal carotid pulses  No carotid bruit or JVD     Cardiovascular:      Rate and Rhythm: Regular rhythm  Bradycardia present  Pulses:           Dorsalis pedis pulses are 2+ on the right side and 2+ on the left side  Posterior tibial pulses are 1+ on the right side and 1+ on the left side  Heart sounds: No murmur heard  No friction rub  No gallop  Pulmonary:      Breath sounds: Decreased breath sounds present  No wheezing, rhonchi or rales  Abdominal:      Palpations: Abdomen is soft  There is no hepatomegaly, splenomegaly or mass  Tenderness: There is no abdominal tenderness  Musculoskeletal:         General: Swelling (1+ in LEs) and deformity (kyphosis) present  Cervical back: Neck supple  Skin:     General: Skin is warm  Coloration: Skin is not jaundiced or pale  Findings: No bruising, erythema, lesion or rash  Neurological:      General: No focal deficit present  Mental Status: He is alert and oriented to person, place, and time  Cranial Nerves: No cranial nerve deficit  Sensory: No sensory deficit  Motor: No weakness  Psychiatric:         Mood and Affect: Mood normal          Behavior: Behavior normal          Thought Content: Thought content normal          Judgment: Judgment normal          Discussion/Summary:  1  PAF    maintaining NSR on tikosyn 250 mcgs BID  On nebivolol 10 mg daily for BP and potential rate control  Patient on xarelto 20 mg daily for CVA prevention  QT corrected acceptable at 457 milliseconds on today's EKG  2  Chronic diastolic heart failure  Compensated on furosemide 40 mg daily  He takes potassium 30 mEq daily  Needs updated BMP  3  Hypertension  Still has issues with elevated systolic blood pressure although diastolic blood pressure is fine  Cannot be on thiazide diuretic in light of take this in therapy  Patient currently on amlodipine 7 5 mg daily, nebivolol 10 mg daily and clonidine 0 2 mg daily  Weight loss advised which should help blood pressure control  4  Hyperlipidemia    Has not had a lipid panel in almost 2 years  Currently on pravastatin 10 mg daily  LDL cholesterol in May of 2020 was 81  He did have some findings of atherosclerosis in his aorta and coronary is on a CT scan 2 years ago  We may want to intensify his statin therapy after we get an updated lipid profile and SGOT        FU 6 months        Daly Flower MD    2

## 2022-04-04 NOTE — ASSESSMENT & PLAN NOTE
Blood pressure was a bit elevated today but his home blood pressure readings have been pretty good overall  He is going to continue to work on weight loss  Notify me if he is running above 140/90

## 2022-04-04 NOTE — PROGRESS NOTES
Assessment and Plan:     Problem List Items Addressed This Visit        Digestive    Gastroesophageal reflux disease without esophagitis       Endocrine    Type 2 diabetes mellitus with diabetic peripheral angiopathy without gangrene ([place] [address] )    Relevant Orders    POCT hemoglobin A1c (Completed)       Cardiovascular and Mediastinum    Essential hypertension    A-fib ([place] [address] )      Other Visit Diagnoses     Medicare annual wellness visit, subsequent    -  Primary    Hypercholesterolemia            BMI Counseling: Body mass index is 46 07 kg/m²  The BMI is above normal  Nutrition recommendations include encouraging healthy choices of fruits and vegetables  Exercise recommendations include moderate physical activity 150 minutes/week  Rationale for BMI follow-up plan is due to patient being overweight or obese  Depression Screening and Follow-up Plan: Patient advised to follow-up with PCP for further management  Preventive health issues were discussed with patient, and age appropriate screening tests were ordered as noted in patient's After Visit Summary  Personalized health advice and appropriate referrals for health education or preventive services given if needed, as noted in patient's After Visit Summary       History of Present Illness:     Patient presents for Medicare Annual Wellness visit    Patient Care Team:  Deja Ramirez MD as PCP - MD Deja Craft MD Sallye Roy, MD     Problem List:     Patient Active Problem List   Diagnosis    Gastroesophageal reflux disease without esophagitis    Benign colon polyp    Major depressive disorder, single episode, mild ([facility] )    Type 2 diabetes mellitus with diabetic peripheral angiopathy without gangrene ([facility] )    Gout    Hyperlipidemia    Essential hypertension    Microscopic hematuria    Morbid obesity ([facility] )    Peripheral neuropathy    MARISSA (obstructive sleep apnea)    Testicular hypogonadism    Thyroglossal duct cyst    Benign prostatic hyperplasia with urinary frequency    Chronic rhinitis    Chronic diastolic (congestive) heart failure (HCC)    A-fib (HCC)    Anticoagulation adequate    Peripheral vascular disease ([address] )    Fatty liver    Long term current use of antiarrhythmic drug    S/P ablation of atrial fibrillation    Drug-induced insomnia (HCC)    Atherosclerosis of aorta (HCC)    Anxiety    Horseshoe tear of retina of left eye without detachment    Chronic constipation    Acute prostatitis      Past Medical and Surgical History:     Past Medical History:   Diagnosis Date    Atrial fibrillation (HCC)     BPH (benign prostatic hyperplasia)     Chronic diastolic (congestive) heart failure (HCC)     Depression     Diabetes mellitus (HCC)     GERD (gastroesophageal reflux disease)     Gout     Hyperlipidemia     Hypertension     Hyponatremia     last assessed: 09/08/2014    Leukocytosis     Liver function abnormality     Morbid obesity (HCC)     Obstructive sleep apnea     Sleep apnea      Past Surgical History:   Procedure Laterality Date    CARDIAC CATHETERIZATION      outcome: Neg    CARDIAC ELECTROPHYSIOLOGY STUDY AND ABLATION      CARDIAC LOOP RECORDER  2019    CARDIOVASCULAR STRESS TEST      resolved: 10/27/2010; negative    COLONOSCOPY  11/2013    polyp; complete    COLONOSCOPY  05/26/2017    HERNIA REPAIR      resolved: 11/1999    REPLACEMENT TOTAL KNEE Left 2010    REPLACEMENT TOTAL KNEE Right 2003    THYROGLOSSAL DUCT EXCISION      TONSILLECTOMY      last assessed: 06/02/2014    UPPER GASTROINTESTINAL ENDOSCOPY        Family History:     Family History   Problem Relation Age of Onset    Diabetes Mother     Heart attack Mother     Hypertension Mother     Heart attack Sister       Social History:     Social History     Socioeconomic History    Marital status: /Civil Union     Spouse name: None    Number of children: None    Years of education: None    Highest education level: None   Occupational History    Occupation: Disability    Occupation:      Comment: significant asbestos and silica exposure in the past   Tobacco Use    Smoking status: Never Smoker    Smokeless tobacco: Never Used   Vaping Use    Vaping Use: Never used   Substance and Sexual Activity    Alcohol use: Yes     Alcohol/week: 3 0 - 4 0 standard drinks     Types: 3 - 4 Glasses of wine per week    Drug use: Never    Sexual activity: Not Currently   Other Topics Concern    None   Social History Narrative    4 cups of coffee/day     Social Determinants of Health     Financial Resource Strain: Not on file   Food Insecurity: Not on file   Transportation Needs: Not on file   Physical Activity: Not on file   Stress: Not on file   Social Connections: Not on file   Intimate Partner Violence: Not on file   Housing Stability: Not on file      Medications and Allergies:     Current Outpatient Medications   Medication Sig Dispense Refill    allopurinol (ZYLOPRIM) 300 mg tablet TAKE 1 TABLET BY MOUTH  DAILY 90 tablet 0    amLODIPine (NORVASC) 2 5 mg tablet Take 1 tablet daily in addition to a 5 mg tablet   90 tablet 3    amLODIPine (NORVASC) 5 mg tablet Take 1 tablet daily in addition to 2 5 mg tablet 90 tablet 3    cloNIDine (CATAPRES) 0 2 mg tablet Take 1 tablet (0 2 mg total) by mouth daily One hour prior to bed 90 tablet 3    dofetilide (TIKOSYN) 250 mcg capsule Take 1 capsule (250 mcg total) by mouth every 12 (twelve) hours 180 capsule 0    FLUoxetine (PROzac) 40 MG capsule Take 2 capsules (80 mg total) by mouth daily 180 capsule 1    furosemide (LASIX) 40 mg tablet TAKE 1 TABLET BY MOUTH  DAILY 90 tablet 3    Klor-Con M15 15 MEQ tablet TAKE 2 TABLETS BY MOUTH  DAILY 180 tablet 3    nebivolol (BYSTOLIC) 10 mg tablet Take 1 tablet (10 mg total) by mouth daily 90 tablet 3    olmesartan (BENICAR) 40 mg tablet Take 1 tablet (40 mg total) by mouth daily 90 tablet 3    omeprazole (PriLOSEC) 40 MG capsule TAKE 1 CAPSULE BY MOUTH  DAILY 90 capsule 3    pravastatin (PRAVACHOL) 10 mg tablet TAKE 1 TABLET BY MOUTH  DAILY AT BEDTIME 90 tablet 3    Xarelto 20 MG tablet TAKE 1 TABLET BY MOUTH  DAILY WITH BREAKFAST 90 tablet 3     No current facility-administered medications for this visit  Allergies   Allergen Reactions    Metoprolol Shortness Of Breath     Tolerates Bystolic    Benazepril Cough    Clonidine Fatigue    Diltiazem Bradycardia    Entex T  [Pseudoephedrine-Guaifenesin]      Annotation - [id number]: LEG SWELLING    Tizanidine Other (See Comments)      Immunizations:     Immunization History   Administered Date(s) Administered    COVID-19 PFIZER VACCINE 0 3 ML IM 02/06/2021, 03/02/2021, 08/19/2021    INFLUENZA 08/19/2021    Influenza Split High Dose Preservative Free IM 09/30/2013, 12/01/2014, 11/02/2015, 11/03/2016, 12/08/2017    Influenza, high dose seasonal 0 7 mL 10/16/2019, 10/12/2020    Influenza, seasonal, injectable 12/14/2005, 11/26/2007    Pneumococcal Conjugate 13-Valent 12/01/2014    Pneumococcal Polysaccharide PPV23 11/26/2007, 12/08/2017, 10/26/2018    Td (adult), adsorbed 01/15/1996    Tdap 06/05/2012    Zoster Vaccine Recombinant 12/15/2018, 04/30/2019    influenza, trivalent, adjuvanted 10/26/2018      Health Maintenance:         Topic Date Due    Colorectal Cancer Screening  10/12/2026    Hepatitis C Screening  Completed     There are no preventive care reminders to display for this patient  Medicare Health Risk Assessment:     /80 (BP Location: Left arm, Patient Position: Sitting, Cuff Size: Large)   Pulse (!) 52   Resp 18   Ht 5' 9" (1 753 m)   Wt (!) 142 kg (312 lb)   SpO2 96%   BMI 46 07 kg/m²      Bladimir Abdiel is here for his Subsequent Wellness visit  Health Risk Assessment:   Patient rates overall health as fair  Patient feels that their physical health rating is slightly worse  Patient is dissatisfied with their life  Eyesight was rated as same  Hearing was rated as slightly worse  Patient feels that their emotional and mental health rating is slightly worse  Patients states they are sometimes angry  Patient states they are sometimes unusually tired/fatigued  Pain experienced in the last 7 days has been none  Patient states that he has experienced weight loss or gain in last 6 months  Depression Screening:   PHQ-9 Score: 5      Fall Risk Screening: In the past year, patient has experienced: no history of falling in past year      Home Safety:  Patient does not have trouble with stairs inside or outside of their home  Patient has working smoke alarms and has working carbon monoxide detector  Home safety hazards include: none  Nutrition:   Current diet is No Added Salt  Medications:   Patient is currently taking over-the-counter supplements  OTC medications include: vitamin d3  milk thistle probactics allergy mucnix  Patient is able to manage medications  Activities of Daily Living (ADLs)/Instrumental Activities of Daily Living (IADLs):   Walk and transfer into and out of bed and chair?: Yes  Dress and groom yourself?: Yes    Bathe or shower yourself?: Yes    Feed yourself?  Yes  Do your laundry/housekeeping?: Yes  Manage your money, pay your bills and track your expenses?: Yes  Make your own meals?: Yes    Do your own shopping?: Yes    Previous Hospitalizations:   Any hospitalizations or ED visits within the last 12 months?: No      Advance Care Planning:   Living will: No    Durable POA for healthcare: No    Advanced directive: No    Five wishes given: Yes    End of Life Decisions reviewed with patient: Yes      Cognitive Screening:   Provider or family/friend/caregiver concerned regarding cognition?: No    PREVENTIVE SCREENINGS      Cardiovascular Screening:    General: Screening Not Indicated and History Lipid Disorder      Diabetes Screening:     General: Screening Not Indicated and History Diabetes Colorectal Cancer Screening:     General: Screening Current      Prostate Cancer Screening:    General: Screening Current      Osteoporosis Screening:    General: Screening Not Indicated      Abdominal Aortic Aneurysm (AAA) Screening:    Risk factors include: age between 73-69 yo        General: Screening Not Indicated      Lung Cancer Screening:     General: Screening Not Indicated      Hepatitis C Screening:    General: Screening Current    Screening, Brief Intervention, and Referral to Treatment (SBIRT)    Screening  Typical number of drinks in a day: 2  Typical number of drinks in a week: 14  Interpretation: Low risk drinking behavior  AUDIT-C Screenin) How often did you have a drink containing alcohol in the past year? 2 to 3 times a week  2) How many drinks did you have on a typical day when you were drinking in the past year? 1 to 2  3) How often did you have 6 or more drinks on one occasion in the past year? less than monthly    AUDIT-C Score: 4  Interpretation: Score 4-12 (male): POSITIVE screen for alcohol misuse    Single Item Drug Screening:  How often have you used an illegal drug (including marijuana) or a prescription medication for non-medical reasons in the past year? never    Single Item Drug Screen Score: 0  Interpretation: Negative screen for possible drug use disorder    Brief Intervention  Alcohol & drug use screenings were reviewed  No concerns regarding substance use disorder identified  Diabetic Foot Exam    Patient's shoes and socks removed  Right Foot/Ankle   Right Foot Inspection  Skin Exam: skin normal and skin intact  No dry skin, no warmth, no callus, no erythema, no maceration, no abnormal color, no pre-ulcer, no ulcer and no callus  Toe Exam: ROM and strength within normal limits  No swelling, no tenderness, erythema and  no right toe deformity    Sensory   Monofilament testing: diminished    Vascular  Capillary refills: < 3 seconds  The right DP pulse is 2+   The right PT pulse is 2+  Left Foot/Ankle  Left Foot Inspection  Skin Exam: skin normal and skin intact  No dry skin, no warmth, no erythema, no maceration, normal color, no pre-ulcer, no ulcer and no callus  Toe Exam: ROM and strength within normal limits  No swelling, no tenderness, no erythema and no left toe deformity  Sensory   Monofilament testing: diminished    Vascular  Capillary refills: < 3 seconds  The left DP pulse is 2+  The left PT pulse is 2+       Assign Risk Category  No deformity present  Loss of protective sensation  No weak pulses  Risk: 1          Flako Mercado MD

## 2022-04-07 ENCOUNTER — APPOINTMENT (OUTPATIENT)
Dept: LAB | Age: 72
End: 2022-04-07
Payer: MEDICARE

## 2022-04-07 DIAGNOSIS — K76.0 FATTY LIVER: ICD-10-CM

## 2022-04-07 DIAGNOSIS — E11.51 TYPE 2 DIABETES MELLITUS WITH DIABETIC PERIPHERAL ANGIOPATHY WITHOUT GANGRENE, WITHOUT LONG-TERM CURRENT USE OF INSULIN (HCC): ICD-10-CM

## 2022-04-07 DIAGNOSIS — I10 ESSENTIAL HYPERTENSION: ICD-10-CM

## 2022-04-07 DIAGNOSIS — E78.2 MIXED HYPERLIPIDEMIA: ICD-10-CM

## 2022-04-07 DIAGNOSIS — I48.0 PAROXYSMAL ATRIAL FIBRILLATION (HCC): ICD-10-CM

## 2022-04-07 DIAGNOSIS — I50.32 CHRONIC DIASTOLIC (CONGESTIVE) HEART FAILURE (HCC): ICD-10-CM

## 2022-04-07 DIAGNOSIS — F32.0 MAJOR DEPRESSIVE DISORDER, SINGLE EPISODE, MILD (HCC): ICD-10-CM

## 2022-04-07 DIAGNOSIS — M1A.09X0 IDIOPATHIC CHRONIC GOUT OF MULTIPLE SITES WITHOUT TOPHUS: ICD-10-CM

## 2022-04-07 LAB
ALBUMIN SERPL BCP-MCNC: 3.8 G/DL (ref 3.5–5)
ALP SERPL-CCNC: 57 U/L (ref 46–116)
ALT SERPL W P-5'-P-CCNC: 51 U/L (ref 12–78)
ANION GAP SERPL CALCULATED.3IONS-SCNC: 8 MMOL/L (ref 4–13)
ARTIFACT: PRESENT
AST SERPL W P-5'-P-CCNC: 31 U/L (ref 5–45)
BASOPHILS # BLD MANUAL: 0.18 THOUSAND/UL (ref 0–0.1)
BASOPHILS NFR MAR MANUAL: 2 % (ref 0–1)
BILIRUB SERPL-MCNC: 1.22 MG/DL (ref 0.2–1)
BUN SERPL-MCNC: 13 MG/DL (ref 5–25)
CALCIUM SERPL-MCNC: 9.2 MG/DL (ref 8.3–10.1)
CHLORIDE SERPL-SCNC: 96 MMOL/L (ref 100–108)
CHOLEST SERPL-MCNC: 131 MG/DL
CO2 SERPL-SCNC: 26 MMOL/L (ref 21–32)
CREAT SERPL-MCNC: 0.96 MG/DL (ref 0.6–1.3)
EOSINOPHIL # BLD MANUAL: 0.18 THOUSAND/UL (ref 0–0.4)
EOSINOPHIL NFR BLD MANUAL: 2 % (ref 0–6)
ERYTHROCYTE [DISTWIDTH] IN BLOOD BY AUTOMATED COUNT: 11.9 % (ref 11.6–15.1)
GFR SERPL CREATININE-BSD FRML MDRD: 77 ML/MIN/1.73SQ M
GLUCOSE P FAST SERPL-MCNC: 128 MG/DL (ref 65–99)
HCT VFR BLD AUTO: 39.7 % (ref 36.5–49.3)
HDLC SERPL-MCNC: 50 MG/DL
HGB BLD-MCNC: 14 G/DL (ref 12–17)
LDLC SERPL CALC-MCNC: 65 MG/DL (ref 0–100)
LYMPHOCYTES # BLD AUTO: 4.6 THOUSAND/UL (ref 0.6–4.47)
LYMPHOCYTES # BLD AUTO: 52 % (ref 14–44)
MCH RBC QN AUTO: 32.2 PG (ref 26.8–34.3)
MCHC RBC AUTO-ENTMCNC: 35.3 G/DL (ref 31.4–37.4)
MCV RBC AUTO: 91 FL (ref 82–98)
MONOCYTES # BLD AUTO: 0.44 THOUSAND/UL (ref 0–1.22)
MONOCYTES NFR BLD: 5 % (ref 4–12)
NEUTROPHILS # BLD MANUAL: 2.66 THOUSAND/UL (ref 1.85–7.62)
NEUTS SEG NFR BLD AUTO: 30 % (ref 43–75)
PLATELET # BLD AUTO: 212 THOUSANDS/UL (ref 149–390)
PLATELET BLD QL SMEAR: ADEQUATE
PMV BLD AUTO: 10.7 FL (ref 8.9–12.7)
POTASSIUM SERPL-SCNC: 4 MMOL/L (ref 3.5–5.3)
PROT SERPL-MCNC: 7.3 G/DL (ref 6.4–8.2)
RBC # BLD AUTO: 4.35 MILLION/UL (ref 3.88–5.62)
SODIUM SERPL-SCNC: 130 MMOL/L (ref 136–145)
TRIGL SERPL-MCNC: 82 MG/DL
TSH SERPL DL<=0.05 MIU/L-ACNC: 1.4 UIU/ML (ref 0.45–4.5)
URATE SERPL-MCNC: 5.2 MG/DL (ref 4.2–8)
VARIANT LYMPHS # BLD AUTO: 9 %
WBC # BLD AUTO: 8.85 THOUSAND/UL (ref 4.31–10.16)

## 2022-04-07 PROCEDURE — 84550 ASSAY OF BLOOD/URIC ACID: CPT

## 2022-04-07 PROCEDURE — 36415 COLL VENOUS BLD VENIPUNCTURE: CPT

## 2022-04-07 PROCEDURE — 85007 BL SMEAR W/DIFF WBC COUNT: CPT

## 2022-04-07 PROCEDURE — 80053 COMPREHEN METABOLIC PANEL: CPT

## 2022-04-07 PROCEDURE — 84443 ASSAY THYROID STIM HORMONE: CPT

## 2022-04-07 PROCEDURE — 80061 LIPID PANEL: CPT

## 2022-04-07 PROCEDURE — 85027 COMPLETE CBC AUTOMATED: CPT

## 2022-04-08 PROBLEM — E87.1 HYPONATREMIA: Status: ACTIVE | Noted: 2022-04-08

## 2022-05-03 DIAGNOSIS — M1A.00X0 IDIOPATHIC CHRONIC GOUT WITHOUT TOPHUS, UNSPECIFIED SITE: ICD-10-CM

## 2022-05-03 RX ORDER — ALLOPURINOL 300 MG/1
300 TABLET ORAL DAILY
Qty: 90 TABLET | Refills: 3 | Status: SHIPPED | OUTPATIENT
Start: 2022-05-03

## 2022-05-03 RX ORDER — ALLOPURINOL 300 MG/1
TABLET ORAL DAILY
Qty: 90 TABLET | Refills: 0 | Status: SHIPPED | OUTPATIENT
Start: 2022-05-03

## 2022-05-04 ENCOUNTER — OFFICE VISIT (OUTPATIENT)
Dept: URGENT CARE | Age: 72
End: 2022-05-04
Payer: MEDICARE

## 2022-05-04 VITALS
OXYGEN SATURATION: 95 % | DIASTOLIC BLOOD PRESSURE: 90 MMHG | SYSTOLIC BLOOD PRESSURE: 176 MMHG | HEART RATE: 70 BPM | RESPIRATION RATE: 18 BRPM | TEMPERATURE: 98 F

## 2022-05-04 DIAGNOSIS — J01.90 ACUTE BACTERIAL SINUSITIS: Primary | ICD-10-CM

## 2022-05-04 DIAGNOSIS — B96.89 ACUTE BACTERIAL SINUSITIS: Primary | ICD-10-CM

## 2022-05-04 PROCEDURE — G0463 HOSPITAL OUTPT CLINIC VISIT: HCPCS | Performed by: STUDENT IN AN ORGANIZED HEALTH CARE EDUCATION/TRAINING PROGRAM

## 2022-05-04 PROCEDURE — 99213 OFFICE O/P EST LOW 20 MIN: CPT | Performed by: STUDENT IN AN ORGANIZED HEALTH CARE EDUCATION/TRAINING PROGRAM

## 2022-05-04 RX ORDER — AMOXICILLIN AND CLAVULANATE POTASSIUM 875; 125 MG/1; MG/1
1 TABLET, FILM COATED ORAL EVERY 12 HOURS SCHEDULED
Qty: 14 TABLET | Refills: 0 | Status: SHIPPED | OUTPATIENT
Start: 2022-05-04 | End: 2022-05-11

## 2022-05-04 NOTE — PROGRESS NOTES
[facility]        NAME: Anusha Gutierrez  is a 74 y o  male  : 1950    MRN: [id number]  DATE: May 4, 2022  TIME: 2:31 PM    Assessment and Plan   Acute bacterial sinusitis [J01 90, B96 89]  1  Acute bacterial sinusitis  amoxicillin-clavulanate (AUGMENTIN) 875-125 mg per tablet         Patient Instructions       Follow up with PCP in 3-5 days  Proceed to  ER if symptoms worsen  Chief Complaint     Chief Complaint   Patient presents with    Headache     sinus congestion          History of Present Illness       HPI   Patient presents with worsening sore throat sinus pain pressure congestion a cough ongoing for the past 1 week  Patient denies any fevers or chills, is fully vaccinated for COVID-19    It is    Review of Systems   Review of Systems  Per hpi     Current Medications       Current Outpatient Medications:     allopurinol (ZYLOPRIM) 300 mg tablet, TAKE 1 TABLET BY MOUTH  DAILY, Disp: 90 tablet, Rfl: 0    allopurinol (ZYLOPRIM) 300 mg tablet, Take 1 tablet (300 mg total) by mouth daily, Disp: 90 tablet, Rfl: 3    amLODIPine (NORVASC) 2 5 mg tablet, Take 1 tablet daily in addition to a 5 mg tablet , Disp: 90 tablet, Rfl: 3    amLODIPine (NORVASC) 5 mg tablet, Take 1 tablet daily in addition to 2 5 mg tablet, Disp: 90 tablet, Rfl: 3    amoxicillin-clavulanate (AUGMENTIN) 875-125 mg per tablet, Take 1 tablet by mouth every 12 (twelve) hours for 7 days, Disp: 14 tablet, Rfl: 0    cloNIDine (CATAPRES) 0 2 mg tablet, Take 1 tablet (0 2 mg total) by mouth daily One hour prior to bed, Disp: 90 tablet, Rfl: 3    dofetilide (TIKOSYN) 250 mcg capsule, Take 1 capsule (250 mcg total) by mouth every 12 (twelve) hours, Disp: 180 capsule, Rfl: 0    FLUoxetine (PROzac) 40 MG capsule, Take 2 capsules (80 mg total) by mouth daily, Disp: 180 capsule, Rfl: 3    furosemide (LASIX) 40 mg tablet, TAKE 1 TABLET BY MOUTH  DAILY, Disp: 90 tablet, Rfl: 3    nebivolol (BYSTOLIC) 10 mg tablet, Take 1 tablet (10 mg total) by mouth daily, Disp: 90 tablet, Rfl: 3    olmesartan (BENICAR) 40 mg tablet, Take 1 tablet (40 mg total) by mouth daily, Disp: 90 tablet, Rfl: 3    omeprazole (PriLOSEC) 40 MG capsule, Take 1 capsule (40 mg total) by mouth daily, Disp: 90 capsule, Rfl: 3    potassium chloride SA (Klor-Con M15) 15 MEQ tablet, Take 2 tablets (30 mEq total) by mouth daily, Disp: 180 tablet, Rfl: 3    pravastatin (PRAVACHOL) 10 mg tablet, Take 1 tablet (10 mg total) by mouth daily at bedtime, Disp: 90 tablet, Rfl: 3    Xarelto 20 MG tablet, TAKE 1 TABLET BY MOUTH  DAILY WITH BREAKFAST, Disp: 90 tablet, Rfl: 3    Current Allergies     Allergies as of 05/04/2022 - Reviewed 05/04/2022   Allergen Reaction Noted    Metoprolol Shortness Of Breath 11/09/2018    Benazepril Cough 04/07/2014    Clonidine Fatigue 07/03/2014    Diltiazem Bradycardia 07/20/2012    Entex t  [pseudoephedrine-guaifenesin]  07/20/2012    Tizanidine Other (See Comments) 05/26/2020            The following portions of the patient's history were reviewed and updated as appropriate: allergies, current medications, past family history, past medical history, past social history, past surgical history and problem list      Past Medical History:   Diagnosis Date    [facility]     BPH (benign prostatic hyperplasia)     Chronic diastolic (congestive) heart failure ([facility] )     Depression     Diabetes mellitus ([facility] )     Drug-induced insomnia ([facility] ) 2/3/2020    GERD (gastroesophageal reflux disease)     Gout     Hyperlipidemia     Hypertension     Hyponatremia     last assessed: 09/08/2014    Leukocytosis     Liver function abnormality     Morbid obesity (HCC)     Obstructive sleep apnea     Sleep apnea        Past Surgical History:   Procedure Laterality Date    CARDIAC CATHETERIZATION      outcome: Neg    CARDIAC ELECTROPHYSIOLOGY STUDY AND ABLATION      CARDIAC LOOP RECORDER  2019    CARDIOVASCULAR STRESS TEST resolved: 10/27/2010; negative    COLONOSCOPY  11/2013    polyp; complete    COLONOSCOPY  05/26/2017    HERNIA REPAIR      resolved: 11/1999    REPLACEMENT TOTAL KNEE Left 2010    REPLACEMENT TOTAL KNEE Right 2003    THYROGLOSSAL DUCT EXCISION      TONSILLECTOMY      last assessed: 06/02/2014    UPPER GASTROINTESTINAL ENDOSCOPY         Family History   Problem Relation Age of Onset    Diabetes Mother     Heart attack Mother     Hypertension Mother     Heart attack Sister          Medications have been verified  Objective   BP (!) 176/90   Pulse 70   Temp 98 °F (36 7 °C)   Resp 18   SpO2 95%        Physical Exam     Physical Exam  Constitutional:       General: He is not in acute distress  Appearance: He is well-developed  He is not diaphoretic  HENT:      Head: Normocephalic and atraumatic  Right Ear: Tympanic membrane and external ear normal       Left Ear: Tympanic membrane and external ear normal       Mouth/Throat:      Mouth: Mucous membranes are moist       Pharynx: Oropharynx is clear  Posterior oropharyngeal erythema present  No oropharyngeal exudate  Comments: Max sinuis tender  Eyes:      Extraocular Movements: Extraocular movements intact  Conjunctiva/sclera: Conjunctivae normal    Cardiovascular:      Rate and Rhythm: Normal rate and regular rhythm  Heart sounds: Normal heart sounds  Pulmonary:      Effort: Pulmonary effort is normal  No respiratory distress  Breath sounds: Normal breath sounds  No wheezing  Abdominal:      General: Bowel sounds are normal  There is no distension  Palpations: Abdomen is soft  There is no mass  Tenderness: There is no abdominal tenderness  Musculoskeletal:         General: No swelling or tenderness  Cervical back: Normal range of motion  Right lower leg: No edema  Left lower leg: No edema  Lymphadenopathy:      Cervical: Cervical adenopathy present  Skin:     General: Skin is warm  Neurological:      Mental Status: He is alert and oriented to person, place, and time

## 2022-06-02 ENCOUNTER — TELEPHONE (OUTPATIENT)
Dept: NEPHROLOGY | Facility: CLINIC | Age: 72
End: 2022-06-02

## 2022-06-02 NOTE — TELEPHONE ENCOUNTER
New Patient Intake Form   Patient Details   Honey Gill     1950     [id number]     Appointment Information   Who is calling to schedule? If not patient, what is callers name? Patient    Referring Provider PCP Dr Santo Davila   Referring Provider Number [phone]   Reason for Appt (Diagnosis) E87 1 (ICD-10-CM) - Hyponatremia   Is patient aware of why they are being referred? Yes   Does Patient have labs done at [place] [address]? If not, where do they go? Yes   Has patient had labs / urine work done? List date of most recent lab / urine work SL  Previously had labs done at [facility]   Has patient had a BMP & CBC done in the past 2 years? If so, list the date Yes   Has patient been hospitalized recently? If yes, list name and location of hospital they were In No   Has patient been seen by a Nephrologist before? If yes, list name, location and phone number No   Has patient been see by another Specialty before (ex  Neurology, urology, cardiology)? If yes, please list name, and specialty Pulmonary Dr Patricia Bah [phone]  Cardiology Dr Carina Solo [phone]  Urology Dr Theodore Vieira  [phone]   Has the patient had imaging done? If so, list the most recent date and type of imagineg 8/12/21    Does the patient has a stone analysis report if history of kidney stones? N/a   Appointment Details   Is there a referral on file?  Yes   Appointment Date 8/31   Location [facility]

## 2022-06-06 ENCOUNTER — REMOTE DEVICE CLINIC VISIT (OUTPATIENT)
Dept: CARDIOLOGY CLINIC | Facility: CLINIC | Age: 72
End: 2022-06-06
Payer: MEDICARE

## 2022-06-06 DIAGNOSIS — Z95.818 PRESENCE OF OTHER CARDIAC IMPLANTS AND GRAFTS: Primary | ICD-10-CM

## 2022-06-06 PROCEDURE — 93298 REM INTERROG DEV EVAL SCRMS: CPT | Performed by: INTERNAL MEDICINE

## 2022-06-06 PROCEDURE — G2066 INTER DEVC REMOTE 30D: HCPCS | Performed by: INTERNAL MEDICINE

## 2022-06-06 NOTE — PROGRESS NOTES
MDT LOOP   CARELINK TRANSMISSION: LOOP RECORDER  PRESENTING RHYTHM NSR @ 67 BPM  BATTERY STATUS "OK " 2 DEVICE CLASSIFIED AF EPISODES, LONGEST EPISODE IS 13:46 HOURS, AVAILABLE STRIPS DEMONSTRATE SR W/ PACs AND ATRIAL FIBRILLATION  AF BURDEN IS 0 6%  AF BURDEN MAYBE OVERESTIMATED DUE TO INAPPROPRIATE CLASSIFICATION OF AF  SOME STRIPS MAY NOT BE INTERPRETABLE OR AVAILABLE, CANNOT DEFINITIVELY RULE OUT ATRIAL FIBRILLATION  HX OF PAF  PT TAKES BYSTOLIC, [address] [facility]  NO PATIENT ACTIVATED EPISODES  NORMAL DEVICE FUNCTION   DL

## 2022-06-21 ENCOUNTER — TELEPHONE (OUTPATIENT)
Dept: FAMILY MEDICINE CLINIC | Facility: CLINIC | Age: 72
End: 2022-06-21

## 2022-06-21 DIAGNOSIS — R10.9 FLANK PAIN: ICD-10-CM

## 2022-06-21 DIAGNOSIS — E87.1 HYPONATREMIA: Primary | ICD-10-CM

## 2022-06-21 DIAGNOSIS — I10 ESSENTIAL HYPERTENSION: ICD-10-CM

## 2022-07-06 ENCOUNTER — APPOINTMENT (OUTPATIENT)
Dept: LAB | Age: 72
End: 2022-07-06
Payer: MEDICARE

## 2022-07-06 DIAGNOSIS — E87.1 HYPONATREMIA: ICD-10-CM

## 2022-07-06 DIAGNOSIS — I10 ESSENTIAL HYPERTENSION: ICD-10-CM

## 2022-07-06 LAB
ALBUMIN SERPL BCP-MCNC: 4 G/DL (ref 3.5–5)
ALP SERPL-CCNC: 65 U/L (ref 46–116)
ALT SERPL W P-5'-P-CCNC: 45 U/L (ref 12–78)
ANION GAP SERPL CALCULATED.3IONS-SCNC: 7 MMOL/L (ref 4–13)
AST SERPL W P-5'-P-CCNC: 32 U/L (ref 5–45)
BACTERIA UR QL AUTO: NORMAL /HPF
BILIRUB SERPL-MCNC: 1.36 MG/DL (ref 0.2–1)
BILIRUB UR QL STRIP: NEGATIVE
BUN SERPL-MCNC: 13 MG/DL (ref 5–25)
CALCIUM SERPL-MCNC: 9.4 MG/DL (ref 8.3–10.1)
CHLORIDE SERPL-SCNC: 95 MMOL/L (ref 100–108)
CLARITY UR: CLEAR
CO2 SERPL-SCNC: 27 MMOL/L (ref 21–32)
COLOR UR: ABNORMAL
CREAT SERPL-MCNC: 0.96 MG/DL (ref 0.6–1.3)
GFR SERPL CREATININE-BSD FRML MDRD: 77 ML/MIN/1.73SQ M
GLUCOSE P FAST SERPL-MCNC: 126 MG/DL (ref 65–99)
GLUCOSE UR STRIP-MCNC: NEGATIVE MG/DL
HGB UR QL STRIP.AUTO: NEGATIVE
KETONES UR STRIP-MCNC: NEGATIVE MG/DL
LEUKOCYTE ESTERASE UR QL STRIP: NEGATIVE
NITRITE UR QL STRIP: NEGATIVE
NON-SQ EPI CELLS URNS QL MICRO: NORMAL /HPF
PH UR STRIP.AUTO: 7 [PH]
POTASSIUM SERPL-SCNC: 5.2 MMOL/L (ref 3.5–5.3)
PROT SERPL-MCNC: 8.1 G/DL (ref 6.4–8.2)
PROT UR STRIP-MCNC: ABNORMAL MG/DL
RBC #/AREA URNS AUTO: NORMAL /HPF
SODIUM SERPL-SCNC: 129 MMOL/L (ref 136–145)
SP GR UR STRIP.AUTO: 1.01 (ref 1–1.03)
UROBILINOGEN UR STRIP-ACNC: <2 MG/DL
WBC #/AREA URNS AUTO: NORMAL /HPF

## 2022-07-06 PROCEDURE — 36415 COLL VENOUS BLD VENIPUNCTURE: CPT

## 2022-07-06 PROCEDURE — 81001 URINALYSIS AUTO W/SCOPE: CPT

## 2022-07-06 PROCEDURE — 80053 COMPREHEN METABOLIC PANEL: CPT

## 2022-07-07 DIAGNOSIS — E87.1 HYPONATREMIA: Primary | ICD-10-CM

## 2022-08-30 ENCOUNTER — TELEPHONE (OUTPATIENT)
Dept: NEPHROLOGY | Facility: CLINIC | Age: 72
End: 2022-08-30

## 2022-08-30 NOTE — TELEPHONE ENCOUNTER
Appointment Confirmation   Person confirmed appointment with  If not patient, name of the person lm    Date and time of appointment 08/31   Patient acknowledged and will be at appointment? no   Did you advise the patient that they will need a urine sample if they are a new patient?  yes   Did you advise the patient to bring their current medications for verification? (including any OTC) yes   Additional Information

## 2022-08-30 NOTE — TELEPHONE ENCOUNTER
Received a voicemail from patient regarding appt for 05/31  I called patient back and he wanted to confirm about the urine sample  Patient also stated that all his medications should be in the chart  All questions were answered

## 2022-08-31 ENCOUNTER — CONSULT (OUTPATIENT)
Dept: NEPHROLOGY | Facility: CLINIC | Age: 72
End: 2022-08-31
Payer: MEDICARE

## 2022-08-31 VITALS
WEIGHT: 314 LBS | SYSTOLIC BLOOD PRESSURE: 174 MMHG | BODY MASS INDEX: 46.51 KG/M2 | HEIGHT: 69 IN | DIASTOLIC BLOOD PRESSURE: 82 MMHG | HEART RATE: 60 BPM

## 2022-08-31 DIAGNOSIS — R80.1 PERSISTENT PROTEINURIA: ICD-10-CM

## 2022-08-31 DIAGNOSIS — E87.1 HYPONATREMIA: Primary | ICD-10-CM

## 2022-08-31 DIAGNOSIS — E87.5 HYPERKALEMIA: ICD-10-CM

## 2022-08-31 DIAGNOSIS — I10 ESSENTIAL HYPERTENSION: ICD-10-CM

## 2022-08-31 LAB
SL AMB  POCT GLUCOSE, UA: NORMAL
SL AMB LEUKOCYTE ESTERASE,UA: NORMAL
SL AMB POCT BILIRUBIN,UA: NORMAL
SL AMB POCT BLOOD,UA: NORMAL
SL AMB POCT CLARITY,UA: CLEAR
SL AMB POCT COLOR,UA: NORMAL
SL AMB POCT KETONES,UA: NORMAL
SL AMB POCT NITRITE,UA: NORMAL
SL AMB POCT PH,UA: 6
SL AMB POCT SPECIFIC GRAVITY,UA: 1.02
SL AMB POCT URINE PROTEIN: NORMAL
SL AMB POCT UROBILINOGEN: 0.2

## 2022-08-31 PROCEDURE — 99204 OFFICE O/P NEW MOD 45 MIN: CPT | Performed by: INTERNAL MEDICINE

## 2022-08-31 PROCEDURE — 81002 URINALYSIS NONAUTO W/O SCOPE: CPT | Performed by: INTERNAL MEDICINE

## 2022-08-31 NOTE — PROGRESS NOTES
NEPHROLOGY OUTPATIENT CONSULTATION   Anneliese Ingram  74 y o  male MRN: [id number]  Date: 8/31/2022  Reason for consultation:   Chief Complaint   Patient presents with    Consult    Hyponatremia       ASSESSMENT AND PLAN:  Chronic hyponatremia, baseline serum sodium low 130s going back to 2018  -last sodium dropped 129 in July 2022  -hyponatremia suspect in the setting of increased liquid intake, component of? SIADH due to Prozac, mild hypervolemic component  -strict fluid restriction less than 60 oz per day recommended  He admits to be drinking currently 80 to 100 oz liquid (including water, coffee, ice tea, alcohol) daily  -recommend to discontinue Prozac if possible, patient will discuss this further with PCP as well  -will do initial evaluation including serum osmolality, urine sodium, urine osmolality, serum sodium next week  -no obvious history of malignancy, denies any chronic pain issues or any regular NSAID use    Hypertension  -BP uncontrolled and above goal in the office today  -home BP readings are generally 140s/70s  -current regimen includes amlodipine 7 5 mg daily, clonidine 0 2 mg daily, Lasix 40 mg daily, Bystolic 10 mg daily, Benicar 40 mg daily   -was unable to tolerate higher dose of amlodipine due to significant leg edema, unable to tolerate higher clonidine due to feeling tired/fatigued  -check renal artery Doppler  -given relatively better controlled blood pressure, we will not change in diameter this regimen for now  -recommended to follow regular exercise, weight loss program  -consider starting spironolactone depending on repeat lab results due to last BMP showed mild hyperkalemia    Mild hyperkalemia  -currently on potassium chloride 30 mEq daily due to prior hypokalemia issues   -most recent serum potassium 5 2  -repeat BMP early next week  -if potassium remains higher, consider decrease potassium supplements      Proteinuria  -UA shows intermittent proteinuria, no hematuria  -check urine microalbumin/creatinine ratio before next visit  -mild proteinuria suspect secondary to underlying hypertension, morbid obesity causing secondary FSGS    Renal function overall stable with baseline serum creatinine 0 9 to 1 1    Diastolic CHF  -does have mild leg edema  Denies any worsening dyspnea  Weight at home seems to be stable around 308 lb  -currently remains on Lasix 40 mg daily along with potassium supplements   -follows with Cardiology  -prior echo in 2018 shows EF 55%    HISTORY OF PRESENT ILLNESS:  Nathan Corea  is a 74y o  year old male with medical issues of AFib, status post ablation, diastolic CHF, hypertension for many years, diabetes diet controlled for about 20 years, sleep apnea on CPAP, morbid obesity, gout, GERD, who presents for initial consultation for hyponatremia  Old medical records including prior lab values were reviewed from [facility]  Patient does have chronic hyponatremia issues including sodium level low 130s going back to 2018  Most recent serum sodium has dropped to 129  Upon further discussion patient admits to be drinking anywhere from 80 to 100 oz of liquid daily including water, coffee, ice tea, alcohol  Denies any headache or any regular NSAID use  Denies any obvious history of malignancy  Remains on Prozac for many years  Denies any nausea, vomiting  Does feel tired  Denies any urinary complaint  REVIEW OF SYSTEMS:    More than 10 point review of systems were obtained and discussed in length with the patient  Complete review of systems were negative / unremarkable except mentioned in the HPI section  PHYSICAL EXAM:  Vitals:    08/31/22 1454 08/31/22 1530   BP:  (!) 174/82   Pulse:  60   Weight: (!) 142 kg (314 lb)    Height: 5' 9" (1 753 m)      Body mass index is 46 37 kg/m²  Physical Exam  Vitals reviewed  Constitutional:       Appearance: He is well-developed     HENT:      Head: Normocephalic and atraumatic  Right Ear: External ear normal       Left Ear: External ear normal    Eyes:      General: No scleral icterus  Conjunctiva/sclera: Conjunctivae normal    Cardiovascular:      Comments: Trace edema in both legs  Pulmonary:      Effort: Pulmonary effort is normal  No respiratory distress  Breath sounds: Normal breath sounds  No wheezing or rales  Abdominal:      General: Bowel sounds are normal  There is no distension  Palpations: Abdomen is soft  Tenderness: There is no abdominal tenderness  Musculoskeletal:         General: No deformity  Lymphadenopathy:      Cervical: No cervical adenopathy  Skin:     Findings: No rash  Neurological:      Mental Status: He is alert and oriented to person, place, and time     Psychiatric:         Behavior: Behavior normal          PAST MEDICAL HISTORY:  Past Medical History:   Diagnosis Date    Atrial fibrillation (HCC)     BPH (benign prostatic hyperplasia)     Chronic diastolic (congestive) heart failure (HCC)     Depression     Diabetes mellitus ([address] )     Drug-induced insomnia ([facility] ) 02/03/2020    Ear problems     GERD (gastroesophageal reflux disease)     Gout     HL (hearing loss)     Hyperlipidemia     Hypertension     Hyponatremia     last assessed: 09/08/2014    Leukocytosis     Liver function abnormality     Morbid obesity ([address] )     Nasal congestion     Obstructive sleep apnea     Sleep apnea        PAST SURGICAL HISTORY:  Past Surgical History:   Procedure Laterality Date    CARDIAC CATHETERIZATION      outcome: Neg    CARDIAC ELECTROPHYSIOLOGY STUDY AND ABLATION      CARDIAC LOOP RECORDER  2019    CARDIOVASCULAR STRESS TEST      resolved: 10/27/2010; negative    COLONOSCOPY  11/2013    polyp; complete    COLONOSCOPY  05/26/2017    HERNIA REPAIR      resolved: 11/1999    REPLACEMENT TOTAL KNEE Left 2010    REPLACEMENT TOTAL KNEE Right 2003    THYROGLOSSAL DUCT EXCISION      TONSILLECTOMY      last assessed: 06/02/2014    UPPER GASTROINTESTINAL ENDOSCOPY         ALLERGIES:  Allergies   Allergen Reactions    Metoprolol Shortness Of Breath     Tolerates Bystolic    Benazepril Cough    Clonidine Fatigue    Diltiazem Bradycardia    Entex T  [Pseudoephedrine-Guaifenesin]      Annotation - [id number]: LEG SWELLING    Tizanidine Other (See Comments)       SOCIAL HISTORY:  Social History     Substance and Sexual Activity   Alcohol Use Yes    Alcohol/week: 3 0 - 4 0 standard drinks    Types: 3 - 4 Glasses of wine per week     Social History     Substance and Sexual Activity   Drug Use Never     Social History     Tobacco Use   Smoking Status Never Smoker   Smokeless Tobacco Never Used       FAMILY HISTORY:  Family History   Problem Relation Age of Onset    Diabetes Mother     Heart attack Mother     Hypertension Mother     Heart attack Sister        MEDICATIONS:    Current Outpatient Medications:     allopurinol (ZYLOPRIM) 300 mg tablet, TAKE 1 TABLET BY MOUTH  DAILY, Disp: 90 tablet, Rfl: 0    amLODIPine (NORVASC) 2 5 mg tablet, Take 1 tablet daily in addition to a 5 mg tablet , Disp: 90 tablet, Rfl: 3    amLODIPine (NORVASC) 5 mg tablet, Take 1 tablet daily in addition to 2 5 mg tablet, Disp: 90 tablet, Rfl: 3    cloNIDine (CATAPRES) 0 2 mg tablet, Take 1 tablet (0 2 mg total) by mouth daily One hour prior to bed, Disp: 90 tablet, Rfl: 3    dofetilide (TIKOSYN) 250 mcg capsule, Take 1 capsule (250 mcg total) by mouth every 12 (twelve) hours, Disp: 180 capsule, Rfl: 0    famotidine (PEPCID) 40 MG tablet, Take 1 tablet (40 mg total) by mouth daily at bedtime, Disp: 90 tablet, Rfl: 3    FLUoxetine (PROzac) 40 MG capsule, Take 2 capsules (80 mg total) by mouth daily, Disp: 180 capsule, Rfl: 3    furosemide (LASIX) 40 mg tablet, TAKE 1 TABLET BY MOUTH  DAILY, Disp: 90 tablet, Rfl: 3    nebivolol (BYSTOLIC) 10 mg tablet, Take 1 tablet (10 mg total) by mouth daily, Disp: 90 tablet, Rfl: 3    olmesartan (BENICAR) 40 mg tablet, Take 1 tablet (40 mg total) by mouth daily, Disp: 90 tablet, Rfl: 3    omeprazole (PriLOSEC) 40 MG capsule, Take 1 capsule (40 mg total) by mouth daily, Disp: 90 capsule, Rfl: 3    potassium chloride SA (Klor-Con M15) 15 MEQ tablet, Take 2 tablets (30 mEq total) by mouth daily, Disp: 180 tablet, Rfl: 3    pravastatin (PRAVACHOL) 10 mg tablet, Take 1 tablet (10 mg total) by mouth daily at bedtime, Disp: 90 tablet, Rfl: 3    Xarelto 20 MG tablet, TAKE 1 TABLET BY MOUTH  DAILY WITH BREAKFAST, Disp: 90 tablet, Rfl: 3    allopurinol (ZYLOPRIM) 300 mg tablet, Take 1 tablet (300 mg total) by mouth daily, Disp: 90 tablet, Rfl: 3    Lab Results:   Results for orders placed or performed in visit on 08/31/22   POCT urine dip   Result Value Ref Range    LEUKOCYTE ESTERASE,UA -     NITRITE,UA -     SL AMB POCT UROBILINOGEN 0 2     POCT URINE PROTEIN +      PH,UA 6 0     BLOOD,UA -     SPECIFIC GRAVITY,UA 1 020     KETONES,UA -     BILIRUBIN,UA -     GLUCOSE, UA -      COLOR,UA Rachel     CLARITY,UA Clear    Creatinine 0 9 in July 2022    Portions of the record may have been created with voice recognition software  Occasional wrong word or "sound a like" substitutions may have occurred due to the inherent limitations of voice recognition software  Read the chart carefully and recognize, using context, where substitutions have occurred

## 2022-09-15 ENCOUNTER — APPOINTMENT (OUTPATIENT)
Dept: LAB | Age: 72
End: 2022-09-15
Payer: MEDICARE

## 2022-09-15 DIAGNOSIS — E87.1 HYPONATREMIA: ICD-10-CM

## 2022-09-15 LAB
ANION GAP SERPL CALCULATED.3IONS-SCNC: 9 MMOL/L (ref 4–13)
BUN SERPL-MCNC: 10 MG/DL (ref 5–25)
CALCIUM SERPL-MCNC: 9.2 MG/DL (ref 8.3–10.1)
CHLORIDE SERPL-SCNC: 101 MMOL/L (ref 96–108)
CO2 SERPL-SCNC: 22 MMOL/L (ref 21–32)
CREAT SERPL-MCNC: 1 MG/DL (ref 0.6–1.3)
GFR SERPL CREATININE-BSD FRML MDRD: 73 ML/MIN/1.73SQ M
GLUCOSE P FAST SERPL-MCNC: 119 MG/DL (ref 65–99)
OSMOLALITY UR/SERPL-RTO: 283 MMOL/KG (ref 282–298)
OSMOLALITY UR: 340 MMOL/KG
POTASSIUM SERPL-SCNC: 4.2 MMOL/L (ref 3.5–5.3)
SODIUM 24H UR-SCNC: 106 MOL/L
SODIUM SERPL-SCNC: 132 MMOL/L (ref 135–147)

## 2022-09-15 PROCEDURE — 80048 BASIC METABOLIC PNL TOTAL CA: CPT

## 2022-09-15 PROCEDURE — 83935 ASSAY OF URINE OSMOLALITY: CPT | Performed by: INTERNAL MEDICINE

## 2022-09-15 PROCEDURE — 84300 ASSAY OF URINE SODIUM: CPT | Performed by: INTERNAL MEDICINE

## 2022-09-15 PROCEDURE — 36415 COLL VENOUS BLD VENIPUNCTURE: CPT

## 2022-09-15 PROCEDURE — 83930 ASSAY OF BLOOD OSMOLALITY: CPT

## 2022-09-16 ENCOUNTER — TELEPHONE (OUTPATIENT)
Dept: NEPHROLOGY | Facility: HOSPITAL | Age: 72
End: 2022-09-16

## 2022-09-16 ENCOUNTER — TELEPHONE (OUTPATIENT)
Dept: NEPHROLOGY | Facility: CLINIC | Age: 72
End: 2022-09-16

## 2022-09-16 NOTE — TELEPHONE ENCOUNTER
----- Message from Ze Akhtar sent at 9/16/2022 12:31 PM EDT -----  Please call patient let him know that I have reviewed his repeat labs  His sodium level remains on his baseline  He should continue to follow his fluid restriction  His potassium level was 5 2  He should continue to take his supplements as prescribed  No changes made to his medications today  Thank you

## 2022-09-16 NOTE — TELEPHONE ENCOUNTER
Spoke with Jose Zuñiga and schedule appointment for 3/28 with Dr Mel Lanier in the [facility] location

## 2022-09-22 ENCOUNTER — HOSPITAL ENCOUNTER (OUTPATIENT)
Dept: NON INVASIVE DIAGNOSTICS | Facility: CLINIC | Age: 72
Discharge: HOME | End: 2022-09-22
Payer: MEDICARE

## 2022-09-22 DIAGNOSIS — I10 ESSENTIAL HYPERTENSION: ICD-10-CM

## 2022-09-22 PROCEDURE — 93975 VASCULAR STUDY: CPT

## 2022-09-23 ENCOUNTER — TELEPHONE (OUTPATIENT)
Dept: NEPHROLOGY | Facility: CLINIC | Age: 72
End: 2022-09-23

## 2022-09-23 PROCEDURE — 93975 VASCULAR STUDY: CPT | Performed by: SURGERY

## 2022-09-23 NOTE — TELEPHONE ENCOUNTER
----- Message from Jason Lawrence, [address] sent at 9/23/2022 11:25 AM EDT -----  Please let patient aware that renal artery Doppler reviewed  No renal artery stenosis seen    Continue with his current medication treatment as per Raulito Grimes

## 2022-10-05 ENCOUNTER — RA CDI HCC (OUTPATIENT)
Dept: OTHER | Facility: HOSPITAL | Age: 72
End: 2022-10-05

## 2022-10-05 NOTE — PROGRESS NOTES
I11 0  [facility]  coding opportunities          Chart Reviewed number of suggestions sent to Provider: 1     Patients Insurance     Medicare Insurance: [employer]

## 2022-10-12 ENCOUNTER — OFFICE VISIT (OUTPATIENT)
Dept: FAMILY MEDICINE CLINIC | Facility: CLINIC | Age: 72
End: 2022-10-12
Payer: MEDICARE

## 2022-10-12 VITALS
HEIGHT: 70 IN | TEMPERATURE: 97.9 F | SYSTOLIC BLOOD PRESSURE: 172 MMHG | WEIGHT: 313 LBS | BODY MASS INDEX: 44.81 KG/M2 | OXYGEN SATURATION: 97 % | HEART RATE: 57 BPM | DIASTOLIC BLOOD PRESSURE: 88 MMHG

## 2022-10-12 DIAGNOSIS — R80.1 PERSISTENT PROTEINURIA: ICD-10-CM

## 2022-10-12 DIAGNOSIS — I50.32 CHRONIC DIASTOLIC (CONGESTIVE) HEART FAILURE (HCC): ICD-10-CM

## 2022-10-12 DIAGNOSIS — I10 ESSENTIAL HYPERTENSION: ICD-10-CM

## 2022-10-12 DIAGNOSIS — Z98.890 S/P ABLATION OF ATRIAL FIBRILLATION: ICD-10-CM

## 2022-10-12 DIAGNOSIS — K21.9 GASTROESOPHAGEAL REFLUX DISEASE WITHOUT ESOPHAGITIS: ICD-10-CM

## 2022-10-12 DIAGNOSIS — E11.51 TYPE 2 DIABETES MELLITUS WITH DIABETIC PERIPHERAL ANGIOPATHY WITHOUT GANGRENE, WITHOUT LONG-TERM CURRENT USE OF INSULIN (HCC): Primary | ICD-10-CM

## 2022-10-12 DIAGNOSIS — G47.33 OSA (OBSTRUCTIVE SLEEP APNEA): ICD-10-CM

## 2022-10-12 DIAGNOSIS — F32.0 MAJOR DEPRESSIVE DISORDER, SINGLE EPISODE, MILD (HCC): ICD-10-CM

## 2022-10-12 DIAGNOSIS — I70.0 ATHEROSCLEROSIS OF AORTA (HCC): ICD-10-CM

## 2022-10-12 DIAGNOSIS — I73.9 PERIPHERAL VASCULAR DISEASE (HCC): ICD-10-CM

## 2022-10-12 DIAGNOSIS — I48.0 PAROXYSMAL ATRIAL FIBRILLATION (HCC): ICD-10-CM

## 2022-10-12 DIAGNOSIS — Z86.79 S/P ABLATION OF ATRIAL FIBRILLATION: ICD-10-CM

## 2022-10-12 DIAGNOSIS — E66.01 MORBID OBESITY (HCC): ICD-10-CM

## 2022-10-12 DIAGNOSIS — Z23 NEED FOR INFLUENZA VACCINATION: ICD-10-CM

## 2022-10-12 PROBLEM — N41.0 ACUTE PROSTATITIS: Status: RESOLVED | Noted: 2021-07-26 | Resolved: 2022-10-12

## 2022-10-12 LAB — SL AMB POCT HEMOGLOBIN AIC: 6 (ref ?–6.5)

## 2022-10-12 PROCEDURE — 99214 OFFICE O/P EST MOD 30 MIN: CPT | Performed by: FAMILY MEDICINE

## 2022-10-12 PROCEDURE — G0008 ADMIN INFLUENZA VIRUS VAC: HCPCS

## 2022-10-12 PROCEDURE — 90662 IIV NO PRSV INCREASED AG IM: CPT

## 2022-10-12 PROCEDURE — 83036 HEMOGLOBIN GLYCOSYLATED A1C: CPT | Performed by: FAMILY MEDICINE

## 2022-10-12 RX ORDER — FLUOXETINE HYDROCHLORIDE 40 MG/1
40 CAPSULE ORAL DAILY
Qty: 180 CAPSULE | Refills: 3 | Status: SHIPPED | OUTPATIENT
Start: 2022-10-12

## 2022-10-12 RX ORDER — AMLODIPINE BESYLATE 10 MG/1
TABLET ORAL
Qty: 90 TABLET | Refills: 3 | Status: SHIPPED | OUTPATIENT
Start: 2022-10-12

## 2022-10-12 NOTE — PROGRESS NOTES
Assessment/Plan:       Problem List Items Addressed This Visit        Digestive    Gastroesophageal reflux disease without esophagitis     Stable at this time            Endocrine    Type 2 diabetes mellitus with diabetic peripheral angiopathy without gangrene [facility]) - Primary       Lab Results   Component Value Date    HGBA1C 5 9 04/04/2022   A1c was 6 today  He remains off medication for diabetes  Relevant Orders    POCT hemoglobin A1c (Completed)       Respiratory    MARISSA (obstructive sleep apnea)     Continue on CPAP            Cardiovascular and Mediastinum    Essential hypertension     Try to increase amlodipine to 10 mg again  He has had some lower extremity swelling with this in the past   He does remain on multiple different agents for his elevated blood pressure  I did certainly encouraged him to try to lose some weight which would be very helpful in overall blood pressure management  The patient has noted his blood pressure readings at home have been much better than they are today but he has only been doing them sporadically  I encouraged him to check his blood pressure much more routinely  Relevant Medications    amLODIPine (NORVASC) 10 mg tablet    Chronic diastolic (congestive) heart failure (HCC)     Wt Readings from Last 3 Encounters:   10/12/22 (!) 142 kg (313 lb)   10/06/22 (!) 142 kg (314 lb)   08/31/22 (!) 142 kg (314 lb)     He has no signs of fluid overload  He does continue to have intermittent significant elevations of his blood pressure including today  I am going to bump up his amlodipine to 10 mg again  Monitor for increased swelling of his lower extremities  Will have him come back in a month for a follow-up  Continue to monitor his blood pressure at home  He is reminded that his goal is to be below 140/90 consistently  Relevant Medications    amLODIPine (NORVASC) 10 mg tablet    A-fib (HCC)     He is status post ablation    Continue cardiology follow-up  Relevant Medications    FLUoxetine (PROzac) 40 MG capsule    amLODIPine (NORVASC) 10 mg tablet    Peripheral vascular disease (HCC)     Clinically stable  Atherosclerosis of aorta ([address] )     Continue work on blood pressure control  Other    Major depressive disorder, single episode, mild (HCC)    Relevant Medications    FLUoxetine (PROzac) 40 MG capsule    Morbid obesity (HCC)    S/P ablation of atrial fibrillation    Persistent proteinuria      Other Visit Diagnoses     Need for influenza vaccination        Relevant Orders    influenza vaccine, high-dose, PF 0 7 mL (FLUZONE HIGH-DOSE) (Completed)            Subjective:      Patient ID: Leanna Dunaway  is a 74 y o  male  HPI patient presents today for follow-up for chronic health issues  His mood seems to be pretty well controlled this time  His nephrologist did suggest potentially getting him off of Prozac but he is very hesitant to do this at this time  He is working on fluid restriction for his hyponatremia  Blood pressure is poorly controlled today  He notes his blood pressures at home are usually in the 130s/80s  He denies chest pain, palpitations    He has some chronic mild lower extremity swelling    The following portions of the patient's history were reviewed and updated as appropriate: allergies, current medications, past family history, past medical history, past social history, past surgical history and problem list       Current Outpatient Medications:   •  allopurinol (ZYLOPRIM) 300 mg tablet, TAKE 1 TABLET BY MOUTH  DAILY, Disp: 90 tablet, Rfl: 0  •  allopurinol (ZYLOPRIM) 300 mg tablet, Take 1 tablet (300 mg total) by mouth daily, Disp: 90 tablet, Rfl: 3  •  amLODIPine (NORVASC) 10 mg tablet, Take 1 tablet daily in addition to 2 5 mg tablet, Disp: 90 tablet, Rfl: 3  •  cloNIDine (CATAPRES) 0 2 mg tablet, Take 1 tablet (0 2 mg total) by mouth daily One hour prior to bed, Disp: 90 tablet, Rfl: 3  •  dofetilide (TIKOSYN) 250 mcg capsule, Take 1 capsule (250 mcg total) by mouth every 12 (twelve) hours, Disp: 180 capsule, Rfl: 0  •  famotidine (PEPCID) 40 MG tablet, Take 1 tablet (40 mg total) by mouth daily at bedtime, Disp: 90 tablet, Rfl: 3  •  FLUoxetine (PROzac) 40 MG capsule, Take 1 capsule (40 mg total) by mouth daily, Disp: 180 capsule, Rfl: 3  •  furosemide (LASIX) 40 mg tablet, TAKE 1 TABLET BY MOUTH  DAILY, Disp: 90 tablet, Rfl: 3  •  nebivolol (BYSTOLIC) 10 mg tablet, Take 1 tablet (10 mg total) by mouth daily, Disp: 90 tablet, Rfl: 3  •  olmesartan (BENICAR) 40 mg tablet, Take 1 tablet (40 mg total) by mouth daily, Disp: 90 tablet, Rfl: 3  •  omeprazole (PriLOSEC) 40 MG capsule, Take 1 capsule (40 mg total) by mouth daily, Disp: 90 capsule, Rfl: 3  •  potassium chloride SA (Klor-Con M15) 15 MEQ tablet, Take 2 tablets (30 mEq total) by mouth daily, Disp: 180 tablet, Rfl: 3  •  pravastatin (PRAVACHOL) 10 mg tablet, Take 1 tablet (10 mg total) by mouth daily at bedtime, Disp: 90 tablet, Rfl: 3  •  Xarelto 20 MG tablet, TAKE 1 TABLET BY MOUTH  DAILY WITH BREAKFAST, Disp: 90 tablet, Rfl: 3  •  cloNIDine (CATAPRES) 0 2 mg tablet, Take 1 tablet (0 2 mg total) by mouth daily One hour prior to bed, Disp: 90 tablet, Rfl: 3  •  dofetilide (TIKOSYN) 250 mcg capsule, Take 1 capsule (250 mcg total) by mouth every 12 (twelve) hours, Disp: 180 capsule, Rfl: 0  •  furosemide (LASIX) 40 mg tablet, TAKE 1 TABLET BY MOUTH  DAILY, Disp: 90 tablet, Rfl: 3  •  olmesartan (BENICAR) 40 mg tablet, Take 1 tablet (40 mg total) by mouth daily, Disp: 90 tablet, Rfl: 3  •  Xarelto 20 MG tablet, TAKE 1 TABLET BY MOUTH  DAILY WITH BREAKFAST, Disp: 90 tablet, Rfl: 3     Review of Systems   Constitutional: Negative for appetite change, chills, fatigue, fever and unexpected weight change  HENT: Negative for trouble swallowing  Eyes: Negative for visual disturbance     Respiratory: Negative for cough, chest tightness, shortness of breath and wheezing  Cardiovascular: Negative for chest pain, palpitations and leg swelling  Gastrointestinal: Negative for abdominal distention, abdominal pain, blood in stool, constipation and diarrhea  Endocrine: Negative for polyuria  Genitourinary: Negative for difficulty urinating and flank pain  Musculoskeletal: Negative for arthralgias and myalgias  Skin: Negative for rash  Neurological: Negative for dizziness and light-headedness  Hematological: Negative for adenopathy  Does not bruise/bleed easily  Psychiatric/Behavioral: Negative for dysphoric mood and sleep disturbance  The patient is not nervous/anxious  Objective:      BP (!) 172/88 (BP Location: Left arm, Patient Position: Sitting, Cuff Size: Large)   Pulse 57   Temp 97 9 °F (36 6 °C)   Ht 5' 9 5" (1 765 m)   Wt (!) 142 kg (313 lb)   SpO2 97%   BMI 45 56 kg/m²          Physical Exam  Vitals reviewed  Constitutional:       General: He is not in acute distress  Appearance: He is well-developed  He is obese  He is not diaphoretic  HENT:      Head: Normocephalic  Eyes:      General:         Right eye: No discharge  Left eye: No discharge  Pupils: Pupils are equal, round, and reactive to light  Neck:      Thyroid: No thyromegaly  Trachea: No tracheal deviation  Cardiovascular:      Rate and Rhythm: Normal rate and regular rhythm  Heart sounds: Normal heart sounds  No murmur heard  Pulmonary:      Effort: Pulmonary effort is normal  No respiratory distress  Breath sounds: No wheezing or rales  Abdominal:      General: There is no distension  Palpations: Abdomen is soft  Tenderness: There is no abdominal tenderness  Musculoskeletal:         General: Normal range of motion  Right lower leg: No edema  Left lower leg: No edema  Lymphadenopathy:      Cervical: No cervical adenopathy  Skin:     General: Skin is warm  Findings: No erythema  Neurological:      General: No focal deficit present  Mental Status: He is alert and oriented to person, place, and time  Gait: Gait normal    Psychiatric:         Thought Content:  Thought content normal          Judgment: Judgment normal            Rolanda Rizvi

## 2022-10-12 NOTE — ASSESSMENT & PLAN NOTE
Chief Complaint   Patient presents with   • Gyn Exam     HISTORY OF PRESENT ILLNESS:  Patient is a 38 year old  alert female who presents today for annual exam. C/O heavy periods.  No libido.    CONTRACEPTION: Vasectomy    ALLERGIES:   Allergen Reactions   • Vicodin [Hydrocodone-Acetaminophen] GI UPSET and NAUSEA     Outpatient Prescriptions Marked as Taking for the 3/16/18 encounter (Office Visit) with Milagros Paz CNM   Medication Sig Dispense Refill   • cholecalciferol (VITAMIN D3) 1000 UNITS tablet Take 1,000 Units by mouth daily.     • Multiple Vitamins-Minerals (MULTIVITAMIN PO) Take  by mouth.       Past Medical History:   Diagnosis Date   • Annular tear of lumbar disc     L 5- S 1   • Back pain     upper   • Knee pain 2013    right    • Meniscus tear , ,     left knee, right knee, left knee   • PMH of     none   • PONV (postoperative nausea and vomiting)      Past Surgical History:   Procedure Laterality Date   • Hysteroscopy     • Knee arthroscopy w/ meniscectomy Left 2016    left ; right ; left     • Knee scope,diagnostic Left 2010   • Pelvis laparoscopy,dx      Laparoscopy/hysteroscopy (fertility). Dr Naylor   • Tonsillectomy and adenoidectomy     • [place] tooth extraction       Social History     Social History   • Marital status:      Spouse name: N/A   • Number of children: 2   • Years of education: college     Occupational History   • Teacher      [employer]     Social History Main Topics   • Smoking status: Never Smoker   • Smokeless tobacco: Never Used   • Alcohol use Yes      Comment: occassional   • Drug use: No   • Sexual activity: Yes     Partners: Male     Birth control/ protection: Surgical      Comment:      Other Topics Concern   •  Service No   • Blood Transfusions No   • Caffeine Concern No   • Occupational Exposure No   • Hobby Hazards No   • Sleep Concern No   • Stress Concern No   • Weight Concern No  Clinically stable    • Special Diet No   • Back Care Yes     LOWER BACK   • Exercise Yes   • Bike Helmet Yes   • Seat Belt Yes   • Self-Exams Yes     Social History Narrative   • No narrative on file     Family History   Problem Relation Age of Onset   • Arthritis Mother    • Arthritis Father    • Other Sister      uterine ablasion     Obstetric History       T2      L2     SAB0   TAB0   Ectopic0   Molar0   Multiple0   Live Births2        GYNECOLOGIC HISTORY:  Patient's last menstrual period was 2018 (approximate).  Menses are regular, lasting 7 days and some are very heavy in flow.    REVIEW OF SYSTEMS:  Constitutional: Denies headache. No weight changes. No fevers or chills. No fatigue.  HEENT: Denies vision changes or hearing changes. No sinus problems.  Breasts: Denies breast masses, pain or nipple discharge.  Respiratory: No cough or shortness of breath.  Cardiovascular: Denies chest pain, syncope or palpitations.  Gastrointestinal: Denies nausea, vomiting, diarrhea, or constipation.  Gynecological: Denies pelvic pain, vaginal discharge, itching, or odor.  Endocrine: Denies hot flashes, night sweats, heat or cold intolerance.  Hematologic: Denies easy bruising or bleeding disorders.  Allergies/Immunologic: Denies seasonal allergies or any history of immunologic disorders.  Musculoskeletal: Denies joint pain, swelling, or erythema.  Skin: Denies rashes, significant lesions or pruritus.   Psychiatric: Denies anxiety, depression, memory deficits, and appetite or sleep changes.  Stressors: Doing OK.    OBJECTIVE:  Visit Vitals  /62   Pulse 80   Ht 5' 8\" (1.727 m)   Wt 74.4 kg   LMP 2018 (Approximate)   BMI 24.94 kg/m²       GENERAL APPEARANCE: The patient is a pleasant, normal-appearing female with normal affect and in no distress.  NECK: Supple. No cervical lymphadenopathy. No thyromegaly, no nodules palpitated, trachea midline.  LUNGS: Clear bilaterally with normal respiratory effort.  HEART: Regular  

## 2022-10-12 NOTE — ASSESSMENT & PLAN NOTE
Wt Readings from Last 3 Encounters:   10/12/22 (!) 142 kg (313 lb)   10/06/22 (!) 142 kg (314 lb)   08/31/22 (!) 142 kg (314 lb)     He has no signs of fluid overload  He does continue to have intermittent significant elevations of his blood pressure including today  I am going to bump up his amlodipine to 10 mg again  Monitor for increased swelling of his lower extremities  Will have him come back in a month for a follow-up  Continue to monitor his blood pressure at home  He is reminded that his goal is to be below 140/90 consistently  Linear

## 2022-10-12 NOTE — ASSESSMENT & PLAN NOTE
Lab Results   Component Value Date    HGBA1C 5 9 04/04/2022   A1c was 6 today  He remains off medication for diabetes

## 2022-10-12 NOTE — ASSESSMENT & PLAN NOTE
Try to increase amlodipine to 10 mg again  He has had some lower extremity swelling with this in the past   He does remain on multiple different agents for his elevated blood pressure  I did certainly encouraged him to try to lose some weight which would be very helpful in overall blood pressure management  The patient has noted his blood pressure readings at home have been much better than they are today but he has only been doing them sporadically  I encouraged him to check his blood pressure much more routinely

## 2022-10-14 DIAGNOSIS — I10 ESSENTIAL HYPERTENSION: ICD-10-CM

## 2022-10-14 RX ORDER — AMLODIPINE BESYLATE 10 MG/1
TABLET ORAL
Qty: 90 TABLET | Refills: 3 | OUTPATIENT
Start: 2022-10-14

## 2022-10-17 ENCOUNTER — OFFICE VISIT (OUTPATIENT)
Dept: SLEEP CENTER | Facility: CLINIC | Age: 72
End: 2022-10-17
Payer: MEDICARE

## 2022-10-17 VITALS
SYSTOLIC BLOOD PRESSURE: 146 MMHG | BODY MASS INDEX: 46.12 KG/M2 | HEIGHT: 69 IN | WEIGHT: 311.4 LBS | DIASTOLIC BLOOD PRESSURE: 80 MMHG

## 2022-10-17 DIAGNOSIS — I10 ESSENTIAL HYPERTENSION: ICD-10-CM

## 2022-10-17 DIAGNOSIS — E66.01 MORBID OBESITY (HCC): ICD-10-CM

## 2022-10-17 DIAGNOSIS — R68.2 DRY MOUTH: ICD-10-CM

## 2022-10-17 DIAGNOSIS — K21.9 GASTROESOPHAGEAL REFLUX DISEASE WITHOUT ESOPHAGITIS: ICD-10-CM

## 2022-10-17 DIAGNOSIS — I50.32 CHRONIC DIASTOLIC (CONGESTIVE) HEART FAILURE (HCC): ICD-10-CM

## 2022-10-17 DIAGNOSIS — J31.0 CHRONIC RHINITIS: ICD-10-CM

## 2022-10-17 DIAGNOSIS — E11.9 TYPE 2 DIABETES MELLITUS WITHOUT COMPLICATION, WITHOUT LONG-TERM CURRENT USE OF INSULIN (HCC): ICD-10-CM

## 2022-10-17 DIAGNOSIS — I48.0 PAROXYSMAL ATRIAL FIBRILLATION (HCC): ICD-10-CM

## 2022-10-17 DIAGNOSIS — G47.33 OSA (OBSTRUCTIVE SLEEP APNEA): Primary | ICD-10-CM

## 2022-10-17 DIAGNOSIS — F41.8 DEPRESSION WITH ANXIETY: ICD-10-CM

## 2022-10-17 PROCEDURE — 99214 OFFICE O/P EST MOD 30 MIN: CPT | Performed by: INTERNAL MEDICINE

## 2022-10-17 RX ORDER — AMLODIPINE BESYLATE 10 MG/1
TABLET ORAL
Qty: 90 TABLET | Refills: 3 | OUTPATIENT
Start: 2022-10-17

## 2022-10-17 NOTE — PROGRESS NOTES
Follow-Up Note - [facility]   74 y o  male  :1950  BKS:[id number]  DOS:10/17/2022    CC: I saw this patient for follow-up in clinic today for Sleep disordered breathing, Coexisting Sleep and Medical Problems  There were no results of prior studies in [address], Problem List, Medications & Allergies were reviewed in EMR  Interval changes: [none reported ]   He  has a past medical history of Acute prostatitis (2021), Atrial fibrillation ([facility] [address] ), BPH (benign prostatic hyperplasia), Chronic diastolic (congestive) heart failure ([facility] [address] ), Depression, Diabetes mellitus ([facility] ), Drug-induced insomnia ([facility] ) (2020), Ear problems, GERD (gastroesophageal reflux disease), Gout, HL (hearing loss), Hyperlipidemia, Hypertension, Hyponatremia, Leukocytosis, Liver function abnormality, Morbid obesity ([facility] ), Nasal congestion, Obstructive sleep apnea, and Sleep apnea  He has a current medication list which includes the following prescription(s): allopurinol, allopurinol, amlodipine, clonidine, famotidine, fluoxetine, furosemide, nebivolol, olmesartan, omeprazole, potassium chloride sa, pravastatin, tetrahydrozoline-zinc, xarelto, dofetilide, and [DISCONTINUED] losartan  PHYSIOLOGICAL DATA REVIEW AND INTERPRETATION: [ ]  using PAP > 4 hours/night 100%  Estimated JOHNY 0 6/hour with pressure of 16cm H2O [email or web address] percentile]; Patient has not been using ozone based devices to sanitize the machine  SUBJECTIVE: Regarding use of PAP, Clifton Robbins reports:   · some adverse effects: dry mouth/throat and dry nose; has [not] noticed any fibres or foreign material in air line  · He is[ ]benefiting from use: sleeping better   Sleep Routine: Clifton Robbins reports getting >8 hrs sleep[  ]; he has no difficulty initiating or maintaining sleep   He arises spontaneously and feels refreshed  Antwanoctavio Robbins denies [Excessive] [Daytime] Sleepiness, [ ] He rated [himself] at Total score: 3 /24 on the [place] Sleepiness Scale  Habits:[ reports that he has never smoked  He has never used smokeless tobacco ], [ reports current alcohol use of about 3 0 - 4 0 standard drinks of alcohol per week ], [ reports no history of drug use ], Caffeine use:limited[ ]; Exercise routine: "not enough"[ ]  ROS: reviewed & as attached  [Significant for has been stable  He was experiencing acid reflux and nasal congestion for which she is under care of ENT  He reports no nasal, respiratory or cardiac symptoms ]  Mood is stable on current medication    EXAM: /80   Ht 5' 9" (1 753 m)   Wt (!) 141 kg (311 lb 6 4 oz)   BMI 45 99 kg/m²     Wt Readings from Last 3 Encounters:   10/17/22 (!) 141 kg (311 lb 6 4 oz)   10/12/22 (!) 142 kg (313 lb)   10/06/22 (!) 142 kg (314 lb)      Patient is well groomed; well appearing  CNS: Alert, orientated, [clear & coherent] speech  Psych: cooperative[and in no distress]  Mental state:[appears normal]  H&N: EOMI; NC/AT:[no] facial pressure marks, [no] rashes  Skin/Extrem: col & hydration [normal]; [no] edema  Resp: Respiratory effort [is normal]  [Physical findings otherwise essentially unchanged from previous ]    IMPRESSION: Problem List Items & Comorbidities Addressed this Visit    1  MARISSA (obstructive sleep apnea)  PAP DME Resupply/Reorder   2  Chronic rhinitis     3  Dry mouth     4  Essential hypertension     5  Chronic diastolic (congestive) heart failure (HCC)     6  Paroxysmal atrial fibrillation ([address] )     7  Depression with anxiety     8  Morbid obesity ([address] )     9  Gastroesophageal reflux disease without esophagitis     10  Type 2 diabetes mellitus without complication, without long-term current use of insulin ([address] )         PLAN:  1  I reviewed  [physiologic data ]with the patient  2  [I discussed treatment options with risks and benefits ]  3  Treatment with  PAP is medically necessary and Danny Olmedo is agreable to continue use     4  Care of equipment, methods to improve comfort using PAP and importance of compliance with therapy were discussed  5  Pressure setting: continue 16 cmH2O     6  Rx provided to replace[ ] supplies and Care coordinated with DME provider  7  [Discussed] strategies for weight [reduction]  8  Follow-up is advised in [[address] [place] [address] [or sooner if needed] [to monitor progress, compliance and to adjust therapy]  Thank you for allowing me to participate in the care of this patient  Sincerely,     Authenticated electronically on 70/10/81   Board Certified Specialist     Portions of the record may have been created with voice recognition software  Occasional wrong word or "sound a like" substitutions may have occurred due to the inherent limitations of voice recognition software  There may also be notations and random deletions of words or characters from malfunctioning software  Read the chart carefully and recognize, using context, where substitutions/deletions have occurred

## 2022-10-17 NOTE — PATIENT INSTRUCTIONS

## 2022-10-17 NOTE — PROGRESS NOTES
Review of Systems      Genitourinary need to urinate more than twice a night   Cardiology ankle/leg swelling   Gastrointestinal none   Neurology numbness/tingling of an extremity   Constitutional none   Integumentary none   Psychiatry anxiety and depression   Musculoskeletal legs twitching/jerking   Pulmonary none   ENT none   Endocrine excessive thirst and frequent urination   Hematological none

## 2022-10-17 NOTE — TELEPHONE ENCOUNTER
Pt called about his *amlodipine 10mg   Not being filled by [facility]  He states the pharmacy wants to speak with MD, and they've call multiple times  I informed pt of MD's schedule and let him know I will send a msg concerning his medication      As of now pt has not received his medication

## 2022-10-18 ENCOUNTER — TELEPHONE (OUTPATIENT)
Dept: SLEEP CENTER | Facility: CLINIC | Age: 72
End: 2022-10-18

## 2022-10-25 DIAGNOSIS — I48.0 PAROXYSMAL ATRIAL FIBRILLATION (HCC): ICD-10-CM

## 2022-10-25 RX ORDER — DOFETILIDE 0.25 MG/1
250 CAPSULE ORAL EVERY 12 HOURS
Qty: 180 CAPSULE | Refills: 0 | Status: SHIPPED | OUTPATIENT
Start: 2022-10-25 | End: 2023-01-23

## 2022-10-25 NOTE — TELEPHONE ENCOUNTER
Tikosyn prescription ran out but it also  on 10/12/22  I did refill for pt since pt states he starts having palpitations without it  Is it ok for pt top continue this mediation? Thanks

## 2022-11-15 DIAGNOSIS — I48.0 PAROXYSMAL ATRIAL FIBRILLATION (HCC): ICD-10-CM

## 2022-11-15 RX ORDER — DOFETILIDE 0.25 MG/1
CAPSULE ORAL
Qty: 180 CAPSULE | Refills: 0 | Status: SHIPPED | OUTPATIENT
Start: 2022-11-15

## 2022-12-05 ENCOUNTER — REMOTE DEVICE CLINIC VISIT (OUTPATIENT)
Dept: CARDIOLOGY CLINIC | Facility: CLINIC | Age: 72
End: 2022-12-05

## 2022-12-05 DIAGNOSIS — Z95.818 PRESENCE OF OTHER CARDIAC IMPLANTS AND GRAFTS: Primary | ICD-10-CM

## 2022-12-05 NOTE — PROGRESS NOTES
MDT LOOP   CARELINK TRANSMISSION: LOOP RECORDER  PRESENTING RHYTHM SB @ 59 BPM  BATTERY STATUS "OK " 1 DEVICE CLASSIFIED AF EPISODES, LONGEST EPISODE IS 25:24 HOURS,AVAILABLE STRIPS DEMONSTRATE ATRIAL FIBRILLATION @ 82 BPM  AF BURDEN IS 0 6%  AF BURDEN MAYBE OVERESTIMATED DUE TO INAPPROPRIATE CLASSIFICATION OF AF  SOME STRIPS MAY NOT BE INTERPRETABLE OR AVAILABLE, CANNOT DEFINITIVELY RULE OUT ATRIAL FIBRILLATION  HX OF PAF  PT TAKES TIKOSYN AND Pasqual Fore  NO PATIENT OR NEW DEVICE ACTIVATED EPISODES  NORMAL DEVICE FUNCTION   DL

## 2022-12-29 DIAGNOSIS — I10 ESSENTIAL HYPERTENSION: ICD-10-CM

## 2022-12-29 RX ORDER — OLMESARTAN MEDOXOMIL 40 MG/1
TABLET ORAL
Qty: 90 TABLET | Refills: 3 | Status: SHIPPED | OUTPATIENT
Start: 2022-12-29

## 2022-12-29 NOTE — TELEPHONE ENCOUNTER
Pt previous [facility] patient is scheduled to follow in [place] with Dr Jose Angulo in January

## 2023-01-11 ENCOUNTER — OFFICE VISIT (OUTPATIENT)
Dept: CARDIOLOGY CLINIC | Facility: CLINIC | Age: 73
End: 2023-01-11

## 2023-01-11 VITALS
OXYGEN SATURATION: 98 % | DIASTOLIC BLOOD PRESSURE: 70 MMHG | HEIGHT: 68 IN | HEART RATE: 63 BPM | SYSTOLIC BLOOD PRESSURE: 142 MMHG | WEIGHT: 309.5 LBS | BODY MASS INDEX: 46.91 KG/M2

## 2023-01-11 DIAGNOSIS — I48.0 PAROXYSMAL ATRIAL FIBRILLATION (HCC): ICD-10-CM

## 2023-01-11 DIAGNOSIS — R07.89 OTHER CHEST PAIN: ICD-10-CM

## 2023-01-11 DIAGNOSIS — I10 ESSENTIAL HYPERTENSION: ICD-10-CM

## 2023-01-11 DIAGNOSIS — I73.9 PERIPHERAL VASCULAR DISEASE (HCC): ICD-10-CM

## 2023-01-11 DIAGNOSIS — Z86.79 S/P ABLATION OF ATRIAL FIBRILLATION: ICD-10-CM

## 2023-01-11 DIAGNOSIS — E11.51 TYPE 2 DIABETES MELLITUS WITH DIABETIC PERIPHERAL ANGIOPATHY WITHOUT GANGRENE, WITHOUT LONG-TERM CURRENT USE OF INSULIN (HCC): ICD-10-CM

## 2023-01-11 DIAGNOSIS — E78.2 MIXED HYPERLIPIDEMIA: ICD-10-CM

## 2023-01-11 DIAGNOSIS — I50.32 CHRONIC DIASTOLIC (CONGESTIVE) HEART FAILURE (HCC): ICD-10-CM

## 2023-01-11 DIAGNOSIS — Z98.890 S/P ABLATION OF ATRIAL FIBRILLATION: ICD-10-CM

## 2023-01-11 DIAGNOSIS — G47.33 OSA (OBSTRUCTIVE SLEEP APNEA): ICD-10-CM

## 2023-01-11 DIAGNOSIS — I70.0 ATHEROSCLEROSIS OF AORTA (HCC): ICD-10-CM

## 2023-01-11 DIAGNOSIS — R06.09 DOE (DYSPNEA ON EXERTION): Primary | ICD-10-CM

## 2023-01-11 DIAGNOSIS — E78.00 HYPERCHOLESTEROLEMIA: ICD-10-CM

## 2023-01-11 DIAGNOSIS — E66.01 MORBID OBESITY (HCC): ICD-10-CM

## 2023-01-11 RX ORDER — CLONIDINE HYDROCHLORIDE 0.2 MG/1
0.2 TABLET ORAL DAILY
Qty: 90 TABLET | Refills: 3 | Status: SHIPPED | OUTPATIENT
Start: 2023-01-11

## 2023-01-11 RX ORDER — AMLODIPINE BESYLATE 10 MG/1
10 TABLET ORAL DAILY
Qty: 90 TABLET | Refills: 3 | Status: SHIPPED | OUTPATIENT
Start: 2023-01-11

## 2023-01-11 RX ORDER — FUROSEMIDE 40 MG/1
40 TABLET ORAL DAILY
Qty: 90 TABLET | Refills: 3 | Status: SHIPPED | OUTPATIENT
Start: 2023-01-11

## 2023-01-11 RX ORDER — DOFETILIDE 0.25 MG/1
250 CAPSULE ORAL EVERY 12 HOURS
Qty: 180 CAPSULE | Refills: 3 | Status: SHIPPED | OUTPATIENT
Start: 2023-01-11

## 2023-01-11 RX ORDER — NEBIVOLOL 10 MG/1
10 TABLET ORAL DAILY
Qty: 90 TABLET | Refills: 3 | Status: SHIPPED | OUTPATIENT
Start: 2023-01-11

## 2023-01-11 RX ORDER — AMOXICILLIN 500 MG/1
CAPSULE ORAL
COMMUNITY
Start: 2023-01-02

## 2023-01-11 RX ORDER — POTASSIUM CHLORIDE 1125 MG/1
30 TABLET, EXTENDED RELEASE ORAL DAILY
Qty: 180 TABLET | Refills: 3 | Status: SHIPPED | OUTPATIENT
Start: 2023-01-11

## 2023-01-11 RX ORDER — PRAVASTATIN SODIUM 10 MG
10 TABLET ORAL
Qty: 90 TABLET | Refills: 3 | Status: SHIPPED | OUTPATIENT
Start: 2023-01-11

## 2023-01-11 NOTE — PROGRESS NOTES
Cardiology Follow Up    Asa Davila   1950  [address] [place] [address] Dr Maribell MANE  [address] [place] [address] [phone]    1  ABURTO (dyspnea on exertion)  Echo complete w/ contrast if indicated    NM myocardial perfusion spect (rx stress and/or rest)      2  Essential hypertension  cloNIDine (CATAPRES) 0 2 mg tablet    amLODIPine (NORVASC) 10 mg tablet    Echo complete w/ contrast if indicated    NM myocardial perfusion spect (rx stress and/or rest)      3  Chronic diastolic (congestive) heart failure ([facility])  furosemide (LASIX) 40 mg tablet    Echo complete w/ contrast if indicated    NM myocardial perfusion spect (rx stress and/or rest)      4  Paroxysmal atrial fibrillation ([facility])  rivaroxaban (Xarelto) 20 mg tablet    potassium chloride SA (Klor-Con M15) 15 MEQ tablet    Echo complete w/ contrast if indicated    NM myocardial perfusion spect (rx stress and/or rest)      5  Peripheral vascular disease ([address] )        6  Atherosclerosis of aorta ([facility] )        7  MARISSA (obstructive sleep apnea)        8  S/P ablation of atrial fibrillation        9  Morbid obesity ([address] )        10  Mixed hyperlipidemia        11  Hypercholesterolemia  pravastatin (PRAVACHOL) 10 mg tablet      12  Other chest pain  Echo complete w/ contrast if indicated    NM myocardial perfusion spect (rx stress and/or rest)      13   Type 2 diabetes mellitus with diabetic peripheral angiopathy without gangrene, without long-term current use of insulin ([facility])            Discussion/Summary:  #1 heavy coronary artery calcifications  #2 atypical chest pain  #3 dyspnea on exertion  #4 chronic diastolic congestive heart failure  #5 paroxysmal atrial fibrillation status post ablation  #6 morbid obesity  #7 obstructive sleep apnea  #8 hypertension  #9 peripheral arterial disease  #10 diabetes     Recommendations: Given his atypical chest pain and shortness of breath on exertion recommend a stress test to rule out ischemia  Due to lower extremity osteoarthritis he is unable to walk on the treadmill we will schedule a Lexiscan Myoview  Continue current statin LDL less than 70  Blood pressure overall has been well controlled  Continue CPAP for treatment of sleep apnea  Check an echocardiogram to evaluate pulmonary artery pressure given his sleep apnea and chronic heart failure  We talked extensively about dietary modifications and weight loss strategies  Continue low-sodium intake  Interval History: 15-year-old gentleman transitioning to my care from one of my partners who retired  He has a history of chronic diastolic congestive heart failure, hypertension, paroxysmal atrial fibrillation status post ablation, peripheral arterial disease  Functional capacity has been good over the years  He remains somewhat sedentary at the house though  He does not do much in terms of walking  If he goes to the grocery store he is able to walk up and down the [place] without any significant difficulty  He has had some sharp pains in his anterior chest that can come and go with or without warning  He does get short of breath with mild to moderate activity  There has been no significant lower extremity edema, PND, orthopnea  There is been no palpitations, lightness, dizziness, or syncope  Medical Problems     Problem List     Gastroesophageal reflux disease without esophagitis    Benign colon polyp    Overview Signed 1/28/2018  5:14 PM by Corin Dobbs DO     Description: 11/13         Major depressive disorder, single episode, mild ([facility] )    Overview Addendum 10/12/2020  3:34 PM by Elvia Gilbert MD     PHQ-9 was high on 06/18 at 14    PHQ- 9 of 12 on 10/16/19  PHQ 9 of 7 on October 12, 2020         Type 2 diabetes mellitus with diabetic peripheral angiopathy without gangrene [facility])    Overview Addendum 3/31/2020  5:44 AM by Janki Jain     Per CMS ICD 10 Guidelines--Per Physician           Lab Results   Component Value Date    HGBA1C 6 0 10/12/2022         Idiopathic chronic gout of multiple sites without tophus    Hyperlipidemia    Essential hypertension    Microscopic hematuria    Morbid obesity (HCC)    Peripheral neuropathy    MARISSA (obstructive sleep apnea)    Overview Addendum 11/9/2018  1:57 PM by Stan Stern MD     On cpap         Testicular hypogonadism    Overview Addendum 10/16/2019  3:09 PM by Stan Stern MD     Description: Serum Testost = 146 (6/13)  Pt declines treatment          Thyroglossal duct cyst    Overview Signed 11/9/2018  2:14 PM by Stan Stern MD     Excised 6/14         Benign prostatic hyperplasia with urinary frequency    Overview Addendum 7/26/2021  3:02 PM by Stan Stern MD     VAN done July 26, 2021-questionable prostatitis  Chronic rhinitis    Chronic diastolic (congestive) heart failure (HCC)    Wt Readings from Last 3 Encounters:   01/11/23 (!) 140 kg (309 lb 8 oz)   10/17/22 (!) 141 kg (311 lb 6 4 oz)   10/12/22 (!) 142 kg (313 lb)                 A-fib ([address] )    Overview Addendum 10/12/2022  8:21 PM by Stan Stern MD     10/30/18 - AFib with rapid ventricular response, new onset a fib, rate was controlled with Bystolic, consult by Cardiology and a SANDRITA cardioversion was completed  Patient tolerated procedure and prior to discharge heart rate was in the 60s with normal sinus rhythm  He will remain on Xarelto and beta-blocker for rate control  12/19-ablation completed         Anticoagulation adequate    Peripheral vascular disease ([address] )    Fatty liver    Long term current use of antiarrhythmic drug    S/P ablation of atrial fibrillation    Overview Signed 10/12/2022  8:21 PM by Stan Stern MD     Status post ablation 12/19           Atherosclerosis of aorta [facility])    Overview Signed 3/31/2020  5:45 AM by Lynn Osgood     Per CMS ICD 10 Guidelines--Per Physician         Anxiety [place] tear of retina of left eye without detachment    Chronic constipation    Hyponatremia    Hyperkalemia    Persistent proteinuria        Past Medical History:   Diagnosis Date   • Acute prostatitis 7/26/2021    Diagnosed in [facility] and treated with Cipro x2 weeks 6/7/21  Questionable recurrence July 16, 2021   • Atrial fibrillation [facility])    • BPH (benign prostatic hyperplasia)    • Chronic diastolic (congestive) heart failure (HCC)    • Depression    • Diabetes mellitus ([facility] )    • Drug-induced insomnia ([facility] ) 02/03/2020   • Ear problems    • GERD (gastroesophageal reflux disease)    • Gout    • HL (hearing loss)    • Hyperlipidemia    • Hypertension    • Hyponatremia     last assessed: 09/08/2014   • Leukocytosis    • Liver function abnormality    • Morbid obesity (HCC)    • Nasal congestion    • Obstructive sleep apnea    • Sleep apnea      Social History     Socioeconomic History   • Marital status: /Civil Union     Spouse name: Not on file   • Number of children: Not on file   • Years of education: Not on file   • Highest education level: Not on file   Occupational History   • Occupation: Disability   • Occupation:      Comment: significant asbestos and silica exposure in the past   Tobacco Use   • Smoking status: Never   • Smokeless tobacco: Never   Vaping Use   • Vaping Use: Never used   Substance and Sexual Activity   • Alcohol use:  Yes     Alcohol/week: 3 0 - 4 0 standard drinks     Types: 3 - 4 Glasses of wine per week   • Drug use: Never   • Sexual activity: Not Currently   Other Topics Concern   • Not on file   Social History Narrative    4 cups of coffee/day     Social Determinants of Health     Financial Resource Strain: Not on file   Food Insecurity: Not on file   Transportation Needs: Not on file   Physical Activity: Not on file   Stress: Not on file   Social Connections: Not on file   Intimate Partner Violence: Not on file   Housing Stability: Not on file      Family History   Problem Relation Age of Onset   • Diabetes Mother    • Heart attack Mother    • Hypertension Mother    • Heart attack Sister      Past Surgical History:   Procedure Laterality Date   • CARDIAC CATHETERIZATION      outcome: Neg   • CARDIAC ELECTROPHYSIOLOGY STUDY AND ABLATION     • CARDIAC LOOP RECORDER  2019   • CARDIOVASCULAR STRESS TEST      resolved: 10/27/2010; negative   • COLONOSCOPY  11/2013    polyp; complete   • COLONOSCOPY  05/26/2017   • HERNIA REPAIR      resolved: 11/1999   • REPLACEMENT TOTAL KNEE Left 2010   • REPLACEMENT TOTAL KNEE Right 2003   • THYROGLOSSAL DUCT EXCISION     • TONSILLECTOMY      last assessed: 06/02/2014   • UPPER GASTROINTESTINAL ENDOSCOPY         Current Outpatient Medications:   •  allopurinol (ZYLOPRIM) 300 mg tablet, TAKE 1 TABLET BY MOUTH  DAILY, Disp: 90 tablet, Rfl: 0  •  amLODIPine (NORVASC) 10 mg tablet, Take 1 tablet (10 mg total) by mouth daily, Disp: 90 tablet, Rfl: 3  •  amoxicillin (AMOXIL) 500 mg capsule, Prior to dental visits, Disp: , Rfl:   •  cloNIDine (CATAPRES) 0 2 mg tablet, Take 1 tablet (0 2 mg total) by mouth daily One hour prior to bed, Disp: 90 tablet, Rfl: 3  •  dofetilide (TIKOSYN) 250 mcg capsule, TAKE 1 CAPSULE BY MOUTH EVERY 12 HOURS , Disp: 180 capsule, Rfl: 0  •  famotidine (PEPCID) 40 MG tablet, Take 1 tablet (40 mg total) by mouth daily at bedtime, Disp: 90 tablet, Rfl: 3  •  FLUoxetine (PROzac) 40 MG capsule, Take 1 capsule (40 mg total) by mouth daily, Disp: 180 capsule, Rfl: 3  •  furosemide (LASIX) 40 mg tablet, Take 1 tablet (40 mg total) by mouth daily, Disp: 90 tablet, Rfl: 3  •  nebivolol (BYSTOLIC) 10 mg tablet, Take 1 tablet (10 mg total) by mouth daily, Disp: 90 tablet, Rfl: 3  •  olmesartan (BENICAR) 40 mg tablet, TAKE 1 TABLET BY MOUTH  DAILY, Disp: 90 tablet, Rfl: 3  •  omeprazole (PriLOSEC) 40 MG capsule, Take 1 capsule (40 mg total) by mouth daily, Disp: 90 capsule, Rfl: 3  •  potassium chloride SA (Klor-Con M15) 15 MEQ tablet, Take 2 tablets (30 mEq total) by mouth daily, Disp: 180 tablet, Rfl: 3  •  pravastatin (PRAVACHOL) 10 mg tablet, Take 1 tablet (10 mg total) by mouth daily at bedtime, Disp: 90 tablet, Rfl: 3  •  rivaroxaban (Xarelto) 20 mg tablet, Take 1 tablet (20 mg total) by mouth daily with breakfast, Disp: 90 tablet, Rfl: 3  •  allopurinol (ZYLOPRIM) 300 mg tablet, Take 1 tablet (300 mg total) by mouth daily, Disp: 90 tablet, Rfl: 3  •  tetrahydrozoline-zinc (VISINE-AC) 0 05-0 25 % ophthalmic solution, QD OU (Patient not taking: Reported on 1/11/2023), Disp: , Rfl:   Allergies   Allergen Reactions   • Metoprolol Shortness Of Breath     Tolerates Bystolic   • Benazepril Cough   • Clonidine Fatigue   • Diltiazem Bradycardia   • Entex T  [Pseudoephedrine-Guaifenesin]      Annotation - [id number]: LEG SWELLING   • Tizanidine Other (See Comments)       Labs:     Chemistry        Component Value Date/Time    K 4 2 09/15/2022 1036     09/15/2022 1036    CO2 22 09/15/2022 1036    BUN 10 09/15/2022 1036    CREATININE 1 00 09/15/2022 1036        Component Value Date/Time    CALCIUM 9 2 09/15/2022 1036    ALKPHOS 65 07/06/2022 0936    AST 32 07/06/2022 0936    ALT 45 07/06/2022 0936            No results found for: CHOL  Lab Results   Component Value Date    HDL 50 04/07/2022    HDL 42 10/31/2018     Lab Results   Component Value Date    LDLCALC 65 04/07/2022    LDLCALC 70 10/31/2018     Lab Results   Component Value Date    TRIG 82 04/07/2022    TRIG 138 10/31/2018     No results found for: CHOLHDL    Imaging: No results found  ECG: Sinus rhythm      ROS    Vitals:    01/11/23 1321   BP: 142/70   Pulse: 63   SpO2: 98%     Vitals:    01/11/23 1321   Weight: (!) 140 kg (309 lb 8 oz)     Height: 5' 8" (172 7 cm)   Body mass index is 47 06 kg/m²      Physical Exam:  Vital signs reviewed  General:  Alert and cooperative, appears stated age, no acute distress  HEENT:  PERRLA, EOMI, no scleral icterus, no conjunctival pallor  Neck:  No lymphadenopathy, no thyromegaly, no carotid bruits, no elevated JVP  Heart:  Regular rate and rhythm, normal S1/S2, no S3/S4, no murmur, rubs or gallops  PMI nondisplaced  Lungs:  Clear to auscultation bilaterally, no wheezes rales or rhonchi  Abdomen:  Soft, non-tender, positive bowel sounds, no rebound or guarding,   no organomegaly   Extremities:  Normal range of motion    No clubbing, cyanosis or edema   Vascular:  2+ pedal pulses  Skin:  No rashes or lesions on exposed skin  Neurologic:  Cranial nerves II-XII grossly intact without focal deficits

## 2023-01-25 ENCOUNTER — HOSPITAL ENCOUNTER (OUTPATIENT)
Dept: NON INVASIVE DIAGNOSTICS | Facility: CLINIC | Age: 73
Discharge: HOME | End: 2023-01-25

## 2023-01-25 VITALS
SYSTOLIC BLOOD PRESSURE: 142 MMHG | HEIGHT: 68 IN | BODY MASS INDEX: 46.83 KG/M2 | HEART RATE: 60 BPM | WEIGHT: 309 LBS | DIASTOLIC BLOOD PRESSURE: 70 MMHG

## 2023-01-25 VITALS — SYSTOLIC BLOOD PRESSURE: 142 MMHG | HEART RATE: 56 BPM | OXYGEN SATURATION: 96 % | DIASTOLIC BLOOD PRESSURE: 84 MMHG

## 2023-01-25 DIAGNOSIS — I50.32 CHRONIC DIASTOLIC (CONGESTIVE) HEART FAILURE (HCC): ICD-10-CM

## 2023-01-25 DIAGNOSIS — R06.09 DOE (DYSPNEA ON EXERTION): ICD-10-CM

## 2023-01-25 DIAGNOSIS — R07.89 OTHER CHEST PAIN: ICD-10-CM

## 2023-01-25 DIAGNOSIS — I10 ESSENTIAL HYPERTENSION: ICD-10-CM

## 2023-01-25 DIAGNOSIS — I48.0 PAROXYSMAL ATRIAL FIBRILLATION (HCC): ICD-10-CM

## 2023-01-25 LAB
AORTIC ROOT: 4.3 CM
APICAL FOUR CHAMBER EJECTION FRACTION: 59 %
ASCENDING AORTA: 4.2 CM
E WAVE DECELERATION TIME: 165 MS
FRACTIONAL SHORTENING: 33 % (ref 28–44)
INTERVENTRICULAR SEPTUM IN DIASTOLE (PARASTERNAL SHORT AXIS VIEW): 1.4 CM
INTERVENTRICULAR SEPTUM: 1.4 CM (ref 0.6–1.1)
LAAS-AP2: 28.8 CM2
LAAS-AP4: 27.9 CM2
LEFT ATRIUM AREA SYSTOLE SINGLE PLANE A4C: 25.8 CM2
LEFT ATRIUM SIZE: 4.4 CM
LEFT INTERNAL DIMENSION IN SYSTOLE: 4.5 CM (ref 2.1–4)
LEFT VENTRICLE DIASTOLIC VOLUME (MOD BIPLANE): 170 ML
LEFT VENTRICLE SYSTOLIC VOLUME (MOD BIPLANE): 74 ML
LEFT VENTRICULAR INTERNAL DIMENSION IN DIASTOLE: 6.7 CM (ref 3.5–6)
LEFT VENTRICULAR POSTERIOR WALL IN END DIASTOLE: 1.5 CM
LEFT VENTRICULAR STROKE VOLUME: 143 ML
LV EF: 57 %
LVSV (TEICH): 143 ML
MV E'TISSUE VEL-SEP: 9 CM/S
MV PEAK A VEL: 0.52 M/S
MV PEAK E VEL: 92 CM/S
MV STENOSIS PRESSURE HALF TIME: 48 MS
MV VALVE AREA P 1/2 METHOD: 4.58 CM2
NUC STRESS EJECTION FRACTION: 66 %
RA PRESSURE ESTIMATED: 5 MMHG
RATE PRESSURE PRODUCT: NORMAL
RIGHT ATRIUM AREA SYSTOLE A4C: 19.9 CM2
RIGHT VENTRICLE ID DIMENSION: 3.7 CM
RV PSP: 42 MMHG
SL CV LEFT ATRIUM LENGTH A2C: 7.2 CM
SL CV LV EF: 60
SL CV PED ECHO LEFT VENTRICLE DIASTOLIC VOLUME (MOD BIPLANE) 2D: 234 ML
SL CV PED ECHO LEFT VENTRICLE SYSTOLIC VOLUME (MOD BIPLANE) 2D: 92 ML
SL CV REST NUCLEAR ISOTOPE DOSE: 14.92 MCI
SL CV STRESS NUCLEAR ISOTOPE DOSE: 48.9 MCI
SL CV STRESS RECOVERY BP: NORMAL MMHG
SL CV STRESS RECOVERY HR: 64 BPM
SL CV STRESS RECOVERY O2 SAT: 98 %
STRESS ANGINA INDEX: 0
STRESS BASELINE BP: NORMAL MMHG
STRESS BASELINE HR: 56 BPM
STRESS O2 SAT REST: 96 %
STRESS PEAK HR: 73 BPM
STRESS POST O2 SAT PEAK: 98 %
STRESS POST PEAK BP: 180 MMHG
STRESS ST DEPRESSION: 0 MM
STRESS/REST PERFUSION RATIO: 0.99
TR MAX PG: 37 MMHG
TR PEAK VELOCITY: 3 M/S
TRICUSPID VALVE PEAK REGURGITATION VELOCITY: 3.02 M/S

## 2023-01-25 RX ADMIN — REGADENOSON 0.4 MG: 0.08 INJECTION, SOLUTION INTRAVENOUS at 12:17

## 2023-01-25 RX ADMIN — PERFLUTREN 0.6 ML/MIN: 6.52 INJECTION, SUSPENSION INTRAVENOUS at 10:30

## 2023-01-26 LAB
CHEST PAIN STATEMENT: NORMAL
MAX DIASTOLIC BP: 96 MMHG
MAX HEART RATE: 73 BPM
MAX PREDICTED HEART RATE: 146 BPM
MAX. SYSTOLIC BP: 180 MMHG
PROTOCOL NAME: NORMAL
REASON FOR TERMINATION: NORMAL
TARGET HR FORMULA: NORMAL
TEST INDICATION: NORMAL
TIME IN EXERCISE PHASE: NORMAL

## 2023-03-22 ENCOUNTER — TELEPHONE (OUTPATIENT)
Dept: NEPHROLOGY | Facility: CLINIC | Age: 73
End: 2023-03-22

## 2023-03-22 NOTE — TELEPHONE ENCOUNTER
Called and spoke with [employer] to complete their bloodwork prior to their appointment on 03/28/23 with Dr Angel Alford at the [facility]

## 2023-03-24 ENCOUNTER — REMOTE DEVICE CLINIC VISIT (OUTPATIENT)
Dept: CARDIOLOGY CLINIC | Facility: CLINIC | Age: 73
End: 2023-03-24

## 2023-03-24 DIAGNOSIS — Z95.818 PRESENCE OF OTHER CARDIAC IMPLANTS AND GRAFTS: Primary | ICD-10-CM

## 2023-03-24 NOTE — PROGRESS NOTES
MDT LOOP   CARELINK TRANSMISSION: LOOP RECORDER  PRESENTING RHYTHM SB @ 57BPM  BATTERY STATUS "OK " NO PATIENT OR DEVICE ACTIVATED EPISODES  NORMAL DEVICE FUNCTION   DL

## 2023-03-26 ENCOUNTER — APPOINTMENT (OUTPATIENT)
Dept: RADIOLOGY | Age: 73
End: 2023-03-26

## 2023-03-26 ENCOUNTER — OFFICE VISIT (OUTPATIENT)
Dept: URGENT CARE | Age: 73
End: 2023-03-26

## 2023-03-26 VITALS
SYSTOLIC BLOOD PRESSURE: 144 MMHG | TEMPERATURE: 98.4 F | HEART RATE: 60 BPM | DIASTOLIC BLOOD PRESSURE: 87 MMHG | RESPIRATION RATE: 20 BRPM | OXYGEN SATURATION: 97 %

## 2023-03-26 DIAGNOSIS — R06.02 SHORTNESS OF BREATH: ICD-10-CM

## 2023-03-26 DIAGNOSIS — U07.1 COVID-19: ICD-10-CM

## 2023-03-26 DIAGNOSIS — R06.02 SHORTNESS OF BREATH: Primary | ICD-10-CM

## 2023-03-26 RX ORDER — DEXAMETHASONE 4 MG/1
4 TABLET ORAL 2 TIMES DAILY WITH MEALS
Qty: 10 TABLET | Refills: 0 | Status: SHIPPED | OUTPATIENT
Start: 2023-03-26

## 2023-03-26 RX ORDER — AZITHROMYCIN 250 MG/1
TABLET, FILM COATED ORAL
Qty: 6 TABLET | Refills: 0 | Status: SHIPPED | OUTPATIENT
Start: 2023-03-26 | End: 2023-03-30

## 2023-03-26 RX ORDER — FEXOFENADINE HCL 180 MG/1
180 TABLET ORAL DAILY
COMMUNITY

## 2023-03-26 NOTE — PROGRESS NOTES
"  [facility]        NAME: Emil Franks  is a 74 y o  male  : 1950    MRN: [id number]  DATE: 2023  TIME: 2:43 PM    Assessment and Plan   Shortness of breath [R06 02]  1  Shortness of breath  XR chest pa & lateral    dexamethasone (DECADRON) 4 mg tablet      2  COVID-19  azithromycin (ZITHROMAX) 250 mg tablet            Patient Instructions     paxlovid not prescribed as it will interact with his blood thinner  Take prescribed meds as ordered  If SOB worsens, go to the ED or call your PCP  Follow up with PCP in 3-5 days  Proceed to  ER if symptoms worsen  Chief Complaint     Chief Complaint   Patient presents with   • COVID-19     Patient has had nasal congestion, severe sinus pressure, and a sore throat since yesterday  He recently traveled to [employer] and states that he took a covid test this morning and it was positive         History of Present Illness       HPI   Reports he traveled to [facility]  Just came back a couple of days ago  Yesterday started having cold symptoms  Today tested himself for covid x 2, and both times were positive  Says he has had SOB a couple times and it lasted \"for just a little bit\"       Review of Systems   Review of Systems      Current Medications       Current Outpatient Medications:   •  allopurinol (ZYLOPRIM) 300 mg tablet, TAKE 1 TABLET BY MOUTH  DAILY, Disp: 90 tablet, Rfl: 0  •  allopurinol (ZYLOPRIM) 300 mg tablet, Take 1 tablet (300 mg total) by mouth daily, Disp: 90 tablet, Rfl: 3  •  amLODIPine (NORVASC) 10 mg tablet, Take 1 tablet (10 mg total) by mouth daily, Disp: 90 tablet, Rfl: 3  •  amoxicillin (AMOXIL) 500 mg capsule, Prior to dental visits, Disp: , Rfl:   •  azithromycin (ZITHROMAX) 250 mg tablet, Take 2 tablets today then 1 tablet daily x 4 days, Disp: 6 tablet, Rfl: 0  •  cloNIDine (CATAPRES) 0 2 mg tablet, Take 1 tablet (0 2 mg total) by mouth daily One hour prior to bed, Disp: 90 tablet, Rfl: 3  •  dexamethasone (DECADRON) " 4 mg tablet, Take 1 tablet (4 mg total) by mouth 2 (two) times a day with meals, Disp: 10 tablet, Rfl: 0  •  dofetilide (TIKOSYN) 250 mcg capsule, Take 1 capsule (250 mcg total) by mouth every 12 (twelve) hours, Disp: 180 capsule, Rfl: 3  •  famotidine (PEPCID) 40 MG tablet, Take 1 tablet (40 mg total) by mouth daily at bedtime, Disp: 90 tablet, Rfl: 3  •  fexofenadine (ALLEGRA) 180 MG tablet, Take 180 mg by mouth daily, Disp: , Rfl:   •  FLUoxetine (PROzac) 40 MG capsule, Take 1 capsule (40 mg total) by mouth daily, Disp: 180 capsule, Rfl: 3  •  furosemide (LASIX) 40 mg tablet, Take 1 tablet (40 mg total) by mouth daily, Disp: 90 tablet, Rfl: 3  •  nebivolol (BYSTOLIC) 10 mg tablet, Take 1 tablet (10 mg total) by mouth daily, Disp: 90 tablet, Rfl: 3  •  olmesartan (BENICAR) 40 mg tablet, TAKE 1 TABLET BY MOUTH  DAILY, Disp: 90 tablet, Rfl: 3  •  omeprazole (PriLOSEC) 40 MG capsule, Take 1 capsule (40 mg total) by mouth daily, Disp: 90 capsule, Rfl: 3  •  potassium chloride SA (Klor-Con M15) 15 MEQ tablet, Take 2 tablets (30 mEq total) by mouth daily, Disp: 180 tablet, Rfl: 3  •  pravastatin (PRAVACHOL) 10 mg tablet, Take 1 tablet (10 mg total) by mouth daily at bedtime, Disp: 90 tablet, Rfl: 3  •  rivaroxaban (Xarelto) 20 mg tablet, Take 1 tablet (20 mg total) by mouth daily with breakfast, Disp: 90 tablet, Rfl: 3  •  tetrahydrozoline-zinc (VISINE-AC) 0 05-0 25 % ophthalmic solution, QD OU (Patient not taking: Reported on 1/11/2023), Disp: , Rfl:     Current Allergies     Allergies as of 03/26/2023 - Reviewed 03/26/2023   Allergen Reaction Noted   • Metoprolol Shortness Of Breath 11/09/2018   • Benazepril Cough 04/07/2014   • Clonidine Fatigue 07/03/2014   • Diltiazem Bradycardia 07/20/2012   • Entex t  [pseudoephedrine-guaifenesin]  07/20/2012   • Tizanidine Other (See Comments) 05/26/2020            The following portions of the patient's history were reviewed and updated as appropriate: allergies, current medications, past family history, past medical history, past social history, past surgical history and problem list      Past Medical History:   Diagnosis Date   • Acute prostatitis 7/26/2021    [facility] and treated with Cipro x2 weeks 6/7/21  Questionable recurrence July 16, 2021   • Atrial fibrillation [facility])    • BPH (benign prostatic hyperplasia)    • Chronic diastolic (congestive) heart failure (HCC)    • Depression    • Diabetes mellitus ([facility] )    • Drug-induced insomnia ([facility] ) 02/03/2020   • Ear problems    • GERD (gastroesophageal reflux disease)    • Gout    • HL (hearing loss)    • Hyperlipidemia    • Hypertension    • Hyponatremia     last assessed: 09/08/2014   • Leukocytosis    • Liver function abnormality    • Morbid obesity ([place] [address] )    • Nasal congestion    • Obstructive sleep apnea    • Sleep apnea        Past Surgical History:   Procedure Laterality Date   • CARDIAC CATHETERIZATION      outcome: Neg   • CARDIAC ELECTROPHYSIOLOGY STUDY AND ABLATION     • CARDIAC LOOP RECORDER  2019   • CARDIOVASCULAR STRESS TEST      resolved: 10/27/2010; negative   • COLONOSCOPY  11/2013    polyp; complete   • COLONOSCOPY  05/26/2017   • HERNIA REPAIR      resolved: 11/1999   • REPLACEMENT TOTAL KNEE Left 2010   • REPLACEMENT TOTAL KNEE Right 2003   • THYROGLOSSAL DUCT EXCISION     • TONSILLECTOMY      last assessed: 06/02/2014   • UPPER GASTROINTESTINAL ENDOSCOPY         Family History   Problem Relation Age of Onset   • Diabetes Mother    • Heart attack Mother    • Hypertension Mother    • Heart attack Sister          Medications have been verified  Objective   /87   Pulse 60   Temp 98 4 °F (36 9 °C)   Resp 20   SpO2 97%   No LMP for male patient  Physical Exam     Physical Exam  Constitutional:       Appearance: He is not ill-appearing or diaphoretic     HENT:      Right Ear: Tympanic membrane normal       Left Ear: Tympanic membrane normal       Mouth/Throat:      Pharynx: No posterior oropharyngeal erythema  Cardiovascular:      Rate and Rhythm: Regular rhythm  Heart sounds: Normal heart sounds  Pulmonary:      Effort: Pulmonary effort is normal       Breath sounds: Normal breath sounds  No wheezing

## 2023-03-31 ENCOUNTER — RA CDI HCC (OUTPATIENT)
Dept: OTHER | Facility: HOSPITAL | Age: 73
End: 2023-03-31

## 2023-03-31 NOTE — PROGRESS NOTES
I11 0  [facility]  coding opportunities          Chart Reviewed number of suggestions sent to Provider: 1     Patients Insurance     Medicare Insurance: [employer]

## 2023-06-06 ENCOUNTER — REMOTE DEVICE CLINIC VISIT (OUTPATIENT)
Dept: CARDIOLOGY CLINIC | Facility: CLINIC | Age: 73
End: 2023-06-06
Payer: MEDICARE

## 2023-06-06 DIAGNOSIS — Z95.818 PRESENCE OF OTHER CARDIAC IMPLANTS AND GRAFTS: Primary | ICD-10-CM

## 2023-06-06 PROCEDURE — 93298 REM INTERROG DEV EVAL SCRMS: CPT | Performed by: INTERNAL MEDICINE

## 2023-06-06 PROCEDURE — G2066 INTER DEVC REMOTE 30D: HCPCS | Performed by: INTERNAL MEDICINE

## 2023-06-06 NOTE — PROGRESS NOTES
"MDT LOOP   CARELINK TRANSMISSION: LOOP RECORDER  PRESENTING RHYTHM VS @ 68 BPM  BATTERY STATUS \"OK  \" 2 DEVICE CLASSIFIED AF EPISODES, LONGEST EPISODE IS 11:08 HOURS, AVAILABLE STRIPS DEMONSTRATE PACs, PVCs AND ATRIAL FIBRILLATION  AF BURDEN IS 0 7%  AF BURDEN MAYBE OVERESTIMATED DUE TO INAPPROPRIATE CLASSIFICATION OF AF  SOME STRIPS MAY NOT BE INTERPRETABLE OR AVAILABLE, CANNOT DEFINITIVELY RULE OUT ATRIAL FIBRILLATION  HX OF PAF  PT TAKES TIKOSYN AND [place]  NO PATIENT ACTIVATED EPISODES  NORMAL DEVICE FUNCTION   DL   "

## 2023-06-10 DIAGNOSIS — E78.00 HYPERCHOLESTEROLEMIA: ICD-10-CM

## 2023-06-12 RX ORDER — PRAVASTATIN SODIUM 10 MG
10 TABLET ORAL
Qty: 90 TABLET | Refills: 3 | Status: SHIPPED | OUTPATIENT
Start: 2023-06-12

## 2023-06-28 DIAGNOSIS — I48.0 PAROXYSMAL ATRIAL FIBRILLATION (HCC): ICD-10-CM

## 2023-06-28 RX ORDER — DOFETILIDE 0.25 MG/1
CAPSULE ORAL
Qty: 180 CAPSULE | Refills: 3 | Status: SHIPPED | OUTPATIENT
Start: 2023-06-28

## 2023-08-04 DIAGNOSIS — M10.9 GOUT, UNSPECIFIED CAUSE, UNSPECIFIED CHRONICITY, UNSPECIFIED SITE: ICD-10-CM

## 2023-08-04 RX ORDER — ALLOPURINOL 300 MG/1
TABLET ORAL
Qty: 90 TABLET | Refills: 3 | Status: SHIPPED | OUTPATIENT
Start: 2023-08-04

## 2023-08-10 ENCOUNTER — OFFICE VISIT (OUTPATIENT)
Dept: OBGYN CLINIC | Facility: CLINIC | Age: 73
End: 2023-08-10

## 2023-08-10 VITALS
WEIGHT: 309 LBS | DIASTOLIC BLOOD PRESSURE: 78 MMHG | BODY MASS INDEX: 46.83 KG/M2 | HEIGHT: 68 IN | SYSTOLIC BLOOD PRESSURE: 145 MMHG

## 2023-08-10 DIAGNOSIS — G56.03 CARPAL TUNNEL SYNDROME, BILATERAL: Primary | ICD-10-CM

## 2023-08-10 NOTE — PROGRESS NOTES
Patient Name:  Bethany Pimentel. MRN:  [id number]    Assessment & Plan     Bilateral hand numbness and tingling likely carpal tunnel syndrome,   1. Universal wrist braces provided today to be worn at night. 2. Ultrasound bilateral wrists to evaluate the carpal tunnels. 3. Follow-up after ultrasounds with our hand specialist, Dr. Burke Jeff. Chief Complaint     Bilateral hand numbness and tingling    History of the Present Illness     Bethany Kline is a 74 y.o. right-hand-dominant male who reports to the office today for evaluation of his bilateral hands. He notes a chronic history of numbness and tingling in the bilateral hands which has been ongoing for many years. He denies any injury or trauma. He notes numbness and tingling primarily in the thumb index and long fingers. Numbness and tingling is constant and worse at night while trying to sleep. He also notes increased numbness and tingling with repetitive use as well as talking on the phone and driving. He has difficulty performing fine dexterity tasks such as using a zipper and buttons. He also has trouble opening jars and has been dropping objects as well. He denies any pain. He denies any neck pain. He has not had any treatment for this. Physical Exam     /78   Ht 5' 8" (1.727 m)   Wt (!) 140 kg (309 lb)   BMI 46.98 kg/m²     Bilateral hands: Skin intact. No gross deformity. No erythema ecchymosis or swelling. No evidence of thenar atrophy. No tenderness about the wrist and carpus. Full wrist range of motion without pain. Full composite fist formation. Positive Tinel's and Durkan's compression test about the carpal tunnels bilaterally. Sensation intact but diminished in the median nerve distribution bilaterally. Sensation intact in the ulnar and radial nerve distributions bilaterally. 5 out of 5 APB strength bilaterally. 2+ radial pulse. Eyes: Anicteric sclerae. ENT: Trachea midline.   Lungs: Normal respiratory effort. CV: Capillary refill is less than 2 seconds. Skin: Intact without erythema. Lymph: No palpable lymphadenopathy. Neuro: Sensation is grossly intact to light touch. Psych: Mood and affect are appropriate. Past Medical History:   Diagnosis Date   • Acute prostatitis 7/26/2021    Diagnosed in [facility] and treated with Cipro x2 weeks 6/7/21.  Questionable recurrence July 16, 2021   • Atrial fibrillation [facility])    • BPH (benign prostatic hyperplasia)    • Chronic diastolic (congestive) heart failure (HCC)    • Depression    • Diabetes mellitus ([address])    • Drug-induced insomnia ([address]) 02/03/2020   • Ear problems    • GERD (gastroesophageal reflux disease)    • Gout    • HL (hearing loss)    • Hyperlipidemia    • Hypertension    • Hyponatremia     last assessed: 09/08/2014   • Leukocytosis    • Liver function abnormality    • Morbid obesity ([address])    • Nasal congestion    • Obstructive sleep apnea    • Sleep apnea        Past Surgical History:   Procedure Laterality Date   • CARDIAC CATHETERIZATION      outcome: Neg   • CARDIAC ELECTROPHYSIOLOGY STUDY AND ABLATION     • CARDIAC LOOP RECORDER  2019   • CARDIOVASCULAR STRESS TEST      resolved: 10/27/2010; negative   • COLONOSCOPY  11/2013    polyp; complete   • COLONOSCOPY  05/26/2017   • HERNIA REPAIR      resolved: 11/1999   • REPLACEMENT TOTAL KNEE Left 2010   • REPLACEMENT TOTAL KNEE Right 2003   • THYROGLOSSAL DUCT EXCISION     • TONSILLECTOMY      last assessed: 06/02/2014   • UPPER GASTROINTESTINAL ENDOSCOPY         Allergies   Allergen Reactions   • Metoprolol Shortness Of Breath     Tolerates Bystolic   • Benazepril Cough   • Clonidine Fatigue   • Diltiazem Bradycardia   • Entex T  [Pseudoephedrine-Guaifenesin]      [facility] - [id number]: LEG SWELLING   • Tizanidine Other (See Comments)       Current Outpatient Medications on File Prior to Visit   Medication Sig Dispense Refill   • allopurinol (ZYLOPRIM) 300 mg tablet TAKE 1 TABLET BY MOUTH  DAILY 90 tablet 0   • allopurinol (ZYLOPRIM) 300 mg tablet Take 1 tablet (300 mg total) by mouth daily 90 tablet 3   • allopurinol (ZYLOPRIM) 300 mg tablet TAKE 1 TABLET BY MOUTH DAILY 90 tablet 3   • amLODIPine (NORVASC) 10 mg tablet Take 1 tablet (10 mg total) by mouth daily 90 tablet 3   • amoxicillin (AMOXIL) 500 mg capsule Prior to dental visits     • cloNIDine (CATAPRES) 0.2 mg tablet Take 1 tablet (0.2 mg total) by mouth daily One hour prior to bed 90 tablet 3   • dexamethasone (DECADRON) 4 mg tablet Take 1 tablet (4 mg total) by mouth 2 (two) times a day with meals 10 tablet 0   • dofetilide (TIKOSYN) 250 mcg capsule TAKE 1 CAPSULE BY MOUTH EVERY TWELVE (12) HOURS 180 capsule 3   • famotidine (PEPCID) 40 MG tablet TAKE 1 TABLET BY MOUTH  DAILY AT BEDTIME 90 tablet 3   • fexofenadine (ALLEGRA) 180 MG tablet Take 180 mg by mouth daily     • FLUoxetine (PROzac) 40 MG capsule TAKE 2 CAPSULES BY MOUTH  DAILY 180 capsule 1   • furosemide (LASIX) 40 mg tablet Take 1 tablet (40 mg total) by mouth daily 90 tablet 3   • nebivolol (BYSTOLIC) 10 mg tablet Take 1 tablet (10 mg total) by mouth daily 90 tablet 3   • olmesartan (BENICAR) 40 mg tablet TAKE 1 TABLET BY MOUTH  DAILY 90 tablet 3   • omeprazole (PriLOSEC) 40 MG capsule TAKE 1 CAPSULE BY MOUTH  DAILY 90 capsule 3   • potassium chloride SA (Klor-Con M15) 15 MEQ tablet Take 2 tablets (30 mEq total) by mouth daily 180 tablet 3   • pravastatin (PRAVACHOL) 10 mg tablet Take 1 tablet (10 mg total) by mouth daily at bedtime 90 tablet 3   • rivaroxaban (Xarelto) 20 mg tablet Take 1 tablet (20 mg total) by mouth daily with breakfast 90 tablet 3   • tetrahydrozoline-zinc (VISINE-AC) 0.05-0.25 % ophthalmic solution QD OU (Patient not taking: Reported on 1/11/2023)     • [DISCONTINUED] losartan (COZAAR) 100 MG tablet TAKE 1 TABLET BY MOUTH  DAILY 90 tablet 3     No current facility-administered medications on file prior to visit.        Social History     Tobacco Use • Smoking status: Never   • Smokeless tobacco: Never   Vaping Use   • Vaping Use: Never used   Substance Use Topics   • Alcohol use: Yes     Alcohol/week: 3.0 - 4.0 standard drinks of alcohol     Types: 3 - 4 Glasses of wine per week   • Drug use: Never       Family History   Problem Relation Age of Onset   • Diabetes Mother    • Heart attack Mother    • Hypertension Mother    • Heart attack Sister        Review of Systems     As stated in the HPI. All other systems reviewed and are negative.

## 2023-08-11 ENCOUNTER — TELEPHONE (OUTPATIENT)
Age: 73
End: 2023-08-11

## 2023-08-11 NOTE — TELEPHONE ENCOUNTER
Caller: Patient    Doctor: Sergo Martin    Reason for call: Patient stated his braces are too tight. How could he exchange them? In addition, patient stated he was told an order would be placed for lab work. When will that be done?     Call back#: [phone]

## 2023-08-11 NOTE — TELEPHONE ENCOUNTER
I sent a message to the patient's PCP about the need for new lab work orders. I asked that his PCP's office contact the patient once the orders are in. As far as his braces, is there a fitter at [facility] today? If so, is it possible for him to report to the [facility] office for evaluation of the braces?     Thanks

## 2023-08-14 DIAGNOSIS — E11.51 TYPE 2 DIABETES MELLITUS WITH DIABETIC PERIPHERAL ANGIOPATHY WITHOUT GANGRENE, WITHOUT LONG-TERM CURRENT USE OF INSULIN (HCC): ICD-10-CM

## 2023-08-14 DIAGNOSIS — I48.0 PAROXYSMAL ATRIAL FIBRILLATION (HCC): ICD-10-CM

## 2023-08-14 DIAGNOSIS — Z12.5 PROSTATE CANCER SCREENING: Primary | ICD-10-CM

## 2023-08-14 DIAGNOSIS — K21.9 GASTROESOPHAGEAL REFLUX DISEASE WITHOUT ESOPHAGITIS: ICD-10-CM

## 2023-08-14 DIAGNOSIS — E87.1 HYPONATREMIA: ICD-10-CM

## 2023-08-14 DIAGNOSIS — K76.0 FATTY LIVER: ICD-10-CM

## 2023-08-14 NOTE — PROGRESS NOTES
Labs placed for upcoming visit  Problem List Items Addressed This Visit        Digestive    Gastroesophageal reflux disease without esophagitis    Fatty liver    Relevant Orders    CBC and differential    Comprehensive metabolic panel    Uric acid       Endocrine    Type 2 diabetes mellitus with diabetic peripheral angiopathy without gangrene (HCC)    Relevant Orders    Comprehensive metabolic panel    Hemoglobin A1C    Lipid Panel with Direct LDL reflex       Cardiovascular and Mediastinum    A-fib (HCC)    Relevant Orders    CBC and differential    TSH, 3rd generation with Free T4 reflex       Other    Hyponatremia   Other Visit Diagnoses     Prostate cancer screening    -  Primary    Relevant Orders    PSA, Total Screen

## 2023-08-15 ENCOUNTER — TELEPHONE (OUTPATIENT)
Dept: FAMILY MEDICINE CLINIC | Facility: CLINIC | Age: 73
End: 2023-08-15

## 2023-08-15 NOTE — TELEPHONE ENCOUNTER
----- Message from Rigo Maldonado MD sent at 8/14/2023  1:09 PM EDT -----  Will do. Staff, please notify patient that labs were placed. He also does not appear to have a follow-up visit which we should make. Thank you, Kaci Jordan  ----- Message -----  From: Florinda Christopher PA-C  Sent: 8/11/2023  10:50 AM EDT  To: Rigo Maldonado MD    Good morning,    I evaluated this patient yesterday and he brought up to me about needing new routine lab work ordered. Are you able to order the lab work and have your office reach out to him once it is ordered? Thanks!     Kavitha Esquivel PA-C

## 2023-08-15 NOTE — TELEPHONE ENCOUNTER
Patient missed AWV this April. Scheduled AWV for earliest spot: 11/27/23 with PCP. Patient will go to [phone]  [facility] for active orders.

## 2023-08-31 ENCOUNTER — APPOINTMENT (OUTPATIENT)
Dept: LAB | Age: 73
End: 2023-08-31
Payer: MEDICARE

## 2023-08-31 DIAGNOSIS — D72.829 LEUKOCYTOSIS, UNSPECIFIED TYPE: Primary | ICD-10-CM

## 2023-08-31 DIAGNOSIS — E11.51 TYPE 2 DIABETES MELLITUS WITH DIABETIC PERIPHERAL ANGIOPATHY WITHOUT GANGRENE, WITHOUT LONG-TERM CURRENT USE OF INSULIN (HCC): ICD-10-CM

## 2023-08-31 DIAGNOSIS — I48.0 PAROXYSMAL ATRIAL FIBRILLATION (HCC): ICD-10-CM

## 2023-08-31 DIAGNOSIS — E87.1 HYPONATREMIA: ICD-10-CM

## 2023-08-31 DIAGNOSIS — K76.0 FATTY LIVER: ICD-10-CM

## 2023-08-31 DIAGNOSIS — Z12.5 PROSTATE CANCER SCREENING: ICD-10-CM

## 2023-08-31 LAB
ALBUMIN SERPL BCP-MCNC: 4.6 G/DL (ref 3.5–5)
ALP SERPL-CCNC: 55 U/L (ref 34–104)
ALT SERPL W P-5'-P-CCNC: 30 U/L (ref 7–52)
ANION GAP SERPL CALCULATED.3IONS-SCNC: 10 MMOL/L
AST SERPL W P-5'-P-CCNC: 27 U/L (ref 13–39)
BILIRUB SERPL-MCNC: 0.93 MG/DL (ref 0.2–1)
BUN SERPL-MCNC: 15 MG/DL (ref 5–25)
CALCIUM SERPL-MCNC: 10.1 MG/DL (ref 8.4–10.2)
CHLORIDE SERPL-SCNC: 99 MMOL/L (ref 96–108)
CHOLEST SERPL-MCNC: 139 MG/DL
CO2 SERPL-SCNC: 26 MMOL/L (ref 21–32)
CREAT SERPL-MCNC: 1.03 MG/DL (ref 0.6–1.3)
ERYTHROCYTE [DISTWIDTH] IN BLOOD BY AUTOMATED COUNT: 11.6 % (ref 11.6–15.1)
EST. AVERAGE GLUCOSE BLD GHB EST-MCNC: 134 MG/DL
GFR SERPL CREATININE-BSD FRML MDRD: 70 ML/MIN/1.73SQ M
GLUCOSE P FAST SERPL-MCNC: 119 MG/DL (ref 65–99)
HBA1C MFR BLD: 6.3 %
HCT VFR BLD AUTO: 44 % (ref 36.5–49.3)
HDLC SERPL-MCNC: 44 MG/DL
HGB BLD-MCNC: 14.9 G/DL (ref 12–17)
LDLC SERPL CALC-MCNC: 80 MG/DL (ref 0–100)
MCH RBC QN AUTO: 30.7 PG (ref 26.8–34.3)
MCHC RBC AUTO-ENTMCNC: 33.9 G/DL (ref 31.4–37.4)
MCV RBC AUTO: 91 FL (ref 82–98)
PLATELET # BLD AUTO: 223 THOUSANDS/UL (ref 149–390)
PMV BLD AUTO: 11.1 FL (ref 8.9–12.7)
POTASSIUM SERPL-SCNC: 4.7 MMOL/L (ref 3.5–5.3)
PROT SERPL-MCNC: 7.6 G/DL (ref 6.4–8.4)
PSA SERPL-MCNC: 0.13 NG/ML (ref 0–4)
RBC # BLD AUTO: 4.85 MILLION/UL (ref 3.88–5.62)
SODIUM SERPL-SCNC: 135 MMOL/L (ref 135–147)
TRIGL SERPL-MCNC: 75 MG/DL
TSH SERPL DL<=0.05 MIU/L-ACNC: 1.57 UIU/ML (ref 0.45–4.5)
URATE SERPL-MCNC: 4.6 MG/DL (ref 3.5–8.5)
WBC # BLD AUTO: 9.53 THOUSAND/UL (ref 4.31–10.16)

## 2023-08-31 PROCEDURE — 88184 FLOWCYTOMETRY/ TC 1 MARKER: CPT

## 2023-08-31 PROCEDURE — 88374 M/PHMTRC ALYS ISHQUANT/SEMIQ: CPT

## 2023-08-31 PROCEDURE — 80061 LIPID PANEL: CPT

## 2023-08-31 PROCEDURE — 84443 ASSAY THYROID STIM HORMONE: CPT

## 2023-08-31 PROCEDURE — 84550 ASSAY OF BLOOD/URIC ACID: CPT

## 2023-08-31 PROCEDURE — 36415 COLL VENOUS BLD VENIPUNCTURE: CPT

## 2023-08-31 PROCEDURE — 83036 HEMOGLOBIN GLYCOSYLATED A1C: CPT

## 2023-08-31 PROCEDURE — 85007 BL SMEAR W/DIFF WBC COUNT: CPT

## 2023-08-31 PROCEDURE — 88185 FLOWCYTOMETRY/TC ADD-ON: CPT

## 2023-08-31 PROCEDURE — 85027 COMPLETE CBC AUTOMATED: CPT

## 2023-08-31 PROCEDURE — G0103 PSA SCREENING: HCPCS

## 2023-08-31 PROCEDURE — 80053 COMPREHEN METABOLIC PANEL: CPT

## 2023-09-01 ENCOUNTER — HOSPITAL ENCOUNTER (OUTPATIENT)
Dept: RADIOLOGY | Facility: HOSPITAL | Age: 73
Discharge: HOME | End: 2023-09-01
Payer: MEDICARE

## 2023-09-01 DIAGNOSIS — G56.03 CARPAL TUNNEL SYNDROME, BILATERAL: ICD-10-CM

## 2023-09-01 PROCEDURE — 76882 US LMTD JT/FCL EVL NVASC XTR: CPT

## 2023-09-02 LAB — SCAN RESULT: NORMAL

## 2023-09-05 LAB
BASOPHILS NFR MAR MANUAL: 1 % (ref 0–1)
EOSINOPHIL NFR BLD MANUAL: 2 % (ref 0–6)
ERYTHROCYTE [DISTWIDTH] IN BLOOD BY AUTOMATED COUNT: 11.6 % (ref 11.6–15.1)
HCT VFR BLD AUTO: 44 % (ref 36.5–49.3)
HGB BLD-MCNC: 14.9 G/DL (ref 12–17)
LG PLATELETS BLD QL SMEAR: PRESENT
LYMPHOCYTES # BLD AUTO: 19 % (ref 14–44)
MCH RBC QN AUTO: 30.7 PG (ref 26.8–34.3)
MCHC RBC AUTO-ENTMCNC: 33.9 G/DL (ref 31.4–37.4)
MCV RBC AUTO: 91 FL (ref 82–98)
MONOCYTES NFR BLD: 9 % (ref 4–12)
NEUTS SEG NFR BLD AUTO: 34 % (ref 43–75)
PATHOLOGY REVIEW: YES
PLATELET # BLD AUTO: 223 THOUSANDS/UL (ref 149–390)
PLATELET BLD QL SMEAR: ADEQUATE
PMV BLD AUTO: 11.1 FL (ref 8.9–12.7)
RBC # BLD AUTO: 4.85 MILLION/UL (ref 3.88–5.62)
TOTAL CELLS COUNTED SPEC: 100
VARIANT LYMPHS # BLD AUTO: 35 %
WBC # BLD AUTO: 9.53 THOUSAND/UL (ref 4.31–10.16)

## 2023-09-11 ENCOUNTER — TELEPHONE (OUTPATIENT)
Dept: HEMATOLOGY ONCOLOGY | Facility: CLINIC | Age: 73
End: 2023-09-11

## 2023-09-11 NOTE — TELEPHONE ENCOUNTER
I called Reynold Chamberlain in response to a referral that was received for patient to establish care with Hematology. Outreach was made to schedule a consultation. I left a voicemail explaining the reason for my call and advised patient to call [facility] at [phone]. Another attempt will be made to contact patient.

## 2023-09-12 ENCOUNTER — TELEPHONE (OUTPATIENT)
Dept: HEMATOLOGY ONCOLOGY | Facility: CLINIC | Age: 73
End: 2023-09-12

## 2023-09-12 ENCOUNTER — REMOTE DEVICE CLINIC VISIT (OUTPATIENT)
Dept: CARDIOLOGY CLINIC | Facility: CLINIC | Age: 73
End: 2023-09-12
Payer: MEDICARE

## 2023-09-12 DIAGNOSIS — Z95.818 PRESENCE OF OTHER CARDIAC IMPLANTS AND GRAFTS: Primary | ICD-10-CM

## 2023-09-12 LAB
MISCELLANEOUS LAB TEST RESULT: NORMAL
MISCELLANEOUS LAB TEST RESULT: NORMAL

## 2023-09-12 PROCEDURE — 93298 REM INTERROG DEV EVAL SCRMS: CPT | Performed by: INTERNAL MEDICINE

## 2023-09-12 PROCEDURE — G2066 INTER DEVC REMOTE 30D: HCPCS | Performed by: INTERNAL MEDICINE

## 2023-09-12 NOTE — TELEPHONE ENCOUNTER
I called Alejandra Ann in response to a referral that was received for patient to establish care with Hematology. Outreach was made to schedule a consultation. A consultation was scheduled for patient during this call.  Patient is scheduled on 11/01/23 at 1PM with Rosaura Barillas at the [address] [place] [address]

## 2023-09-12 NOTE — PROGRESS NOTES
MDT LOOP   CARELINK TRANSMISSION: LOOP RECORDER. PRESENTING RHYTHM SB @ 54 BPM. BATTERY STATUS "OK." 2 DEVICE CLASSIFIED AF EPISODES, AVAILABLE STRIPS DEMONSTRATE NO ATRIAL FIBRILLATION. INSTEAD EGM'S SHOW SB W/ PACS AND OVERSENSING. AF BURDEN IS 0%. AF BURDEN MAYBE OVERESTIMATED DUE TO INAPPROPRIATE CLASSIFICATION OF AF. SOME STRIPS MAY NOT BE INTERPRETABLE OR AVAILABLE, CANNOT DEFINITIVELY RULE OUT ATRIAL FIBRILLATION. NO PATIENT ACTIVATED EPISODES. NORMAL DEVICE FUNCTION.  DL

## 2023-09-25 LAB
LEFT EYE DIABETIC RETINOPATHY: NORMAL
RIGHT EYE DIABETIC RETINOPATHY: NORMAL

## 2023-09-28 ENCOUNTER — TELEPHONE (OUTPATIENT)
Dept: OBGYN CLINIC | Facility: HOSPITAL | Age: 73
End: 2023-09-28

## 2023-09-28 ENCOUNTER — OFFICE VISIT (OUTPATIENT)
Dept: OBGYN CLINIC | Facility: CLINIC | Age: 73
End: 2023-09-28
Payer: MEDICARE

## 2023-09-28 VITALS
HEIGHT: 68 IN | BODY MASS INDEX: 46.83 KG/M2 | DIASTOLIC BLOOD PRESSURE: 81 MMHG | SYSTOLIC BLOOD PRESSURE: 152 MMHG | WEIGHT: 309 LBS

## 2023-09-28 DIAGNOSIS — G56.23 CUBITAL TUNNEL SYNDROME OF BOTH UPPER EXTREMITIES: Primary | ICD-10-CM

## 2023-09-28 DIAGNOSIS — G56.03 CARPAL TUNNEL SYNDROME, BILATERAL: ICD-10-CM

## 2023-09-28 PROCEDURE — 99213 OFFICE O/P EST LOW 20 MIN: CPT | Performed by: SURGERY

## 2023-09-28 NOTE — TELEPHONE ENCOUNTER
Caller: Laney Carter    Doctor: Ashley Wan / [facility]    Reason for call: Patient returning call regarding moving his appointment from   November 20   [place] 17 [place] [facility]    Please call patient regarding this.       Call back#: [phone]

## 2023-09-28 NOTE — PROGRESS NOTES
Assessment:    Bilateral carpal tunnel syndrome       Plan:    Patient is a candidate for carpal tunnel release procedures. Prior to this, we will obtain ultrasound MSK to evaluate for bilateral cubital tunnel syndrome. Follow-up after ultrasounds for review and likely surgical scheduling. Subjective:     HPI    Patient ID:  Sailaja Harkins. is a right hand dominant 74 y.o. male presenting for evaluation of the bilateral hands. He has been referred by immediate care for chronic history of numbness and tingling in both hands for many years that came on without injury or trauma. Symptoms are worse at night and are associated with overall hand weakness, trouble gripping fine objects. He has had no treatment for this until recently wrist braces were provided. He was also sent for ultrasound studies to check for carpal tunnel syndrome. Today, he states the braces have helped but symptoms of numbness and tingling, weakness, and sharp pains are still present. He has no history of surgery to either wrist/hand. The following portions of the patient's history were reviewed and updated as appropriate: allergies, current medications, past family history, past medical history, past social history, past surgical history, and problem list.    Review of Systems     Objective:    Imaging:  US MSK 9/1/2023    IMPRESSION:     RIGHT SIDE: Carpal tunnel syndrome ruled in based on marked abnormal CSA > 14 sq mm.     LEFT SIDE: Carpal tunnel syndrome ruled in based on marked abnormal CSA > 14 sq mm. Physical Exam     Vitals:    09/28/23 1016   BP: 152/81       General appearance:  NAD   Cardiac:  Regular rate  Lungs:  Unlabored breathing  Abdomen:  Non-distended    Orthopedic Examination:  Bilateral hands     Inspection: No open wounds or erythema. No ecchymosis or swelling. No thenar, hypothenar or dorsal interossei wasting.     Palpation: Nontender to palpation first dorsal extensor compartment bilaterally    Range-of-motion: Normal elbow wrist range of motion, full composite fist    Strength: 5/5 wrist flexion extension, , intrinsics, 4/5 thumb opposition, APB    Sensation:  Altered sensation in the median ulnar nerve distribution bilaterally  Abnormal two-point discrimination testing all fingers of bilateral hands, > 15mm    Special Tests:  + Tinel's at the wrist and medial elbows bilaterally. + Durkan's compression test bilaterally  Palpable radial pulse bilaterally  Bilateral upper extremities are warm and well-perfused.

## 2023-10-25 ENCOUNTER — OFFICE VISIT (OUTPATIENT)
Dept: OBGYN CLINIC | Facility: CLINIC | Age: 73
End: 2023-10-25
Payer: MEDICARE

## 2023-10-25 VITALS
SYSTOLIC BLOOD PRESSURE: 140 MMHG | WEIGHT: 309 LBS | DIASTOLIC BLOOD PRESSURE: 64 MMHG | BODY MASS INDEX: 46.83 KG/M2 | HEIGHT: 68 IN

## 2023-10-25 DIAGNOSIS — G56.03 CARPAL TUNNEL SYNDROME, BILATERAL: Primary | ICD-10-CM

## 2023-10-25 PROCEDURE — 99213 OFFICE O/P EST LOW 20 MIN: CPT | Performed by: SURGERY

## 2023-10-25 NOTE — H&P (VIEW-ONLY)
Assessment:  Bilateral carpal tunnel syndrome     Plan:    Patient is a candidate for carpal tunnel release procedures. Patient states his right is worse then left. I discussed treatment options with the patient which included non-operative and operative treatments. We discussed risks, benefits, alternatives, and expectations for each treatment. Surgical protocol was discussed with patient at today's visit and all of his questions and concerns were addressed. Inform consent was signed by patient for right carpal tunnel release and patient met with surgery scheduler today to confirm surgical date. Subjective:     HPI    Patient ID:  Warden Asencio. is a right hand dominant 74 y.o. male presenting for evaluation of the bilateral hands and EMG review. Patient states his symptoms are worse at night and are associated with overall hand weakness, trouble gripping fine objects. He was sent for EMG to examine cubital tunnel. Today, he states the braces have helped but symptoms of numbness and tingling, weakness, and sharp pains are still present. He has no history of surgery to either wrist/hand. The following portions of the patient's history were reviewed and updated as appropriate: allergies, current medications, past family history, past medical history, past social history, past surgical history, and problem list.    Review of Systems   Constitutional:  Negative for chills, fever and unexpected weight change. HENT:  Negative for hearing loss, nosebleeds and sore throat. Eyes:  Negative for pain, redness and visual disturbance. Respiratory:  Negative for cough, shortness of breath and wheezing. Cardiovascular:  Negative for chest pain, palpitations and leg swelling. Gastrointestinal:  Negative for abdominal pain, nausea and vomiting. Endocrine: Negative for polyphagia and polyuria. Genitourinary:  Negative for dysuria and hematuria.    Musculoskeletal:         See HPI   Skin: Negative for rash and wound. Neurological:  Negative for dizziness, numbness and headaches. Psychiatric/Behavioral:  Negative for decreased concentration and suicidal ideas. The patient is not nervous/anxious. Objective:  Imaging:  US MSK 9/1/2023  IMPRESSION:  RIGHT SIDE: Carpal tunnel syndrome ruled in based on marked abnormal CSA > 14 sq mm. LEFT SIDE: Carpal tunnel syndrome ruled in based on marked abnormal CSA > 14 sq mm. 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 No evidence of mononeuropathy across the elbows. Physical Exam     Vitals:    10/25/23 1034   BP: 140/64       General appearance:  NAD   Cardiac:  Regular rate  Lungs:  Unlabored breathing  Abdomen:  Non-distended    Orthopedic Examination:  Bilateral hands   Inspection: No open wounds or erythema. No ecchymosis or swelling. No thenar, hypothenar or dorsal interossei wasting. Palpation: Nontender to palpation first dorsal extensor compartment bilaterally    Range-of-motion: Normal elbow wrist range of motion, full composite fist    Strength: 5/5 wrist flexion extension, , intrinsics, 4/5 thumb opposition, APB    Sensation:  Altered sensation in the median ulnar nerve distribution bilaterally  Abnormal two-point discrimination testing all fingers of bilateral hands, > 15mm    Special Tests:  + Tinel's at the wrist and medial elbows bilaterally. + Durkan's compression test bilaterally  Palpable radial pulse bilaterally  Bilateral upper extremities are warm and well-perfused.     Scribe Attestation      I,:  [facility] am acting as a scribe while in the presence of the attending physician.:       I,:  Baron Osman MD personally performed the services described in this documentation    as scribed in my presence.:

## 2023-10-25 NOTE — PROGRESS NOTES
Assessment:  Bilateral carpal tunnel syndrome     Plan:    Patient is a candidate for carpal tunnel release procedures. Patient states his right is worse then left. I discussed treatment options with the patient which included non-operative and operative treatments. We discussed risks, benefits, alternatives, and expectations for each treatment. Surgical protocol was discussed with patient at today's visit and all of his questions and concerns were addressed. Inform consent was signed by patient for right carpal tunnel release and patient met with surgery scheduler today to confirm surgical date. Subjective:     HPI    Patient ID:  Aminah Villatoro. is a right hand dominant 74 y.o. male presenting for evaluation of the bilateral hands and EMG review. Patient states his symptoms are worse at night and are associated with overall hand weakness, trouble gripping fine objects. He was sent for EMG to examine cubital tunnel. Today, he states the braces have helped but symptoms of numbness and tingling, weakness, and sharp pains are still present. He has no history of surgery to either wrist/hand. The following portions of the patient's history were reviewed and updated as appropriate: allergies, current medications, past family history, past medical history, past social history, past surgical history, and problem list.    Review of Systems   Constitutional:  Negative for chills, fever and unexpected weight change. HENT:  Negative for hearing loss, nosebleeds and sore throat. Eyes:  Negative for pain, redness and visual disturbance. Respiratory:  Negative for cough, shortness of breath and wheezing. Cardiovascular:  Negative for chest pain, palpitations and leg swelling. Gastrointestinal:  Negative for abdominal pain, nausea and vomiting. Endocrine: Negative for polyphagia and polyuria. Genitourinary:  Negative for dysuria and hematuria.    Musculoskeletal:         See HPI   Skin: Negative for rash and wound. Neurological:  Negative for dizziness, numbness and headaches. Psychiatric/Behavioral:  Negative for decreased concentration and suicidal ideas. The patient is not nervous/anxious. Objective:  Imaging:  US MSK 9/1/2023  IMPRESSION:  RIGHT SIDE: Carpal tunnel syndrome ruled in based on marked abnormal CSA > 14 sq mm. LEFT SIDE: Carpal tunnel syndrome ruled in based on marked abnormal CSA > 14 sq mm. 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 No evidence of mononeuropathy across the elbows. Physical Exam     Vitals:    10/25/23 1034   BP: 140/64       General appearance:  NAD   Cardiac:  Regular rate  Lungs:  Unlabored breathing  Abdomen:  Non-distended    Orthopedic Examination:  Bilateral hands   Inspection: No open wounds or erythema. No ecchymosis or swelling. No thenar, hypothenar or dorsal interossei wasting. Palpation: Nontender to palpation first dorsal extensor compartment bilaterally    Range-of-motion: Normal elbow wrist range of motion, full composite fist    Strength: 5/5 wrist flexion extension, , intrinsics, 4/5 thumb opposition, APB    Sensation:  Altered sensation in the median ulnar nerve distribution bilaterally  Abnormal two-point discrimination testing all fingers of bilateral hands, > 15mm    Special Tests:  + Tinel's at the wrist and medial elbows bilaterally. + Durkan's compression test bilaterally  Palpable radial pulse bilaterally  Bilateral upper extremities are warm and well-perfused.     Scribe Attestation      I,:  [facility] am acting as a scribe while in the presence of the attending physician.:       I,:  Imelda Forde MD personally performed the services described in this documentation    as scribed in my presence.:

## 2023-10-30 ENCOUNTER — OFFICE VISIT (OUTPATIENT)
Dept: CARDIOLOGY CLINIC | Facility: CLINIC | Age: 73
End: 2023-10-30
Payer: MEDICARE

## 2023-10-30 VITALS
OXYGEN SATURATION: 96 % | HEART RATE: 58 BPM | SYSTOLIC BLOOD PRESSURE: 158 MMHG | HEIGHT: 69 IN | BODY MASS INDEX: 45.9 KG/M2 | DIASTOLIC BLOOD PRESSURE: 78 MMHG | WEIGHT: 309.9 LBS

## 2023-10-30 DIAGNOSIS — I10 ESSENTIAL HYPERTENSION: ICD-10-CM

## 2023-10-30 DIAGNOSIS — G47.33 OSA (OBSTRUCTIVE SLEEP APNEA): ICD-10-CM

## 2023-10-30 DIAGNOSIS — E11.51 TYPE 2 DIABETES MELLITUS WITH DIABETIC PERIPHERAL ANGIOPATHY WITHOUT GANGRENE, WITHOUT LONG-TERM CURRENT USE OF INSULIN (HCC): ICD-10-CM

## 2023-10-30 DIAGNOSIS — E78.2 MIXED HYPERLIPIDEMIA: ICD-10-CM

## 2023-10-30 DIAGNOSIS — I48.0 PAROXYSMAL ATRIAL FIBRILLATION (HCC): Primary | ICD-10-CM

## 2023-10-30 DIAGNOSIS — Z86.79 S/P ABLATION OF ATRIAL FIBRILLATION: ICD-10-CM

## 2023-10-30 DIAGNOSIS — Z98.890 S/P ABLATION OF ATRIAL FIBRILLATION: ICD-10-CM

## 2023-10-30 DIAGNOSIS — I70.0 ATHEROSCLEROSIS OF AORTA (HCC): ICD-10-CM

## 2023-10-30 DIAGNOSIS — I73.9 PERIPHERAL VASCULAR DISEASE (HCC): ICD-10-CM

## 2023-10-30 PROCEDURE — 93000 ELECTROCARDIOGRAM COMPLETE: CPT | Performed by: INTERNAL MEDICINE

## 2023-10-30 PROCEDURE — 99214 OFFICE O/P EST MOD 30 MIN: CPT | Performed by: INTERNAL MEDICINE

## 2023-10-30 NOTE — PROGRESS NOTES
Cardiology Follow Up    Kwadwo Hairston.  1950  [address] [place] [address] [facility]  [address] [facility] [id number] [phone]    1. Paroxysmal atrial fibrillation (HCC)  POCT ECG    Basic metabolic panel      2. Essential hypertension  POCT ECG      3. Atherosclerosis of aorta ([address])        4. MARISSA (obstructive sleep apnea)        5. Type 2 diabetes mellitus with diabetic peripheral angiopathy without gangrene, without long-term current use of insulin ([address])        6. Peripheral vascular disease ([address])        7. Mixed hyperlipidemia        8. S/P ablation of atrial fibrillation            Discussion/Summary:  #1 heavy coronary artery calcifications  #2 atypical chest pain  #3 dyspnea on exertion  #4 chronic diastolic congestive heart failure  #5 paroxysmal atrial fibrillation status post ablation  #6 morbid obesity  #7 obstructive sleep apnea  #8 hypertension  #9 peripheral arterial disease  #10 diabetes     Recommendations: Recent nuclear stress test did not show any evidence of ischemia. He does have some lower extremity edema I instructed him a couple times a week if he has a salty meal to take an additional tablet of Lasix in the afternoon. I gave him a prescription to get a BMP done in a few weeks. He is also working with a dietitian now and is losing some weight. Echocardiogram was reviewed. He is maintaining sinus rhythm. No further cardiac testing I will see how he does in a few months with weight loss and exercise. Interval History: 77-year-old gentleman transitioning to my care from one of my partners who retired. He has a history of chronic diastolic congestive heart failure, hypertension, paroxysmal atrial fibrillation status post ablation, peripheral arterial disease. Functional capacity has been good over the years. He remains somewhat sedentary at the house though.   He does not do much in terms of walking. If he goes to the grocery store he is able to walk up and down the [place] without any significant difficulty. Since her last visit he has been doing well. No further chest discomfort stress test showed no ischemia. Denies any palpitations or fluttering. He gets chronically short of breath with moderate level activity. Weights have been exactly the same as the beginning of the year. He does note at times he gets worsening lower extremity edema. Medical Problems       Problem List       Gastroesophageal reflux disease without esophagitis    Benign colon polyp    Overview Signed 1/28/2018  5:14 PM by Nelda Nolan DO     Description: 11/13         Major depressive disorder, single episode, mild ([address])    Overview Addendum 10/12/2020  3:34 PM by Alfonse Galeazzi., MD     PHQ-9 was high on 06/18 at 14. PHQ- 9 of 12 on 10/16/19  PHQ 9 of 7 on October 12, 2020         Type 2 diabetes mellitus with diabetic peripheral angiopathy without gangrene ([address])    Overview Addendum 3/31/2020  5:44 AM by Rashida Coley     Per CMS ICD 10 Guidelines--Per Physician           Lab Results   Component Value Date    HGBA1C 6.3 (H) 08/31/2023         Idiopathic chronic gout of multiple sites without tophus    Hyperlipidemia    Essential hypertension    Microscopic hematuria    Morbid obesity ([address])    Peripheral neuropathy    MARISSA (obstructive sleep apnea)    Overview Addendum 11/9/2018  1:57 PM by Alfonse Galeazzi., MD     On cpap         Testicular hypogonadism    Overview Addendum 10/16/2019  3:09 PM by Alfonse Galeazzi., MD     Description: Serum Testost = 146 (6/13)  Pt declines treatment          Thyroglossal duct cyst    Overview Signed 11/9/2018  2:14 PM by Alfonse Galeazzi., MD     Excised 6/14         Benign prostatic hyperplasia with urinary frequency    Overview Addendum 7/26/2021  3:02 PM by Alfonse Galeazzi., MD     VAN done July 26, 2021-questionable prostatitis.          Chronic rhinitis Chronic diastolic (congestive) heart failure (HCC)    Wt Readings from Last 3 Encounters:   10/30/23 (!) 141 kg (309 lb 14.4 oz)   10/25/23 (!) 140 kg (309 lb)   10/12/23 (!) 140 kg (309 lb)                 A-fib ([address])    Overview Addendum 10/12/2022  8:21 PM by Rachael Ambrosio MD     10/30/18 - AFib with rapid ventricular response, new onset a fib, rate was controlled with Bystolic, consult by Cardiology and a SANDRITA cardioversion was completed. Patient tolerated procedure and prior to discharge heart rate was in the 60s with normal sinus rhythm. He will remain on Xarelto and beta-blocker for rate control. 12/19-ablation completed         Anticoagulation adequate    Peripheral vascular disease ([address])    Fatty liver    Long term current use of antiarrhythmic drug    S/P ablation of atrial fibrillation    Overview Signed 10/12/2022  8:21 PM by Rachael Ambrosio MD     Status post ablation 12/19. Atherosclerosis of aorta [facility])    Overview Signed 3/31/2020  5:45 AM by Casey Soto     Per CMS ICD 10 Guidelines--Per Physician         Anxiety    Horseshoe tear of retina of left eye without detachment    Chronic constipation    Hyponatremia    Hyperkalemia    Persistent proteinuria        Past Medical History:   Diagnosis Date    Acute prostatitis 7/26/2021    Diagnosed in [facility] and treated with Cipro x2 weeks 6/7/21.  Questionable recurrence July 16, 2021    Atrial fibrillation [facility])     BPH (benign prostatic hyperplasia)     Chronic diastolic (congestive) heart failure (HCC)     Depression     Diabetes mellitus ([address])     Drug-induced insomnia ([address]) 02/03/2020    Ear problems     GERD (gastroesophageal reflux disease)     Gout     HL (hearing loss)     Hyperlipidemia     Hypertension     Hyponatremia     last assessed: 09/08/2014    Leukocytosis     Liver function abnormality     Morbid obesity ([address])     Nasal congestion     Obstructive sleep apnea     Sleep apnea      Social History Socioeconomic History    Marital status: /Civil Union     Spouse name: Not on file    Number of children: Not on file    Years of education: Not on file    Highest education level: Not on file   Occupational History    Occupation: Disability    Occupation:      Comment: significant asbestos and silica exposure in the past   Tobacco Use    Smoking status: Never    Smokeless tobacco: Never   Vaping Use    Vaping Use: Never used   Substance and Sexual Activity    Alcohol use:  Yes     Alcohol/week: 3.0 - 4.0 standard drinks of alcohol     Types: 3 - 4 Glasses of wine per week    Drug use: Never    Sexual activity: Not Currently   Other Topics Concern    Not on file   Social History Narrative    4 cups of coffee/day     Social Determinants of Health     Financial Resource Strain: Not on file   Food Insecurity: Not on file   Transportation Needs: Not on file   Physical Activity: Not on file   Stress: Not on file   Social Connections: Not on file   Intimate Partner Violence: Not on file   Housing Stability: Not on file      Family History   Problem Relation Age of Onset    Diabetes Mother     Heart attack Mother     Hypertension Mother     Heart attack Sister      Past Surgical History:   Procedure Laterality Date    CARDIAC CATHETERIZATION      outcome: Neg    CARDIAC ELECTROPHYSIOLOGY STUDY AND ABLATION      CARDIAC LOOP RECORDER  2019    CARDIOVASCULAR STRESS TEST      resolved: 10/27/2010; negative    COLONOSCOPY  11/2013    polyp; complete    COLONOSCOPY  05/26/2017    HERNIA REPAIR      resolved: 11/1999    REPLACEMENT TOTAL KNEE Left 2010    REPLACEMENT TOTAL KNEE Right 2003    THYROGLOSSAL DUCT EXCISION      TONSILLECTOMY      last assessed: 06/02/2014    UPPER GASTROINTESTINAL ENDOSCOPY         Current Outpatient Medications:     allopurinol (ZYLOPRIM) 300 mg tablet, TAKE 1 TABLET BY MOUTH DAILY, Disp: 90 tablet, Rfl: 3    amLODIPine (NORVASC) 10 mg tablet, Take 1 tablet (10 mg total) by mouth daily, Disp: 90 tablet, Rfl: 3    cloNIDine (CATAPRES) 0.2 mg tablet, Take 1 tablet (0.2 mg total) by mouth daily One hour prior to bed, Disp: 90 tablet, Rfl: 3    dexlansoprazole (DEXILANT) 60 MG capsule, Take 1 capsule (60 mg total) by mouth daily before breakfast, Disp: 90 capsule, Rfl: 3    dofetilide (TIKOSYN) 250 mcg capsule, TAKE 1 CAPSULE BY MOUTH EVERY TWELVE (12) HOURS, Disp: 180 capsule, Rfl: 3    famotidine (PEPCID) 40 MG tablet, TAKE 1 TABLET BY MOUTH  DAILY AT BEDTIME, Disp: 90 tablet, Rfl: 3    fexofenadine (ALLEGRA) 180 MG tablet, Take 180 mg by mouth daily, Disp: , Rfl:     FLUoxetine (PROzac) 40 MG capsule, TAKE 2 CAPSULES BY MOUTH  DAILY, Disp: 180 capsule, Rfl: 1    furosemide (LASIX) 40 mg tablet, Take 1 tablet (40 mg total) by mouth daily, Disp: 90 tablet, Rfl: 3    nebivolol (BYSTOLIC) 10 mg tablet, Take 1 tablet (10 mg total) by mouth daily, Disp: 90 tablet, Rfl: 3    olmesartan (BENICAR) 40 mg tablet, TAKE 1 TABLET BY MOUTH  DAILY, Disp: 90 tablet, Rfl: 3    potassium chloride SA (Klor-Con M15) 15 MEQ tablet, Take 2 tablets (30 mEq total) by mouth daily, Disp: 180 tablet, Rfl: 3    pravastatin (PRAVACHOL) 10 mg tablet, Take 1 tablet (10 mg total) by mouth daily at bedtime, Disp: 90 tablet, Rfl: 3    rivaroxaban (Xarelto) 20 mg tablet, Take 1 tablet (20 mg total) by mouth daily with breakfast, Disp: 90 tablet, Rfl: 3    allopurinol (ZYLOPRIM) 300 mg tablet, TAKE 1 TABLET BY MOUTH  DAILY, Disp: 90 tablet, Rfl: 0    allopurinol (ZYLOPRIM) 300 mg tablet, Take 1 tablet (300 mg total) by mouth daily, Disp: 90 tablet, Rfl: 3    amoxicillin (AMOXIL) 500 mg capsule, Prior to dental visits, Disp: , Rfl:     dexamethasone (DECADRON) 4 mg tablet, Take 1 tablet (4 mg total) by mouth 2 (two) times a day with meals (Patient not taking: Reported on 10/30/2023), Disp: 10 tablet, Rfl: 0    tetrahydrozoline-zinc (VISINE-AC) 0.05-0.25 % ophthalmic solution, QD OU (Patient not taking: Reported on 1/11/2023), Disp: , Rfl:   Allergies   Allergen Reactions    Metoprolol Shortness Of Breath     Tolerates Bystolic    Benazepril Cough    Clonidine Fatigue    Diltiazem Bradycardia    Entex T  [Pseudoephedrine-Guaifenesin]      Annotation - [id number]: LEG SWELLING    Tizanidine Other (See Comments)       Labs:     Chemistry        Component Value Date/Time    K 4.7 08/31/2023 1056    CL 99 08/31/2023 1056    CO2 26 08/31/2023 1056    BUN 15 08/31/2023 1056    CREATININE 1.03 08/31/2023 1056        Component Value Date/Time    CALCIUM 10.1 08/31/2023 1056    ALKPHOS 55 08/31/2023 1056    AST 27 08/31/2023 1056    ALT 30 08/31/2023 1056            No results found for: "CHOL"  Lab Results   Component Value Date    HDL 44 08/31/2023    HDL 50 04/07/2022    HDL 42 10/31/2018     Lab Results   Component Value Date    LDLCALC 80 08/31/2023    LDLCALC 65 04/07/2022    LDLCALC 70 10/31/2018     Lab Results   Component Value Date    TRIG 75 08/31/2023    TRIG 82 04/07/2022    TRIG 138 10/31/2018     No results found for: "CHOLHDL"    Imaging: No results found. ECG: Sinus rhythm      ROS    Vitals:    10/30/23 1044   BP: 158/78   Pulse: 58   SpO2: 96%     Vitals:    10/30/23 1044   Weight: (!) 141 kg (309 lb 14.4 oz)     Height: 5' 9" (175.3 cm)   Body mass index is 45.76 kg/m². Physical Exam:  Vital signs reviewed  General:  Alert and cooperative, appears stated age, no acute distress  HEENT:  PERRLA, EOMI, no scleral icterus, no conjunctival pallor  Neck:  No lymphadenopathy, no thyromegaly, no carotid bruits, no elevated JVP  Heart:  Regular rate and rhythm, normal S1/S2, no S3/S4, no murmur, rubs or gallops. PMI nondisplaced  Lungs:  Clear to auscultation bilaterally, no wheezes rales or rhonchi  Abdomen:  Soft, non-tender, positive bowel sounds, no rebound or guarding,   no organomegaly   Extremities:  Normal range of motion. No clubbing, cyanosis.   +edema  Skin:  No rashes or lesions on exposed skin  Neurologic: Cranial nerves II-XII grossly intact without focal deficits  Psych:  Normal mood and affect

## 2023-11-01 ENCOUNTER — CONSULT (OUTPATIENT)
Dept: HEMATOLOGY ONCOLOGY | Facility: CLINIC | Age: 73
End: 2023-11-01
Payer: MEDICARE

## 2023-11-01 VITALS
BODY MASS INDEX: 45.74 KG/M2 | TEMPERATURE: 97.7 F | DIASTOLIC BLOOD PRESSURE: 80 MMHG | RESPIRATION RATE: 17 BRPM | HEIGHT: 69 IN | OXYGEN SATURATION: 97 % | WEIGHT: 308.8 LBS | HEART RATE: 50 BPM | SYSTOLIC BLOOD PRESSURE: 148 MMHG

## 2023-11-01 DIAGNOSIS — E55.9 VITAMIN D DEFICIENCY: ICD-10-CM

## 2023-11-01 DIAGNOSIS — R79.9 ABNORMAL BLOOD SMEAR: Primary | ICD-10-CM

## 2023-11-01 DIAGNOSIS — E53.8 FOLATE DEFICIENCY: ICD-10-CM

## 2023-11-01 DIAGNOSIS — G25.81 RESTLESS LEGS: ICD-10-CM

## 2023-11-01 DIAGNOSIS — D50.8 OTHER IRON DEFICIENCY ANEMIA: ICD-10-CM

## 2023-11-01 DIAGNOSIS — E63.9 NUTRITIONAL DEFICIENCY: ICD-10-CM

## 2023-11-01 DIAGNOSIS — R53.83 OTHER FATIGUE: ICD-10-CM

## 2023-11-01 DIAGNOSIS — E53.8 VITAMIN B 12 DEFICIENCY: ICD-10-CM

## 2023-11-01 DIAGNOSIS — D72.820 MONOCLONAL B-CELL LYMPHOCYTOSIS OF UNDETERMINED SIGNIFICANCE: ICD-10-CM

## 2023-11-01 PROCEDURE — 99204 OFFICE O/P NEW MOD 45 MIN: CPT | Performed by: NURSE PRACTITIONER

## 2023-11-01 NOTE — PROGRESS NOTES
[facility] HEMATOLOGY ONCOLOGY SPECIALISTS [address] [address] [phone]  Hematology Ambulatory Consult  Michelle Arenas, 1950, [id number]  11/1/2023      Assessment and Plan   1. Abnormal blood smear  2. Monoclonal B-cell lymphocytosis of undetermined significance  3. Other fatigue  4. Nutritional deficiency  5. Vitamin B 12 deficiency  6. Other iron deficiency anemia  7. Folate deficiency  8. Vitamin D deficiency   Patient is a 75-year-old male with a history of GERD, type 2 diabetes, hypertension, MARISSA, CHF, A-fib, PVD, atherosclerosis of the aorta, obesity, who was referred to us for further evaluation of abnormal peripheral smear. In August 2023 CBC showed a normal WBC, hemoglobin, platelet count. Differential showed 34% segs, atypical lymphocytes 35%, elevated since at least 2018 gene between 1 to 31%. Pathology slide review showed normochromic normocytic RBCs, normal number and morphology of platelets, atypical lymphocytosis consistent with clonal B-cell population. Flow cytometry showed clonal B-cell population with nonspecific phenotype, lambda positive, CD20 positive, CD5 negative, CD10 negative. Absolute number of clonal B cells 3.4K per UL no evidence of acute leukemia or T-cell lymphoproliferative disorder. FISH showed no evidence of CCND1-IgH translocation, gene rearrangement, or evidence of trisomy 11 or gain of 11 q. we discussed that he likely has MBL which is the precursor to CLL. Overall patient is able to function without restriction. He does not have symptoms including fevers chills drenching night sweats or unexplained weight loss. He has been following with a nutritionist and has lost approximately 13 pounds that is purposeful. He does report fatigue but has noticed some increased energy since losing weight.    Given the fatigue we will request nutritional studies including iron levels, folate, vitamin B12 and vitamin D.  He also reports numbness and tingling to the fingers and restless legs. I will plan to see him in 2 weeks however off all is stable we will likely stretch out visit to 3 months with repeat blood work. Patient verbalized understanding and is in agreement with the plan. - Ambulatory referral to Hematology / Oncology  - CBC and differential; Future  - Comprehensive metabolic panel; Future  - Folate; Future  - Vitamin B12; Future  - Iron Panel (Includes Ferritin, Iron Sat%, Iron, and TIBC); Future  - Vitamin D 25 hydroxy; Future    Barrier(s) to care: None. The patient is  able to self care. [address] [facility]    Subjective     Chief Complaint   Patient presents with    Consult     Referring provider    Leotis Canavan., MD  [address] [facility].  [address] [place], [place] [address]    History of present illness:  Patient is a 43-year-old male with a history of GERD, type 2 diabetes, hypertension, MARISSA, CHF, A-fib, PVD, atherosclerosis of the aorta, obesity, who was referred to us for further evaluation of abnormal peripheral smear. 11/01/23: clinically stable    Review of Systems   Constitutional:  Positive for fatigue. Negative for activity change, appetite change, fever and unexpected weight change. Respiratory:  Negative for cough and shortness of breath. Cardiovascular:  Negative for chest pain and leg swelling. Gastrointestinal:  Negative for abdominal pain, constipation, diarrhea and nausea. Endocrine: Negative for cold intolerance and heat intolerance. Musculoskeletal:  Negative for arthralgias and myalgias. Skin: Negative. Neurological:  Negative for dizziness, weakness and headaches. Hematological:  Negative for adenopathy. Does not bruise/bleed easily. Past Medical History:   Diagnosis Date    Acute prostatitis 7/26/2021    Diagnosed in [facility] and treated with Cipro x2 weeks 6/7/21.  Questionable recurrence July 16, 2021    Atrial fibrillation [facility])     BPH (benign prostatic hyperplasia)     Chronic diastolic (congestive) heart failure (HCC)     Depression     Diabetes mellitus ([address])     Drug-induced insomnia ([address]) 02/03/2020    Ear problems     GERD (gastroesophageal reflux disease)     Gout     HL (hearing loss)     Hyperlipidemia     Hypertension     Hyponatremia     last assessed: 09/08/2014    Leukocytosis     Liver function abnormality     Morbid obesity ([address])     Nasal congestion     Obstructive sleep apnea     Sleep apnea      Past Surgical History:   Procedure Laterality Date    CARDIAC CATHETERIZATION      outcome: Neg    CARDIAC ELECTROPHYSIOLOGY STUDY AND ABLATION      CARDIAC LOOP RECORDER  2019    CARDIOVASCULAR STRESS TEST      resolved: 10/27/2010; negative    COLONOSCOPY  11/2013    polyp; complete    COLONOSCOPY  05/26/2017    HERNIA REPAIR      resolved: 11/1999    REPLACEMENT TOTAL KNEE Left 2010    REPLACEMENT TOTAL KNEE Right 2003    THYROGLOSSAL DUCT EXCISION      TONSILLECTOMY      last assessed: 06/02/2014    UPPER GASTROINTESTINAL ENDOSCOPY       Family History   Problem Relation Age of Onset    Diabetes Mother     Heart attack Mother     Hypertension Mother     Heart attack Sister      Social History     Socioeconomic History    Marital status: /Civil Union     Spouse name: Not on file    Number of children: Not on file    Years of education: Not on file    Highest education level: Not on file   Occupational History    Occupation: Disability    Occupation:      Comment: significant asbestos and silica exposure in the past   Tobacco Use    Smoking status: Never    Smokeless tobacco: Never   Vaping Use    Vaping Use: Never used   Substance and Sexual Activity    Alcohol use:  Yes     Alcohol/week: 3.0 - 4.0 standard drinks of alcohol     Types: 3 - 4 Glasses of wine per week    Drug use: Never    Sexual activity: Not Currently   Other Topics Concern    Not on file Social History Narrative    4 cups of coffee/day     Social Determinants of Health     Financial Resource Strain: Not on file   Food Insecurity: Not on file   Transportation Needs: Not on file   Physical Activity: Not on file   Stress: Not on file   Social Connections: Not on file   Intimate Partner Violence: Not on file   Housing Stability: Not on file       Current Outpatient Medications:     allopurinol (ZYLOPRIM) 300 mg tablet, TAKE 1 TABLET BY MOUTH DAILY, Disp: 90 tablet, Rfl: 3    amLODIPine (NORVASC) 10 mg tablet, Take 1 tablet (10 mg total) by mouth daily, Disp: 90 tablet, Rfl: 3    amoxicillin (AMOXIL) 500 mg capsule, Prior to dental visits, Disp: , Rfl:     cloNIDine (CATAPRES) 0.2 mg tablet, Take 1 tablet (0.2 mg total) by mouth daily One hour prior to bed, Disp: 90 tablet, Rfl: 3    dexlansoprazole (DEXILANT) 60 MG capsule, Take 1 capsule (60 mg total) by mouth daily before breakfast, Disp: 90 capsule, Rfl: 3    dofetilide (TIKOSYN) 250 mcg capsule, TAKE 1 CAPSULE BY MOUTH EVERY TWELVE (12) HOURS, Disp: 180 capsule, Rfl: 3    famotidine (PEPCID) 40 MG tablet, TAKE 1 TABLET BY MOUTH  DAILY AT BEDTIME, Disp: 90 tablet, Rfl: 3    fexofenadine (ALLEGRA) 180 MG tablet, Take 180 mg by mouth daily, Disp: , Rfl:     FLUoxetine (PROzac) 40 MG capsule, TAKE 2 CAPSULES BY MOUTH  DAILY, Disp: 180 capsule, Rfl: 1    furosemide (LASIX) 40 mg tablet, Take 1 tablet (40 mg total) by mouth daily, Disp: 90 tablet, Rfl: 3    nebivolol (BYSTOLIC) 10 mg tablet, Take 1 tablet (10 mg total) by mouth daily, Disp: 90 tablet, Rfl: 3    olmesartan (BENICAR) 40 mg tablet, TAKE 1 TABLET BY MOUTH  DAILY, Disp: 90 tablet, Rfl: 3    potassium chloride SA (Klor-Con M15) 15 MEQ tablet, Take 2 tablets (30 mEq total) by mouth daily, Disp: 180 tablet, Rfl: 3    pravastatin (PRAVACHOL) 10 mg tablet, Take 1 tablet (10 mg total) by mouth daily at bedtime, Disp: 90 tablet, Rfl: 3    rivaroxaban (Xarelto) 20 mg tablet, Take 1 tablet (20 mg total) by mouth daily with breakfast, Disp: 90 tablet, Rfl: 3  Allergies   Allergen Reactions    Metoprolol Shortness Of Breath     Tolerates Bystolic    Benazepril Cough    Clonidine Fatigue    Diltiazem Bradycardia    Entex T  [Pseudoephedrine-Guaifenesin]      Annotation - [id number]: LEG SWELLING    Tizanidine Other (See Comments)       Objective   /80 (BP Location: Right arm, Patient Position: Sitting, Cuff Size: Adult)   Pulse (!) 50   Temp 97.7 °F (36.5 °C)   Resp 17   Ht 5' 9" (1.753 m)   Wt (!) 140 kg (308 lb 12.8 oz)   SpO2 97%   BMI 45.60 kg/m²   Physical Exam  Vitals reviewed. Constitutional:       Appearance: Normal appearance. He is well-developed. HENT:      Head: Normocephalic and atraumatic. Eyes:      Pupils: Pupils are equal, round, and reactive to light. Pulmonary:      Effort: Pulmonary effort is normal. No respiratory distress. Musculoskeletal:         General: Normal range of motion. Cervical back: Normal range of motion. Lymphadenopathy:      Cervical: No cervical adenopathy. Skin:     General: Skin is dry. Neurological:      Mental Status: He is alert and oriented to person, place, and time.    Psychiatric:         Behavior: Behavior normal.     Result Review  Labs:  Orders Only on 09/25/2023   Component Date Value Ref Range Status    Right Eye Diabetic Retinopathy 09/25/2023 None   Final    Left Eye Diabetic Retinopathy 09/25/2023 None   Final   Appointment on 08/31/2023   Component Date Value Ref Range Status    WBC 08/31/2023 9.53  4.31 - 10.16 Thousand/uL Final    RBC 08/31/2023 4.85  3.88 - 5.62 Million/uL Final    Hemoglobin 08/31/2023 14.9  12.0 - 17.0 g/dL Final    Hematocrit 08/31/2023 44.0  36.5 - 49.3 % Final    MCV 08/31/2023 91  82 - 98 fL Final    MCH 08/31/2023 30.7  26.8 - 34.3 pg Final    MCHC 08/31/2023 33.9  31.4 - 37.4 g/dL Final    RDW 08/31/2023 11.6  11.6 - 15.1 % Final    MPV 08/31/2023 11.1  8.9 - 12.7 fL Final    Platelets 18/89/0486 223  149 - 390 Thousands/uL Final    Sodium 08/31/2023 135  135 - 147 mmol/L Final    Potassium 08/31/2023 4.7  3.5 - 5.3 mmol/L Final    Chloride 08/31/2023 99  96 - 108 mmol/L Final    CO2 08/31/2023 26  21 - 32 mmol/L Final    ANION GAP 08/31/2023 10  mmol/L Final    BUN 08/31/2023 15  5 - 25 mg/dL Final    Creatinine 08/31/2023 1.03  0.60 - 1.30 mg/dL Final    Standardized to IDMS reference method    Glucose, Fasting 08/31/2023 119 (H)  65 - 99 mg/dL Final    Calcium 08/31/2023 10.1  8.4 - 10.2 mg/dL Final    AST 08/31/2023 27  13 - 39 U/L Final    ALT 08/31/2023 30  7 - 52 U/L Final    Specimen collection should occur prior to Sulfasalazine administration due to the potential for falsely depressed results. Alkaline Phosphatase 08/31/2023 55  34 - 104 U/L Final    Total Protein 08/31/2023 7.6  6.4 - 8.4 g/dL Final    Albumin 08/31/2023 4.6  3.5 - 5.0 g/dL Final    Total Bilirubin 08/31/2023 0.93  0.20 - 1.00 mg/dL Final    Use of this assay is not recommended for patients undergoing treatment with eltrombopag due to the potential for falsely elevated results. N-acetyl-p-benzoquinone imine (metabolite of Acetaminophen) will generate erroneously low results in samples for patients that have taken an overdose of Acetaminophen. eGFR 08/31/2023 70  ml/min/1.73sq m Final    Hemoglobin A1C 08/31/2023 6.3 (H)  Normal 4.0-5.6%; PreDiabetic 5.7-6.4%;  Diabetic >=6.5%; Glycemic control for adults with diabetes <7.0% % Final    EAG 08/31/2023 134  mg/dl Final    Cholesterol 08/31/2023 139  See Comment mg/dL Final    Cholesterol:         Pediatric <18 Years        Desirable          <170 mg/dL      Borderline High    170-199 mg/dL      High               >=200 mg/dL        Adult >=18 Years            Desirable         <200 mg/dL      Borderline High   200-239 mg/dL      High              >239 mg/dL      Triglycerides 08/31/2023 75  See Comment mg/dL Final    Triglyceride:     0-9Y            <75mg/dL     10Y-17Y <90 mg/dL       >=18Y     Normal          <150 mg/dL     Borderline High 150-199 mg/dL     High            200-499 mg/dL        Very High       >499 mg/dL    Specimen collection should occur prior to Metamizole administration due to the potential for falsely depressed results. HDL, Direct 08/31/2023 44  >=40 mg/dL Final    LDL Calculated 08/31/2023 80  0 - 100 mg/dL Final    LDL Cholesterol:     Optimal           <100 mg/dl     Near Optimal      100-129 mg/dl     Above Optimal       Borderline High 130-159 mg/dl       High            160-189 mg/dl       Very High       >189 mg/dl         This screening LDL is a calculated result. It does not have the accuracy of the Direct Measured LDL in the monitoring of patients with hyperlipidemia and/or statin therapy. Direct Measure LDL (QZA006) must be ordered separately in these patients. TSH 3RD GENERATON 08/31/2023 1.568  0.450 - 4.500 uIU/mL Final    Adult TSH (3rd generation) reference range follows the recommended guidelines of the American Thyroid Association, January, 2020. Uric Acid 08/31/2023 4.6  3.5 - 8.5 mg/dL Final    Specimen collection should occur prior to Metamizole administration due to the potential for falsely depressed results. PSA 08/31/2023 0.13  0.00 - 4.00 ng/mL Final    Rosetta Yozons Access chemiluminescent immunoassay. Confirm baseline values for patients being serially monitored.      Segmented % 08/31/2023 34 (L)  43 - 75 % Final    Lymphocytes % 08/31/2023 19  14 - 44 % Final    Monocytes % 08/31/2023 9  4 - 12 % Final    Eosinophils, % 08/31/2023 2  0 - 6 % Final    Basophils % 08/31/2023 1  0 - 1 % Final    Atypical Lymphocytes % 08/31/2023 35 (H)  <=0 % Final    Total Counted 08/31/2023 100   Final    Platelet Estimate 70/96/8757 Adequate  Adequate Final    Large Platelet 89/96/7639 Present   Final    Pathology Review 08/31/2023 Yes (A)  No Final    Path Review 08/31/2023    Final                    Value:Peripheral blood smear shows normochromic normocytic RBCs, normal number and morphology of platelets, atypical lymphocytosis, consistent with Clonal B-cell population with non-specific phenotype (lambda+, CD20+, CD5- and CD10-). Dr. Gene Mcqueen notified electronically (Magdaleno Baer) by Dr. Xochitl Tineo on 9/5/2023 at 8:41 am.  Interpretation performed at [facility], [address] [place] [address]    Flow cytometry ([id number], evaluated by Esperanza Bruce. Marjorie Solo M.D.):    PERIPHERAL BLOOD:  1. Clonal B-cell population with non-specific phenotype. - Phenotype: lambda+, CD20+, CD5- and CD10-;  - Absolute number of clonal B-cells = 3.4 k/uL (38%). 2. In the sample analyzed, there is no evidence for an acute leukemia or a T-cell lymphoproliferative disorder. Monoclonal B-cell lymphocytosis (MBL) of non-CLL phenotype will be an appropriate diagnosis only in the absence of a previous history of a lymphoproliferative disorder (LPD) and if the patient lacks clinical features of LPD (e.g.                           lymphadenopathy, organomegaly, B-symptoms). Clinical correlation is recommended. IMMUNOPHENOTYPIC ANALYSIS:  Viability 7AAD: 91.21%  Percent of clonal B-cells = 38%. Monoclonal B-cell population (lambda-positive) without expressing of CD5 and CD10. The expression of CD20 is moderate. No abnormal myeloid antigen expression or circulating CD34+ blasts is seen. The T-cells (25.38% of total) show no pan T-cell antigen deletion. The CD4:CD8 ratio is within normal limits (1.42:1). Scan Result 08/31/2023 SEE WRITTEN REPORT   Final    Miscellaneous Lab Test Result 08/31/2023 OnkoSight   Final    Miscellaneous Lab Test Result 08/31/2023 FISH   Final     Please note: This report has been generated by a voice recognition software system. Therefore there may be syntax, spelling, and/or grammatical errors. Please call if you have any questions.

## 2023-11-02 ENCOUNTER — APPOINTMENT (OUTPATIENT)
Dept: LAB | Age: 73
End: 2023-11-02
Payer: MEDICARE

## 2023-11-02 DIAGNOSIS — D72.820 MONOCLONAL B-CELL LYMPHOCYTOSIS OF UNDETERMINED SIGNIFICANCE: ICD-10-CM

## 2023-11-02 DIAGNOSIS — G25.81 RESTLESS LEGS: ICD-10-CM

## 2023-11-02 DIAGNOSIS — R53.83 OTHER FATIGUE: ICD-10-CM

## 2023-11-02 DIAGNOSIS — E53.8 FOLATE DEFICIENCY: ICD-10-CM

## 2023-11-02 DIAGNOSIS — E63.9 NUTRITIONAL DEFICIENCY: ICD-10-CM

## 2023-11-02 DIAGNOSIS — R79.9 ABNORMAL BLOOD SMEAR: ICD-10-CM

## 2023-11-02 DIAGNOSIS — E55.9 VITAMIN D DEFICIENCY: ICD-10-CM

## 2023-11-02 DIAGNOSIS — D50.8 OTHER IRON DEFICIENCY ANEMIA: ICD-10-CM

## 2023-11-02 DIAGNOSIS — E53.8 VITAMIN B 12 DEFICIENCY: ICD-10-CM

## 2023-11-02 LAB
25(OH)D3 SERPL-MCNC: 28.1 NG/ML (ref 30–100)
ALBUMIN SERPL BCP-MCNC: 4.5 G/DL (ref 3.5–5)
ALP SERPL-CCNC: 54 U/L (ref 34–104)
ALT SERPL W P-5'-P-CCNC: 27 U/L (ref 7–52)
ANION GAP SERPL CALCULATED.3IONS-SCNC: 12 MMOL/L
AST SERPL W P-5'-P-CCNC: 28 U/L (ref 13–39)
BASOPHILS # BLD MANUAL: 0 THOUSAND/UL (ref 0–0.1)
BASOPHILS NFR MAR MANUAL: 0 % (ref 0–1)
BILIRUB SERPL-MCNC: 1.04 MG/DL (ref 0.2–1)
BUN SERPL-MCNC: 16 MG/DL (ref 5–25)
CALCIUM SERPL-MCNC: 9.7 MG/DL (ref 8.4–10.2)
CHLORIDE SERPL-SCNC: 96 MMOL/L (ref 96–108)
CO2 SERPL-SCNC: 24 MMOL/L (ref 21–32)
CREAT SERPL-MCNC: 0.99 MG/DL (ref 0.6–1.3)
DIFFERENTIAL COMMENT: ABNORMAL
EOSINOPHIL # BLD MANUAL: 0.31 THOUSAND/UL (ref 0–0.4)
EOSINOPHIL NFR BLD MANUAL: 3 % (ref 0–6)
ERYTHROCYTE [DISTWIDTH] IN BLOOD BY AUTOMATED COUNT: 11.9 % (ref 11.6–15.1)
FERRITIN SERPL-MCNC: 260 NG/ML (ref 24–336)
FOLATE SERPL-MCNC: >22.3 NG/ML
GFR SERPL CREATININE-BSD FRML MDRD: 74 ML/MIN/1.73SQ M
GLUCOSE P FAST SERPL-MCNC: 110 MG/DL (ref 65–99)
HCT VFR BLD AUTO: 43.9 % (ref 36.5–49.3)
HGB BLD-MCNC: 15.3 G/DL (ref 12–17)
IRON SATN MFR SERPL: 24 % (ref 15–50)
IRON SERPL-MCNC: 85 UG/DL (ref 50–212)
LYMPHOCYTES # BLD AUTO: 49 % (ref 14–44)
LYMPHOCYTES # BLD AUTO: 5.09 THOUSAND/UL (ref 0.6–4.47)
MCH RBC QN AUTO: 31.5 PG (ref 26.8–34.3)
MCHC RBC AUTO-ENTMCNC: 34.9 G/DL (ref 31.4–37.4)
MCV RBC AUTO: 91 FL (ref 82–98)
MONOCYTES # BLD AUTO: 1.04 THOUSAND/UL (ref 0–1.22)
MONOCYTES NFR BLD: 10 % (ref 4–12)
NEUTROPHILS # BLD MANUAL: 3.95 THOUSAND/UL (ref 1.85–7.62)
NEUTS SEG NFR BLD AUTO: 38 % (ref 43–75)
PLATELET # BLD AUTO: 232 THOUSANDS/UL (ref 149–390)
PLATELET BLD QL SMEAR: ADEQUATE
PMV BLD AUTO: 11.4 FL (ref 8.9–12.7)
POTASSIUM SERPL-SCNC: 4.9 MMOL/L (ref 3.5–5.3)
PROT SERPL-MCNC: 7.8 G/DL (ref 6.4–8.4)
RBC # BLD AUTO: 4.85 MILLION/UL (ref 3.88–5.62)
RBC MORPH BLD: NORMAL
SODIUM SERPL-SCNC: 132 MMOL/L (ref 135–147)
TIBC SERPL-MCNC: 360 UG/DL (ref 250–450)
UIBC SERPL-MCNC: 275 UG/DL (ref 155–355)
VIT B12 SERPL-MCNC: 645 PG/ML (ref 180–914)
WBC # BLD AUTO: 10.39 THOUSAND/UL (ref 4.31–10.16)

## 2023-11-02 PROCEDURE — 85007 BL SMEAR W/DIFF WBC COUNT: CPT

## 2023-11-02 PROCEDURE — 83550 IRON BINDING TEST: CPT

## 2023-11-02 PROCEDURE — 82306 VITAMIN D 25 HYDROXY: CPT

## 2023-11-02 PROCEDURE — 36415 COLL VENOUS BLD VENIPUNCTURE: CPT

## 2023-11-02 PROCEDURE — 82728 ASSAY OF FERRITIN: CPT

## 2023-11-02 PROCEDURE — 82607 VITAMIN B-12: CPT

## 2023-11-02 PROCEDURE — 85027 COMPLETE CBC AUTOMATED: CPT

## 2023-11-02 PROCEDURE — 80053 COMPREHEN METABOLIC PANEL: CPT

## 2023-11-02 PROCEDURE — 83540 ASSAY OF IRON: CPT

## 2023-11-02 PROCEDURE — 82746 ASSAY OF FOLIC ACID SERUM: CPT

## 2023-11-03 ENCOUNTER — HOSPITAL ENCOUNTER (OUTPATIENT)
Facility: HOSPITAL | Age: 73
Setting detail: OUTPATIENT SURGERY
Discharge: HOME | End: 2023-11-03
Attending: SURGERY | Admitting: SURGERY
Payer: MEDICARE

## 2023-11-03 VITALS
HEART RATE: 60 BPM | OXYGEN SATURATION: 98 % | TEMPERATURE: 98 F | DIASTOLIC BLOOD PRESSURE: 70 MMHG | RESPIRATION RATE: 16 BRPM | SYSTOLIC BLOOD PRESSURE: 162 MMHG

## 2023-11-03 DIAGNOSIS — G56.03 CARPAL TUNNEL SYNDROME, BILATERAL: Primary | ICD-10-CM

## 2023-11-03 LAB — GLUCOSE SERPL-MCNC: 122 MG/DL (ref 65–140)

## 2023-11-03 PROCEDURE — 82948 REAGENT STRIP/BLOOD GLUCOSE: CPT

## 2023-11-03 PROCEDURE — 64721 CARPAL TUNNEL SURGERY: CPT | Performed by: SURGERY

## 2023-11-03 RX ORDER — BUPIVACAINE HYDROCHLORIDE 2.5 MG/ML
INJECTION, SOLUTION EPIDURAL; INFILTRATION; INTRACAUDAL AS NEEDED
Status: DISCONTINUED | OUTPATIENT
Start: 2023-11-03 | End: 2023-11-03 | Stop reason: HOSPADM

## 2023-11-03 RX ORDER — ACETAMINOPHEN AND CODEINE PHOSPHATE 300; 30 MG/1; MG/1
1 TABLET ORAL EVERY 12 HOURS PRN
Qty: 5 TABLET | Refills: 0 | Status: SHIPPED | OUTPATIENT
Start: 2023-11-03

## 2023-11-03 NOTE — DISCHARGE INSTR - AVS FIRST PAGE
Elevate hand above heart as much as possible to help pain and swelling. May use hand for simple tasks, but no heavy lifting or tight squeezing x 4 weeks. Keep operative bandage clean and dry. You may remove bandage in 4 days, just place a band-aid over incision. You are permitted to shower after 4 days with band-aid off. Perform simple finger motion exercises: opening & closing fingers 10 x every hr.   Follow-up in the office 11/16/2023 per your scheduled appointment for suture removal.

## 2023-11-03 NOTE — INTERVAL H&P NOTE
H&P reviewed. After examining the patient I find no changes in the patients condition since the H&P had been written.     Vitals:    11/03/23 0844   BP: (!) 176/84   Pulse: 69   Resp: 18   Temp: 98.3 °F (36.8 °C)   SpO2: 98%

## 2023-11-03 NOTE — OP NOTE
OPERATIVE REPORT  PATIENT NAME: Alexandre Reyes. :  1950  MRN: [id number]  Pt Location: [facility] 09    SURGERY DATE: 11/3/2023    Surgeon(s) and Role:     * Carnella Nyhan, MD - Primary    Preop Diagnosis:  Carpal tunnel syndrome, bilateral [G56.03]    Post-Op Diagnosis Codes:     * Carpal tunnel syndrome, bilateral [G56.03]    Procedure(s):  Right - CTR    Specimen(s):  * No specimens in log *    Estimated Blood Loss:   Minimal    Drains:  * No LDAs found *    Anesthesia Type:   Local    Operative Indications:  Carpal tunnel syndrome, bilateral [G56.03]      Operative Findings:  none    Complications:   None    Procedure and Technique:  The patient's right hand was cleansed with alcohol. A field block was performed using marcaine 0.25% with epinephrine and lidocaine 1% with epinephrine in a 50-50 mixture. The right upper extremity was prepped and draped in a sterile fashion. A longitudinal incision was made overlying the transverse carpal ligament in line with the ring finger in a flexed position. Sharp dissection was performed down to the level of the transverse carpal ligament. Ezio Memory was used for counterretraction. The transverse carpal ligament was divided with a 15 blade scalpel distally, and tenotomy scissors proximally along with a portion of the distal antebrachial fascia. The wound was irrigated copiously with normal saline. The skin was approximated with 4-0 nylon in an interrupted horizontal mattress fashion. Xeroform was applied followed by gauze and an ace bandage over wrap. The patient was transferred to recovery room in stable condition. I was present for the entire procedure.     Patient Disposition:  PACU         SIGNATURE: Carnella Nyhan, MD  DATE: November 3, 2023  TIME: 9:59 AM

## 2023-11-10 ENCOUNTER — TELEPHONE (OUTPATIENT)
Dept: HEMATOLOGY ONCOLOGY | Facility: CLINIC | Age: 73
End: 2023-11-10

## 2023-11-10 NOTE — TELEPHONE ENCOUNTER
Appointment Change  Cancel, Reschedule, Change to Virtual      Who are you speaking with? Patient   If it is not the patient, is the caller listed on the communication consent form? N/A   Which provider is the appointment scheduled with? RADHA Lopez   When was the original appointment scheduled? Please list date and time 11/22/23 @ 130   At which location is the appointment scheduled to take place? [facility]   Was the appointment rescheduled? Was the appointment changed from an in person visit to a virtual visit? If so, please list the details of the change. 2/12/24 @ 1230   What is the reason for the appointment change? Provider said he can push our 3 months       Was STAR transport scheduled? No   Does STAR transport need to be scheduled for the new visit (if applicable) No   Does the patient need an infusion appointment rescheduled? No   Does the patient have an upcoming infusion appointment scheduled? If so, when? No   Is the patient undergoing chemotherapy? No   For appointments cancelled with less than 24 hours:  Was the no-show policy reviewed?  N/A

## 2023-11-16 ENCOUNTER — RA CDI HCC (OUTPATIENT)
Dept: OTHER | Facility: HOSPITAL | Age: 73
End: 2023-11-16

## 2023-11-16 ENCOUNTER — OFFICE VISIT (OUTPATIENT)
Dept: OBGYN CLINIC | Facility: CLINIC | Age: 73
End: 2023-11-16

## 2023-11-16 VITALS
SYSTOLIC BLOOD PRESSURE: 141 MMHG | WEIGHT: 308 LBS | DIASTOLIC BLOOD PRESSURE: 70 MMHG | BODY MASS INDEX: 45.62 KG/M2 | HEIGHT: 69 IN

## 2023-11-16 DIAGNOSIS — G56.03 CARPAL TUNNEL SYNDROME, BILATERAL: Primary | ICD-10-CM

## 2023-11-16 PROCEDURE — 99024 POSTOP FOLLOW-UP VISIT: CPT | Performed by: SURGERY

## 2023-11-16 NOTE — PROGRESS NOTES
Orthopedic Surgery  Patient Name:  Anya Daly Surgery:  R CTR   Surgery Date:  11/3/2023      Subjective      Patient reports overall feels about the same slightly improved numbness and tingling. No severe pain. No issues with the incision. He reports he has been keeping it clean and dry. Objective     Vitals:    11/16/23 1017   BP: 141/70   Right hand   Incision healed with scab. No open wounds, erythema or drainage. Full composite fist, normal wrist range of motion  Sensation intact to light touch median nerve distribution  2-pt discrimination testing  Thumb > 15  Index 5  Long 15  Ring 15  Small 10    Assessment      S/p R CTR 11/3/2023. Doing well, sutures removed. Plan     Activities as tolerated, use as able. No specific restrictions from our standpoint. Massage and moisturize the incisional area. Follow-up in 4 to 6 weeks for sensation check, and to discuss surgery for carpal tunnel on the left side. Suture removal    Date/Time: 11/16/2023 10:30 AM    Performed by: Brennan Walker PA-C  Authorized by: Clarissa Daniel MD  Universal Protocol:  Consent: Verbal consent obtained. Risks and benefits: risks, benefits and alternatives were discussed  Consent given by: patient  Patient identity confirmed: verbally with patient      Patient location:  Clinic  Location:     Laterality:  Right    Location:  Upper extremity    Upper extremity location:  Hand    Hand location:  R hand  Procedure details: Tools used:  Suture removal kit    Wound appearance:  No sign(s) of infection, good wound healing and clean  Post-procedure details:     Post-removal:  No dressing applied    Patient tolerance of procedure:   Tolerated well, no immediate complications

## 2023-11-21 DIAGNOSIS — I48.0 PAROXYSMAL ATRIAL FIBRILLATION (HCC): ICD-10-CM

## 2023-12-04 ENCOUNTER — REMOTE DEVICE CLINIC VISIT (OUTPATIENT)
Dept: CARDIOLOGY CLINIC | Facility: CLINIC | Age: 73
End: 2023-12-04
Payer: MEDICARE

## 2023-12-04 DIAGNOSIS — Z95.818 PRESENCE OF OTHER CARDIAC IMPLANTS AND GRAFTS: Primary | ICD-10-CM

## 2023-12-04 PROCEDURE — 93298 REM INTERROG DEV EVAL SCRMS: CPT | Performed by: INTERNAL MEDICINE

## 2023-12-04 PROCEDURE — G2066 INTER DEVC REMOTE 30D: HCPCS | Performed by: INTERNAL MEDICINE

## 2023-12-04 NOTE — PROGRESS NOTES
MDT LOOP   CARELINK TRANSMISSION: LOOP RECORDER. PRESENTING RHYTHM SB @ 48 BPM. BATTERY RRT SINCE 9/28/23 AND HAS BEEN ADDRESSED. 2 PAUSE EPISODES W/ EGRAMS SHOWING 3 SEC PAUSE. NO ALERTS RECEIVED. 6 DEVICE CLASSIFIED AF EPISODES, LONGEST EPISODE IS 96:50 HOURS , AVAILABLE STRIPS DEMONSTRATE PACs, PVCs AND ATRIAL FIBRILLATION. AF BURDEN IS 15.1%. AF BURDEN MAYBE OVERESTIMATED DUE TO INAPPROPRIATE CLASSIFICATION OF AF. SOME STRIPS MAY NOT BE INTERPRETABLE OR AVAILABLE, CANNOT DEFINITIVELY RULE OUT ATRIAL FIBRILLATION. HX OF PAF. PT TAKES XARELTO AND TIKOSYN. NO PATIENT ACTIVATED EPISODES. NORMAL DEVICE FUNCTION.  DL

## 2023-12-05 ENCOUNTER — TELEPHONE (OUTPATIENT)
Dept: CARDIOLOGY CLINIC | Facility: CLINIC | Age: 73
End: 2023-12-05

## 2023-12-05 NOTE — TELEPHONE ENCOUNTER
Patient called, had several questions:     States experienced "heart shocking" on 10/24/23 at a time on loop transmission which corresponds to rapid V rate of afib for over 2 hours. This almost took him to the hospital.    2.  Someone called him about explanting his loop (RRT 9/28/23), but never heard from our office. 3.   He is extremely fatigued and noticed his HR frequently around 48 bpm. Last loop transmission was yesterday. 4.  He is on dofetilide 250mcg bid but decreased this to q.d. on his own 1.5 weeks ago (not when that episode I mentioned in #1 occurred). Also on nebevilol 10mg daily. You have no openings anytime soon, do we need to open something up to see him, or place Zio?

## 2023-12-06 NOTE — TELEPHONE ENCOUNTER
Spoke with patient. He will go back to bid dosing of his dofetilide and cut his nebevilol to 5mg daily. I will have our schedulers reach out and get him an appointment with a PA. He is agreeable to this plan.

## 2023-12-14 ENCOUNTER — OFFICE VISIT (OUTPATIENT)
Dept: INTERNAL MEDICINE CLINIC | Age: 73
End: 2023-12-14
Payer: MEDICARE

## 2023-12-14 VITALS
HEIGHT: 68 IN | WEIGHT: 284 LBS | DIASTOLIC BLOOD PRESSURE: 78 MMHG | OXYGEN SATURATION: 96 % | TEMPERATURE: 97.6 F | HEART RATE: 55 BPM | BODY MASS INDEX: 43.04 KG/M2 | SYSTOLIC BLOOD PRESSURE: 136 MMHG

## 2023-12-14 DIAGNOSIS — E11.51 TYPE 2 DIABETES MELLITUS WITH DIABETIC PERIPHERAL ANGIOPATHY WITHOUT GANGRENE, WITHOUT LONG-TERM CURRENT USE OF INSULIN (HCC): ICD-10-CM

## 2023-12-14 DIAGNOSIS — Z23 NEED FOR INFLUENZA VACCINATION: ICD-10-CM

## 2023-12-14 DIAGNOSIS — I48.0 PAROXYSMAL ATRIAL FIBRILLATION (HCC): ICD-10-CM

## 2023-12-14 DIAGNOSIS — I50.32 CHRONIC DIASTOLIC (CONGESTIVE) HEART FAILURE (HCC): ICD-10-CM

## 2023-12-14 DIAGNOSIS — F32.0 MAJOR DEPRESSIVE DISORDER, SINGLE EPISODE, MILD (HCC): ICD-10-CM

## 2023-12-14 DIAGNOSIS — I73.9 PERIPHERAL VASCULAR DISEASE (HCC): ICD-10-CM

## 2023-12-14 DIAGNOSIS — K21.9 GASTROESOPHAGEAL REFLUX DISEASE WITHOUT ESOPHAGITIS: ICD-10-CM

## 2023-12-14 DIAGNOSIS — I70.0 ATHEROSCLEROSIS OF AORTA (HCC): ICD-10-CM

## 2023-12-14 DIAGNOSIS — G47.33 OSA (OBSTRUCTIVE SLEEP APNEA): ICD-10-CM

## 2023-12-14 DIAGNOSIS — Z23 ENCOUNTER FOR IMMUNIZATION: Primary | ICD-10-CM

## 2023-12-14 DIAGNOSIS — E66.01 MORBID OBESITY (HCC): ICD-10-CM

## 2023-12-14 DIAGNOSIS — N18.31 STAGE 3A CHRONIC KIDNEY DISEASE (HCC): ICD-10-CM

## 2023-12-14 DIAGNOSIS — I10 ESSENTIAL HYPERTENSION: ICD-10-CM

## 2023-12-14 PROCEDURE — 99215 OFFICE O/P EST HI 40 MIN: CPT | Performed by: INTERNAL MEDICINE

## 2023-12-14 PROCEDURE — G0008 ADMIN INFLUENZA VIRUS VAC: HCPCS

## 2023-12-14 PROCEDURE — 90662 IIV NO PRSV INCREASED AG IM: CPT

## 2023-12-14 PROCEDURE — G0439 PPPS, SUBSEQ VISIT: HCPCS | Performed by: INTERNAL MEDICINE

## 2023-12-14 NOTE — ASSESSMENT & PLAN NOTE
Lab Results   Component Value Date    EGFR 74 11/02/2023    EGFR 70 08/31/2023    EGFR 73 09/15/2022    CREATININE 0.99 11/02/2023    CREATININE 1.03 08/31/2023    CREATININE 1.00 09/15/2022   Renal function is stable. Will continue to monitor. Advised to avoid nephrotoxic meds including OTC NSAIDs.

## 2023-12-14 NOTE — PATIENT INSTRUCTIONS
Medicare Preventive Visit Patient Instructions  Thank you for completing your Welcome to Medicare Visit or Medicare Annual Wellness Visit today. Your next wellness visit will be due in one year (12/14/2024). The screening/preventive services that you may require over the next 5-10 years are detailed below. Some tests may not apply to you based off risk factors and/or age. Screening tests ordered at today's visit but not completed yet may show as past due. Also, please note that scanned in results may not display below. Preventive Screenings:  Service Recommendations Previous Testing/Comments   Colorectal Cancer Screening  Colonoscopy    Fecal Occult Blood Test (FOBT)/Fecal Immunochemical Test (FIT)  Fecal DNA/Cologuard Test  Flexible Sigmoidoscopy Age: 43-73 years old   Colonoscopy: every 10 years (May be performed more frequently if at higher risk)  OR  FOBT/FIT: every 1 year  OR  Cologuard: every 3 years  OR  Sigmoidoscopy: every 5 years  Screening may be recommended earlier than age 39 if at higher risk for colorectal cancer. Also, an individualized decision between you and your healthcare provider will decide whether screening between the ages of 77-80 would be appropriate.  Colonoscopy: 10/13/2021  FOBT/FIT: Not on file  Cologuard: Not on file  Sigmoidoscopy: Not on file    Screening Current     Prostate Cancer Screening Individualized decision between patient and health care provider in men between ages of 53-66   Medicare will cover every 12 months beginning on the day after your 50th birthday PSA: 0.13 ng/mL     Screening Not Indicated     Hepatitis C Screening Once for adults born between 1945 and 1965  More frequently in patients at high risk for Hepatitis C Hep C Antibody: 07/14/2017    Screening Current   Diabetes Screening 1-2 times per year if you're at risk for diabetes or have pre-diabetes Fasting glucose: 110 mg/dL (11/2/2023)  A1C: 6.3 % (8/31/2023)  Screening Not Indicated  History Diabetes Cholesterol Screening Once every 5 years if you don't have a lipid disorder. May order more often based on risk factors. Lipid panel: 08/31/2023  Screening Not Indicated  History Lipid Disorder      Other Preventive Screenings Covered by Medicare:  Abdominal Aortic Aneurysm (AAA) Screening: covered once if your at risk. You're considered to be at risk if you have a family history of AAA or a male between the age of 70-76 who smoking at least 100 cigarettes in your lifetime. Lung Cancer Screening: covers low dose CT scan once per year if you meet all of the following conditions: (1) Age 48-67; (2) No signs or symptoms of lung cancer; (3) Current smoker or have quit smoking within the last 15 years; (4) You have a tobacco smoking history of at least 20 pack years (packs per day x number of years you smoked); (5) You get a written order from a healthcare provider. Glaucoma Screening: covered annually if you're considered high risk: (1) You have diabetes OR (2) Family history of glaucoma OR (3)  aged 48 and older OR (3)  American aged 72 and older  Osteoporosis Screening: covered every 2 years if you meet one of the following conditions: (1) Have a vertebral abnormality; (2) On glucocorticoid therapy for more than 3 months; (3) Have primary hyperparathyroidism; (4) On osteoporosis medications and need to assess response to drug therapy. HIV Screening: covered annually if you're between the age of 14-79. Also covered annually if you are younger than 13 and older than 72 with risk factors for HIV infection. For pregnant patients, it is covered up to 3 times per pregnancy.     Immunizations:  Immunization Recommendations   Influenza Vaccine Annual influenza vaccination during flu season is recommended for all persons aged >= 6 months who do not have contraindications   Pneumococcal Vaccine   * Pneumococcal conjugate vaccine = PCV13 (Prevnar 13), PCV15 (Vaxneuvance), PCV20 (Prevnar 20)  * Pneumococcal polysaccharide vaccine = PPSV23 (Pneumovax) Adults 42-82 yo with certain risk factors or if 69+ yo  If never received any pneumonia vaccine: recommend Prevnar 20 (PCV20)  Give PCV20 if previously received 1 dose of PCV13 or PPSV23   Hepatitis B Vaccine 3 dose series if at intermediate or high risk (ex: diabetes, end stage renal disease, liver disease)   Respiratory syncytial virus (RSV) Vaccine - COVERED BY MEDICARE PART D  * RSVPreF3 (Arexvy) CDC recommends that adults 61years of age and older may receive a single dose of RSV vaccine using shared clinical decision-making (SCDM)   Tetanus (Td) Vaccine - COST NOT COVERED BY MEDICARE PART B Following completion of primary series, a booster dose should be given every 10 years to maintain immunity against tetanus. Td may also be given as tetanus wound prophylaxis. Tdap Vaccine - COST NOT COVERED BY MEDICARE PART B Recommended at least once for all adults. For pregnant patients, recommended with each pregnancy. Shingles Vaccine (Shingrix) - COST NOT COVERED BY MEDICARE PART B  2 shot series recommended in those 19 years and older who have or will have weakened immune systems or those 50 years and older     Health Maintenance Due:      Topic Date Due   • Colorectal Cancer Screening  10/12/2026   • Hepatitis C Screening  Completed     Immunizations Due:      Topic Date Due   • COVID-19 Vaccine (4 - 2023-24 season) 09/01/2023     Advance Directives   What are advance directives? Advance directives are legal documents that state your wishes and plans for medical care. These plans are made ahead of time in case you lose your ability to make decisions for yourself. Advance directives can apply to any medical decision, such as the treatments you want, and if you want to donate organs. What are the types of advance directives? There are many types of advance directives, and each state has rules about how to use them.  You may choose a combination of any of the following:  Living will: This is a written record of the treatment you want. You can also choose which treatments you do not want, which to limit, and which to stop at a certain time. This includes surgery, medicine, IV fluid, and tube feedings. Durable power of  for healthcare [facility]): This is a written record that states who you want to make healthcare choices for you when you are unable to make them for yourself. This person, called a proxy, is usually a family member or a friend. You may choose more than 1 proxy. Do not resuscitate (DNR) order:  A DNR order is used in case your heart stops beating or you stop breathing. It is a request not to have certain forms of treatment, such as CPR. A DNR order may be included in other types of advance directives. Medical directive: This covers the care that you want if you are in a coma, near death, or unable to make decisions for yourself. You can list the treatments you want for each condition. Treatment may include pain medicine, surgery, blood transfusions, dialysis, IV or tube feedings, and a ventilator (breathing machine). Values history: This document has questions about your views, beliefs, and how you feel and think about life. This information can help others choose the care that you would choose. Why are advance directives important? An advance directive helps you control your care. Although spoken wishes may be used, it is better to have your wishes written down. Spoken wishes can be misunderstood, or not followed. Treatments may be given even if you do not want them. An advance directive may make it easier for your family to make difficult choices about your care. Weight Management   Why it is important to manage your weight:  Being overweight increases your risk of health conditions such as heart disease, high blood pressure, type 2 diabetes, and certain types of cancer.  It can also increase your risk for osteoarthritis, sleep apnea, and other respiratory problems. Aim for a slow, steady weight loss. Even a small amount of weight loss can lower your risk of health problems. How to lose weight safely:  A safe and healthy way to lose weight is to eat fewer calories and get regular exercise. You can lose up about 1 pound a week by decreasing the number of calories you eat by 500 calories each day. Healthy meal plan for weight management:  A healthy meal plan includes a variety of foods, contains fewer calories, and helps you stay healthy. A healthy meal plan includes the following:  Eat whole-grain foods more often. A healthy meal plan should contain fiber. Fiber is the part of grains, fruits, and vegetables that is not broken down by your body. Whole-grain foods are healthy and provide extra fiber in your diet. Some examples of whole-grain foods are whole-wheat breads and pastas, oatmeal, brown rice, and bulgur. Eat a variety of vegetables every day. Include dark, leafy greens such as spinach, kale, alejo greens, and mustard greens. Eat yellow and orange vegetables such as carrots, sweet potatoes, and winter squash. Eat a variety of fruits every day. Choose fresh or canned fruit (canned in its own juice or light syrup) instead of juice. Fruit juice has very little or no fiber. Eat low-fat dairy foods. Drink fat-free (skim) milk or 1% milk. Eat fat-free yogurt and low-fat cottage cheese. Try low-fat cheeses such as mozzarella and other reduced-fat cheeses. Choose meat and other protein foods that are low in fat. Choose beans or other legumes such as split peas or lentils. Choose fish, skinless poultry (chicken or turkey), or lean cuts of red meat (beef or pork). Before you cook meat or poultry, cut off any visible fat. Use less fat and oil. Try baking foods instead of frying them. Add less fat, such as margarine, sour cream, regular salad dressing and mayonnaise to foods. Eat fewer high-fat foods.  Some examples of high-fat foods include french fries, doughnuts, ice cream, and cakes. Eat fewer sweets. Limit foods and drinks that are high in sugar. This includes candy, cookies, regular soda, and sweetened drinks. Exercise:  Exercise at least 30 minutes per day on most days of the week. Some examples of exercise include walking, biking, dancing, and swimming. You can also fit in more physical activity by taking the stairs instead of the elevator or parking farther away from stores. Ask your healthcare provider about the best exercise plan for you. Alcohol Use and Your Health    Drinking too much can harm your health. Excessive alcohol use leads to about 88,000 death in [facility] each year, and shortens the life of those who diet by almost 30 years. Further, excessive drinking cost the economy $249 billion in 2010. Most excessive drinkers are not alcohol dependent. Excessive alcohol use has immediate effects that increase the risk of many harmful health conditions. These are most often the result of binge drinking. Over time, excessive alcohol use can lead to the development of chronic diseases and other series health problems. What is considered a "drink"? Excessive alcohol use includes:  Binge Drinking: For women, 4 or more drinks consumed on one occasion. For men, 5 or more drinks consumed on one occasion. Heavy Drinking: For women, 8 or more drinks per week. For men, 15 or more drinks per week  Any alcohol used by pregnant women  Any alcohol used by those under the age of 21 years    If you choose to drink, do so in moderation:  Do not drink at all if you are under the age of 24, or if you are or may be pregnant, or have health problems that could be made worse by drinking.   For women, up to 1 drink per day  For men, up to 2 drinks a day    No one should begin drinking or drink more frequently based on potential health benefits    Short-Term Health Risks:  Injuries: motor vehicle crashes, falls, drownings, burns  Violence: homicide, suicide, sexual assault, intimate partner violence  Alcohol poisoning  Reproductive health: risky sexual behaviors, unintended prengnacy, sexually transmitted diseases, miscarriage, stillbirth, fetal alcohol syndrome    Long-Term Health Risks:  Chronic diseases: high blood pressure, heart disease, stroke, liver disease, digestive problems  Cancers: breast, mouth and throat, liver, colon  Learning and memory problems: dementia, poor school performance  Mental health: depression, anxiety, insomnia  Social problems: lost productivity, family problems, unemployment  Alcohol dependence    For support and more information:  Substance Abuse and [facility] , [address] [place] [address]  Web Address: https://Tabtor/    Alcoholics Anonymous        Web Address: http://www.[place].info/    https://www.cdc.gov/alcohol/fact-sheets/alcohol-use.htm     © Copyright [employer] 2018 Information is for End User's use only and may not be sold, redistributed or otherwise used for commercial purposes. All illustrations and images included in CareNotes® are the copyrighted property of [employer] or [address] [place] [address]    Type 2 Diabetes Management for Adults   AMBULATORY CARE:   Type 2 diabetes  is a disease that affects how your body uses glucose (sugar). Either your body cannot make enough insulin, or it cannot use the insulin correctly. It is important to keep diabetes controlled to prevent damage to your heart, blood vessels, and other organs. Management will help you feel well and enjoy your daily activities. Your diabetes care team providers can help you make a plan to fit diabetes care into your schedule. Your plan can change over time to fit your needs and your family's needs. Have someone call your local emergency number (911 in the [address]) if:   You cannot be woken.     You have signs of diabetic ketoacidosis:     confusion, fatigue    vomiting    rapid heartbeat    fruity smelling breath    extreme thirst    dry mouth and skin    You have any of the following signs of a heart attack:      Squeezing, pressure, or pain in your chest    You may  also have any of the following:     Discomfort or pain in your back, neck, jaw, stomach, or arm    Shortness of breath    Nausea or vomiting    Lightheadedness or a sudden cold sweat    You have any of the following signs of a stroke:      Numbness or drooping on one side of your face     Weakness in an arm or leg    Confusion or difficulty speaking    Dizziness, a severe headache, or vision loss    Call your doctor or diabetes care team provider if:   You have a sore or wound that will not heal.    You have a change in the amount you urinate. Your blood sugar levels are higher than your target goals. You often have lower blood sugar levels than your target goals. Your skin is red, dry, warm, or swollen. You have trouble coping with diabetes, or you feel anxious or depressed. You have trouble following any part of your care plan, such as your meal plan. You have questions or concerns about your condition or care. What you need to know about high blood sugar levels:  High blood sugar levels may not cause any symptoms. You may feel more thirsty or urinate more often than usual. Over time, high blood sugar levels can damage your nerves, blood vessels, tissues, and organs. The following can increase your blood sugar levels:  Large meals or large amounts of carbohydrates at one time    Less physical activity    Stress    Illness    A lower dose of diabetes medicine or insulin, or a late dose    What you need to know about low blood sugar levels:  Symptoms include feeling shaky, dizzy, irritable, or confused. You can prevent symptoms by keeping your blood sugar levels from going too low. Treat a low blood sugar level right away:      Drink 4 ounces of juice or have 1 tube of glucose gel.     Check your blood sugar level again 10 to 15 minutes later. When the level goes back to normal, eat a meal or snack to prevent another decrease. Keep glucose gel, raisins, or hard candy with you at all times to treat a low blood sugar level. Your blood sugar level can get too low if you take diabetes medicine or insulin and do not eat enough food. If you use insulin, check your blood sugar level before you exercise. If your blood sugar level is below 100 mg/dL, eat 4 crackers or 2 ounces of raisins, or drink 4 ounces of juice. Check your level every 30 minutes if you exercise longer than 1 hour. You may need a snack during or after exercise. What you can do to manage your blood sugar levels:   Check your blood sugar levels as directed and as needed. Several items are available to use to check your levels. You may need to check by testing a drop of blood in a glucose monitor. You may instead be given a continuous glucose monitoring (CGM) device. The device is worn at all times. The CGM checks your blood sugar level every 5 minutes. It sends results to an electronic device such as a smart phone. A CGM can be used with or without an insulin pump. You and your diabetes care team providers will decide on the best method for you. The goal for blood sugar levels before meals  is between 80 and 130 mg/dL and 2 hours after eating  is lower than 180 mg/dL. Make healthy food choices. Work with a dietitian to create a meal plan that works for you and your schedule. A dietitian can help you learn how to eat the right amount of carbohydrates (sugar and starchy foods) during your meals and snacks. Examples of carbohydrates are breads, cereals, rice, pasta, fruit, low-fat dairy, and sweets. Carbohydrates can raise your blood sugar level if you eat too many at one time. Eat high-fiber foods as directed. Fiber helps improve blood sugar levels.  Fiber also lowers your risk for heart disease and other problems diabetes can cause. Examples of high-fiber foods include vegetables, whole-grain bread, and beans such as patel beans. Your dietitian can tell you how much fiber to have each day. Get regular physical activity. Physical activity can help you get to your target blood sugar level goal and manage your weight. Get at least 150 minutes of moderate to vigorous aerobic physical activity each week. Resistance training, such as lifting weights, should be done 3 times each week. Do not miss more than 2 days of physical activity in a row. Do not sit longer than 30 minutes at a time. Your healthcare provider can help you create an activity plan. The plan can include the best activities for you and can help you build your strength and endurance. Maintain a healthy weight. Ask your team what a healthy weight is for you. A healthy weight can help you control diabetes and prevent heart disease. Ask your team to help you create a weight-loss plan, if needed. Even a loss of 3% to 7% of your excess body weight can help make a difference in managing diabetes. Your team will help you set a weight-loss goal, such as 10 to 15 pounds, or 5% of your extra weight. Together you and your team can set manageable weight-loss goals. Take your diabetes medicine or insulin as directed. You may need diabetes medicine, insulin, or both to help control your blood sugar levels. Your healthcare provider will teach you how and when to take your diabetes medicine or insulin. You will also be taught about side effects oral diabetes medicine can cause. Insulin may be injected or given through a pump or pen. You and your providers will decide on the best method for you: An insulin pump  is an implanted device that gives your insulin 24 hours a day. An insulin pump prevents the need for multiple insulin injections in a day. An insulin pen  is a device prefilled with the right amount of insulin.          You and your family members will be taught how to draw up and give insulin  if this is the best method for you. Your providers will also teach you how to dispose of needles and syringes. You will learn how much insulin you need  and when to give it. You will be taught when not to give insulin. You will also be taught what to do if your blood sugar level drops too low. This may happen if you take insulin and do not eat the right amount of carbohydrates. More ways to manage type 2 diabetes:   Wear medical alert identification. Wear medical alert jewelry or carry a card that says you have diabetes. Ask your provider where to get these items. Do not smoke. Nicotine and other chemicals in cigarettes and cigars can cause lung and blood vessel damage. It also makes it more difficult to manage your diabetes. Ask your provider for information if you currently smoke and need help to quit. Do not use e-cigarettes or smokeless tobacco in place of cigarettes or to help you quit. They still contain nicotine. Check your feet each day for cuts, scratches, calluses, or other wounds. Look for redness and swelling, and feel for warmth. Wear shoes that fit well. Check your shoes for rocks or other objects that can hurt your feet. Do not walk barefoot or wear shoes without socks. Wear cotton socks to help keep your feet dry. Ask about vaccines you may need. You have a higher risk for serious illness if you get the flu, pneumonia, COVID-19, or hepatitis. Ask your provider if you should get vaccines to prevent these or other diseases, and when to get the vaccines. Talk to your provider if you become stressed about diabetes care. Sometimes being able to fit diabetes care into your life can cause increased stress. The stress can cause you not to take care of yourself properly. Your provider can help by offering tips about self-care. A mental health provider can listen and offer help with self-care issues.  Other types of counseling can help you make nutrition or physical activity changes. Have your A1c checked as directed. Your provider may check your A1c every 3 months, or 2 times each year if your diabetes is controlled. An A1c test shows the average amount of sugar in your blood over the past 2 to 3 months. Your provider will tell you what your A1c level should be. Have screening tests as directed. Your provider may recommend screening for complications of diabetes and other conditions that may develop. Some screenings may begin right away and some may happen within the first 5 years of diagnosis:    Examples of diabetes complications  include kidney problems, high cholesterol, high blood pressure, blood vessel problems, eye problems, and sleep apnea. You may be screened for a low vitamin B level  if you take oral diabetes medicine for a long time. You may be screened for polycystic ovarian syndrome (PCOS)  if you are of childbearing age. Follow up with your doctor or diabetes care team providers as directed: You may need to have blood tests done before your follow-up visit. The test results will show if changes need to be made in your treatment or self-care. Talk to your provider if you cannot afford your medicine. Write down your questions so you remember to ask them during your visits. © Copyright [employer] 2023 Information is for End User's use only and may not be sold, redistributed or otherwise used for commercial purposes. The above information is an  only. It is not intended as medical advice for individual conditions or treatments. Talk to your doctor, nurse or pharmacist before following any medical regimen to see if it is safe and effective for you.

## 2023-12-14 NOTE — ASSESSMENT & PLAN NOTE
Rate is well-controlled on present regimen. Patient is also on Xarelto.   Also managed by cardiologist

## 2023-12-14 NOTE — PROGRESS NOTES
Diabetic Foot Exam    Patient's shoes and socks removed. Right Foot/Ankle   Right Foot Inspection  Skin Exam: skin normal, skin intact and dry skin. No warmth, no callus, no erythema, no maceration, no abnormal color, no pre-ulcer, no ulcer and no callus. Toe Exam: ROM and strength within normal limits. Sensory   Monofilament testing: intact    Vascular  The right DP pulse is 2+. The right PT pulse is 2+. Left Foot/Ankle  Left Foot Inspection  Skin Exam: skin normal, skin intact and dry skin. No warmth, no erythema, no maceration, normal color, no pre-ulcer, no ulcer and no callus. Toe Exam: ROM and strength within normal limits. Sensory   Monofilament testing: intact    Vascular  The left DP pulse is 2+. The left PT pulse is 2+. Assign Risk Category  No deformity present  No loss of protective sensation  No weak pulses  Risk: 0   Assessment and Plan:     Problem List Items Addressed This Visit          Digestive    Gastroesophageal reflux disease without esophagitis     Continue with Pepcid. Relevant Orders    CBC and differential       Endocrine    Type 2 diabetes mellitus with diabetic peripheral angiopathy without gangrene [facility])       Lab Results   Component Value Date    HGBA1C 6.3 (H) 08/31/2023   Well-controlled with diet. Continue with diabetic diet. Will continue to monitor. Continue with exercise         Relevant Orders    Hemoglobin A1C    Comprehensive metabolic panel    Lipid Panel with Direct LDL reflex    Microalbumin, Random Urine (W/Creatinine) ([facility] ONLY)       Respiratory    MARISSA (obstructive sleep apnea)       Cardiovascular and Mediastinum    Essential hypertension    Chronic diastolic (congestive) heart failure (HCC)    A-fib (HCC)     Rate is well-controlled on present regimen. Patient is also on Xarelto.   Also managed by cardiologist         Peripheral vascular disease ([address])    Atherosclerosis of aorta [facility])     Managed by cardiologist Genitourinary    Stage 3a chronic kidney disease [facility])     Lab Results   Component Value Date    EGFR 74 11/02/2023    EGFR 70 08/31/2023    EGFR 73 09/15/2022    CREATININE 0.99 11/02/2023    CREATININE 1.03 08/31/2023    CREATININE 1.00 09/15/2022   Renal function is stable. Will continue to monitor. Advised to avoid nephrotoxic meds including OTC NSAIDs. Other    Major depressive disorder, single episode, mild (HCC)     Stable on present regimen. Will continue to monitor         Morbid obesity ([address])     Advised for low caloric diet and exercise          Other Visit Diagnoses       Encounter for immunization    -  Primary    Relevant Orders    influenza vaccine, high-dose, PF 0.7 mL (FLUZONE HIGH-DOSE) (Completed)    Need for influenza vaccination        Relevant Orders    influenza vaccine, high-dose, PF 0.7 mL (FLUZONE HIGH-DOSE) (Completed)          BMI Counseling: Body mass index is 43.82 kg/m². The BMI is above normal. Nutrition recommendations include decreasing portion sizes, encouraging healthy choices of fruits and vegetables, decreasing fast food intake, consuming healthier snacks and limiting drinks that contain sugar. Exercise recommendations include vigorous physical activity 75 minutes/week and exercising 3-5 times per week. Rationale for BMI follow-up plan is due to patient being overweight or obese. Preventive health issues were discussed with patient, and age appropriate screening tests were ordered as noted in patient's After Visit Summary. Personalized health advice and appropriate referrals for health education or preventive services given if needed, as noted in patient's After Visit Summary. History of Present Illness:     Patient presents for a Medicare Wellness Visit    Patient is a new patient visit. Also blood work done in November 2023. Complaining of generalized fatigue.        Patient Care Team:  Brenda Rader MD as PCP - [address] MD Leotis Canavan., MD Silvano Mares MD (Nephrology)     Review of Systems:     Review of Systems   Constitutional:  Positive for fatigue. Negative for fever. HENT:  Negative for congestion, ear discharge, ear pain, postnasal drip, sinus pressure, sore throat, tinnitus and trouble swallowing. Eyes:  Negative for discharge, itching and visual disturbance. Respiratory:  Negative for cough and shortness of breath. Cardiovascular:  Negative for chest pain and palpitations. Gastrointestinal:  Negative for abdominal pain, diarrhea, nausea and vomiting. Endocrine: Negative for cold intolerance and polyuria. Genitourinary:  Negative for difficulty urinating, dysuria and urgency. Musculoskeletal:  Positive for arthralgias. Negative for neck pain. Skin:  Negative for rash. Allergic/Immunologic: Negative for environmental allergies. Neurological:  Negative for dizziness, weakness and headaches. Psychiatric/Behavioral:  Negative for agitation and behavioral problems. The patient is not nervous/anxious.          Problem List:     Patient Active Problem List   Diagnosis    Gastroesophageal reflux disease without esophagitis    Benign colon polyp    Major depressive disorder, single episode, mild (HCC)    Type 2 diabetes mellitus with diabetic peripheral angiopathy without gangrene (HCC)    Idiopathic chronic gout of multiple sites without tophus    Hyperlipidemia    Essential hypertension    Microscopic hematuria    Morbid obesity (HCC)    Peripheral neuropathy    MARISSA (obstructive sleep apnea)    Testicular hypogonadism    Thyroglossal duct cyst    Benign prostatic hyperplasia with urinary frequency    Chronic rhinitis    Chronic diastolic (congestive) heart failure (HCC)    A-fib (HCC)    Anticoagulation adequate    Peripheral vascular disease ([address])    Fatty liver    Long term current use of antiarrhythmic drug    S/P ablation of atrial fibrillation    Atherosclerosis of aorta ([address])    Anxiety    Horseshoe tear of retina of left eye without detachment    Chronic constipation    Hyponatremia    Hyperkalemia    Persistent proteinuria    Carpal tunnel syndrome, bilateral    Stage 3a chronic kidney disease [facility])      Past Medical and Surgical History:     Past Medical History:   Diagnosis Date    Acute prostatitis 07/26/2021    [facility] and treated with Cipro x2 weeks 6/7/21.  Questionable recurrence July 16, 2021    Allergic     Anxiety     Arthritis     Atrial fibrillation (HCC)     BPH (benign prostatic hyperplasia)     Chronic diastolic (congestive) heart failure (HCC)     Depression     Diabetes mellitus ([address])     Drug-induced insomnia ([address]) 02/03/2020    Ear problems     GERD (gastroesophageal reflux disease)     Gout     Headache(784.0)     HL (hearing loss)     Hyperlipidemia     Hypertension     Hyponatremia     last assessed: 09/08/2014    Leukocytosis     Liver function abnormality     Morbid obesity ([address])     Nasal congestion     Obesity     Obstructive sleep apnea     Sleep apnea      Past Surgical History:   Procedure Laterality Date    CARDIAC CATHETERIZATION      outcome: Neg    CARDIAC ELECTROPHYSIOLOGY STUDY AND ABLATION      CARDIAC LOOP RECORDER  2019    CARDIOVASCULAR STRESS TEST      resolved: 10/27/2010; negative    COLONOSCOPY  11/2013    polyp; complete    COLONOSCOPY  05/26/2017    HERNIA REPAIR      resolved: 11/1999    JOINT REPLACEMENT      TN NEUROPLASTY &/TRANSPOS MEDIAN NRV CARPAL TUNNE Right 11/03/2023    Procedure: Pratima Amato;  Surgeon: Kathryn Kelly MD;  Location: [facility];  Service: Orthopedics    REPLACEMENT TOTAL KNEE Left 2010    REPLACEMENT TOTAL KNEE Right 2003    THYROGLOSSAL DUCT EXCISION      TONSILLECTOMY      last assessed: 06/02/2014    UPPER GASTROINTESTINAL ENDOSCOPY        Family History:     Family History   Problem Relation Age of Onset    Diabetes Mother     Heart attack Mother     Hypertension Mother     Heart attack Sister Social History:     Social History     Socioeconomic History    Marital status: /Civil Union     Spouse name: None    Number of children: None    Years of education: None    Highest education level: None   Occupational History    Occupation: Disability    Occupation:      Comment: significant asbestos and silica exposure in the past   Tobacco Use    Smoking status: Never    Smokeless tobacco: Never   Vaping Use    Vaping status: Never Used   Substance and Sexual Activity    Alcohol use: Not Currently     Alcohol/week: 3.0 standard drinks of alcohol     Types: 3 Standard drinks or equivalent per week    Drug use: Never    Sexual activity: Not Currently   Other Topics Concern    None   Social History Narrative    4 cups of coffee/day     Social Determinants of Health     Financial Resource Strain: Low Risk  (12/11/2023)    Overall Financial Resource Strain (CARDIA)     Difficulty of Paying Living Expenses: Not hard at all   Food Insecurity: Not on file   Transportation Needs: No Transportation Needs (12/11/2023)    PRAPARE - Transportation     Lack of Transportation (Medical): No     Lack of Transportation (Non-Medical):  No   Physical Activity: Not on file   Stress: Not on file   Social Connections: Not on file   Intimate Partner Violence: Not on file   Housing Stability: Not on file      Medications and Allergies:     Current Outpatient Medications   Medication Sig Dispense Refill    acetaminophen-codeine (TYLENOL/CODEINE #3) 300-30 MG per tablet Take 1 tablet by mouth every 12 (twelve) hours as needed for severe pain 5 tablet 0    allopurinol (ZYLOPRIM) 300 mg tablet TAKE 1 TABLET BY MOUTH DAILY 90 tablet 3    amLODIPine (NORVASC) 10 mg tablet Take 1 tablet (10 mg total) by mouth daily 90 tablet 3    amoxicillin (AMOXIL) 500 mg capsule Prior to dental visits      cloNIDine (CATAPRES) 0.2 mg tablet Take 1 tablet (0.2 mg total) by mouth daily One hour prior to bed 90 tablet 3    dexlansoprazole (DEXILANT) 60 MG capsule Take 1 capsule (60 mg total) by mouth daily before breakfast 90 capsule 3    dofetilide (TIKOSYN) 250 mcg capsule TAKE 1 CAPSULE BY MOUTH EVERY TWELVE (12) HOURS 180 capsule 3    famotidine (PEPCID) 40 MG tablet TAKE 1 TABLET BY MOUTH  DAILY AT BEDTIME 90 tablet 3    fexofenadine (ALLEGRA) 180 MG tablet Take 180 mg by mouth daily      FLUoxetine (PROzac) 40 MG capsule TAKE 2 CAPSULES BY MOUTH  DAILY 180 capsule 1    furosemide (LASIX) 40 mg tablet Take 1 tablet (40 mg total) by mouth daily 90 tablet 3    nebivolol (BYSTOLIC) 10 mg tablet Take 1 tablet (10 mg total) by mouth daily (Patient taking differently: Take 5 mg by mouth daily) 90 tablet 3    olmesartan (BENICAR) 40 mg tablet TAKE 1 TABLET BY MOUTH  DAILY 90 tablet 3    potassium chloride SA (Klor-Con M15) 15 MEQ tablet Take 2 tablets (30 mEq total) by mouth daily 180 tablet 3    pravastatin (PRAVACHOL) 10 mg tablet Take 1 tablet (10 mg total) by mouth daily at bedtime 90 tablet 3    rivaroxaban (Xarelto) 20 mg tablet Take 1 tablet (20 mg total) by mouth daily with breakfast 14 tablet 3     No current facility-administered medications for this visit.      Allergies   Allergen Reactions    Metoprolol Shortness Of Breath     Tolerates Bystolic    Benazepril Cough    Clonidine Fatigue    Diltiazem Bradycardia    Entex T  [Pseudoephedrine-Guaifenesin]      Annotation - [id number]: LEG SWELLING    Tizanidine Other (See Comments)      Immunizations:     Immunization History   Administered Date(s) Administered    COVID-19 PFIZER VACCINE 0.3 ML IM 02/06/2021, 03/02/2021, 08/19/2021    INFLUENZA 08/19/2021    Influenza Split High Dose Preservative Free IM 09/30/2013, 12/01/2014, 11/02/2015, 11/03/2016, 12/08/2017    Influenza, high dose seasonal 0.7 mL 10/16/2019, 10/12/2020, 10/12/2022, 12/14/2023    Influenza, seasonal, injectable 12/14/2005, 11/26/2007    Pneumococcal Conjugate 13-Valent 12/01/2014    Pneumococcal Polysaccharide PPV23 11/26/2007, 12/08/2017, 10/26/2018    Td (adult), adsorbed 01/15/1996    Tdap 06/05/2012    Zoster Vaccine Recombinant 12/15/2018, 04/30/2019    influenza, trivalent, adjuvanted 10/26/2018      Health Maintenance:         Topic Date Due    Colorectal Cancer Screening  10/12/2026    Hepatitis C Screening  Completed         Topic Date Due    COVID-19 Vaccine (4 - 2023-24 season) 09/01/2023      Medicare Screening Tests and Risk Assessments:     Yared Jarvis is here for his Subsequent Wellness visit. Last Medicare Wellness visit information reviewed, patient interviewed and updates made to the record today. Health Risk Assessment:   Patient rates overall health as fair. Patient feels that their physical health rating is slightly better. Patient is satisfied with their life. Eyesight was rated as slightly worse. Hearing was rated as slightly worse. Patient feels that their emotional and mental health rating is same. Patients states they are sometimes angry. Patient states they are often unusually tired/fatigued. Pain experienced in the last 7 days has been some. Patient's pain rating has been 5/10. Patient states that he has experienced no weight loss or gain in last 6 months. Depression Screening:   PHQ-9 Score: 5      Fall Risk Screening: In the past year, patient has experienced: no history of falling in past year      Home Safety:  Patient has trouble with stairs inside or outside of their home. Patient has working smoke alarms and has working carbon monoxide detector. Home safety hazards include: none. Nutrition:   Current diet is Diabetic. Medications:   Patient is currently taking over-the-counter supplements. OTC medications include: see medication list. Patient is able to manage medications.      Activities of Daily Living (ADLs)/Instrumental Activities of Daily Living (IADLs):   Walk and transfer into and out of bed and chair?: Yes  Dress and groom yourself?: Yes    Bathe or shower yourself?: Yes Feed yourself? Yes  Do your laundry/housekeeping?: Yes  Manage your money, pay your bills and track your expenses?: Yes  Make your own meals?: Yes    Do your own shopping?: Yes    Previous Hospitalizations:   Any hospitalizations or ED visits within the last 12 months?: No      Advance Care Planning:   Living will: No    Durable POA for healthcare: No    Advanced directive: No    Advanced directive counseling given: Yes      Cognitive Screening:   Provider or family/friend/caregiver concerned regarding cognition?: No    PREVENTIVE SCREENINGS      Cardiovascular Screening:    General: Screening Not Indicated and History Lipid Disorder      Diabetes Screening:     General: Screening Not Indicated and History Diabetes      Colorectal Cancer Screening:     General: Screening Current      Prostate Cancer Screening:    General: Screening Not Indicated      Osteoporosis Screening:    General: Screening Not Indicated      Abdominal Aortic Aneurysm (AAA) Screening:    Risk factors include: age between 70-75 yo        General: Screening Not Indicated      Lung Cancer Screening:     General: Screening Not Indicated      Hepatitis C Screening:    General: Screening Current    Screening, Brief Intervention, and Referral to Treatment (SBIRT)    Screening  Typical number of drinks in a day: 0  Typical number of drinks in a week: 0  Interpretation: Low risk drinking behavior. AUDIT-C Screenin) How often did you have a drink containing alcohol in the past year? 2 to 3 times a week  2) How many drinks did you have on a typical day when you were drinking in the past year? 1 to 2  3) How often did you have 6 or more drinks on one occasion in the past year? less than monthly    AUDIT-C Score: 4  Interpretation: Score 4-12 (male): POSITIVE screen for alcohol misuse    AUDIT Screenin) How often during the last year have you found that you were not able to stop drinking once you had started?  0 - never  5) How often during the last year have you failed to do what was normally expected from you because of drinking? 0 - never  6) How often during the last year have you needed a first drink in the morning to get yourself going after a heavy drinking session? 0 - never  7) How often during the last year have you had a feeling of guilt or remorse after drinking? 0 - never  8) How often during the last year have you been unable to remember what happened the night before because you had been drinking? 0 - never  9) Have you or someone else been injured as a result of your drinking? 0 - no  10) Has a relative or friend or a doctor or another health worker been concerned about your drinking or suggested you cut down? 0 - no    AUDIT Score: 4  Interpretation: Low risk alcohol consumption    Single Item Drug Screening:  How often have you used an illegal drug (including marijuana) or a prescription medication for non-medical reasons in the past year? never    Single Item Drug Screen Score: 0  Interpretation: Negative screen for possible drug use disorder    Brief Intervention  Alcohol & drug use screenings were reviewed. No concerns regarding substance use disorder identified. Other Counseling Topics:   Car/seat belt/driving safety, skin self-exam, sunscreen and calcium and vitamin D intake and regular weightbearing exercise. No results found. Physical Exam:     /78   Pulse 55   Temp 97.6 °F (36.4 °C) (Temporal)   Ht 5' 7.5" (1.715 m)   Wt 129 kg (284 lb)   SpO2 96% Comment: room air  BMI 43.82 kg/m²     Physical Exam  Vitals and nursing note reviewed. Constitutional:       General: He is not in acute distress. Appearance: He is well-developed. He is obese. HENT:      Head: Normocephalic and atraumatic. Right Ear: External ear normal. There is no impacted cerumen. Left Ear: External ear normal. There is no impacted cerumen. Nose: No rhinorrhea.       Mouth/Throat:      Pharynx: No posterior oropharyngeal erythema. Eyes:      Conjunctiva/sclera: Conjunctivae normal.   Cardiovascular:      Rate and Rhythm: Normal rate and regular rhythm. Pulses: no weak pulses          Dorsalis pedis pulses are 2+ on the right side and 2+ on the left side. Posterior tibial pulses are 2+ on the right side and 2+ on the left side. Heart sounds: No murmur heard. Pulmonary:      Effort: Pulmonary effort is normal. No respiratory distress. Breath sounds: Normal breath sounds. Abdominal:      Palpations: Abdomen is soft. Tenderness: There is no abdominal tenderness. Musculoskeletal:         General: No swelling. Cervical back: Neck supple. Right lower leg: Edema present. Left lower leg: Edema present. Feet:      Right foot:      Skin integrity: Dry skin present. No ulcer, skin breakdown, erythema, warmth or callus. Left foot:      Skin integrity: Dry skin present. No ulcer, skin breakdown, erythema, warmth or callus. Skin:     General: Skin is warm and dry. Capillary Refill: Capillary refill takes less than 2 seconds. Findings: No rash. Neurological:      Mental Status: He is alert and oriented to person, place, and time.    Psychiatric:         Mood and Affect: Mood normal.         Behavior: Behavior normal.          Tahmina Martinez MD

## 2023-12-14 NOTE — ASSESSMENT & PLAN NOTE
Lab Results   Component Value Date    HGBA1C 6.3 (H) 08/31/2023   Well-controlled with diet. Continue with diabetic diet. Will continue to monitor.   Continue with exercise

## 2023-12-21 ENCOUNTER — OFFICE VISIT (OUTPATIENT)
Dept: OBGYN CLINIC | Facility: CLINIC | Age: 73
End: 2023-12-21

## 2023-12-21 VITALS
DIASTOLIC BLOOD PRESSURE: 70 MMHG | HEIGHT: 68 IN | BODY MASS INDEX: 43.04 KG/M2 | WEIGHT: 284 LBS | SYSTOLIC BLOOD PRESSURE: 150 MMHG

## 2023-12-21 DIAGNOSIS — I10 ESSENTIAL HYPERTENSION: ICD-10-CM

## 2023-12-21 DIAGNOSIS — G56.03 CARPAL TUNNEL SYNDROME, BILATERAL: Primary | ICD-10-CM

## 2023-12-21 DIAGNOSIS — I48.0 PAROXYSMAL ATRIAL FIBRILLATION (HCC): ICD-10-CM

## 2023-12-21 PROCEDURE — 99024 POSTOP FOLLOW-UP VISIT: CPT | Performed by: SURGERY

## 2023-12-21 RX ORDER — NEBIVOLOL 10 MG/1
10 TABLET ORAL DAILY
Qty: 90 TABLET | Refills: 3 | Status: SHIPPED | OUTPATIENT
Start: 2023-12-21

## 2023-12-21 NOTE — PROGRESS NOTES
Orthopedic Surgery  Patient Name:  Alexys Valdez .    Surgery:  R CTR   Surgery Date:  11/3/2023      Subjective      Patient reports overall doing well.  The incisional area/scar tissue is , but he admits to not massaging it.  He feels the numbness and tingling in his fingers is improving.  He is interested in surgery for the left carpal tunnel sometime next year.    Objective   Chest:  unlabored breathing  Heart:  regular rate  Abd:  no distention  Right hand   Incision is healed nicely.  Full composite fist  Normal wrist range of motion  Normal strength  Sensation intact to light touch median nerve distribution  2-point discrimination testing now normal throughout right hand    Assessment and Plan    S/p R CTR 11/3/2023 - doing well.  Activities as tolerated.     Left carpal tunnel syndrome -patient is a candidate for left carpal tunnel release procedure under local anesthesia.  He wishes to pursue this option.  Risks, benefits and alternative treatments were discussed with the patient. These included but are not limited to: bleeding, infection, damage to nerves, vessels or tendons, allergic reaction to agents, possible increase in pain, tendon or ligament rupture, weakening of bone or soft tissues, and/or elevation in blood sugar. Patient understands and would like to proceed with the proposed procedure.    Follow-up 2 weeks after surgery.

## 2024-01-15 ENCOUNTER — OFFICE VISIT (OUTPATIENT)
Dept: CARDIOLOGY CLINIC | Facility: CLINIC | Age: 74
End: 2024-01-15
Payer: MEDICARE

## 2024-01-15 VITALS
WEIGHT: 278.9 LBS | SYSTOLIC BLOOD PRESSURE: 104 MMHG | HEART RATE: 55 BPM | HEIGHT: 68 IN | BODY MASS INDEX: 42.27 KG/M2 | DIASTOLIC BLOOD PRESSURE: 58 MMHG

## 2024-01-15 DIAGNOSIS — Z86.79 S/P ABLATION OF ATRIAL FIBRILLATION: ICD-10-CM

## 2024-01-15 DIAGNOSIS — I48.0 PAROXYSMAL ATRIAL FIBRILLATION (HCC): Primary | ICD-10-CM

## 2024-01-15 DIAGNOSIS — Z98.890 S/P ABLATION OF ATRIAL FIBRILLATION: ICD-10-CM

## 2024-01-15 PROCEDURE — 93000 ELECTROCARDIOGRAM COMPLETE: CPT | Performed by: PHYSICIAN ASSISTANT

## 2024-01-15 PROCEDURE — 99213 OFFICE O/P EST LOW 20 MIN: CPT | Performed by: PHYSICIAN ASSISTANT

## 2024-01-15 NOTE — PROGRESS NOTES
Electrophysiology Office Visit  [facility]  [facility] [address], [place] [address]    Name: Alexys Valdez Jr.  : 1950  MRN: [id number]    ASSESSMENT:  1. Paroxysmal atrial fibrillation (HCC)  POCT ECG      2. S/P ablation of atrial fibrillation            PLAN:  Paroxysmal atrial fibrillation  - anticoagulated with Xarelto / [id number] score of 2 (age, HTN)  - EF of 60% per echo 2023 / mild dilation of left atrium noted  - rate control: nebivolol  - antiarrhythmic therapy: dofetilide  - prior ablation: ablation of atrial fibrillation with pulmonary vein isolation by Dr. Swann on 12/3/2019  [employer] loop recorder in situ  - implantaed 2019 for surveillance of afib  Essential hypertension  Obesity with BMI of 43  Obstructive sleep apnea maintained on CPAP    IMPRESSION:  Patient is stable from an arrhythmia standpoint.  Creatinine is stable as is QT interval so we will continue current dose of dofetilide and nebivolol with no changes.    Patient is to follow up in our EP office in 6 months. He is to call our office with any further questions or concerns in the meantime.    HPI:   Interim history: Alexys Valdez Jr. is a 73 y.o. male with past medical history as stated above. He presents today for overdue office visit.    Patient reports that he feels well today and offers no complaints.  He has been seen by electrophysiology in the past and underwent ablation of atrial fibrillation in 2019.  At that time he underwent loop recorder implantation and initiation of dofetilide and has been well-maintained on this.    EKG: Normal sinus rhythm at 55 beats per minute, Qtc of 431msec    ROS:   Review of Systems   Constitutional: Negative for chills, diaphoresis, fever and malaise/fatigue.   Cardiovascular:  Negative for chest pain, claudication, cyanosis, dyspnea on exertion, irregular heartbeat, leg swelling, near-syncope,  "orthopnea, palpitations, paroxysmal nocturnal dyspnea and syncope.   Respiratory:  Negative for shortness of breath and sleep disturbances due to breathing.    Neurological:  Negative for dizziness and light-headedness.   All other systems reviewed and are negative.      OBJECTIVE:   Vitals:   /58 (BP Location: Right arm, Patient Position: Sitting, Cuff Size: Standard)   Pulse 55   Ht 5' 7.5\" (1.715 m)   Wt 127 kg (278 lb 14.4 oz)   BMI 43.04 kg/m²       Physical Exam:   Physical Exam  Vitals and nursing note reviewed.   Constitutional:       General: He is not in acute distress.     Appearance: Normal appearance. He is well-developed. He is not diaphoretic.   HENT:      Head: Normocephalic and atraumatic.   Eyes:      Pupils: Pupils are equal, round, and reactive to light.   Neck:      Vascular: No JVD.   Cardiovascular:      Rate and Rhythm: Normal rate and regular rhythm.      Heart sounds: No murmur heard.     No friction rub. No gallop.   Pulmonary:      Effort: Pulmonary effort is normal. No respiratory distress.      Breath sounds: Normal breath sounds. No wheezing.   Abdominal:      Palpations: Abdomen is soft.   Musculoskeletal:         General: Normal range of motion.      Cervical back: Normal range of motion.   Skin:     General: Skin is warm and dry.   Neurological:      Mental Status: He is alert and oriented to person, place, and time.   Psychiatric:         Mood and Affect: Mood normal.         Behavior: Behavior normal.       Medications:      Current Outpatient Medications:     acetaminophen-codeine (TYLENOL/CODEINE #3) 300-30 MG per tablet, Take 1 tablet by mouth every 12 (twelve) hours as needed for severe pain, Disp: 5 tablet, Rfl: 0    allopurinol (ZYLOPRIM) 300 mg tablet, TAKE 1 TABLET BY MOUTH DAILY, Disp: 90 tablet, Rfl: 3    amLODIPine (NORVASC) 10 mg tablet, Take 1 tablet (10 mg total) by mouth daily, Disp: 90 tablet, Rfl: 3    amoxicillin (AMOXIL) 500 mg capsule, Prior to dental " visits, Disp: , Rfl:     cloNIDine (CATAPRES) 0.2 mg tablet, Take 1 tablet (0.2 mg total) by mouth daily One hour prior to bed, Disp: 90 tablet, Rfl: 3    dexlansoprazole (DEXILANT) 60 MG capsule, Take 1 capsule (60 mg total) by mouth daily before breakfast, Disp: 90 capsule, Rfl: 3    dofetilide (TIKOSYN) 250 mcg capsule, TAKE 1 CAPSULE BY MOUTH EVERY TWELVE (12) HOURS, Disp: 180 capsule, Rfl: 3    famotidine (PEPCID) 40 MG tablet, TAKE 1 TABLET BY MOUTH  DAILY AT BEDTIME, Disp: 90 tablet, Rfl: 3    fexofenadine (ALLEGRA) 180 MG tablet, Take 180 mg by mouth daily, Disp: , Rfl:     FLUoxetine (PROzac) 40 MG capsule, TAKE 2 CAPSULES BY MOUTH  DAILY, Disp: 180 capsule, Rfl: 1    furosemide (LASIX) 40 mg tablet, Take 1 tablet (40 mg total) by mouth daily, Disp: 90 tablet, Rfl: 3    nebivolol (BYSTOLIC) 10 mg tablet, TAKE 1 TABLET BY MOUTH ONCE DAILY, Disp: 90 tablet, Rfl: 3    ofloxacin (FLOXIN) 0.3 % otic solution, [place] 4 drops into the afffected ear twice daily x 3 days, Disp: 5 mL, Rfl: 0    olmesartan (BENICAR) 40 mg tablet, TAKE 1 TABLET BY MOUTH  DAILY, Disp: 90 tablet, Rfl: 3    potassium chloride SA (Klor-Con M15) 15 MEQ tablet, Take 2 tablets (30 mEq total) by mouth daily, Disp: 180 tablet, Rfl: 3    pravastatin (PRAVACHOL) 10 mg tablet, Take 1 tablet (10 mg total) by mouth daily at bedtime, Disp: 90 tablet, Rfl: 3    rivaroxaban (Xarelto) 20 mg tablet, Take 1 tablet (20 mg total) by mouth daily with breakfast, Disp: 14 tablet, Rfl: 3     Family History   Problem Relation Age of Onset    Diabetes Mother     Heart attack Mother     Hypertension Mother     Heart attack Sister      Social History     Socioeconomic History    Marital status: /Civil Union     Spouse name: Not on file    Number of children: Not on file    Years of education: Not on file    Highest education level: Not on file   Occupational History    Occupation: Disability    Occupation:      Comment: significant asbestos  and silica exposure in the past   Tobacco Use    Smoking status: Never    Smokeless tobacco: Never   Vaping Use    Vaping status: Never Used   Substance and Sexual Activity    Alcohol use: Not Currently     Alcohol/week: 3.0 standard drinks of alcohol     Types: 3 Standard drinks or equivalent per week    Drug use: Never    Sexual activity: Not Currently   Other Topics Concern    Not on file   Social History Narrative    4 cups of coffee/day     Social Determinants of Health     Financial Resource Strain: Low Risk  (12/11/2023)    Overall Financial Resource Strain (CARDIA)     Difficulty of Paying Living Expenses: Not hard at all   Food Insecurity: Not on file   Transportation Needs: No Transportation Needs (12/11/2023)    PRAPARE - Transportation     Lack of Transportation (Medical): No     Lack of Transportation (Non-Medical): No   Physical Activity: Not on file   Stress: Not on file   Social Connections: Not on file   Intimate Partner Violence: Not on file   Housing Stability: Not on file     Social History     Tobacco Use   Smoking Status Never   Smokeless Tobacco Never     Social History     Substance and Sexual Activity   Alcohol Use Not Currently    Alcohol/week: 3.0 standard drinks of alcohol    Types: 3 Standard drinks or equivalent per week       Labs & Results:  Below is the patient's most recent value for Albumin, ALT, AST, BUN, Calcium, Chloride, Cholesterol, CO2, Creatinine, GFR, Glucose, HDL, Hematocrit, Hemoglobin, Hemoglobin A1C, LDL, Magnesium, Phosphorus, Platelets, Potassium, PSA, Sodium, Triglycerides, and WBC.   Lab Results   Component Value Date    ALT 27 11/02/2023    AST 28 11/02/2023    BUN 16 11/02/2023    CALCIUM 9.7 11/02/2023    CL 96 11/02/2023    CO2 24 11/02/2023    CREATININE 0.99 11/02/2023    HDL 44 08/31/2023    HCT 43.9 11/02/2023    HGB 15.3 11/02/2023    HGBA1C 6.3 (H) 08/31/2023    MG 1.6 12/03/2019     11/02/2023    K 4.9 11/02/2023    PSA 0.13 08/31/2023    TRIG 75  2023    WBC 10.39 (H) 2023     Note: for a comprehensive list of the patient's lab results, access the Results Review activity.    CARDIAC TESTING:   ECHO:   Results for orders placed during the hospital encounter of 10/30/18    Echo complete with contrast if indicated    Narrative  [facility]  [address] [place] [address]  [phone]    Transthoracic Echocardiogram  2D, M-mode, Doppler, and Color Doppler    Study date:  2018    Patient: CHRIS CHAVES  MR number: [id number]  Account number: [id number]  : 1950  Age: 68 years  Gender: Male  Status: Inpatient  Location: Bedside  Height: 70 in  Weight: 332 lb  BP: 134/ 98 mmHg    Indications: Atrial fibrillation.    Diagnoses: I48.0 - Atrial fibrillation    Sonographer:  Joi Norman RDCS  Primary Physician:  Juan Jane MD  Referring Physician:  Jenna Cruz MD  Group:  [facility]  Interpreting Physician:  Lorraine South DO    SUMMARY    PROCEDURE INFORMATION:  This was a very technically difficult and poor quality study.  Echocardiographic views were limited due to decreased penetration and lung interference.  Intravenous contrast ( 1.2 mL of definity) was administered.    LEFT VENTRICLE:  Systolic function was normal. Ejection fraction was estimated to be 55 %.  This study was inadequate for the evaluation of regional wall motion.  Wall thickness was mildly increased.    LEFT ATRIUM:  The atrium was mildly dilated.    RIGHT ATRIUM:  The atrium was poorly visualized.    MITRAL VALVE:  Grossly normal but very poorly visualized mitral valve with possible mild annular calcification.    AORTIC VALVE:  The valve was not well visualized.    TRICUSPID VALVE:  Not well visualized.  There was mild to moderate regurgitation.    IVC, HEPATIC VEINS:  The inferior vena cava was mildly dilated.    HISTORY: PRIOR HISTORY: DM2. Hyperlipidemia. Hypertension. Morbid obesity.  CHF.    PROCEDURE: The procedure was performed at the bedside. This was a routine study. The transthoracic approach was used. The study included complete 2D imaging, M-mode, complete spectral Doppler, and color Doppler. The heart rate was 102 bpm,  at the start of the study. Intravenous contrast ( 1.2 mL of definity) was administered. Echocardiographic views were limited due to decreased penetration and lung interference. This was a very technically difficult and poor quality study.    LEFT VENTRICLE: Size was normal. Systolic function was normal. Ejection fraction was estimated to be 55 %. This study was inadequate for the evaluation of regional wall motion. Wall thickness was mildly increased. DOPPLER: Transmitral flow  pattern: atrial fibrillation. The study was not technically sufficient to allow evaluation of LV diastolic function.    RIGHT VENTRICLE: The size was normal. Systolic function was normal. Wall thickness was normal.    LEFT ATRIUM: The atrium was mildly dilated.    RIGHT ATRIUM: The atrium was poorly visualized.    MITRAL VALVE: Grossly normal but very poorly visualized mitral valve with possible mild annular calcification. DOPPLER: There was no evidence for stenosis. There was no significant regurgitation.    AORTIC VALVE: The valve was not well visualized. DOPPLER: Transaortic velocity was within the normal range. There was no evidence for stenosis. There was no significant regurgitation, by limited doppler study.    TRICUSPID VALVE: Not well visualized. DOPPLER: There was mild to moderate regurgitation.    PULMONIC VALVE: Not well visualized.    PERICARDIUM: There was no pericardial effusion.    AORTA: The root exhibited normal size.    SYSTEMIC VEINS: IVC: The inferior vena cava was mildly dilated. Respirophasic changes were normal.    SYSTEM MEASUREMENT TABLES    2D  %FS: 40.6 %  Ao Diam: 4.1 cm  EDV(Teich): 136.4 ml  EF(Teich): 70.9 %  ESV(Teich): 39.8 ml  IVSd: 1.7 cm  LA Area: 40.8 cm2  LA  Diam: 5.1 cm  LVIDd: 5.3 cm  LVIDs: 3.2 cm  LVPWd: 1.7 cm  RA Area: 30.2 cm2  SV(Teich): 96.6 ml    In[employer] Commission Accredited Echocardiography Laboratory    Prepared and electronically signed by    Lorraine South DO  Signed 2018 12:13:25    Results for orders placed during the hospital encounter of 19    [place] [address]  [phone]    Transesophageal Echocardiogram  Limited 2D, Doppler, and Color Doppler    Study date:  03-Dec-2019    Patient: CHRIS CHAVES  MR number: [id number]  Account number: [id number]  : 1950  Age: 69 years  Gender: Male  Status: Inpatient  Location: Cath lab  Height: 67 in  Weight: 320 lb  BP: 138/ 82 mmHg    Indications: Evaluate for suspected cardiac source of embolism.    Diagnoses: I48.1 - Atrial flutter    Sonographer:  MAYI Barclay  Interpreting Physician:  Nuno Swann DO  Primary Physician:  Juan Jane MD  Referring Physician:  Nuno Swann DO  Group:  [facility]    SUMMARY    LEFT VENTRICLE:  Systolic function was at the lower limits of normal by visual assessment. Ejection fraction was estimated to be 50 %.    RIGHT VENTRICLE:  The ventricle was mildly dilated.    LEFT ATRIUM:  The atrium was mildly to moderately dilated.    ATRIAL SEPTUM:  No defect or patent foramen ovale was identified.  There was no left-to-right shunt and no right-to-left shunt.    MITRAL VALVE:  There was trace regurgitation.    TRICUSPID VALVE:  There was trace regurgitation.    HISTORY: PRIOR HISTORY: GERD; DM2; HLD; HTN;    PROCEDURE: The procedure was performed in the catheterization laboratory. This was a routine study. The risks and alternatives of the procedure were explained to the patient and informed consent was obtained. The transesophageal approach  was used. The study included limited 2D imaging, limited spectral Doppler, and color  Doppler. The heart rate was 75 bpm, at the start of the study. An adult omniplane probe was inserted by the attending cardiologist. Intubated with ease.  One intubation attempt(s). There was no blood detected on the probe. This was a technically difficult study. MEDICATIONS: SBE prophylaxis. General anesthesia administered by anesthesia team.    LEFT VENTRICLE: Size was normal. Systolic function was at the lower limits of normal by visual assessment. Ejection fraction was estimated to be 50 %. Wall thickness was normal.    RIGHT VENTRICLE: The ventricle was mildly dilated. Systolic function was low normal. Wall thickness was normal.    LEFT ATRIUM: The atrium was mildly to moderately dilated. APPENDAGE: No thrombus was identified.    ATRIAL SEPTUM: No defect or patent foramen ovale was identified. There was no left-to-right shunt and no right-to-left shunt.    MITRAL VALVE: Valve structure was normal. There was normal leaflet separation. There was no echocardiographic evidence of vegetation. DOPPLER: There was trace regurgitation.    AORTIC VALVE: The valve was trileaflet. Leaflets exhibited mildly increased thickness, mild calcification, and normal cuspal separation. There was no echocardiographic evidence of vegetation. DOPPLER: There was no regurgitation.    TRICUSPID VALVE: The valve structure was normal. There was normal leaflet separation. There was no echocardiographic evidence of vegetation. DOPPLER: There was trace regurgitation.    MEASUREMENT TABLES    DOPPLER MEASUREMENTS  Left atrium   (Reference normals)  DARIO peak comfort   50 cm/s   (--)    In[employer]oci[employer] Commission Accredited Echocardiography Laboratory    Prepared and electronically signed by    Nuno Swann DO  Signed 04-Dec-2019 15:12:59      CATH:  No results found for this or any previous visit.      STRESS TEST:  No results found for this or any previous visit.

## 2024-01-21 DIAGNOSIS — I48.0 PAROXYSMAL ATRIAL FIBRILLATION (HCC): ICD-10-CM

## 2024-01-21 DIAGNOSIS — I10 ESSENTIAL HYPERTENSION: ICD-10-CM

## 2024-01-22 RX ORDER — OLMESARTAN MEDOXOMIL 40 MG/1
TABLET ORAL
Qty: 90 TABLET | Refills: 3 | Status: SHIPPED | OUTPATIENT
Start: 2024-01-22

## 2024-01-22 RX ORDER — AMLODIPINE BESYLATE 10 MG/1
10 TABLET ORAL DAILY
Qty: 90 TABLET | Refills: 3 | Status: SHIPPED | OUTPATIENT
Start: 2024-01-22

## 2024-01-22 RX ORDER — POTASSIUM CHLORIDE 1125 MG/1
2 TABLET, EXTENDED RELEASE ORAL DAILY
Qty: 180 EACH | Refills: 3 | Status: SHIPPED | OUTPATIENT
Start: 2024-01-22

## 2024-01-22 RX ORDER — CLONIDINE HYDROCHLORIDE 0.2 MG/1
TABLET ORAL
Qty: 90 TABLET | Refills: 3 | Status: SHIPPED | OUTPATIENT
Start: 2024-01-22

## 2024-01-30 DIAGNOSIS — I48.0 PAROXYSMAL ATRIAL FIBRILLATION (HCC): ICD-10-CM

## 2024-02-06 ENCOUNTER — TELEPHONE (OUTPATIENT)
Dept: HEMATOLOGY ONCOLOGY | Facility: CLINIC | Age: 74
End: 2024-02-06

## 2024-02-06 DIAGNOSIS — I48.0 PAROXYSMAL ATRIAL FIBRILLATION (HCC): ICD-10-CM

## 2024-02-06 DIAGNOSIS — D50.8 OTHER IRON DEFICIENCY ANEMIA: ICD-10-CM

## 2024-02-06 DIAGNOSIS — E63.9 NUTRITIONAL DEFICIENCY: Primary | ICD-10-CM

## 2024-02-06 DIAGNOSIS — R79.9 ABNORMAL BLOOD SMEAR: ICD-10-CM

## 2024-02-06 RX ORDER — FLUOXETINE HYDROCHLORIDE 40 MG/1
CAPSULE ORAL
Qty: 180 CAPSULE | Refills: 1 | Status: SHIPPED | OUTPATIENT
Start: 2024-02-06

## 2024-02-06 NOTE — TELEPHONE ENCOUNTER
Message left on voicemail as a reminder to complete labs ordered before upcoming appointment with Cely.

## 2024-02-08 ENCOUNTER — TELEPHONE (OUTPATIENT)
Dept: HEMATOLOGY ONCOLOGY | Facility: CLINIC | Age: 74
End: 2024-02-08

## 2024-02-08 NOTE — TELEPHONE ENCOUNTER
Message left on voicemail as a reminder to complete labs ordered before upcoming appointment with Cely.  Advised to call back if he is unable to complete his labs we can reschedule his appointment.

## 2024-02-09 ENCOUNTER — TELEPHONE (OUTPATIENT)
Dept: HEMATOLOGY ONCOLOGY | Facility: CLINIC | Age: 74
End: 2024-02-09

## 2024-02-09 NOTE — TELEPHONE ENCOUNTER
Spoke to patient this morning.  He was concerned to have duplicated blood work for both Cely and Dr. Newton.  He states medicare might not pay if he gets the same blood work within weeks from each other.   We rescheduled his appointment a week after Dr. Newton's appointment so that he can go for his labs around the same time and not have them duplicated.  His appointment was rescheduled to 4/8/24 with [facility].  Patient was ok with that.

## 2024-02-09 NOTE — TELEPHONE ENCOUNTER
Patient Call    Who are you speaking with? Patient    If it is not the patient, are they listed on an active communication consent form? N/A   What is the reason for this call? The patient would like a call back to discuss his lab orders.    Does this require a call back? Yes   If a call back is required, please list [facility] call back number [phone]   If a call back is required, advise that a message will be forwarded to their care team and someone will return their call as soon as possible.   Did you relay this information to the patient? Yes

## 2024-02-12 ENCOUNTER — TELEPHONE (OUTPATIENT)
Dept: CARDIOLOGY CLINIC | Facility: CLINIC | Age: 74
End: 2024-02-12

## 2024-02-12 ENCOUNTER — PREP FOR PROCEDURE (OUTPATIENT)
Dept: CARDIOLOGY CLINIC | Facility: CLINIC | Age: 74
End: 2024-02-12

## 2024-02-12 DIAGNOSIS — I48.0 PAROXYSMAL ATRIAL FIBRILLATION (HCC): Primary | ICD-10-CM

## 2024-02-12 NOTE — TELEPHONE ENCOUNTER
"Patient is scheduled for LOOP Recorder Explant/Implant on 2/29/24 at [facility] with .     Patient aware of all general instructions.    Instructions sent to patient through [employer].      Medication holds:   Xarelto - Do not take morning of procedure.    Furosemide (Lasix) - Do not take morning of procedure.    Blood work to be done on:  N/A  (Patient did CMP / CBC on 11/2/23)    Insurance: Medicare     Please obtain auth.         Thank you,  Caro \"Ann Marie\" Kyle      "

## 2024-02-12 NOTE — TELEPHONE ENCOUNTER
Patient called in regard last cardiology consult, he got mentioned his loop recorder should be replace since he still having some Afib episodes.  Rachel, Im not sure if we got any referral for this patient, couldn't found any.  Please let us know if patient should be schedule to have his device replace.  Thank you.

## 2024-02-28 ENCOUNTER — TELEPHONE (OUTPATIENT)
Dept: OBGYN CLINIC | Facility: HOSPITAL | Age: 74
End: 2024-02-28

## 2024-02-28 ENCOUNTER — OFFICE VISIT (OUTPATIENT)
Dept: INTERNAL MEDICINE CLINIC | Facility: OTHER | Age: 74
End: 2024-02-28
Payer: MEDICARE

## 2024-02-28 ENCOUNTER — HOSPITAL ENCOUNTER (OUTPATIENT)
Dept: RADIOLOGY | Facility: IMAGING CENTER | Age: 74
Discharge: HOME | End: 2024-02-28
Payer: MEDICARE

## 2024-02-28 VITALS
OXYGEN SATURATION: 97 % | TEMPERATURE: 98.5 F | SYSTOLIC BLOOD PRESSURE: 130 MMHG | HEIGHT: 68 IN | BODY MASS INDEX: 41.37 KG/M2 | DIASTOLIC BLOOD PRESSURE: 80 MMHG | WEIGHT: 273 LBS | HEART RATE: 58 BPM

## 2024-02-28 DIAGNOSIS — G89.29 CHRONIC RIGHT-SIDED LOW BACK PAIN WITH RIGHT-SIDED SCIATICA: ICD-10-CM

## 2024-02-28 DIAGNOSIS — M25.561 CHRONIC PAIN OF RIGHT KNEE: Primary | ICD-10-CM

## 2024-02-28 DIAGNOSIS — I50.32 CHRONIC DIASTOLIC (CONGESTIVE) HEART FAILURE (HCC): ICD-10-CM

## 2024-02-28 DIAGNOSIS — G89.29 CHRONIC PAIN OF RIGHT KNEE: ICD-10-CM

## 2024-02-28 DIAGNOSIS — I73.9 PERIPHERAL VASCULAR DISEASE (HCC): ICD-10-CM

## 2024-02-28 DIAGNOSIS — I70.0 ATHEROSCLEROSIS OF AORTA (HCC): ICD-10-CM

## 2024-02-28 DIAGNOSIS — E11.51 TYPE 2 DIABETES MELLITUS WITH DIABETIC PERIPHERAL ANGIOPATHY WITHOUT GANGRENE, WITHOUT LONG-TERM CURRENT USE OF INSULIN (HCC): ICD-10-CM

## 2024-02-28 DIAGNOSIS — M54.41 CHRONIC RIGHT-SIDED LOW BACK PAIN WITH RIGHT-SIDED SCIATICA: ICD-10-CM

## 2024-02-28 DIAGNOSIS — F32.0 MAJOR DEPRESSIVE DISORDER, SINGLE EPISODE, MILD (HCC): ICD-10-CM

## 2024-02-28 DIAGNOSIS — M25.561 CHRONIC PAIN OF RIGHT KNEE: ICD-10-CM

## 2024-02-28 DIAGNOSIS — E66.01 MORBID OBESITY (HCC): ICD-10-CM

## 2024-02-28 DIAGNOSIS — R35.0 URINARY FREQUENCY: ICD-10-CM

## 2024-02-28 DIAGNOSIS — G89.29 CHRONIC PAIN OF RIGHT KNEE: Primary | ICD-10-CM

## 2024-02-28 DIAGNOSIS — N18.31 STAGE 3A CHRONIC KIDNEY DISEASE (HCC): ICD-10-CM

## 2024-02-28 PROCEDURE — 73502 X-RAY EXAM HIP UNI 2-3 VIEWS: CPT

## 2024-02-28 PROCEDURE — 72110 X-RAY EXAM L-2 SPINE 4/>VWS: CPT

## 2024-02-28 PROCEDURE — 73562 X-RAY EXAM OF KNEE 3: CPT

## 2024-02-28 PROCEDURE — 99214 OFFICE O/P EST MOD 30 MIN: CPT | Performed by: INTERNAL MEDICINE

## 2024-02-28 RX ORDER — DOXAZOSIN 2 MG/1
2 TABLET ORAL
Qty: 90 TABLET | Refills: 1 | Status: SHIPPED | OUTPATIENT
Start: 2024-02-28

## 2024-02-28 NOTE — PROGRESS NOTES
Assessment/Plan:    Urinary frequency  Started patient on Cardura 2 mg p.o. in the evening.  Hopefully that will help urinary symptoms and also his blood pressure.  Will adjust dose accordingly.  Side effect profile discussed with patient    Morbid obesity (HCC)  Advised for low caloric diet and exercise as much as tolerated    Major depressive disorder, single episode, mild (HCC)  Doing well on present regimen.    Chronic pain of right knee  X-ray right knee requested for evaluation    Stage 3a chronic kidney disease (HCC)  Lab Results   Component Value Date    EGFR 74 11/02/2023    EGFR 70 08/31/2023    EGFR 73 09/15/2022    CREATININE 0.99 11/02/2023    CREATININE 1.03 08/31/2023    CREATININE 1.00 09/15/2022   Stable.  Will continue to monitor    Peripheral vascular disease ([facility])  No new symptoms.    Atherosclerosis of aorta ([facility])  Continue with present regimen.  Also managed by cardiology    Type 2 diabetes mellitus with diabetic peripheral angiopathy without gangrene ([facility])    Lab Results   Component Value Date    HGBA1C 6.3 (H) 08/31/2023   Stable.  Continue with present regimen    Chronic right-sided low back pain with right-sided sciatica  X-ray lumbar spine and right hip requested.  Also referred patient for physical therapy    Chronic diastolic (congestive) heart failure ([facility])  Wt Readings from Last 3 Encounters:   02/28/24 124 kg (273 lb)   01/15/24 127 kg (278 lb 14.4 oz)   01/11/24 125 kg (275 lb)     Stable on present regimen.           Diagnoses and all orders for this visit:    Chronic pain of right knee  -     XR knee 3 vw right non injury; Future    Chronic right-sided low back pain with right-sided sciatica  -     XR hip/pelv 2-3 vws right if performed; Future  -     XR spine lumbar minimum 4 views non injury; Future  -     Ambulatory Referral to Physical Therapy; Future    Urinary frequency  -     doxazosin (CARDURA) 2 mg tablet; Take 1 tablet (2 mg total) by mouth daily at bedtime    Chronic  diastolic (congestive) heart failure (HCC)    Major depressive disorder, single episode, mild (HCC)    Atherosclerosis of aorta (HCC)    Morbid obesity (HCC)    Type 2 diabetes mellitus with diabetic peripheral angiopathy without gangrene, without long-term current use of insulin (HCC)    Stage 3a chronic kidney disease (HCC)    Peripheral vascular disease (HCC)               Subjective:          Patient ID: Alexys Valdez Jr. is a 74 y.o. male.    Patient complaining of lower back pain over the last few months gradually getting worse.  Also complaining of pain in right knee which is also getting worse over the last few weeks.  Pain get worse with walking.  Patient is also status post right total knee arthroplasty.  Also complaining of hesitancy and frequency of urination over the last many months.    Back Pain  Pertinent negatives include no abdominal pain, chest pain, dysuria, fever, headaches or weakness.       The following portions of the patient's history were reviewed and updated as appropriate: allergies, current medications, past family history, past medical history, past social history, past surgical history, and problem list.    Review of Systems   Constitutional:  Negative for fatigue and fever.   HENT:  Negative for congestion, ear discharge, ear pain, postnasal drip, sinus pressure, sore throat, tinnitus and trouble swallowing.    Eyes:  Negative for discharge, itching and visual disturbance.   Respiratory:  Negative for cough and shortness of breath.    Cardiovascular:  Negative for chest pain and palpitations.   Gastrointestinal:  Negative for abdominal pain, diarrhea, nausea and vomiting.   Endocrine: Negative for cold intolerance and polyuria.   Genitourinary:  Positive for difficulty urinating and frequency. Negative for dysuria and urgency.   Musculoskeletal:  Positive for back pain and gait problem. Negative for arthralgias and neck pain.   Skin:  Negative for rash.    Allergic/Immunologic: Negative for environmental allergies.   Neurological:  Negative for dizziness, weakness and headaches.   Psychiatric/Behavioral:  Negative for agitation and behavioral problems. The patient is not nervous/anxious.          Past Medical History:   Diagnosis Date    Acute prostatitis 07/26/2021    Diagnosed in [place] and treated with Cipro x2 weeks 6/7/21. Questionable recurrence July 16, 2021    Allergic     Anxiety     Arthritis     Atrial fibrillation (HCC)     BPH (benign prostatic hyperplasia)     Chronic diastolic (congestive) heart failure (HCC)     Depression     Diabetes mellitus (HCC)     Drug-induced insomnia (HCC) 02/03/2020    Ear problems     GERD (gastroesophageal reflux disease)     Gout     Headache(784.0)     HL (hearing loss)     Hyperlipidemia     Hypertension     Hyponatremia     last assessed: 09/08/2014    Leukocytosis     Liver function abnormality     Morbid obesity (HCC)     Nasal congestion     Obesity     Obstructive sleep apnea     Sleep apnea          Current Outpatient Medications:     allopurinol (ZYLOPRIM) 300 mg tablet, TAKE 1 TABLET BY MOUTH DAILY, Disp: 90 tablet, Rfl: 3    amLODIPine (NORVASC) 10 mg tablet, TAKE 1 TABLET BY MOUTH DAILY, Disp: 90 tablet, Rfl: 3    amoxicillin (AMOXIL) 500 mg capsule, Prior to dental visits, Disp: , Rfl:     cloNIDine (CATAPRES) 0.2 mg tablet, TAKE 1 TABLET BY MOUTH DAILY 1  HOUR PRIOR TO BEDTIME, Disp: 90 tablet, Rfl: 3    dexlansoprazole (DEXILANT) 60 MG capsule, Take 1 capsule (60 mg total) by mouth daily before breakfast, Disp: 90 capsule, Rfl: 3    dofetilide (TIKOSYN) 250 mcg capsule, TAKE 1 CAPSULE BY MOUTH EVERY TWELVE (12) HOURS, Disp: 180 capsule, Rfl: 3    doxazosin (CARDURA) 2 mg tablet, Take 1 tablet (2 mg total) by mouth daily at bedtime, Disp: 90 tablet, Rfl: 1    famotidine (PEPCID) 40 MG tablet, TAKE 1 TABLET BY MOUTH  DAILY AT BEDTIME, Disp: 90 tablet, Rfl: 3    fexofenadine (ALLEGRA) 180 MG tablet, Take  180 mg by mouth daily, Disp: , Rfl:     FLUoxetine (PROzac) 40 MG capsule, TAKE 2 CAPSULES BY MOUTH DAILY, Disp: 180 capsule, Rfl: 1    furosemide (LASIX) 40 mg tablet, Take 1 tablet (40 mg total) by mouth daily, Disp: 90 tablet, Rfl: 3    Klor-Con M15 15 MEQ TBCR, TAKE 2 TABLETS BY MOUTH DAILY, Disp: 180 each, Rfl: 3    nebivolol (BYSTOLIC) 10 mg tablet, TAKE 1 TABLET BY MOUTH ONCE DAILY, Disp: 90 tablet, Rfl: 3    ofloxacin (FLOXIN) 0.3 % otic solution, [place] 4 drops into the afffected ear twice daily x 3 days, Disp: 5 mL, Rfl: 0    olmesartan (BENICAR) 40 mg tablet, TAKE 1 TABLET BY MOUTH DAILY, Disp: 90 tablet, Rfl: 3    pravastatin (PRAVACHOL) 10 mg tablet, Take 1 tablet (10 mg total) by mouth daily at bedtime, Disp: 90 tablet, Rfl: 3    rivaroxaban (Xarelto) 20 mg tablet, Take 1 tablet (20 mg total) by mouth daily with breakfast, Disp: 90 tablet, Rfl: 3    acetaminophen-codeine (TYLENOL/CODEINE #3) 300-30 MG per tablet, Take 1 tablet by mouth every 12 (twelve) hours as needed for severe pain, Disp: 5 tablet, Rfl: 0    Allergies   Allergen Reactions    Metoprolol Shortness Of Breath     Tolerates Bystolic    Benazepril Cough    Clonidine Fatigue    Diltiazem Bradycardia    Entex T  [Pseudoephedrine-Guaifenesin]      Annotation - [id number]: LEG SWELLING    Tizanidine Other (See Comments)       Social History   Past Surgical History:   Procedure Laterality Date    CARDIAC CATHETERIZATION      outcome: Neg    CARDIAC ELECTROPHYSIOLOGY STUDY AND ABLATION      CARDIAC LOOP RECORDER  2019    CARDIOVASCULAR STRESS TEST      resolved: 10/27/2010; negative    COLONOSCOPY  11/2013    polyp; complete    COLONOSCOPY  05/26/2017    HERNIA REPAIR      resolved: 11/1999    JOINT REPLACEMENT      NE NEUROPLASTY &/TRANSPOS MEDIAN NRV CARPAL TUNNE Right 11/03/2023    Procedure: CTR;  Surgeon: Finn Hernández MD;  Location: [facility];  Service: Orthopedics    REPLACEMENT TOTAL KNEE Left 2010    REPLACEMENT TOTAL KNEE Right 2003  "   THYROGLOSSAL DUCT EXCISION      TONSILLECTOMY      last assessed: 06/02/2014    UPPER GASTROINTESTINAL ENDOSCOPY       Family History   Problem Relation Age of Onset    Diabetes Mother     Heart attack Mother     Hypertension Mother     Heart attack Sister        Objective:  /80   Pulse 58   Temp 98.5 °F (36.9 °C) (Temporal)   Ht 5' 7.5\" (1.715 m)   Wt 124 kg (273 lb)   SpO2 97%   BMI 42.13 kg/m²   Body mass index is 42.13 kg/m².     Physical Exam  Constitutional:       General: He is not in acute distress.     Appearance: He is well-developed. He is obese.   HENT:      Head: Normocephalic.      Right Ear: External ear normal. There is no impacted cerumen.      Left Ear: External ear normal. There is no impacted cerumen.      Nose: No congestion or rhinorrhea.      Mouth/Throat:      Pharynx: No posterior oropharyngeal erythema.   Eyes:      General: No scleral icterus.     Pupils: Pupils are equal, round, and reactive to light.   Neck:      Thyroid: No thyromegaly.      Trachea: No tracheal deviation.   Cardiovascular:      Rate and Rhythm: Normal rate and regular rhythm.      Heart sounds: Normal heart sounds.   Pulmonary:      Effort: Pulmonary effort is normal. No respiratory distress.      Breath sounds: Normal breath sounds.   Chest:      Chest wall: No tenderness.   Abdominal:      General: Bowel sounds are normal.      Palpations: Abdomen is soft. There is no mass.      Tenderness: There is no abdominal tenderness.   Musculoskeletal:         General: Normal range of motion.      Cervical back: Normal range of motion and neck supple. No rigidity.      Right lower leg: Edema present.      Left lower leg: Edema present.   Lymphadenopathy:      Cervical: No cervical adenopathy.   Skin:     General: Skin is warm.      Findings: No erythema or rash.   Neurological:      Mental Status: He is alert and oriented to person, place, and time.      Cranial Nerves: No cranial nerve deficit.      Sensory: No " sensory deficit.      Coordination: Coordination abnormal.      Gait: Gait abnormal.      Comments: Limping over right hip and knee   Psychiatric:         Mood and Affect: Mood normal.         Behavior: Behavior normal.

## 2024-02-28 NOTE — ASSESSMENT & PLAN NOTE
Started patient on Cardura 2 mg p.o. in the evening.  Hopefully that will help urinary symptoms and also his blood pressure.  Will adjust dose accordingly.  Side effect profile discussed with patient

## 2024-02-28 NOTE — ASSESSMENT & PLAN NOTE
Lab Results   Component Value Date    EGFR 74 11/02/2023    EGFR 70 08/31/2023    EGFR 73 09/15/2022    CREATININE 0.99 11/02/2023    CREATININE 1.03 08/31/2023    CREATININE 1.00 09/15/2022   Stable.  Will continue to monitor

## 2024-02-28 NOTE — ASSESSMENT & PLAN NOTE
Lab Results   Component Value Date    HGBA1C 6.3 (H) 08/31/2023   Stable.  Continue with present regimen

## 2024-02-28 NOTE — ASSESSMENT & PLAN NOTE
Wt Readings from Last 3 Encounters:   02/28/24 124 kg (273 lb)   01/15/24 127 kg (278 lb 14.4 oz)   01/11/24 125 kg (275 lb)     Stable on present regimen.

## 2024-02-29 ENCOUNTER — TELEPHONE (OUTPATIENT)
Age: 74
End: 2024-02-29

## 2024-02-29 ENCOUNTER — HOSPITAL ENCOUNTER (OUTPATIENT)
Facility: HOSPITAL | Age: 74
Setting detail: OUTPATIENT SURGERY
Discharge: HOME | End: 2024-02-29
Attending: INTERNAL MEDICINE | Admitting: INTERNAL MEDICINE
Payer: MEDICARE

## 2024-02-29 VITALS
WEIGHT: 266 LBS | DIASTOLIC BLOOD PRESSURE: 83 MMHG | SYSTOLIC BLOOD PRESSURE: 174 MMHG | RESPIRATION RATE: 18 BRPM | OXYGEN SATURATION: 97 % | HEART RATE: 63 BPM | HEIGHT: 69 IN | BODY MASS INDEX: 39.4 KG/M2 | TEMPERATURE: 98.8 F

## 2024-02-29 DIAGNOSIS — Z45.09 ENCOUNTER FOR LOOP RECORDER AT END OF BATTERY LIFE: Primary | ICD-10-CM

## 2024-02-29 DIAGNOSIS — I48.0 PAROXYSMAL ATRIAL FIBRILLATION (HCC): ICD-10-CM

## 2024-02-29 PROCEDURE — 33286 RMVL SUBQ CAR RHYTHM MNTR: CPT | Performed by: INTERNAL MEDICINE

## 2024-02-29 PROCEDURE — NC001 PR NO CHARGE: Performed by: PHYSICIAN ASSISTANT

## 2024-02-29 PROCEDURE — C1764 EVENT RECORDER, CARDIAC: HCPCS | Performed by: INTERNAL MEDICINE

## 2024-02-29 PROCEDURE — 33285 INSJ SUBQ CAR RHYTHM MNTR: CPT | Performed by: INTERNAL MEDICINE

## 2024-02-29 DEVICE — LOOP RECORDER REVEAL LINQ II SYS DEVICE ONLY: Type: IMPLANTABLE DEVICE | Site: CHEST  WALL | Status: FUNCTIONAL

## 2024-02-29 RX ORDER — LIDOCAINE HYDROCHLORIDE 10 MG/ML
INJECTION, SOLUTION EPIDURAL; INFILTRATION; INTRACAUDAL; PERINEURAL CODE/TRAUMA/SEDATION MEDICATION
Status: DISCONTINUED | OUTPATIENT
Start: 2024-02-29 | End: 2024-02-29 | Stop reason: HOSPADM

## 2024-02-29 RX ORDER — LIDOCAINE HYDROCHLORIDE 10 MG/ML
INJECTION, SOLUTION EPIDURAL; INFILTRATION; INTRACAUDAL; PERINEURAL
Status: DISCONTINUED
Start: 2024-02-29 | End: 2024-02-29 | Stop reason: HOSPADM

## 2024-02-29 RX ORDER — LIDOCAINE HYDROCHLORIDE AND EPINEPHRINE 10; 10 MG/ML; UG/ML
INJECTION, SOLUTION INFILTRATION; PERINEURAL
Status: DISCONTINUED
Start: 2024-02-29 | End: 2024-02-29 | Stop reason: HOSPADM

## 2024-02-29 NOTE — DISCHARGE INSTR - AVS FIRST PAGE
Keep loop recorder incision dry for one week. If site is glued, then you may shower (as the glue is waterproof). If not, please keep the area covered and dry while bathing.     Do not use lotions/powders/creams on incision. Remove outer bandage 48 hours after procedure. Any sutures present will be removed at your 2 week follow up appointment. Please call the office if you notice redness, swelling, bleeding, or drainage from incision or if you develop fevers - [phone].    Cardiac Loop Recorder Insertion      WHAT YOU SHOULD KNOW:    A cardiac loop recorder is a device used to diagnose heart rhythm problems, such as a fast or irregular heartbeat. It is implanted in your left chest, just under the skin. The device records a pattern of your heart's rhythm, called an EKG. Your device records automatic EKGs, depending on how your caregiver programs it. You may also receive a handheld controller. You press a button on the controller when you have symptoms, such as dizziness, lightheadedness, or palpitations. The device will record an EKG at that moment. The recording can help your caregiver see if your symptoms may be caused by heart rhythm problems. Your caregiver will remove the device after it has collected enough data. You may need the device for up to 3 years. The procedure to remove the device is similar to the procedure used to implant it.      AFTER YOU LEAVE:    Follow up with your cardiologist as directed: You will need to return in 1 to 2 weeks. Your cardiologist will check your incision. He may also program your device settings again. He will retrieve data from the device every 1 to 3 months with a monitor held over your skin. You may be able to transmit data from your device from home as well. You will do this by calling a number provided by your cardiologist, or as they have instructed you. Ask for information about this process. Write down your questions so you remember to ask them during your visits.       Wound care: Keep loop recorder incision dry for one week. Do not use lotions/powders/creams on incision. Remove outer bandage 48 hours after procedure - leave underlying steri-strips in place, they will either fall off on their own or will be removed at 2 week follow up appointment. Please call the office if you notice redness, swelling, bleeding, or drainage from incision or if you develop fevers. After that first week, carefully wash your incision with soap and water. Keep the area clean and dry until it heals.      Return to activity: If you received anesthesia, you will not be able to drive for 24 hours. Otherwise, most people can return to normal activities soon after the procedure. Your cardiologist may want to know if your work involves electrical current or high-voltage equipment. Ask about other electrical items that could interfere with your cardiac loop recorder.      Contact your cardiologist if:   You have a fever or chills.    Your wound is red, swollen, or draining pus.    You have questions or concerns about your condition or care.    Seek care immediately or call 911 if:   You feel weak, dizzy, or faint.    You lose consciousness.      © 2014 [employer]. Information is for End User's use only and may not be sold, redistributed or otherwise used for commercial purposes. All illustrations and images included in CareNotes® are the copyrighted property of [employer] or [employer].    The above information is an  only. It is not intended as medical advice for individual conditions or treatments. Talk to your doctor, nurse or pharmacist before following any medical regimen to see if it is safe and effective for you.

## 2024-02-29 NOTE — H&P
Patient with loop recorder in situ battery end-of-life since 9/20/2023.  Loop recorder for A-fib monitoring it was determined he would need an explant with reimplantation due to concerns of continued A-fib monitoring

## 2024-02-29 NOTE — TELEPHONE ENCOUNTER
Caller: Patient     Doctor: Edgar     Reason for call: Patient needs to rs surgery date again. Please call to coordinate     Call back#: [phone]

## 2024-03-11 ENCOUNTER — EVALUATION (OUTPATIENT)
Dept: PHYSICAL THERAPY | Age: 74
End: 2024-03-11
Payer: MEDICARE

## 2024-03-11 DIAGNOSIS — G89.29 CHRONIC RIGHT-SIDED LOW BACK PAIN WITH RIGHT-SIDED SCIATICA: Primary | ICD-10-CM

## 2024-03-11 DIAGNOSIS — M54.41 CHRONIC RIGHT-SIDED LOW BACK PAIN WITH RIGHT-SIDED SCIATICA: Primary | ICD-10-CM

## 2024-03-11 PROCEDURE — 97110 THERAPEUTIC EXERCISES: CPT | Performed by: PHYSICAL THERAPIST

## 2024-03-11 PROCEDURE — 97161 PT EVAL LOW COMPLEX 20 MIN: CPT | Performed by: PHYSICAL THERAPIST

## 2024-03-11 PROCEDURE — 97535 SELF CARE MNGMENT TRAINING: CPT | Performed by: PHYSICAL THERAPIST

## 2024-03-11 NOTE — PROGRESS NOTES
PT Evaluation     Today's date: 3/11/2024  Patient name: Alexys Valdez Jr.  : 1950  MRN: [id number]  Referring provider: Armando Newton MD  Dx:   Encounter Diagnosis     ICD-10-CM    1. Chronic right-sided low back pain with right-sided sciatica  M54.41 Ambulatory Referral to Physical Therapy    G89.29                      Assessment  Assessment details: The patient presents with s/s consistent with LBP with a mobility and flexion preference. The patient exhibits limited lumbar and hip ROM, tingling in the right foot, weakness in the right ankle dorsiflexors, and pain with ADLs. The patient was educated on plan of care focusing on increasing ROM, decreasing nerve symptoms, and strengthening the core and instructed in an HEP for home. All questions were answered to the patient's satisfaction. The patient would benefit from skilled PT to address his deficits and meet his goals of reducing his pain and improving gait.   Impairments: abnormal muscle tone, abnormal or restricted ROM, activity intolerance, impaired physical strength and pain with function  Understanding of Dx/Px/POC: good   Prognosis: good    Goals  STG: To be completed in 4 weeks.     1. The patient will report no more than 6/10 pain during all adls.   2. The patient will increase lumbar flexion to fingertips 10 cm from the floor when picking an object off the floor.   3. The patient is compliant with his HEP.     LTG: To be completed in 8 weeks    1. The patient will report no more than 1/10 pain during all adls.   2. The patient will demonstrate a normalized gait pattern without a limp.   3. The patient will increase LE strength to 5/5 MMT when ambulating more than 30 minutes.         Plan  Patient would benefit from: skilled physical therapy  Planned therapy interventions: IASTM, joint mobilization, abdominal trunk stabilization, manual therapy, neuromuscular re-education, patient education, postural training, self care,  "strengthening, stretching, therapeutic activities, therapeutic exercise, balance, gait training and home exercise program  Frequency: 2x week  Plan of Care beginning date: 3/11/2024  Plan of Care expiration date: 5/6/2024  Treatment plan discussed with: patient        Subjective Evaluation    History of Present Illness  Date of onset: 11/1/2023  Mechanism of injury: Alexys Valdez Jr. is a 74 y.o. male who presents with chronic right-sided low back pain with right-sided sciatica of insidious onset several months ago and recently worsening after hearing a \"pop\" in the back. The patient reports tingling in the right foot but does note trouble sleeping at night. The patient currently struggles with standing, walking, and stair negotiation. PMH includes DM2, MARISSA, HTN, PVD, CKD3, depression, HLD, obesity, anxiety, and s/p ablation of atrial fibrillation                Recurrent probem    Patient Goals  Patient goals for therapy: decreased pain    Pain  Current pain rating: 3  At worst pain rating: 10  Quality: throbbing, sharp, dull ache and burning  Relieving factors: medications  Progression: worsening    Social Support    Employment status: not working (retired)    Diagnostic Tests  Abnormal x-ray: Multilevel marked vertebral body endplate spurring with L4-5 and L5-S1 disc space narrowing and advanced lower lumbar facet arthropathy..  Treatments  Previous treatment: medication  Current treatment: physical therapy        Objective     Neurological Testing     Sensation     Hip   Left Hip   Intact: light touch    Right Hip   Intact: light touch    Active Range of Motion     Lumbar   Flexion:  Restriction level: moderate  Extension:  Restriction level: maximal  Left lateral flexion:  Restriction level: moderate  Right lateral flexion:  Restriction level: maximal  Left rotation:  Restriction level: moderate  Right rotation:  Restriction level: moderate    Joint Play     Hypomobile: L1, L2, L3, L4, L5 and S1 " "  Mechanical Assessment    Cervical      Thoracic      Lumbar    Sitting flexion: sustained positions  Pain intensity: better  Pain level: decreased  Standing extension: repeated movements  Pain intensity: worse  Pain level: increased    Strength/Myotome Testing     Lumbar   Left   Heel walk: abnormal  Toe walk: normal    Right   Heel walk: normal  Toe walk: normal    Left Hip   Planes of Motion   Flexion: 4-  Abduction: 4-  Adduction: 4-    Right Hip   Planes of Motion   Flexion: 4-  Abduction: 4-  Adduction: 4-    Left Knee   Flexion: 4-  Extension: 4    Right Knee   Flexion: 4-  Extension: 4    Left Ankle/Foot   Dorsiflexion: 4+  Plantar flexion: 4-  Great toe extension: 4    Right Ankle/Foot   Dorsiflexion: 4-  Plantar flexion: 4-  Great toe extension: 4    Tests     Lumbar     Left   Negative crossed SLR, passive SLR and slump test.     Left Hip   Negative MAHNAZ and FADIR.     Additional Tests Details  Moderate tightness in b/L hamstrings and piriformis      Flowsheet Rows      Flowsheet Row Most Recent Value   PT/OT G-Codes    Current Score 41   Projected Score 55               Precautions: DM2, MARISSA, HTN, PVD, CKD3, depression, HLD, obesity, anxiety, s/p ablation of atrial fibrillation       Manuals 3/11            STM Lumbar             S/L Lumbar Gap St.                                        Neuro Re-Ed             TA Set             TA w/ Marches             TA w/ Clamshells                                                                 Ther Ex             Nustep             Seated Lumbar Flex St. Fwd & Left 20\"x3 ea            Hamstring St.  20\"x3            Piriformis St. W/ SOS 20\"x3            LTR 10\"x5 ea            [facility]             DKTC                          Ther Activity                                       Gait Training                                       Modalities                                            "

## 2024-03-12 ENCOUNTER — OFFICE VISIT (OUTPATIENT)
Dept: PHYSICAL THERAPY | Age: 74
End: 2024-03-12
Payer: MEDICARE

## 2024-03-12 DIAGNOSIS — M54.41 CHRONIC RIGHT-SIDED LOW BACK PAIN WITH RIGHT-SIDED SCIATICA: Primary | ICD-10-CM

## 2024-03-12 DIAGNOSIS — G89.29 CHRONIC RIGHT-SIDED LOW BACK PAIN WITH RIGHT-SIDED SCIATICA: Primary | ICD-10-CM

## 2024-03-12 PROCEDURE — 97110 THERAPEUTIC EXERCISES: CPT

## 2024-03-12 NOTE — PROGRESS NOTES
"Daily Note     Today's date: 3/12/2024  Patient name: Alexys Valdez Jr.  : 1950  MRN: [id number]  Referring provider: Armando Newton MD  Dx:   Encounter Diagnosis     ICD-10-CM    1. Chronic right-sided low back pain with right-sided sciatica  M54.41     G89.29                      Subjective: Pt reports that he is doing well today with minimal complaints of soreness that occurs mostly in the morning and eases up as the day progresses. Notes that he sometimes notices radiating symptoms into both legs all the way down to his feet. Denies any at the start of session today. Most of his LBP is on the R side.   Reports some \"restless\" sensations in his feet at night.  Advised pt to talk to PCP    Objective: See treatment diary below      Assessment: Tolerated treatment fair. Patient demonstrated fatigue post treatment, exhibited good technique with therapeutic exercises, and would benefit from continued PT    Pt reports some weakness in thighs when getting up after lumbar stretch, appears related to stenotic changes and recent pain increase in back.  Pt did not have sensory changes in his symptoms.  Progressed loading of programming today with good tolerance.   Pt was assessed by PT Singh Casillas this session.      Plan: Continue per plan of care.      Precautions: DM2, MARISSA, HTN, PVD, CKD3, depression, HLD, obesity, anxiety, s/p ablation of atrial fibrillation       Manuals 3/11 3/12           STM Lumbar             S/L Lumbar Gap St.                                        Neuro Re-Ed             TA Set  10\" x 10           TA w/ Marches             TA w/ Clamshells  10x Juan                                                                Ther Ex             Nustep  10' L1           Seated Lumbar Flex St. Fwd & Left 20\"x3 ea            Hamstring St.  20\"x3 20\"x 4           Piriformis St. W/ SOS 20\"x3            LTR 10\"x5 ea 10\" x 5 ea           SKC             DKTC             Mod mt climber  2 x 15       "     Mod bird dog              Stir the pot  2 x 20 cw/ccw           Modified good mornings  10# x 3 x 10  seated                                                               Ther Activity                                       Gait Training                                       Modalities

## 2024-03-15 ENCOUNTER — IN-CLINIC DEVICE VISIT (OUTPATIENT)
Dept: CARDIOLOGY CLINIC | Facility: CLINIC | Age: 74
End: 2024-03-15
Payer: MEDICARE

## 2024-03-15 DIAGNOSIS — Z95.818 PRESENCE OF OTHER CARDIAC IMPLANTS AND GRAFTS: Primary | ICD-10-CM

## 2024-03-15 PROCEDURE — 93291 INTERROG DEV EVAL SCRMS IP: CPT | Performed by: INTERNAL MEDICINE

## 2024-03-15 NOTE — PROGRESS NOTES
Results for orders placed or performed in visit on 03/15/24   Cardiac EP device report    Narrative    MDT LOOP2/ ACTIVE SYSTEM IS MRI CONDITIONAL  DEVICE INTERROGATED IN THE [place] OFFICE FOR EXPLANT/RE-IMPLANT.  WOUND CHECK: INCISION CLEAN AND DRY WITH EDGES APPROXIMATED; SUTURE REMOVED; WOUND CARE AND RESTRICTIONS REVIEWED WITH PATIENT. (SEE PDF ATTACHED)  BATTERY VOLTAGE OK. PRESENTING RHYTHM SINUS @ 60 BPM. R WAVE 0.26 mV. AT/AF 0%. PVC'S < 1%. NO PATIENT OR DEVICE ACTIVATED EPISODES. NO PROGRAMMING CHANGES MADE TO DEVICE PARAMETERS. NORMAL DEVICE FUNCTION. PAS/GV

## 2024-03-18 ENCOUNTER — OFFICE VISIT (OUTPATIENT)
Dept: PHYSICAL THERAPY | Age: 74
End: 2024-03-18
Payer: MEDICARE

## 2024-03-18 DIAGNOSIS — G89.29 CHRONIC RIGHT-SIDED LOW BACK PAIN WITH RIGHT-SIDED SCIATICA: Primary | ICD-10-CM

## 2024-03-18 DIAGNOSIS — M54.41 CHRONIC RIGHT-SIDED LOW BACK PAIN WITH RIGHT-SIDED SCIATICA: Primary | ICD-10-CM

## 2024-03-18 PROCEDURE — 97110 THERAPEUTIC EXERCISES: CPT

## 2024-03-18 NOTE — PROGRESS NOTES
"Daily Note     Today's date: 3/18/2024  Patient name: Alexys Valdez Jr.  : 1950  MRN: [id number]  Referring provider: Armando Newton MD  Dx:   Encounter Diagnosis     ICD-10-CM    1. Chronic right-sided low back pain with right-sided sciatica  M54.41     G89.29                      Subjective: Pt has no new complaints. Reports no marked response from last visit.  General soreness from increased workload in the gym and in PT. Reports \"limping\" with long walks.   Reports some \"restless\" sensations in his feet at night.  Reports swelling in legs as well. Advised pt to talk to PCP    Objective: See treatment diary below      Assessment: Tolerated treatment fair. Patient demonstrated fatigue post treatment, exhibited good technique with therapeutic exercises, and would benefit from continued PT  Progressed loading of programming today with good tolerance. Pt challenged with hip control of abductors during the bridge.  Improved pain experience noted with sciatic emphasis on supine nerve glides. STS were fatiguing to thighs with fair response.    Pt was assessed by PT Singh Casillas this session.      Plan: Continue per plan of care.      Precautions: DM2, MARISSA, HTN, PVD, CKD3, depression, HLD, obesity, anxiety, s/p ablation of atrial fibrillation       Manuals 3/11 3/12 3/18          STM Lumbar             S/L Lumbar Gap St.                                        Neuro Re-Ed             TA Set  10\" x 10           TA w/ Marches             TA w/ Clamshells  10x Juan  nv                                                              Ther Ex             Nustep  10' L1 10' L2          Seated Lumbar Flex St. Fwd & Left 20\"x3 ea  20\" x 3          Hamstring St.  20\"x3 20\"x 4 W/Sciatic glide  2 x 10          Piriformis St. W/ SOS 20\"x3            LTR 10\"x5 ea 10\" x 5 ea           SKC             DKTC             Mod mt climber  2 x 15 2x15          Mod bird dog              Stir the pot  2 x 20 cw/ccw         " "  Modified good mornings  10# x 3 x 10  seated           bridge   5\" x 3 x 8          STS   3 x 6                                    Ther Activity                                       Gait Training                                       Modalities                                            "

## 2024-03-21 ENCOUNTER — OFFICE VISIT (OUTPATIENT)
Dept: PHYSICAL THERAPY | Age: 74
End: 2024-03-21
Payer: MEDICARE

## 2024-03-21 DIAGNOSIS — M54.41 CHRONIC RIGHT-SIDED LOW BACK PAIN WITH RIGHT-SIDED SCIATICA: Primary | ICD-10-CM

## 2024-03-21 DIAGNOSIS — G89.29 CHRONIC RIGHT-SIDED LOW BACK PAIN WITH RIGHT-SIDED SCIATICA: Primary | ICD-10-CM

## 2024-03-21 PROCEDURE — 97110 THERAPEUTIC EXERCISES: CPT

## 2024-03-21 PROCEDURE — 97112 NEUROMUSCULAR REEDUCATION: CPT

## 2024-03-21 NOTE — PROGRESS NOTES
"Daily Note     Today's date: 3/21/2024  Patient name: Alexys Valdez Jr.  : 1950  MRN: [id number]  Referring provider: Armando Newton MD  Dx:   Encounter Diagnosis     ICD-10-CM    1. Chronic right-sided low back pain with right-sided sciatica  M54.41     G89.29                      Subjective: pt reports radicular sx L LE      Objective: See treatment diary below      Assessment: pt able to complete there ex dwspite subjective reports, no increased sx noted       Plan: Continue per plan of care.      Precautions: DM2, MARISSA, HTN, PVD, CKD3, depression, HLD, obesity, anxiety, s/p ablation of atrial fibrillation       Manuals 3/11 3/12 3/18          STM Lumbar             S/L Lumbar Gap St.                                        Neuro Re-Ed    3/21         TA Set  10\" x 10  2x10x5\"         TA w/ Marches    2x10         TA w/ Clamshells  10x Juan  nv Sidelying  2x10x3\" ea                                                              Ther Ex    3/21         Nustep  10' L1 10' L2 10' L2         Seated Lumbar Flex St. Fwd & Left 20\"x3 ea  20\" x 3          Hamstring St.  20\"x3 20\"x 4 W/Sciatic glide  2 x 10 2x10x5 ankle pumps         Piriformis St. W/ SOS 20\"x3            LTR 10\"x5 ea 10\" x 5 ea  10x5\"         SK             DKTC             Mod mt climber  2 x 15 2x15 @ counter 2x10         Mod bird dog              Stir the pot(standing/lean on elbows)  2 x 20 cw/ccw  2x20 ea          Modified good mornings  10# x 3 x 10  seated           bridge   5\" x 3 x 8 2x8x3\"         STS   3 x 6 4x5                                   Ther Activity                                       Gait Training                                       Modalities                                              "

## 2024-03-22 ENCOUNTER — APPOINTMENT (OUTPATIENT)
Dept: LAB | Age: 74
End: 2024-03-22
Payer: MEDICARE

## 2024-03-22 DIAGNOSIS — E63.9 NUTRITIONAL DEFICIENCY: ICD-10-CM

## 2024-03-22 DIAGNOSIS — D50.8 OTHER IRON DEFICIENCY ANEMIA: ICD-10-CM

## 2024-03-22 DIAGNOSIS — E11.51 TYPE 2 DIABETES MELLITUS WITH DIABETIC PERIPHERAL ANGIOPATHY WITHOUT GANGRENE, WITHOUT LONG-TERM CURRENT USE OF INSULIN (HCC): Primary | ICD-10-CM

## 2024-03-22 DIAGNOSIS — K21.9 GASTROESOPHAGEAL REFLUX DISEASE WITHOUT ESOPHAGITIS: ICD-10-CM

## 2024-03-22 DIAGNOSIS — R79.9 ABNORMAL BLOOD SMEAR: ICD-10-CM

## 2024-03-22 LAB
ALBUMIN SERPL BCP-MCNC: 4.4 G/DL (ref 3.5–5)
ALP SERPL-CCNC: 53 U/L (ref 34–104)
ALT SERPL W P-5'-P-CCNC: 23 U/L (ref 7–52)
ANION GAP SERPL CALCULATED.3IONS-SCNC: 10 MMOL/L (ref 4–13)
AST SERPL W P-5'-P-CCNC: 31 U/L (ref 13–39)
BASOPHILS # BLD MANUAL: 0 THOUSAND/UL (ref 0–0.1)
BASOPHILS NFR MAR MANUAL: 0 % (ref 0–1)
BILIRUB SERPL-MCNC: 0.8 MG/DL (ref 0.2–1)
BUN SERPL-MCNC: 24 MG/DL (ref 5–25)
CALCIUM SERPL-MCNC: 9.5 MG/DL (ref 8.4–10.2)
CHLORIDE SERPL-SCNC: 98 MMOL/L (ref 96–108)
CHOLEST SERPL-MCNC: 133 MG/DL
CO2 SERPL-SCNC: 27 MMOL/L (ref 21–32)
CREAT SERPL-MCNC: 0.87 MG/DL (ref 0.6–1.3)
CREAT UR-MCNC: 48.1 MG/DL
EOSINOPHIL # BLD MANUAL: 0.42 THOUSAND/UL (ref 0–0.4)
EOSINOPHIL NFR BLD MANUAL: 4 % (ref 0–6)
ERYTHROCYTE [DISTWIDTH] IN BLOOD BY AUTOMATED COUNT: 13.2 % (ref 11.6–15.1)
EST. AVERAGE GLUCOSE BLD GHB EST-MCNC: 126 MG/DL
FERRITIN SERPL-MCNC: 197 NG/ML (ref 24–336)
GFR SERPL CREATININE-BSD FRML MDRD: 83 ML/MIN/1.73SQ M
GIANT PLATELETS BLD QL SMEAR: PRESENT
GLUCOSE P FAST SERPL-MCNC: 93 MG/DL (ref 65–99)
HBA1C MFR BLD: 6 %
HCT VFR BLD AUTO: 41.2 % (ref 36.5–49.3)
HDLC SERPL-MCNC: 50 MG/DL
HGB BLD-MCNC: 13.9 G/DL (ref 12–17)
IRON SATN MFR SERPL: 22 % (ref 15–50)
IRON SERPL-MCNC: 67 UG/DL (ref 50–212)
LDLC SERPL CALC-MCNC: 74 MG/DL (ref 0–100)
LYMPHOCYTES # BLD AUTO: 41 % (ref 14–44)
LYMPHOCYTES # BLD AUTO: 5.32 THOUSAND/UL (ref 0.6–4.47)
MCH RBC QN AUTO: 31 PG (ref 26.8–34.3)
MCHC RBC AUTO-ENTMCNC: 33.7 G/DL (ref 31.4–37.4)
MCV RBC AUTO: 92 FL (ref 82–98)
MICROALBUMIN UR-MCNC: 7.9 MG/L
MICROALBUMIN/CREAT 24H UR: 16 MG/G CREATININE (ref 0–30)
MONOCYTES # BLD AUTO: 0.42 THOUSAND/UL (ref 0–1.22)
MONOCYTES NFR BLD: 4 % (ref 4–12)
NEUTROPHILS # BLD MANUAL: 4.28 THOUSAND/UL (ref 1.85–7.62)
NEUTS SEG NFR BLD AUTO: 41 % (ref 43–75)
PLATELET # BLD AUTO: 204 THOUSANDS/UL (ref 149–390)
PLATELET BLD QL SMEAR: ADEQUATE
PMV BLD AUTO: 11.2 FL (ref 8.9–12.7)
POTASSIUM SERPL-SCNC: 4.4 MMOL/L (ref 3.5–5.3)
PROT SERPL-MCNC: 6.9 G/DL (ref 6.4–8.4)
RBC # BLD AUTO: 4.49 MILLION/UL (ref 3.88–5.62)
RBC MORPH BLD: NORMAL
SODIUM SERPL-SCNC: 135 MMOL/L (ref 135–147)
TIBC SERPL-MCNC: 299 UG/DL (ref 250–450)
TRIGL SERPL-MCNC: 47 MG/DL
UIBC SERPL-MCNC: 232 UG/DL (ref 155–355)
VARIANT LYMPHS # BLD AUTO: 10 %
WBC # BLD AUTO: 10.43 THOUSAND/UL (ref 4.31–10.16)

## 2024-03-22 PROCEDURE — 82728 ASSAY OF FERRITIN: CPT

## 2024-03-22 PROCEDURE — 83550 IRON BINDING TEST: CPT

## 2024-03-22 PROCEDURE — 82570 ASSAY OF URINE CREATININE: CPT

## 2024-03-22 PROCEDURE — 80061 LIPID PANEL: CPT

## 2024-03-22 PROCEDURE — 82043 UR ALBUMIN QUANTITATIVE: CPT

## 2024-03-22 PROCEDURE — 80053 COMPREHEN METABOLIC PANEL: CPT

## 2024-03-22 PROCEDURE — 36415 COLL VENOUS BLD VENIPUNCTURE: CPT

## 2024-03-22 PROCEDURE — 85007 BL SMEAR W/DIFF WBC COUNT: CPT

## 2024-03-22 PROCEDURE — 83540 ASSAY OF IRON: CPT

## 2024-03-22 PROCEDURE — 83036 HEMOGLOBIN GLYCOSYLATED A1C: CPT

## 2024-03-22 PROCEDURE — 85027 COMPLETE CBC AUTOMATED: CPT

## 2024-03-25 ENCOUNTER — OFFICE VISIT (OUTPATIENT)
Dept: PHYSICAL THERAPY | Age: 74
End: 2024-03-25
Payer: MEDICARE

## 2024-03-25 ENCOUNTER — RA CDI HCC (OUTPATIENT)
Dept: OTHER | Facility: HOSPITAL | Age: 74
End: 2024-03-25

## 2024-03-25 DIAGNOSIS — G89.29 CHRONIC RIGHT-SIDED LOW BACK PAIN WITH RIGHT-SIDED SCIATICA: Primary | ICD-10-CM

## 2024-03-25 DIAGNOSIS — M54.41 CHRONIC RIGHT-SIDED LOW BACK PAIN WITH RIGHT-SIDED SCIATICA: Primary | ICD-10-CM

## 2024-03-25 PROCEDURE — 97110 THERAPEUTIC EXERCISES: CPT

## 2024-03-25 NOTE — PROGRESS NOTES
"Daily Note     Today's date: 3/25/2024  Patient name: Alexys Valdez Jr.  : 1950  MRN: [id number]  Referring provider: Armando Newton MD  Dx:   Encounter Diagnosis     ICD-10-CM    1. Chronic right-sided low back pain with right-sided sciatica  M54.41     G89.29           Start Time: 1015  Stop Time: 1100  Total time in clinic (min): 45 minutes    Subjective: pt reports after spending all day doing tasks around the house yesterday he feels increased soreness. Pt      Objective: See treatment diary below      Assessment: pt able to complete therex dwspite subjective reports, no increased sx noted.  Flexion and rotation combined for self stretching program, which he found more helpful. He tolerated increased time under tension  Suitcase carry added with some improvements in \"limp\"--correlate to hip girdle weakness.     Plan: Continue per plan of care.      Precautions: DM2, MARISSA, HTN, PVD, CKD3, depression, HLD, obesity, anxiety, s/p ablation of atrial fibrillation       Manuals 3/11 3/12 3/18 3/21 3/25        STM Lumbar             S/L Lumbar Gap St.                                        Neuro Re-Ed    3/21         TA Set  10\" x 10  2x10x5\"         TA w/ Marches    2x10         TA w/ Clamshells  10x Juan  nv Sidelying  2x10x3\" ea                                                              Ther Ex    3/21         Nustep  10' L1 10' L2 10' L2 10' L2        Seated Lumbar Flex St. Fwd & Left 20\"x3 ea  20\" x 3          Hamstring St.  20\"x3 20\"x 4 W/Sciatic glide  2 x 10 2x10x5 ankle pumps Sciat,  2x10x5 aps          Piriformis St. W/ SOS 20\"x3            LTR 10\"x5 ea 10\" x 5 ea  10x5\" Flex/  Rot.  5-10\"  x10        [facility]             DKTC             Mod mt climber  2 x 15 2x15 @ counter 2x10 2x10  @  plinth        Mod bird dog      2x10  @  plinth        Stir the pot(standing/lean on elbows)  2 x 20 cw/ccw  2x20 ea  NV        Modified good mornings  10# x 3 x 10  seated   NV        bridge   5\" x 3 x 8 " "2x8x3\" Pmb;  10\"x10        STS   3 x 6 4x5 NV        Suitcase carry     3x ea  10# DB                     Ther Activity                                       Gait Training                                       Modalities                                              "

## 2024-03-27 ENCOUNTER — OFFICE VISIT (OUTPATIENT)
Dept: INTERNAL MEDICINE CLINIC | Facility: CLINIC | Age: 74
End: 2024-03-27
Payer: MEDICARE

## 2024-03-27 VITALS
BODY MASS INDEX: 39.69 KG/M2 | WEIGHT: 268 LBS | HEART RATE: 64 BPM | HEIGHT: 69 IN | TEMPERATURE: 97.3 F | OXYGEN SATURATION: 96 % | SYSTOLIC BLOOD PRESSURE: 128 MMHG | DIASTOLIC BLOOD PRESSURE: 70 MMHG

## 2024-03-27 DIAGNOSIS — F32.0 MAJOR DEPRESSIVE DISORDER, SINGLE EPISODE, MILD (HCC): ICD-10-CM

## 2024-03-27 DIAGNOSIS — R09.89 PHLEGM IN THROAT: ICD-10-CM

## 2024-03-27 DIAGNOSIS — E11.51 TYPE 2 DIABETES MELLITUS WITH DIABETIC PERIPHERAL ANGIOPATHY WITHOUT GANGRENE, WITHOUT LONG-TERM CURRENT USE OF INSULIN (HCC): Primary | ICD-10-CM

## 2024-03-27 DIAGNOSIS — I10 ESSENTIAL HYPERTENSION: ICD-10-CM

## 2024-03-27 DIAGNOSIS — D72.828 GRANULOCYTOSIS: ICD-10-CM

## 2024-03-27 DIAGNOSIS — R09.89 THROAT CLEARING: ICD-10-CM

## 2024-03-27 DIAGNOSIS — R09.82 POST-NASAL DRIP: ICD-10-CM

## 2024-03-27 DIAGNOSIS — I50.32 CHRONIC DIASTOLIC (CONGESTIVE) HEART FAILURE (HCC): ICD-10-CM

## 2024-03-27 DIAGNOSIS — R09.81 NASAL CONGESTION: ICD-10-CM

## 2024-03-27 DIAGNOSIS — G47.33 OSA (OBSTRUCTIVE SLEEP APNEA): ICD-10-CM

## 2024-03-27 DIAGNOSIS — H61.21 IMPACTED CERUMEN OF RIGHT EAR: ICD-10-CM

## 2024-03-27 DIAGNOSIS — K21.9 GASTROESOPHAGEAL REFLUX DISEASE WITHOUT ESOPHAGITIS: ICD-10-CM

## 2024-03-27 DIAGNOSIS — I48.0 PAROXYSMAL ATRIAL FIBRILLATION (HCC): ICD-10-CM

## 2024-03-27 DIAGNOSIS — K21.9 LARYNGOPHARYNGEAL REFLUX (LPR): ICD-10-CM

## 2024-03-27 DIAGNOSIS — E78.2 MIXED HYPERLIPIDEMIA: ICD-10-CM

## 2024-03-27 PROBLEM — N18.2 STAGE 2 CHRONIC KIDNEY DISEASE: Status: ACTIVE | Noted: 2023-12-14

## 2024-03-27 PROCEDURE — 99215 OFFICE O/P EST HI 40 MIN: CPT | Performed by: INTERNAL MEDICINE

## 2024-03-27 PROCEDURE — G2211 COMPLEX E/M VISIT ADD ON: HCPCS | Performed by: INTERNAL MEDICINE

## 2024-03-27 RX ORDER — FAMOTIDINE 40 MG/1
40 TABLET, FILM COATED ORAL 2 TIMES DAILY
Start: 2024-03-27

## 2024-03-27 NOTE — ASSESSMENT & PLAN NOTE
Current hemoglobin A1c is noted to be 6.0 is recommended the patient continue his current lifestyle management of diabetes he and his wife have been following a healthy balanced diet avoiding carbohydrates and high fat foods and also trying to exercise on a regular basis.  Follow-up studies are requested in 4 months  Lab Results   Component Value Date    HGBA1C 6.0 (H) 03/22/2024

## 2024-03-27 NOTE — ASSESSMENT & PLAN NOTE
Current blood pressure assessment confirms good control of his hypertension recommend continuation of current medications including amlodipine 10 mg daily Bystolic 10 mg daily doxazosin 2 mg daily and clonidine 0.2 mg daily.  Comprehensive metabolic profile pertaining to electrolyte balance and kidney performance has been reviewed today

## 2024-03-27 NOTE — ASSESSMENT & PLAN NOTE
Diagnosed with sleep apnea in the past the patient continues to use his CPAP device on a daily basis.  Benefits reviewed

## 2024-03-27 NOTE — ASSESSMENT & PLAN NOTE
Patient continues on Prozac 40 mg daily for management of depression disorder patient indicates he feels good on the medication has no apparent side effects recommend continuation of current therapy

## 2024-03-27 NOTE — PROGRESS NOTES
Name: Alexys Valdez Jr.      : 1950      MRN: [id number]  Encounter Provider: Tian Cui MD  Encounter Date: 3/27/2024   Encounter department: [facility] INTERNAL MEDICINE    Assessment & Plan     1. Type 2 diabetes mellitus with diabetic peripheral angiopathy without gangrene, without long-term current use of insulin (HCC)  Assessment & Plan:  Current hemoglobin A1c is noted to be 6.0 is recommended the patient continue his current lifestyle management of diabetes he and his wife have been following a healthy balanced diet avoiding carbohydrates and high fat foods and also trying to exercise on a regular basis.  Follow-up studies are requested in 4 months  Lab Results   Component Value Date    HGBA1C 6.0 (H) 2024       Orders:  -     Comprehensive metabolic panel; Future  -     Hemoglobin A1C; Future    2. Impacted cerumen of right ear  -     famotidine (PEPCID) 40 MG tablet; Take 1 tablet (40 mg total) by mouth 2 (two) times a day    3. Laryngopharyngeal reflux (LPR)  -     famotidine (PEPCID) 40 MG tablet; Take 1 tablet (40 mg total) by mouth 2 (two) times a day    4. Throat clearing  -     famotidine (PEPCID) 40 MG tablet; Take 1 tablet (40 mg total) by mouth 2 (two) times a day    5. Post-nasal drip  -     famotidine (PEPCID) 40 MG tablet; Take 1 tablet (40 mg total) by mouth 2 (two) times a day    6. Phlegm in throat  -     famotidine (PEPCID) 40 MG tablet; Take 1 tablet (40 mg total) by mouth 2 (two) times a day    7. Nasal congestion  -     famotidine (PEPCID) 40 MG tablet; Take 1 tablet (40 mg total) by mouth 2 (two) times a day    8. Mixed hyperlipidemia  Assessment & Plan:  Lipid profile reviewed recommend continuation of healthy diet low in saturated fats and continuation of pravastatin at 10 mg daily    Orders:  -     Lipid panel; Future    9. Granulocytosis  -     CBC; Future    10. Essential hypertension  Assessment & Plan:  Current blood pressure assessment  confirms good control of his hypertension recommend continuation of current medications including amlodipine 10 mg daily Bystolic 10 mg daily doxazosin 2 mg daily and clonidine 0.2 mg daily.  Comprehensive metabolic profile pertaining to electrolyte balance and kidney performance has been reviewed today      11. Chronic diastolic (congestive) heart failure (HCC)  Assessment & Plan:  Wt Readings from Last 3 Encounters:   03/27/24 122 kg (268 lb)   02/29/24 121 kg (266 lb)   02/28/24 124 kg (273 lb)       Patient does have some peripheral edema indicates that it tends to get worse as the day progresses.  He has a diagnosis of chronic diastolic congestive failure.  Lung fields are clear on today's assessment recommend avoidance of high salt foods and continuation of furosemide at 40 mg daily.          12. Paroxysmal atrial fibrillation (HCC)  Assessment & Plan:  Current cardiac assessment indicates a regular rhythm of the heart he denies any palpitations or chest discomfort or shortness of breath.  Cardiology notes reviewed recommend continuation of anticoagulation for stroke risk reduction and any continuation of decrease in along with beta-blocker therapy      13. MARISSA (obstructive sleep apnea)  Assessment & Plan:  Diagnosed with sleep apnea in the past the patient continues to use his CPAP device on a daily basis.  Benefits reviewed      14. Gastroesophageal reflux disease without esophagitis  Assessment & Plan:  Acid reflux symptoms reviewed they continue to be well-controlled he is taking famotidine 40 mg twice a day dose before breakfast and again at suppertime      15. Major depressive disorder, single episode, mild (HCC)  Assessment & Plan:  Patient continues on Prozac 40 mg daily for management of depression disorder patient indicates he feels good on the medication has no apparent side effects recommend continuation of current therapy             Subjective      This pleasant 74-year-old gentleman presents to  our office today to transfer medical care to our practice.  During today's visit with the patient I have reviewed his medical history current medications and have also reviewed his most recent blood work.    During today's visit 10 minutes was spent in prep time reviewing the patient's chart and study results followed by 40 minutes of face-to-face time with the patient and 18 minutes of postvisit time for completion of office note and therapeutic plan.      Review of Systems   Musculoskeletal:  Positive for arthralgias.   All other systems reviewed and are negative.      Current Outpatient Medications on File Prior to Visit   Medication Sig    acetaminophen-codeine (TYLENOL/CODEINE #3) 300-30 MG per tablet Take 1 tablet by mouth every 12 (twelve) hours as needed for severe pain    allopurinol (ZYLOPRIM) 300 mg tablet TAKE 1 TABLET BY MOUTH DAILY    amLODIPine (NORVASC) 10 mg tablet TAKE 1 TABLET BY MOUTH DAILY    cloNIDine (CATAPRES) 0.2 mg tablet TAKE 1 TABLET BY MOUTH DAILY 1  HOUR PRIOR TO BEDTIME    dofetilide (TIKOSYN) 250 mcg capsule TAKE 1 CAPSULE BY MOUTH EVERY TWELVE (12) HOURS    doxazosin (CARDURA) 2 mg tablet Take 1 tablet (2 mg total) by mouth daily at bedtime    fexofenadine (ALLEGRA) 180 MG tablet Take 180 mg by mouth daily    FLUoxetine (PROzac) 40 MG capsule TAKE 2 CAPSULES BY MOUTH DAILY    furosemide (LASIX) 40 mg tablet Take 1 tablet (40 mg total) by mouth daily    Klor-Con M15 15 MEQ TBCR TAKE 2 TABLETS BY MOUTH DAILY    nebivolol (BYSTOLIC) 10 mg tablet TAKE 1 TABLET BY MOUTH ONCE DAILY    ofloxacin (FLOXIN) 0.3 % otic solution [place] 4 drops into the afffected ear twice daily x 3 days    olmesartan (BENICAR) 40 mg tablet TAKE 1 TABLET BY MOUTH DAILY    pravastatin (PRAVACHOL) 10 mg tablet Take 1 tablet (10 mg total) by mouth daily at bedtime    rivaroxaban (Xarelto) 20 mg tablet Take 1 tablet (20 mg total) by mouth daily with breakfast    [DISCONTINUED] dexlansoprazole (DEXILANT) 60 MG  "capsule Take 1 capsule (60 mg total) by mouth daily before breakfast    [DISCONTINUED] famotidine (PEPCID) 40 MG tablet TAKE 1 TABLET BY MOUTH  DAILY AT BEDTIME    amoxicillin (AMOXIL) 500 mg capsule Prior to dental visits    [DISCONTINUED] losartan (COZAAR) 100 MG tablet TAKE 1 TABLET BY MOUTH  DAILY       Objective     /70   Pulse 64   Temp (!) 97.3 °F (36.3 °C)   Ht 5' 9\" (1.753 m)   Wt 122 kg (268 lb)   SpO2 96%   BMI 39.58 kg/m²     Physical Exam  Constitutional:       General: He is not in acute distress.     Appearance: Normal appearance. He is not ill-appearing.   HENT:      Head: Normocephalic and atraumatic.      Right Ear: Tympanic membrane, ear canal and external ear normal.      Left Ear: Tympanic membrane, ear canal and external ear normal.      Nose: Nose normal.      Mouth/Throat:      Mouth: Mucous membranes are moist.      Pharynx: Oropharynx is clear.   Eyes:      Extraocular Movements: Extraocular movements intact.      Conjunctiva/sclera: Conjunctivae normal.      Pupils: Pupils are equal, round, and reactive to light.   Neck:      Vascular: No carotid bruit.   Cardiovascular:      Rate and Rhythm: Regular rhythm.      Heart sounds: Murmur heard.   Pulmonary:      Effort: Pulmonary effort is normal. No respiratory distress.      Breath sounds: No wheezing, rhonchi or rales.   Abdominal:      General: Bowel sounds are normal. There is no distension.      Palpations: There is no mass.      Tenderness: There is no abdominal tenderness. There is no guarding.   Musculoskeletal:         General: No swelling or tenderness.      Cervical back: Normal range of motion and neck supple. No rigidity or tenderness.      Right lower leg: No edema.      Left lower leg: No edema.   Lymphadenopathy:      Cervical: No cervical adenopathy.   Skin:     Coloration: Skin is not jaundiced.   Neurological:      General: No focal deficit present.      Mental Status: He is alert.   Psychiatric:         Mood " and Affect: Mood normal.         Behavior: Behavior normal.         Thought Content: Thought content normal.         Judgment: Judgment normal.       Tian Cui MD

## 2024-03-27 NOTE — ASSESSMENT & PLAN NOTE
Wt Readings from Last 3 Encounters:   03/27/24 122 kg (268 lb)   02/29/24 121 kg (266 lb)   02/28/24 124 kg (273 lb)       Patient does have some peripheral edema indicates that it tends to get worse as the day progresses.  He has a diagnosis of chronic diastolic congestive failure.  Lung fields are clear on today's assessment recommend avoidance of high salt foods and continuation of furosemide at 40 mg daily.

## 2024-03-27 NOTE — ASSESSMENT & PLAN NOTE
Lipid profile reviewed recommend continuation of healthy diet low in saturated fats and continuation of pravastatin at 10 mg daily

## 2024-03-27 NOTE — ASSESSMENT & PLAN NOTE
Current cardiac assessment indicates a regular rhythm of the heart he denies any palpitations or chest discomfort or shortness of breath.  Cardiology notes reviewed recommend continuation of anticoagulation for stroke risk reduction and any continuation of decrease in along with beta-blocker therapy

## 2024-03-27 NOTE — ASSESSMENT & PLAN NOTE
Lab Results   Component Value Date    EGFR 83 03/22/2024    EGFR 74 11/02/2023    EGFR 70 08/31/2023    CREATININE 0.87 03/22/2024    CREATININE 0.99 11/02/2023    CREATININE 1.03 08/31/2023   Most recent GFR is up to 83 cc/min placing the patient now in stage II instead of 3A chronic kidney disease recommend continued aggressive hydration avoidance of nonsteroidal anti-inflammatories and continued good control of diabetes and hypertension

## 2024-03-27 NOTE — ASSESSMENT & PLAN NOTE
Acid reflux symptoms reviewed they continue to be well-controlled he is taking famotidine 40 mg twice a day dose before breakfast and again at suppertime

## 2024-03-28 ENCOUNTER — APPOINTMENT (OUTPATIENT)
Dept: PHYSICAL THERAPY | Age: 74
End: 2024-03-28
Payer: MEDICARE

## 2024-03-31 DIAGNOSIS — E78.00 HYPERCHOLESTEROLEMIA: ICD-10-CM

## 2024-03-31 RX ORDER — PRAVASTATIN SODIUM 10 MG
10 TABLET ORAL
Qty: 90 TABLET | Refills: 1 | Status: SHIPPED | OUTPATIENT
Start: 2024-03-31

## 2024-04-02 ENCOUNTER — HOSPITAL ENCOUNTER (OUTPATIENT)
Dept: RADIOLOGY | Facility: IMAGING CENTER | Age: 74
Discharge: HOME | End: 2024-04-02
Payer: MEDICARE

## 2024-04-02 DIAGNOSIS — M99.61 OSSEOUS AND SUBLUXATION STENOSIS OF INTERVERTEBRAL FORAMINA OF CERVICAL REGION: ICD-10-CM

## 2024-04-02 DIAGNOSIS — M54.12 CERVICAL RADICULITIS: ICD-10-CM

## 2024-04-02 PROCEDURE — 72052 X-RAY EXAM NECK SPINE 6/>VWS: CPT

## 2024-04-04 ENCOUNTER — HOSPITAL ENCOUNTER (OUTPATIENT)
Dept: RADIOLOGY | Age: 74
Discharge: HOME | End: 2024-04-04
Payer: MEDICARE

## 2024-04-04 DIAGNOSIS — M48.00 SPINAL STENOSIS, UNSPECIFIED SPINAL REGION: ICD-10-CM

## 2024-04-04 PROCEDURE — 72148 MRI LUMBAR SPINE W/O DYE: CPT

## 2024-04-05 ENCOUNTER — HOSPITAL ENCOUNTER (OUTPATIENT)
Age: 74
Setting detail: OUTPATIENT SURGERY
Discharge: HOME | End: 2024-04-05
Attending: SURGERY | Admitting: SURGERY
Payer: MEDICARE

## 2024-04-05 VITALS
HEART RATE: 50 BPM | TEMPERATURE: 97.5 F | OXYGEN SATURATION: 98 % | WEIGHT: 267 LBS | DIASTOLIC BLOOD PRESSURE: 77 MMHG | BODY MASS INDEX: 41.91 KG/M2 | RESPIRATION RATE: 20 BRPM | SYSTOLIC BLOOD PRESSURE: 161 MMHG | HEIGHT: 67 IN

## 2024-04-05 DIAGNOSIS — G56.03 CARPAL TUNNEL SYNDROME, BILATERAL: Primary | ICD-10-CM

## 2024-04-05 PROCEDURE — 64721 CARPAL TUNNEL SURGERY: CPT | Performed by: PHYSICIAN ASSISTANT

## 2024-04-05 PROCEDURE — NC001 PR NO CHARGE: Performed by: SURGERY

## 2024-04-05 PROCEDURE — 64721 CARPAL TUNNEL SURGERY: CPT | Performed by: SURGERY

## 2024-04-05 RX ORDER — HYDROCODONE BITARTRATE AND ACETAMINOPHEN 5; 325 MG/1; MG/1
1 TABLET ORAL EVERY 6 HOURS PRN
Status: DISCONTINUED | OUTPATIENT
Start: 2024-04-05 | End: 2024-04-05 | Stop reason: HOSPADM

## 2024-04-05 RX ORDER — HYDROCODONE BITARTRATE AND ACETAMINOPHEN 5; 325 MG/1; MG/1
1 TABLET ORAL EVERY 6 HOURS PRN
Qty: 6 TABLET | Refills: 0 | Status: SHIPPED | OUTPATIENT
Start: 2024-04-05 | End: 2024-04-15

## 2024-04-05 RX ORDER — MAGNESIUM HYDROXIDE 1200 MG/15ML
LIQUID ORAL AS NEEDED
Status: DISCONTINUED | OUTPATIENT
Start: 2024-04-05 | End: 2024-04-05 | Stop reason: HOSPADM

## 2024-04-05 NOTE — H&P
Hand Surgery H&P    Orthopedic Surgery  Patient Name:  Alexys Valdez Jr.            Subjective       Pt is doing well with his right hand.  He would like to proceed with left carpal tunnel release today.      Past Medical History:   Diagnosis Date    Acute prostatitis 07/26/2021    Diagnosed in [place] and treated with Cipro x2 weeks 6/7/21. Questionable recurrence July 16, 2021    Allergic     Anxiety     Arthritis     Atrial fibrillation (HCC)     BPH (benign prostatic hyperplasia)     Chronic diastolic (congestive) heart failure (HCC)     Depression     Diabetes mellitus (HCC)     Drug-induced insomnia (HCC) 02/03/2020    Ear problems     GERD (gastroesophageal reflux disease)     Gout     Headache(784.0)     HL (hearing loss)     Hyperlipidemia     Hypertension     Hyponatremia     last assessed: 09/08/2014    Leukocytosis     Liver function abnormality     Morbid obesity (HCC)     Nasal congestion     Obesity     Obstructive sleep apnea     Sleep apnea      Past Surgical History:   Procedure Laterality Date    CARDIAC CATHETERIZATION      outcome: Neg    CARDIAC ELECTROPHYSIOLOGY PROCEDURE N/A 2/29/2024    Procedure: Cardiac Loop Recorder Explant/Implant;  Surgeon: Nuno Swann DO;  Location: [facility];  Service: Cardiology    CARDIAC ELECTROPHYSIOLOGY STUDY AND ABLATION      CARDIAC LOOP RECORDER  2019    CARDIOVASCULAR STRESS TEST      resolved: 10/27/2010; negative    COLONOSCOPY  11/2013    polyp; complete    COLONOSCOPY  05/26/2017    HERNIA REPAIR      resolved: 11/1999    JOINT REPLACEMENT      SC NEUROPLASTY &/TRANSPOS MEDIAN NRV CARPAL TUNNE Right 11/03/2023    Procedure: CTR;  Surgeon: Finn Hernández MD;  Location: [facility];  Service: Orthopedics    REPLACEMENT TOTAL KNEE Left 2010    REPLACEMENT TOTAL KNEE Right 2003    THYROGLOSSAL DUCT EXCISION      TONSILLECTOMY      last assessed: 06/02/2014    UPPER GASTROINTESTINAL ENDOSCOPY            Objective   Chest:  unlabored  breathing  Heart:  regular rate  Abd:  no distention  Right hand   Incision is healed nicely.  Full composite fist  Normal wrist range of motion  Normal strength  Sensation intact to light touch median nerve distribution  2-point discrimination testing now normal throughout right hand     Assessment and Plan     S/p R CTR 11/3/2023 - doing well.      Left carpal tunnel syndrome -patient is a candidate for left carpal tunnel release procedure under local anesthesia.  He wishes to pursue this option today  Consent on chart    Follow-up 2 weeks after surgery.

## 2024-04-05 NOTE — OP NOTE
OPERATIVE REPORT  PATIENT NAME: Alexys Valdez Jr.    :  1950  MRN: [id number]  Pt Location: [facility] OR ROOM 05    SURGERY DATE: 2024    Surgeons and Role:     * Finn Hernández MD - Primary     * Jim Lange PA-C    Preop Diagnosis:  Carpal tunnel syndrome, bilateral [G56.03]    Post-Op Diagnosis Codes:     * Carpal tunnel syndrome, bilateral [G56.03]    Procedure(s):  Left - RELEASE CARPAL TUNNEL    Specimen(s):  * No specimens in log *    Estimated Blood Loss:   Minimal    Drains:  * No LDAs found *    Anesthesia Type:   Local    Operative Indications:  Carpal tunnel syndrome, bilateral [G56.03]      Operative Findings:  None    Complications:   None    Procedure and Technique:  The patient's left hand was cleansed with alcohol.  A field block was performed using marcaine 0.25% with epinephrine and lidocaine 1% with epinephrine in a 50-50 mixture.  The left upper extremity was prepped and draped in a sterile fashion.  A longitudinal incision was made overlying the transverse carpal ligament in line with the ring finger in a flexed position.  Sharp dissection was performed down to the level of the transverse carpal ligament.  A Weitlaner was used for counterretraction.  The transverse carpal ligament was divided with a 15 blade scalpel distally, and tenotomy scissors proximally along with a portion of the distal antebrachial fascia.  The wound was irrigated copiously with normal saline.  The skin was approximated with 4-0 nylon in an interrupted horizontal mattress fashion.  Xeroform was applied followed by gauze and an ace bandage over wrap.  The patient was transferred to recovery room in stable condition.      I was present for the entire procedure.    Patient Disposition:  PACU     My Assistant was necessary throughout the procedure(s) for retraction and positioning.    I understand that section 1842 (b)(7)(D) of the Social Security Act generally prohibits Medicare physician fee schedule  payment for the services of assistants-at-surgery in [facility] when qualified residents are available to furnish such services. I certify that the services for which payment is claimed were medically necessary, and that no qualified resident was available to perform the services. I further understand that these services are subject to post-payment review by the Medicare carrier.      SIGNATURE: Finn Hernández MD  DATE: April 5, 2024  TIME: 8:12 AM

## 2024-04-05 NOTE — INTERVAL H&P NOTE
H&P reviewed. After examining the patient I find no changes in the patients condition since the H&P had been written.    Vitals:    04/05/24 0628   BP: 151/69   Pulse: (!) 53   Resp: 20   Temp: 98.6 °F (37 °C)   SpO2: 97%

## 2024-04-05 NOTE — DISCHARGE INSTR - AVS FIRST PAGE
Elevate hand above heart as much as possible to help pain and swelling.  May use hand for simple tasks, but no heavy lifting or tight squeezing x 4 weeks.  Keep operative bandage clean and dry. You may remove bandage in 4 days, just place a band-aid over incision.  You are permitted to shower after 4 days with band-aid off.  Perform simple finger motion exercises: opening & closing fingers 10 x every hr.  Please call Dr. Hernández's office ([phone]) to confirm your follow-up in 2 weeks for suture removal.

## 2024-04-07 DIAGNOSIS — I50.32 CHRONIC DIASTOLIC (CONGESTIVE) HEART FAILURE (HCC): ICD-10-CM

## 2024-04-08 ENCOUNTER — OFFICE VISIT (OUTPATIENT)
Dept: HEMATOLOGY ONCOLOGY | Facility: CLINIC | Age: 74
End: 2024-04-08
Payer: MEDICARE

## 2024-04-08 VITALS
HEART RATE: 58 BPM | SYSTOLIC BLOOD PRESSURE: 132 MMHG | WEIGHT: 268 LBS | DIASTOLIC BLOOD PRESSURE: 73 MMHG | RESPIRATION RATE: 16 BRPM | HEIGHT: 67 IN | OXYGEN SATURATION: 96 % | BODY MASS INDEX: 42.06 KG/M2 | TEMPERATURE: 97.2 F

## 2024-04-08 DIAGNOSIS — E55.9 VITAMIN D DEFICIENCY: ICD-10-CM

## 2024-04-08 DIAGNOSIS — D72.820 MONOCLONAL B-CELL LYMPHOCYTOSIS OF UNDETERMINED SIGNIFICANCE: Primary | ICD-10-CM

## 2024-04-08 PROCEDURE — 99214 OFFICE O/P EST MOD 30 MIN: CPT | Performed by: NURSE PRACTITIONER

## 2024-04-08 PROCEDURE — G2211 COMPLEX E/M VISIT ADD ON: HCPCS | Performed by: NURSE PRACTITIONER

## 2024-04-08 RX ORDER — FUROSEMIDE 40 MG/1
40 TABLET ORAL DAILY
Qty: 90 TABLET | Refills: 3 | Status: SHIPPED | OUTPATIENT
Start: 2024-04-08

## 2024-04-08 NOTE — PROGRESS NOTES
[facility]ATOLOG[facility] ON[facility] SPECIAL[facility] [place]  [address] [address]  [phone]  Hematology Ambulatory Follow-Up  Alexys Valdez Jr., 1950, [id number]  4/8/2024    Assessment/Plan:    1. Monoclonal B-cell lymphocytosis of undetermined significance  2. Vitamin D deficiency   Patient is a 74 year old male with a history of GERD, type 2 diabetes, hypertension, MARISSA, CHF, A-fib, PVD, atherosclerosis of the aorta, obesity, who was referred to us for further evaluation of abnormal peripheral smear.  He was found to have a monoclonal B-cell lymphocytosis with nonspecific phenotype, lambda positive, CD20 positive, CD5 negative, CD10 negative.  Absolute clonal B cells was 3.4 thousand per UL.  Labs from 3/22/2024 show a WBC count 10.43, hemoglobin and RBC indices within normal limits, platelets 204.  Differential shows 41% segs, 10% atypical lymphocytes however was up to 35% in August 2023.  Absolute lymphocytes 5.32, absolute eosinophils 0.42.  Metabolic panel within normal limits, creatinine 0.87 with an EGFR of 83.  Iron saturation 22% with a ferritin level of 197.   Patient states he has purposely lost 50 pounds and intends to lose more weight.  Overall he is able to function without restriction.  We discussed continuing to monitor as MBL can progress to CLL.  Is up-to-date on colonoscopy and EGD in 2021.  He follows with his PCP for PSA which was normal.  We will see him in 6 months with repeat blood work.    - CBC and differential; Future  - Vitamin D 25 hydroxy; Future  - Comprehensive metabolic panel; Future    Patient verbalized understanding and is in agreement to the plan. Patient knows to call the Hematology/Oncology office with any questions and concerns regarding the above.    Barrier(s) to care: None  The patient is able to self care.    Cely GARCIA  Medical Oncology/Hematology  [facility]    Interval history: clinically  stable    Review of Systems   Constitutional:  Negative for activity change, appetite change, fatigue, fever and unexpected weight change.   Respiratory:  Negative for cough and shortness of breath.    Cardiovascular:  Negative for chest pain and leg swelling.   Gastrointestinal:  Negative for abdominal pain, constipation, diarrhea and nausea.   Endocrine: Negative for cold intolerance and heat intolerance.   Musculoskeletal:  Negative for arthralgias and myalgias.   Skin: Negative.    Neurological:  Negative for dizziness, weakness and headaches.   Hematological:  Negative for adenopathy. Does not bruise/bleed easily.     Past Medical History:   Diagnosis Date    Acute prostatitis 07/26/2021    Diagnosed in [place] and treated with Cipro x2 weeks 6/7/21. Questionable recurrence July 16, 2021    Allergic     Anxiety     Arthritis     Atrial fibrillation (HCC)     BPH (benign prostatic hyperplasia)     Chronic diastolic (congestive) heart failure (HCC)     Depression     Diabetes mellitus (HCC)     Drug-induced insomnia (HCC) 02/03/2020    Ear problems     GERD (gastroesophageal reflux disease)     Gout     Headache(784.0)     HL (hearing loss)     Hyperlipidemia     Hypertension     Hyponatremia     last assessed: 09/08/2014    Leukocytosis     Liver function abnormality     Morbid obesity (HCC)     Nasal congestion     Obesity     Obstructive sleep apnea     Sleep apnea      Past Surgical History:   Procedure Laterality Date    CARDIAC CATHETERIZATION      outcome: Neg    CARDIAC ELECTROPHYSIOLOGY PROCEDURE N/A 2/29/2024    Procedure: Cardiac Loop Recorder Explant/Implant;  Surgeon: Nuno Swann DO;  Location: [facility];  Service: Cardiology    CARDIAC ELECTROPHYSIOLOGY STUDY AND ABLATION      CARDIAC LOOP RECORDER  2019    CARDIOVASCULAR STRESS TEST      resolved: 10/27/2010; negative    COLONOSCOPY  11/2013    polyp; complete    COLONOSCOPY  05/26/2017    HERNIA REPAIR      resolved: 11/1999    JOINT  REPLACEMENT      MO NEUROPLASTY &/TRANSPOS MEDIAN NRV CARPAL TUNNE Right 11/03/2023    Procedure: CTR;  Surgeon: Finn Hernández MD;  Location: [facility];  Service: Orthopedics    MO NEUROPLASTY &/TRANSPOS MEDIAN NRV CARPAL TUNNE Left 4/5/2024    Procedure: RELEASE CARPAL TUNNEL;  Surgeon: Finn Hernández MD;  Location: [facility];  Service: Orthopedics    REPLACEMENT TOTAL KNEE Left 2010    REPLACEMENT TOTAL KNEE Right 2003    THYROGLOSSAL DUCT EXCISION      TONSILLECTOMY      last assessed: 06/02/2014    UPPER GASTROINTESTINAL ENDOSCOPY       Current Outpatient Medications:     acetaminophen-codeine (TYLENOL/CODEINE #3) 300-30 MG per tablet, Take 1 tablet by mouth every 12 (twelve) hours as needed for severe pain, Disp: 5 tablet, Rfl: 0    allopurinol (ZYLOPRIM) 300 mg tablet, TAKE 1 TABLET BY MOUTH DAILY, Disp: 90 tablet, Rfl: 3    amLODIPine (NORVASC) 10 mg tablet, TAKE 1 TABLET BY MOUTH DAILY, Disp: 90 tablet, Rfl: 3    amoxicillin (AMOXIL) 500 mg capsule, Prior to dental visits, Disp: , Rfl:     cloNIDine (CATAPRES) 0.2 mg tablet, TAKE 1 TABLET BY MOUTH DAILY 1  HOUR PRIOR TO BEDTIME, Disp: 90 tablet, Rfl: 3    dofetilide (TIKOSYN) 250 mcg capsule, TAKE 1 CAPSULE BY MOUTH EVERY TWELVE (12) HOURS, Disp: 180 capsule, Rfl: 3    doxazosin (CARDURA) 2 mg tablet, Take 1 tablet (2 mg total) by mouth daily at bedtime, Disp: 90 tablet, Rfl: 1    famotidine (PEPCID) 40 MG tablet, TAKE 1 TABLET BY MOUTH DAILY AT  BEDTIME, Disp: 90 tablet, Rfl: 3    fexofenadine (ALLEGRA) 180 MG tablet, Take 180 mg by mouth daily, Disp: , Rfl:     FLUoxetine (PROzac) 40 MG capsule, TAKE 2 CAPSULES BY MOUTH DAILY, Disp: 180 capsule, Rfl: 1    furosemide (LASIX) 40 mg tablet, TAKE 1 TABLET BY MOUTH DAILY, Disp: 90 tablet, Rfl: 3    HYDROcodone-acetaminophen (Norco) 5-325 mg per tablet, Take 1 tablet by mouth every 6 (six) hours as needed for pain for up to 10 days Max Daily Amount: 4 tablets, Disp: 6 tablet, Rfl: 0    Klor-Con M15 15 MEQ TBCR,  "TAKE 2 TABLETS BY MOUTH DAILY, Disp: 180 each, Rfl: 3    nebivolol (BYSTOLIC) 10 mg tablet, TAKE 1 TABLET BY MOUTH ONCE DAILY, Disp: 90 tablet, Rfl: 3    ofloxacin (FLOXIN) 0.3 % otic solution, [place] 4 drops into the afffected ear twice daily x 3 days, Disp: 5 mL, Rfl: 0    olmesartan (BENICAR) 40 mg tablet, TAKE 1 TABLET BY MOUTH DAILY, Disp: 90 tablet, Rfl: 3    pravastatin (PRAVACHOL) 10 mg tablet, TAKE 1 TABLET BY MOUTH DAILY AT  BEDTIME, Disp: 90 tablet, Rfl: 1    rivaroxaban (Xarelto) 20 mg tablet, Take 1 tablet (20 mg total) by mouth daily with breakfast, Disp: 90 tablet, Rfl: 3    Allergies   Allergen Reactions    Metoprolol Shortness Of Breath     Tolerates Bystolic    Benazepril Cough    Clonidine Fatigue     Pt takes med with no side effects     Diltiazem Bradycardia    Entex T  [Pseudoephedrine-Guaifenesin]      Annotation - [id number]: LEG SWELLING    Tizanidine Other (See Comments)     Objective:  /73   Pulse 58   Temp (!) 97.2 °F (36.2 °C)   Resp 16   Ht 5' 7\" (1.702 m)   Wt 122 kg (268 lb)   SpO2 96%   BMI 41.97 kg/m²    Physical Exam  Vitals reviewed.   Constitutional:       Appearance: Normal appearance. He is well-developed.   HENT:      Head: Normocephalic and atraumatic.   Eyes:      Pupils: Pupils are equal, round, and reactive to light.   Pulmonary:      Effort: Pulmonary effort is normal. No respiratory distress.   Musculoskeletal:         General: Normal range of motion.      Cervical back: Normal range of motion.   Lymphadenopathy:      Cervical: No cervical adenopathy.   Skin:     General: Skin is dry.   Neurological:      Mental Status: He is alert and oriented to person, place, and time.   Psychiatric:         Behavior: Behavior normal.     Result Review  Labs:  Lab Results   Component Value Date    WBC 10.43 (H) 03/22/2024    HGB 13.9 03/22/2024    HCT 41.2 03/22/2024    MCV 92 03/22/2024     03/22/2024     Lab Results   Component Value Date    SODIUM 135 " 03/22/2024    K 4.4 03/22/2024    CL 98 03/22/2024    CO2 27 03/22/2024    AGAP 10 03/22/2024    BUN 24 03/22/2024    CREATININE 0.87 03/22/2024    GLUC 96 07/26/2021    GLUF 93 03/22/2024    CALCIUM 9.5 03/22/2024    AST 31 03/22/2024    ALT 23 03/22/2024    ALKPHOS 53 03/22/2024    TP 6.9 03/22/2024    TBILI 0.80 03/22/2024    EGFR 83 03/22/2024     Please note:  This report has been generated by a voice recognition software system. Therefore there may be syntax, spelling, and/or grammatical errors. Please call if you have any questions.

## 2024-04-18 ENCOUNTER — OFFICE VISIT (OUTPATIENT)
Dept: OBGYN CLINIC | Facility: CLINIC | Age: 74
End: 2024-04-18

## 2024-04-18 VITALS
DIASTOLIC BLOOD PRESSURE: 72 MMHG | BODY MASS INDEX: 42.06 KG/M2 | HEIGHT: 67 IN | WEIGHT: 268 LBS | SYSTOLIC BLOOD PRESSURE: 132 MMHG

## 2024-04-18 DIAGNOSIS — G56.03 CARPAL TUNNEL SYNDROME, BILATERAL: Primary | ICD-10-CM

## 2024-04-18 PROCEDURE — 99024 POSTOP FOLLOW-UP VISIT: CPT | Performed by: SURGERY

## 2024-04-18 NOTE — PROGRESS NOTES
HPI:  Pt is a 73 yo male s/p left carpal tunnel release on 4/5/24.  He notes some improvement in his numbness.  He notes that some of his left upper extremity shoulder pain has improved as well.  Denies fever/chills.      PE:  LUE:  incision healing well, sutures in place, no erythema, no induration, able to make a full fist complex, no edema    A/P:  Pt is a 73 yo male s/p left carpal tunnel release on 4/5/24.  -Sutures removed.  -Scar massage.  -ROM exercises.  -Increase activity as tolerated.  -F/U PRN.

## 2024-04-19 ENCOUNTER — HOSPITAL ENCOUNTER (OUTPATIENT)
Dept: RADIOLOGY | Age: 74
Discharge: HOME | End: 2024-04-19
Payer: MEDICARE

## 2024-04-19 DIAGNOSIS — M48.062 PSEUDOCLAUDICATION SYNDROME: ICD-10-CM

## 2024-04-19 DIAGNOSIS — Q61.00 CONGENITAL RENAL CYST: ICD-10-CM

## 2024-04-19 PROCEDURE — 72131 CT LUMBAR SPINE W/O DYE: CPT

## 2024-04-19 PROCEDURE — 76775 US EXAM ABDO BACK WALL LIM: CPT

## 2024-07-16 PROBLEM — Z95.818 STATUS POST PLACEMENT OF IMPLANTABLE LOOP RECORDER: Status: ACTIVE | Noted: 2024-07-16

## 2024-07-16 PROBLEM — Z45.09 ENCOUNTER FOR LOOP RECORDER AT END OF BATTERY LIFE: Status: RESOLVED | Noted: 2024-02-29 | Resolved: 2024-07-16

## 2024-07-16 NOTE — PROGRESS NOTES
Electrophysiology 6 Month Follow Up  [facility]  [facility] [address], [place] [address]    Name: Alexys Valdez Jr.  : 1950  MRN: [id number]    ASSESSMENT:  1. Paroxysmal atrial fibrillation (HCC)  POCT ECG      2. Long term current use of antiarrhythmic drug        3. S/P ablation of atrial fibrillation            PLAN:  Paroxysmal atrial fibrillation  - anticoagulated with Xarelto / [id number] score of 2 (age, HTN)  - EF of 60% per echo 2023 / mild dilation of left atrium noted  - rate control: nebivolol  - antiarrhythmic therapy: dofetilide  - prior ablation: ablation of atrial fibrillation with pulmonary vein isolation by Dr. Swann on 12/3/2019  [employer] loop recorder in situ  - implantaed 2019 for surveillance of afib initially with loop explant/implant 2024  Essential hypertension  Obesity with BMI of 43  Obstructive sleep apnea   - maintained on CPAP    IMPRESSION:  1. Atrial fibrillation - Patient is currently in sinus rhythm in the office and was noted to only have 1 episode of A-fib for 8 hours in May.  We discussed how at this point we would continue to monitor as it was an isolated episode.  He is still maintained on dofetilide with manual interpretation of QTc interval being 413 ms.  Creatinine clearance is 136 ms.  Will not change current regiment.  He is also maintained on Xarelto and he was given samples at this office visit.    2. Loop - May have been some mixup when he underwent loop explantation but then reimplantation.  Will reestablish him with our device clinic as this fell off and while we were not alerted of the episode he went through routine monitoring.      3. HTN - Advised him that I would like him to be seen by general cardiology sooner than 2024 so he will go down to see if there is a sooner appointment, hopefully October (has not been seen since October of last year).    Patient is to follow up  in our EP office in 6 months. He is to call our office with any further questions or concerns in the meantime.    HPI:   Interim history: Alexys Valdez Jr. is a 74 y.o. male with paroxysmal atrial fibrillation anticoagulated with Xarelto, [employer] loop recorder in situ, hypertension, obesity with BMI of 43, and obstructive sleep apnea maintained on CPAP. He presents today for 6-month follow-up.    Patient is here for routine follow-up.  He has undergone cardioversions along with ablation and antiarrhythmic initiation in the past for atrial fibrillation.  The last couple years he has been stable and just recently underwent loop explantation and implantation in February for further atrial fibrillation monitoring.  He reports that he is doing fairly well at home.  He does notice a little bit of fatigue when his blood pressures are on the lower side for him which is roughly around 119 systolic.  He says that normally his blood pressures range in the 130s to 140s range.    He and his wife do try to monitor their weight with a specific diet and sees a doctor who also prescribed supplements that they are on.    Loop recorder was interrogated in the office and did show 1 episode of atrial fibrillation that lasted 8 hours on May 21 with an average heart rate of 75 bpm that he was asymptomatic for.    Rhythm History:  1.  Followed by [facility] general cardiology since at least 2016  2. New diagnosis of A-fib 10/2018   A. Underwent SANDRITA/CV 11/2018 - lasted 1-2 months  3. Seen by EP DT 10/2019  4. Underwent ablation of afib + loop + dofetilide start 12/3/2019  5. Underwent loop explant/implant 2/2024 for further monitoring    EKG: Sinus rhythm at 56 bpm with QTc interval of 413msec    ROS:   Review of Systems   Constitutional: Negative for chills, diaphoresis, fever and malaise/fatigue.   Cardiovascular:  Negative for chest pain, claudication, cyanosis, dyspnea on exertion, irregular heartbeat, leg swelling,  "near-syncope, orthopnea, palpitations, paroxysmal nocturnal dyspnea and syncope.   Respiratory:  Negative for shortness of breath and sleep disturbances due to breathing.    Neurological:  Negative for dizziness and light-headedness.   All other systems reviewed and are negative.      OBJECTIVE:   Vitals:   /70 (BP Location: Left arm, Patient Position: Sitting, Cuff Size: Large)   Pulse 56   Ht 5' 7\" (1.702 m)   Wt 124 kg (273 lb 4.8 oz)   BMI 42.80 kg/m²       Physical Exam:   Physical Exam  Vitals and nursing note reviewed.   Constitutional:       General: He is not in acute distress.     Appearance: Normal appearance. He is well-developed. He is not diaphoretic.   HENT:      Head: Normocephalic and atraumatic.   Eyes:      Pupils: Pupils are equal, round, and reactive to light.   Neck:      Vascular: No JVD.   Cardiovascular:      Rate and Rhythm: Normal rate and regular rhythm.      Heart sounds: No murmur heard.     No friction rub. No gallop.   Pulmonary:      Effort: Pulmonary effort is normal. No respiratory distress.      Breath sounds: Normal breath sounds. No wheezing.   Abdominal:      Palpations: Abdomen is soft.   Musculoskeletal:         General: Normal range of motion.      Cervical back: Normal range of motion.   Skin:     General: Skin is warm and dry.      Comments: Nonpitting edema of lower extremities   Neurological:      Mental Status: He is alert and oriented to person, place, and time.   Psychiatric:         Mood and Affect: Mood normal.         Behavior: Behavior normal.         Medications:      Current Outpatient Medications:     allopurinol (ZYLOPRIM) 300 mg tablet, TAKE 1 TABLET BY MOUTH DAILY, Disp: 90 tablet, Rfl: 3    amLODIPine (NORVASC) 10 mg tablet, TAKE 1 TABLET BY MOUTH DAILY, Disp: 90 tablet, Rfl: 3    amoxicillin (AMOXIL) 500 mg capsule, Prior to dental visits, Disp: , Rfl:     cloNIDine (CATAPRES) 0.2 mg tablet, TAKE 1 TABLET BY MOUTH DAILY 1  HOUR PRIOR TO BEDTIME, " Disp: 90 tablet, Rfl: 3    dofetilide (TIKOSYN) 250 mcg capsule, TAKE 1 CAPSULE BY MOUTH EVERY TWELVE (12) HOURS, Disp: 180 capsule, Rfl: 3    doxazosin (CARDURA) 2 mg tablet, Take 1 tablet (2 mg total) by mouth daily at bedtime, Disp: 90 tablet, Rfl: 1    famotidine (PEPCID) 40 MG tablet, TAKE 1 TABLET BY MOUTH DAILY AT  BEDTIME, Disp: 90 tablet, Rfl: 3    fexofenadine (ALLEGRA) 180 MG tablet, Take 180 mg by mouth daily, Disp: , Rfl:     FLUoxetine (PROzac) 40 MG capsule, TAKE 2 CAPSULES BY MOUTH DAILY, Disp: 180 capsule, Rfl: 1    furosemide (LASIX) 40 mg tablet, TAKE 1 TABLET BY MOUTH DAILY, Disp: 90 tablet, Rfl: 3    Klor-Con M15 15 MEQ TBCR, TAKE 2 TABLETS BY MOUTH DAILY, Disp: 180 each, Rfl: 3    nebivolol (BYSTOLIC) 10 mg tablet, TAKE 1 TABLET BY MOUTH ONCE DAILY, Disp: 90 tablet, Rfl: 3    ofloxacin (FLOXIN) 0.3 % otic solution, [place] 4 drops into the afffected ear twice daily x 3 days, Disp: 5 mL, Rfl: 0    olmesartan (BENICAR) 40 mg tablet, TAKE 1 TABLET BY MOUTH DAILY, Disp: 90 tablet, Rfl: 3    omeprazole (PriLOSEC) 40 MG capsule, Take 1 capsule (40 mg total) by mouth 2 (two) times a day, Disp: 180 capsule, Rfl: 3    pravastatin (PRAVACHOL) 10 mg tablet, TAKE 1 TABLET BY MOUTH DAILY AT  BEDTIME, Disp: 90 tablet, Rfl: 1    acetaminophen-codeine (TYLENOL/CODEINE #3) 300-30 MG per tablet, Take 1 tablet by mouth every 12 (twelve) hours as needed for severe pain (Patient not taking: Reported on 7/22/2024), Disp: 5 tablet, Rfl: 0    rivaroxaban (Xarelto) 20 mg tablet, Take 1 tablet (20 mg total) by mouth daily with breakfast, Disp: 30 tablet, Rfl: 0     Family History   Problem Relation Age of Onset    Diabetes Mother     Heart attack Mother     Hypertension Mother     Heart attack Sister      Social History     Socioeconomic History    Marital status: /Civil Union     Spouse name: Not on file    Number of children: Not on file    Years of education: Not on file    Highest education level: Not on  file   Occupational History    Occupation: Disability    Occupation:      Comment: significant asbestos and silica exposure in the past   Tobacco Use    Smoking status: Never    Smokeless tobacco: Never   Vaping Use    Vaping status: Never Used   Substance and Sexual Activity    Alcohol use: Not Currently     Alcohol/week: 3.0 standard drinks of alcohol     Types: 3 Standard drinks or equivalent per week    Drug use: Never    Sexual activity: Not Currently   Other Topics Concern    Not on file   Social History Narrative    4 cups of coffee/day     Social Determinants of Health     Financial Resource Strain: Low Risk  (12/11/2023)    Overall Financial Resource Strain (CARDIA)     Difficulty of Paying Living Expenses: Not hard at all   Food Insecurity: Not on file   Transportation Needs: No Transportation Needs (12/11/2023)    PRAPARE - Transportation     Lack of Transportation (Medical): No     Lack of Transportation (Non-Medical): No   Physical Activity: Not on file   Stress: Not on file   Social Connections: Not on file   Intimate Partner Violence: Not on file   Housing Stability: Not on file     Social History     Tobacco Use   Smoking Status Never   Smokeless Tobacco Never     Social History     Substance and Sexual Activity   Alcohol Use Not Currently    Alcohol/week: 3.0 standard drinks of alcohol    Types: 3 Standard drinks or equivalent per week       Labs & Results:  Below is the patient's most recent value for Albumin, ALT, AST, BUN, Calcium, Chloride, Cholesterol, CO2, Creatinine, GFR, Glucose, HDL, Hematocrit, Hemoglobin, Hemoglobin A1C, LDL, Magnesium, Phosphorus, Platelets, Potassium, PSA, Sodium, Triglycerides, and WBC.   Lab Results   Component Value Date    ALT 19 07/17/2024    AST 20 07/17/2024    BUN 11 07/17/2024    CALCIUM 9.3 07/17/2024    CL 98 07/17/2024    CO2 27 07/17/2024    CREATININE 0.81 07/17/2024    HDL 59 07/17/2024    HCT 39.6 07/17/2024    HGB 13.6 07/17/2024    HGBA1C  5.4 2024    MG 1.6 2019     2024    K 4.6 2024    PSA 0.13 2023    TRIG 61 2024    WBC 8.58 2024     Note: for a comprehensive list of the patient's lab results, access the Results Review activity.    CARDIAC TESTING:   ECHO:   Results for orders placed during the hospital encounter of 10/30/18    Echo complete with contrast if indicated    Narrative  [facility]  [address] [place] [address]  [phone]    Transthoracic Echocardiogram  2D, M-mode, Doppler, and Color Doppler    Study date:  2018    Patient: CHRIS CHAVES  MR number: [id number]  Account number: [id number]  : 1950  Age: 68 years  Gender: Male  Status: Inpatient  Location: Bedside  Height: 70 in  Weight: 332 lb  BP: 134/ 98 mmHg    Indications: Atrial fibrillation.    Diagnoses: I48.0 - Atrial fibrillation    Sonographer:  Joi Norman RDCS  Primary Physician:  Juan Jane MD  Referring Physician:  Jenna Cruz MD  Group:  [facility]  Interpreting Physician:  Lorraine South DO    SUMMARY    PROCEDURE INFORMATION:  This was a very technically difficult and poor quality study.  Echocardiographic views were limited due to decreased penetration and lung interference.  Intravenous contrast ( 1.2 mL of definity) was administered.    LEFT VENTRICLE:  Systolic function was normal. Ejection fraction was estimated to be 55 %.  This study was inadequate for the evaluation of regional wall motion.  Wall thickness was mildly increased.    LEFT ATRIUM:  The atrium was mildly dilated.    RIGHT ATRIUM:  The atrium was poorly visualized.    MITRAL VALVE:  Grossly normal but very poorly visualized mitral valve with possible mild annular calcification.    AORTIC VALVE:  The valve was not well visualized.    TRICUSPID VALVE:  Not well visualized.  There was mild to moderate regurgitation.    IVC, HEPATIC VEINS:  The inferior  vena cava was mildly dilated.    HISTORY: PRIOR HISTORY: DM2. Hyperlipidemia. Hypertension. Morbid obesity. CHF.    PROCEDURE: The procedure was performed at the bedside. This was a routine study. The transthoracic approach was used. The study included complete 2D imaging, M-mode, complete spectral Doppler, and color Doppler. The heart rate was 102 bpm,  at the start of the study. Intravenous contrast ( 1.2 mL of definity) was administered. Echocardiographic views were limited due to decreased penetration and lung interference. This was a very technically difficult and poor quality study.    LEFT VENTRICLE: Size was normal. Systolic function was normal. Ejection fraction was estimated to be 55 %. This study was inadequate for the evaluation of regional wall motion. Wall thickness was mildly increased. DOPPLER: Transmitral flow  pattern: atrial fibrillation. The study was not technically sufficient to allow evaluation of LV diastolic function.    RIGHT VENTRICLE: The size was normal. Systolic function was normal. Wall thickness was normal.    LEFT ATRIUM: The atrium was mildly dilated.    RIGHT ATRIUM: The atrium was poorly visualized.    MITRAL VALVE: Grossly normal but very poorly visualized mitral valve with possible mild annular calcification. DOPPLER: There was no evidence for stenosis. There was no significant regurgitation.    AORTIC VALVE: The valve was not well visualized. DOPPLER: Transaortic velocity was within the normal range. There was no evidence for stenosis. There was no significant regurgitation, by limited doppler study.    TRICUSPID VALVE: Not well visualized. DOPPLER: There was mild to moderate regurgitation.    PULMONIC VALVE: Not well visualized.    PERICARDIUM: There was no pericardial effusion.    AORTA: The root exhibited normal size.    SYSTEMIC VEINS: IVC: The inferior vena cava was mildly dilated. Respirophasic changes were normal.    SYSTEM MEASUREMENT TABLES    2D  %FS: 40.6 %  Ao Diam:  4.1 cm  EDV(Teich): 136.4 ml  EF(Teich): 70.9 %  ESV(Teich): 39.8 ml  IVSd: 1.7 cm  LA Area: 40.8 cm2  LA Diam: 5.1 cm  LVIDd: 5.3 cm  LVIDs: 3.2 cm  LVPWd: 1.7 cm  RA Area: 30.2 cm2  SV(Teich): 96.6 ml    [facility] Accredited Echocardiography Laboratory    Prepared and electronically signed by    Lorraine South DO  Signed 2018 12:13:25    Results for orders placed during the hospital encounter of 19    [facility] [address]  [phone]    Transesophageal Echocardiogram  Limited 2D, Doppler, and Color Doppler    Study date:  03-Dec-2019    Patient: CHRIS CHAVES  MR number: [id number]  Account number: [id number]  : 1950  Age: 69 years  Gender: Male  Status: Inpatient  Location: Cath lab  Height: 67 in  Weight: 320 lb  BP: 138/ 82 mmHg    Indications: Evaluate for suspected cardiac source of embolism.    Diagnoses: I48.1 - Atrial flutter    Sonographer:  MAYI Barclay  Interpreting Physician:  Nuno Swann DO  Primary Physician:  Juan Jane MD  Referring Physician:  Nuno Swann DO  Group:  [facility]    SUMMARY    LEFT VENTRICLE:  Systolic function was at the lower limits of normal by visual assessment. Ejection fraction was estimated to be 50 %.    RIGHT VENTRICLE:  The ventricle was mildly dilated.    LEFT ATRIUM:  The atrium was mildly to moderately dilated.    ATRIAL SEPTUM:  No defect or patent foramen ovale was identified.  There was no left-to-right shunt and no right-to-left shunt.    MITRAL VALVE:  There was trace regurgitation.    TRICUSPID VALVE:  There was trace regurgitation.    HISTORY: PRIOR HISTORY: GERD; DM2; HLD; HTN;    PROCEDURE: The procedure was performed in the catheterization laboratory. This was a routine study. The risks and alternatives of the procedure were explained to the patient and informed consent was obtained. The  transesophageal approach  was used. The study included limited 2D imaging, limited spectral Doppler, and color Doppler. The heart rate was 75 bpm, at the start of the study. An adult omniplane probe was inserted by the attending cardiologist. Intubated with ease.  One intubation attempt(s). There was no blood detected on the probe. This was a technically difficult study. MEDICATIONS: SBE prophylaxis. General anesthesia administered by anesthesia team.    LEFT VENTRICLE: Size was normal. Systolic function was at the lower limits of normal by visual assessment. Ejection fraction was estimated to be 50 %. Wall thickness was normal.    RIGHT VENTRICLE: The ventricle was mildly dilated. Systolic function was low normal. Wall thickness was normal.    LEFT ATRIUM: The atrium was mildly to moderately dilated. APPENDAGE: No thrombus was identified.    ATRIAL SEPTUM: No defect or patent foramen ovale was identified. There was no left-to-right shunt and no right-to-left shunt.    MITRAL VALVE: Valve structure was normal. There was normal leaflet separation. There was no echocardiographic evidence of vegetation. DOPPLER: There was trace regurgitation.    AORTIC VALVE: The valve was trileaflet. Leaflets exhibited mildly increased thickness, mild calcification, and normal cuspal separation. There was no echocardiographic evidence of vegetation. DOPPLER: There was no regurgitation.    TRICUSPID VALVE: The valve structure was normal. There was normal leaflet separation. There was no echocardiographic evidence of vegetation. DOPPLER: There was trace regurgitation.    MEASUREMENT TABLES    DOPPLER MEASUREMENTS  Left atrium   (Reference normals)  DARIO peak comfort   50 cm/s   (--)    [employer] Accredited Echocardiography Laboratory    Prepared and electronically signed by    Nuno Swann DO  Signed 04-Dec-2019 15:12:59      CATH:  No results found for this or any previous visit.      STRESS TEST:  No results found for this  or any previous visit.

## 2024-07-17 ENCOUNTER — APPOINTMENT (OUTPATIENT)
Dept: LAB | Age: 74
End: 2024-07-17
Payer: MEDICARE

## 2024-07-17 DIAGNOSIS — E55.9 VITAMIN D DEFICIENCY: ICD-10-CM

## 2024-07-17 DIAGNOSIS — E78.2 MIXED HYPERLIPIDEMIA: ICD-10-CM

## 2024-07-17 DIAGNOSIS — D72.828 GRANULOCYTOSIS: ICD-10-CM

## 2024-07-17 DIAGNOSIS — E11.51 TYPE 2 DIABETES MELLITUS WITH DIABETIC PERIPHERAL ANGIOPATHY WITHOUT GANGRENE, WITHOUT LONG-TERM CURRENT USE OF INSULIN (HCC): ICD-10-CM

## 2024-07-17 DIAGNOSIS — D72.820 MONOCLONAL B-CELL LYMPHOCYTOSIS OF UNDETERMINED SIGNIFICANCE: ICD-10-CM

## 2024-07-17 LAB
25(OH)D3 SERPL-MCNC: 44 NG/ML (ref 30–100)
ALBUMIN SERPL BCG-MCNC: 3.9 G/DL (ref 3.5–5)
ALP SERPL-CCNC: 59 U/L (ref 34–104)
ALT SERPL W P-5'-P-CCNC: 19 U/L (ref 7–52)
ANION GAP SERPL CALCULATED.3IONS-SCNC: 10 MMOL/L (ref 4–13)
AST SERPL W P-5'-P-CCNC: 20 U/L (ref 13–39)
BILIRUB SERPL-MCNC: 0.98 MG/DL (ref 0.2–1)
BUN SERPL-MCNC: 11 MG/DL (ref 5–25)
CALCIUM SERPL-MCNC: 9.3 MG/DL (ref 8.4–10.2)
CHLORIDE SERPL-SCNC: 98 MMOL/L (ref 96–108)
CHOLEST SERPL-MCNC: 139 MG/DL
CO2 SERPL-SCNC: 27 MMOL/L (ref 21–32)
CREAT SERPL-MCNC: 0.81 MG/DL (ref 0.6–1.3)
ERYTHROCYTE [DISTWIDTH] IN BLOOD BY AUTOMATED COUNT: 12.1 % (ref 11.6–15.1)
EST. AVERAGE GLUCOSE BLD GHB EST-MCNC: 108 MG/DL
GFR SERPL CREATININE-BSD FRML MDRD: 86 ML/MIN/1.73SQ M
GLUCOSE P FAST SERPL-MCNC: 104 MG/DL (ref 65–99)
HBA1C MFR BLD: 5.4 %
HCT VFR BLD AUTO: 39.6 % (ref 36.5–49.3)
HDLC SERPL-MCNC: 59 MG/DL
HGB BLD-MCNC: 13.6 G/DL (ref 12–17)
LDLC SERPL CALC-MCNC: 68 MG/DL (ref 0–100)
MCH RBC QN AUTO: 31.1 PG (ref 26.8–34.3)
MCHC RBC AUTO-ENTMCNC: 34.3 G/DL (ref 31.4–37.4)
MCV RBC AUTO: 91 FL (ref 82–98)
NONHDLC SERPL-MCNC: 80 MG/DL
PLATELET # BLD AUTO: 191 THOUSANDS/UL (ref 149–390)
PMV BLD AUTO: 10.8 FL (ref 8.9–12.7)
POTASSIUM SERPL-SCNC: 4.6 MMOL/L (ref 3.5–5.3)
PROT SERPL-MCNC: 6.8 G/DL (ref 6.4–8.4)
RBC # BLD AUTO: 4.37 MILLION/UL (ref 3.88–5.62)
SODIUM SERPL-SCNC: 135 MMOL/L (ref 135–147)
TRIGL SERPL-MCNC: 61 MG/DL
WBC # BLD AUTO: 8.58 THOUSAND/UL (ref 4.31–10.16)

## 2024-07-17 PROCEDURE — 83036 HEMOGLOBIN GLYCOSYLATED A1C: CPT

## 2024-07-17 PROCEDURE — 85027 COMPLETE CBC AUTOMATED: CPT

## 2024-07-17 PROCEDURE — 82306 VITAMIN D 25 HYDROXY: CPT

## 2024-07-17 PROCEDURE — 80053 COMPREHEN METABOLIC PANEL: CPT

## 2024-07-17 PROCEDURE — 36415 COLL VENOUS BLD VENIPUNCTURE: CPT

## 2024-07-17 PROCEDURE — 80061 LIPID PANEL: CPT

## 2024-07-22 ENCOUNTER — OFFICE VISIT (OUTPATIENT)
Dept: CARDIOLOGY CLINIC | Facility: CLINIC | Age: 74
End: 2024-07-22
Payer: MEDICARE

## 2024-07-22 ENCOUNTER — OFFICE VISIT (OUTPATIENT)
Dept: INTERNAL MEDICINE CLINIC | Facility: CLINIC | Age: 74
End: 2024-07-22
Payer: MEDICARE

## 2024-07-22 VITALS
DIASTOLIC BLOOD PRESSURE: 72 MMHG | HEIGHT: 67 IN | OXYGEN SATURATION: 96 % | BODY MASS INDEX: 42.94 KG/M2 | TEMPERATURE: 99 F | WEIGHT: 273.6 LBS | HEART RATE: 60 BPM | SYSTOLIC BLOOD PRESSURE: 126 MMHG

## 2024-07-22 VITALS
HEART RATE: 56 BPM | WEIGHT: 273.3 LBS | SYSTOLIC BLOOD PRESSURE: 168 MMHG | DIASTOLIC BLOOD PRESSURE: 70 MMHG | HEIGHT: 67 IN | BODY MASS INDEX: 42.9 KG/M2

## 2024-07-22 DIAGNOSIS — I48.0 PAROXYSMAL ATRIAL FIBRILLATION (HCC): ICD-10-CM

## 2024-07-22 DIAGNOSIS — Z79.899 LONG TERM CURRENT USE OF ANTIARRHYTHMIC DRUG: ICD-10-CM

## 2024-07-22 DIAGNOSIS — E78.2 MIXED HYPERLIPIDEMIA: ICD-10-CM

## 2024-07-22 DIAGNOSIS — I48.0 PAROXYSMAL ATRIAL FIBRILLATION (HCC): Primary | ICD-10-CM

## 2024-07-22 DIAGNOSIS — D72.828 GRANULOCYTOSIS: ICD-10-CM

## 2024-07-22 DIAGNOSIS — N13.8 BPH WITH OBSTRUCTION/LOWER URINARY TRACT SYMPTOMS: ICD-10-CM

## 2024-07-22 DIAGNOSIS — N40.1 BPH WITH OBSTRUCTION/LOWER URINARY TRACT SYMPTOMS: ICD-10-CM

## 2024-07-22 DIAGNOSIS — R35.0 URINARY FREQUENCY: ICD-10-CM

## 2024-07-22 DIAGNOSIS — Z98.890 S/P ABLATION OF ATRIAL FIBRILLATION: ICD-10-CM

## 2024-07-22 DIAGNOSIS — I50.32 CHRONIC DIASTOLIC (CONGESTIVE) HEART FAILURE (HCC): ICD-10-CM

## 2024-07-22 DIAGNOSIS — E11.51 TYPE 2 DIABETES MELLITUS WITH DIABETIC PERIPHERAL ANGIOPATHY WITHOUT GANGRENE, WITHOUT LONG-TERM CURRENT USE OF INSULIN (HCC): ICD-10-CM

## 2024-07-22 DIAGNOSIS — I10 ESSENTIAL HYPERTENSION: Primary | ICD-10-CM

## 2024-07-22 DIAGNOSIS — Z86.79 S/P ABLATION OF ATRIAL FIBRILLATION: ICD-10-CM

## 2024-07-22 DIAGNOSIS — N18.2 STAGE 2 CHRONIC KIDNEY DISEASE: ICD-10-CM

## 2024-07-22 PROCEDURE — 93000 ELECTROCARDIOGRAM COMPLETE: CPT | Performed by: PHYSICIAN ASSISTANT

## 2024-07-22 PROCEDURE — 99214 OFFICE O/P EST MOD 30 MIN: CPT | Performed by: PHYSICIAN ASSISTANT

## 2024-07-22 PROCEDURE — G2211 COMPLEX E/M VISIT ADD ON: HCPCS | Performed by: INTERNAL MEDICINE

## 2024-07-22 PROCEDURE — 99214 OFFICE O/P EST MOD 30 MIN: CPT | Performed by: INTERNAL MEDICINE

## 2024-07-22 RX ORDER — TAMSULOSIN HYDROCHLORIDE 0.4 MG/1
0.4 CAPSULE ORAL
Qty: 90 CAPSULE | Refills: 3 | Status: SHIPPED | OUTPATIENT
Start: 2024-07-22

## 2024-07-22 NOTE — ASSESSMENT & PLAN NOTE
Sugar control remains in good range with hemoglobin A1c of 5.4% and fasting blood sugar of 104.  Recommend continued care and consumption of concentrated sugars and excessive carbohydrates  Lab Results   Component Value Date    HGBA1C 5.4 07/17/2024

## 2024-07-22 NOTE — ASSESSMENT & PLAN NOTE
Lab Results   Component Value Date    EGFR 86 07/17/2024    EGFR 83 03/22/2024    EGFR 74 11/02/2023    CREATININE 0.81 07/17/2024    CREATININE 0.87 03/22/2024    CREATININE 0.99 11/02/2023   Patient remains stable with kidney performance most recent GFR 86 cc/min continue present medications advised

## 2024-07-22 NOTE — ASSESSMENT & PLAN NOTE
Patient reports no significant benefit of Cardura medication we will discontinue and trial Flomax 0.4 mg daily at suppertime

## 2024-07-22 NOTE — ASSESSMENT & PLAN NOTE
Blood pressure assessment confirms good hypertension control recommend continuation of Benicar 40 mg daily clonidine point to milligrams prior to bedtime and nebivolol 10 mg daily.  Comprehensive metabolic profile pertaining to electrolyte balance and kidney performance has been reviewed

## 2024-07-22 NOTE — ASSESSMENT & PLAN NOTE
Patient remains in a regular rhythm on today's examination denies any recent chest pain or palpitations.  Is recommended that he continue his Xarelto at 20 mg daily and also his beta-blocker therapy with nebivolol 10 mg daily

## 2024-07-22 NOTE — ASSESSMENT & PLAN NOTE
Wt Readings from Last 3 Encounters:   07/22/24 124 kg (273 lb 9.6 oz)   07/22/24 124 kg (273 lb 4.8 oz)   04/18/24 122 kg (268 lb)       Tessman today confirms stability with no evidence of congestive heart failure continue furosemide at 40 mg daily and avoidance of high salt foods.

## 2024-07-26 ENCOUNTER — TELEPHONE (OUTPATIENT)
Age: 74
End: 2024-07-26

## 2024-07-26 NOTE — TELEPHONE ENCOUNTER
Pt called stating [facility] has been trying to reach out to Dr. Cui regarding script for tamsulosin (FLOMAX) 0.4 mg. Per patient, the pharmacist needs to speak to provider before the medication can be filled.    [facility] - [place], [place] - [address] [phone]     Thank you

## 2024-07-26 NOTE — TELEPHONE ENCOUNTER
Left voicemail for pt letting him know that we just received the fax from [facility] and I did send it back to [facility] with [facility]. Cui's answer. He should be able to get the medication filled shortly

## 2024-07-30 DIAGNOSIS — M10.9 GOUT, UNSPECIFIED CAUSE, UNSPECIFIED CHRONICITY, UNSPECIFIED SITE: ICD-10-CM

## 2024-07-31 RX ORDER — ALLOPURINOL 300 MG/1
TABLET ORAL
Qty: 90 TABLET | Refills: 3 | Status: SHIPPED | OUTPATIENT
Start: 2024-07-31

## 2024-08-02 DIAGNOSIS — I48.0 PAROXYSMAL ATRIAL FIBRILLATION (HCC): ICD-10-CM

## 2024-08-02 NOTE — TELEPHONE ENCOUNTER
Reason for call:   [x] Refill   [] Prior Auth  [] Other:     Office:   [] PCP/Provider -   [x] Specialty/Provider - [facility]     Medication: rivaroxaban (Xarelto) 20 mg tablet     Dose/Frequency:     Quantity: 30    Pharmacy:  Take 1 tablet (20 mg total) by mouth daily with breakfast     Does the patient have enough for 3 days?   [] Yes   [x] No - Send as HP to POD

## 2024-08-07 DIAGNOSIS — I48.0 PAROXYSMAL ATRIAL FIBRILLATION (HCC): ICD-10-CM

## 2024-08-07 DIAGNOSIS — I10 ESSENTIAL HYPERTENSION: ICD-10-CM

## 2024-08-07 RX ORDER — NEBIVOLOL 10 MG/1
10 TABLET ORAL DAILY
Qty: 90 TABLET | Refills: 1 | Status: SHIPPED | OUTPATIENT
Start: 2024-08-07

## 2024-08-07 NOTE — TELEPHONE ENCOUNTER
Reason for call:   [x] Refill   [] Prior Auth  [] Other:     Office:   [] PCP/Provider -   [x] Specialty/Provider - [facility]  Authorized By: Salomón Irizarry DO    Medication: nebivolol (BYSTOLIC) 10 mg tablet     Dose/Frequency: TAKE 1 TABLET BY MOUTH ONCE DAILY     Quantity: 90 tablet     Pharmacy: [facility] ([facility] - [place], [place] - [address] [place] [address]      Does the patient have enough for 3 days?   [x] Yes   [] No - Send as HP to POD

## 2024-09-06 ENCOUNTER — TELEPHONE (OUTPATIENT)
Dept: HEMATOLOGY ONCOLOGY | Facility: CLINIC | Age: 74
End: 2024-09-06

## 2024-09-24 ENCOUNTER — REMOTE DEVICE CLINIC VISIT (OUTPATIENT)
Dept: CARDIOLOGY CLINIC | Facility: CLINIC | Age: 74
End: 2024-09-24
Payer: MEDICARE

## 2024-09-24 DIAGNOSIS — I48.91 ATRIAL FIBRILLATION, UNSPECIFIED TYPE (HCC): Primary | ICD-10-CM

## 2024-09-24 PROCEDURE — 93298 REM INTERROG DEV EVAL SCRMS: CPT | Performed by: INTERNAL MEDICINE

## 2024-09-24 NOTE — PROGRESS NOTES
"MDT LOOP2/ ACTIVE SYSTEM IS MRI CONDITIONAL   CARELINK TRANSMISSION: LOOP RECORDER. PRESENTING RHYTHM NSR @ 60 BPM. BATTERY STATUS \"OK.\" NO PATIENT OR DEVICE ACTIVATED EPISODES. NORMAL DEVICE FUNCTION. DL   "

## 2024-10-09 ENCOUNTER — OFFICE VISIT (OUTPATIENT)
Dept: CARDIOLOGY CLINIC | Facility: CLINIC | Age: 74
End: 2024-10-09
Payer: MEDICARE

## 2024-10-09 VITALS
OXYGEN SATURATION: 96 % | SYSTOLIC BLOOD PRESSURE: 142 MMHG | WEIGHT: 270 LBS | DIASTOLIC BLOOD PRESSURE: 82 MMHG | HEIGHT: 67 IN | BODY MASS INDEX: 42.38 KG/M2 | HEART RATE: 52 BPM

## 2024-10-09 DIAGNOSIS — I50.32 CHRONIC DIASTOLIC (CONGESTIVE) HEART FAILURE (HCC): ICD-10-CM

## 2024-10-09 DIAGNOSIS — Z98.890 S/P ABLATION OF ATRIAL FIBRILLATION: ICD-10-CM

## 2024-10-09 DIAGNOSIS — G47.33 OSA (OBSTRUCTIVE SLEEP APNEA): ICD-10-CM

## 2024-10-09 DIAGNOSIS — I10 ESSENTIAL HYPERTENSION: ICD-10-CM

## 2024-10-09 DIAGNOSIS — Z86.79 S/P ABLATION OF ATRIAL FIBRILLATION: ICD-10-CM

## 2024-10-09 DIAGNOSIS — I48.0 PAROXYSMAL ATRIAL FIBRILLATION (HCC): Primary | ICD-10-CM

## 2024-10-09 DIAGNOSIS — E78.2 MIXED HYPERLIPIDEMIA: ICD-10-CM

## 2024-10-09 PROCEDURE — 99214 OFFICE O/P EST MOD 30 MIN: CPT | Performed by: INTERNAL MEDICINE

## 2024-10-09 NOTE — PROGRESS NOTES
Cardiology Follow Up    Alexys Valdez Jr.  1950  [id number]  [facility] [place]  [address]  [place] [id number] [phone]    1. Paroxysmal atrial fibrillation (HCC)        2. Chronic diastolic (congestive) heart failure (HCC)        3. Essential hypertension        4. MARISSA (obstructive sleep apnea)        5. S/P ablation of atrial fibrillation (PVI) 12/3/2019        6. Mixed hyperlipidemia            Discussion/Summary:  #1 heavy coronary artery calcifications  #2 atypical chest pain  #3 dyspnea on exertion  #4 chronic diastolic congestive heart failure  #5 paroxysmal atrial fibrillation status post ablation  #6 morbid obesity  #7 obstructive sleep apnea  #8 hypertension  #9 peripheral arterial disease  #10 diabetes     Recommendations: Overall he is feeling well and is lost about 40 pounds on the new dietary program.  Functional capacity has been good he offers no symptoms.  Lipids and A1c have improved.  Blood pressure was minimally elevated I asked him to just watch the sodium intake.  For now continue current treatment plan stress test and echocardiogram are reviewed from last year.  He has coronary artery calcifications and is on medical therapy but no symptoms at this time.  Continue to follow with EP for paroxysmal A-fib remains on anticoagulation for stroke prevention.    Interval History: 74-year-old gentleman transitioning to my care from one of my partners who retired.  He has a history of chronic diastolic congestive heart failure, hypertension, paroxysmal atrial fibrillation status post ablation, peripheral arterial disease.  Functional capacity has been good over the years.  He remains somewhat sedentary at the house though.  He does not do much in terms of walking.  If he goes to the grocery store he is able to walk up and down the [place] without any significant difficulty.    Overall he is feeling well  functional capacity is good denies any chest pain, shortness of breath, potation's, lightheadedness to be.  Has been no lower extremity edema, edema, or as prescribed.      .  Medical Problems       Problem List       Gastroesophageal reflux disease without esophagitis    Benign colon polyp    Overview Signed 1/28/2018  5:14 PM by Rafal Leahy DO     Description: 11/13         Major depressive disorder, single episode, mild (HCC)    Overview Addendum 10/12/2020  3:34 PM by Juan Jane Jr., MD     PHQ-9 was high on 06/18 at 14.  PHQ- 9 of 12 on 10/16/19  PHQ 9 of 7 on October 12, 2020         Type 2 diabetes mellitus with diabetic peripheral angiopathy without gangrene (HCC)    Overview Addendum 3/31/2020  5:44 AM by Mitra Gardner     Per CMS ICD 10 Guidelines--Per Physician           Lab Results   Component Value Date    HGBA1C 5.4 07/17/2024         Idiopathic chronic gout of multiple sites without tophus    Hyperlipidemia    Essential hypertension    Microscopic hematuria    Morbid obesity (HCC)    Peripheral neuropathy    MARISSA (obstructive sleep apnea)    Overview Addendum 11/9/2018  1:57 PM by Juan Jane Jr., MD     On cpap         Testicular hypogonadism    Overview Addendum 10/16/2019  3:09 PM by Juan Jane Jr., MD     Description: Serum Testost = 146 (6/13)  Pt declines treatment          Thyroglossal duct cyst    Overview Signed 11/9/2018  2:14 PM by Juan Jane Jr., MD     Excised 6/14         Benign prostatic hyperplasia with urinary frequency    Overview Addendum 7/26/2021  3:02 PM by Juan Jane Jr., MD     VAN done July 26, 2021-questionable prostatitis.         Chronic rhinitis    Chronic diastolic (congestive) heart failure (HCC)    Wt Readings from Last 3 Encounters:   10/09/24 122 kg (270 lb)   07/22/24 124 kg (273 lb 9.6 oz)   07/22/24 124 kg (273 lb 4.8 oz)                 A-fib (HCC)    Overview Addendum 10/12/2022  8:21 PM by Juan Jane Jr., MD     10/30/18 -  AFib with rapid ventricular response, new onset a fib, rate was controlled with Bystolic, consult by Cardiology and a SANDRITA cardioversion was completed.  Patient tolerated procedure and prior to discharge heart rate was in the 60s with normal sinus rhythm.  He will remain on Xarelto and beta-blocker for rate control.    12/19-ablation completed         Anticoagulation adequate    Peripheral vascular disease (HCC)    Fatty liver    Long term current use of antiarrhythmic drug    S/P ablation of atrial fibrillation    Overview Signed 10/12/2022  8:21 PM by Juan Jane Jr., MD     Status post ablation 12/19.         Atherosclerosis of aorta (HCC)    Overview Signed 3/31/2020  5:45 AM by Mitra Gardner     Per CMS ICD 10 Guidelines--Per Physician         Anxiety    Horseshoe tear of retina of left eye without detachment    Chronic constipation    Hyponatremia    Hyperkalemia    Persistent proteinuria        Past Medical History:   Diagnosis Date    Acute prostatitis 07/26/2021    Diagnosed in [place] and treated with Cipro x2 weeks 6/7/21. Questionable recurrence July 16, 2021    Allergic     Anxiety     Arthritis     Atrial fibrillation (HCC)     BPH (benign prostatic hyperplasia)     Chronic diastolic (congestive) heart failure (HCC)     Depression     Diabetes mellitus (HCC)     Drug-induced insomnia (HCC) 02/03/2020    Ear problems     GERD (gastroesophageal reflux disease)     Gout     Headache(784.0)     HL (hearing loss)     Hyperlipidemia     Hypertension     Hyponatremia     last assessed: 09/08/2014    Leukocytosis     Liver function abnormality     Morbid obesity (HCC)     Nasal congestion     Obesity     Obstructive sleep apnea     Sleep apnea      Social History     Socioeconomic History    Marital status: /Civil Union     Spouse name: Not on file    Number of children: Not on file    Years of education: Not on file    Highest education level: Not on file   Occupational History    Occupation:  Disability    Occupation:      Comment: significant asbestos and silica exposure in the past   Tobacco Use    Smoking status: Never    Smokeless tobacco: Never   Vaping Use    Vaping status: Never Used   Substance and Sexual Activity    Alcohol use: Not Currently     Alcohol/week: 3.0 standard drinks of alcohol     Types: 3 Standard drinks or equivalent per week    Drug use: Never    Sexual activity: Not Currently   Other Topics Concern    Not on file   Social History Narrative    4 cups of coffee/day     Social Determinants of Health     Financial Resource Strain: Low Risk  (12/11/2023)    Overall Financial Resource Strain (CARDIA)     Difficulty of Paying Living Expenses: Not hard at all   Food Insecurity: Not on file   Transportation Needs: No Transportation Needs (12/11/2023)    PRAPARE - Transportation     Lack of Transportation (Medical): No     Lack of Transportation (Non-Medical): No   Physical Activity: Not on file   Stress: Not on file   Social Connections: Not on file   Intimate Partner Violence: Not on file   Housing Stability: Not on file      Family History   Problem Relation Age of Onset    Diabetes Mother     Heart attack Mother     Hypertension Mother     Heart attack Sister      Past Surgical History:   Procedure Laterality Date    CARDIAC CATHETERIZATION      outcome: Neg    CARDIAC ELECTROPHYSIOLOGY PROCEDURE N/A 2/29/2024    Procedure: Cardiac Loop Recorder Explant/Implant;  Surgeon: Nuno Swann DO;  Location: [facility];  Service: Cardiology    CARDIAC ELECTROPHYSIOLOGY STUDY AND ABLATION      CARDIAC LOOP RECORDER  2019    CARDIOVASCULAR STRESS TEST      resolved: 10/27/2010; negative    COLONOSCOPY  11/2013    polyp; complete    COLONOSCOPY  05/26/2017    HERNIA REPAIR      resolved: 11/1999    JOINT REPLACEMENT      OR NEUROPLASTY &/TRANSPOS MEDIAN NRV CARPAL TUNNE Right 11/03/2023    Procedure: CTR;  Surgeon: Finn Hernández MD;  Location: [facility];  Service:  Orthopedics    VT NEUROPLASTY &/TRANSPOS MEDIAN NRV CARPAL TUNNE Left 4/5/2024    Procedure: RELEASE CARPAL TUNNEL;  Surgeon: Finn Hernández MD;  Location: [facility];  Service: Orthopedics    REPLACEMENT TOTAL KNEE Left 2010    REPLACEMENT TOTAL KNEE Right 2003    THYROGLOSSAL DUCT EXCISION      TONSILLECTOMY      last assessed: 06/02/2014    UPPER GASTROINTESTINAL ENDOSCOPY         Current Outpatient Medications:     acetaminophen-codeine (TYLENOL/CODEINE #3) 300-30 MG per tablet, Take 1 tablet by mouth every 12 (twelve) hours as needed for severe pain, Disp: 5 tablet, Rfl: 0    allopurinol (ZYLOPRIM) 300 mg tablet, TAKE 1 TABLET BY MOUTH DAILY, Disp: 90 tablet, Rfl: 3    amLODIPine (NORVASC) 10 mg tablet, TAKE 1 TABLET BY MOUTH DAILY, Disp: 90 tablet, Rfl: 3    cloNIDine (CATAPRES) 0.2 mg tablet, TAKE 1 TABLET BY MOUTH DAILY 1  HOUR PRIOR TO BEDTIME, Disp: 90 tablet, Rfl: 3    dofetilide (TIKOSYN) 250 mcg capsule, TAKE 1 CAPSULE BY MOUTH EVERY TWELVE (12) HOURS, Disp: 180 capsule, Rfl: 3    famotidine (PEPCID) 40 MG tablet, TAKE 1 TABLET BY MOUTH DAILY AT  BEDTIME, Disp: 90 tablet, Rfl: 3    fexofenadine (ALLEGRA) 180 MG tablet, Take 180 mg by mouth daily, Disp: , Rfl:     FLUoxetine (PROzac) 40 MG capsule, TAKE 2 CAPSULES BY MOUTH DAILY, Disp: 180 capsule, Rfl: 1    furosemide (LASIX) 40 mg tablet, TAKE 1 TABLET BY MOUTH DAILY, Disp: 90 tablet, Rfl: 3    Klor-Con M15 15 MEQ TBCR, TAKE 2 TABLETS BY MOUTH DAILY, Disp: 180 each, Rfl: 3    nebivolol (BYSTOLIC) 10 mg tablet, Take 1 tablet (10 mg total) by mouth daily, Disp: 90 tablet, Rfl: 1    olmesartan (BENICAR) 40 mg tablet, TAKE 1 TABLET BY MOUTH DAILY, Disp: 90 tablet, Rfl: 3    omeprazole (PriLOSEC) 40 MG capsule, Take 1 capsule (40 mg total) by mouth 2 (two) times a day, Disp: 180 capsule, Rfl: 3    pravastatin (PRAVACHOL) 10 mg tablet, TAKE 1 TABLET BY MOUTH DAILY AT  BEDTIME, Disp: 90 tablet, Rfl: 1    rivaroxaban (Xarelto) 20 mg tablet, Take 1 tablet (20 mg  "total) by mouth daily with breakfast, Disp: 100 tablet, Rfl: 1    amoxicillin (AMOXIL) 500 mg capsule, Prior to dental visits, Disp: , Rfl:     ofloxacin (FLOXIN) 0.3 % otic solution, [place] 4 drops into the afffected ear twice daily x 3 days, Disp: 5 mL, Rfl: 0    tamsulosin (FLOMAX) 0.4 mg, Take 1 capsule (0.4 mg total) by mouth daily with dinner (Patient not taking: Reported on 10/9/2024), Disp: 90 capsule, Rfl: 3  Allergies   Allergen Reactions    Metoprolol Shortness Of Breath     Tolerates Bystolic    Benazepril Cough    Clonidine Fatigue     Pt takes med with no side effects     Diltiazem Bradycardia    Entex T  [Pseudoephedrine-Guaifenesin]      Annotation - [id number]: LEG SWELLING    Tizanidine Other (See Comments)       Labs:     Chemistry        Component Value Date/Time    K 4.6 07/17/2024 1001    K 4.2 07/26/2021 1529    CL 98 07/17/2024 1001    CL 99 (L) 07/26/2021 1529    CO2 27 07/17/2024 1001    CO2 25 07/26/2021 1529    BUN 11 07/17/2024 1001    BUN 15 07/26/2021 1529    CREATININE 0.81 07/17/2024 1001    CREATININE 1.05 07/26/2021 1529        Component Value Date/Time    CALCIUM 9.3 07/17/2024 1001    CALCIUM 9.4 07/26/2021 1529    ALKPHOS 59 07/17/2024 1001    ALKPHOS 67 07/26/2021 1529    AST 20 07/17/2024 1001    AST 23 07/26/2021 1529    ALT 19 07/17/2024 1001    ALT 34 07/26/2021 1529            No results found for: \"CHOL\"  Lab Results   Component Value Date    HDL 59 07/17/2024    HDL 50 03/22/2024    HDL 44 08/31/2023     Lab Results   Component Value Date    LDLCALC 68 07/17/2024    LDLCALC 74 03/22/2024    LDLCALC 80 08/31/2023     Lab Results   Component Value Date    TRIG 61 07/17/2024    TRIG 47 03/22/2024    TRIG 75 08/31/2023     No results found for: \"CHOLHDL\"    Imaging: No results found.    ECG: Sinus rhythm      ROS    Vitals:    10/09/24 1307   BP: 142/82   Pulse: (!) 52   SpO2: 96%     Vitals:    10/09/24 1307   Weight: 122 kg (270 lb)     Height: 5' 7\" (170.2 cm)   Body " mass index is 42.29 kg/m².    Physical Exam:  Vital signs reviewed  General:  Alert and cooperative, appears stated age, no acute distress  HEENT:  PERRLA, EOMI, no scleral icterus, no conjunctival pallor  Neck:  No lymphadenopathy, no thyromegaly, no carotid bruits, no elevated JVP  Heart:  Regular rate and rhythm, normal S1/S2, no S3/S4, no murmur, rubs or gallops.  PMI nondisplaced  Lungs:  Clear to auscultation bilaterally, no wheezes rales or rhonchi  Abdomen:  Soft, non-tender, positive bowel sounds, no rebound or guarding,   no organomegaly   Extremities:  Normal range of motion.  No clubbing, cyanosis or edema   Vascular:  2+ pedal pulses  Skin:  No rashes or lesions on exposed skin  Neurologic:  Cranial nerves II-XII grossly intact without focal deficits  Psych:  Normal mood and affect

## 2024-11-05 ENCOUNTER — TELEPHONE (OUTPATIENT)
Dept: CARDIOLOGY CLINIC | Facility: CLINIC | Age: 74
End: 2024-11-05

## 2024-11-05 NOTE — TELEPHONE ENCOUNTER
Patient called the RX Refill Line. Message is being forwarded to the office.     Patient is requesting a call back, he's hoping to be able to come  samples of xarelto 20 mg   He has about a weeks worth of medication     Please contact patient at

## 2024-11-06 NOTE — TELEPHONE ENCOUNTER
Called pt, advised pt we don't have xarelto samples. Checked with the [place] office, they are currently out.     I advised pt, I am working on getting samples delivered to our office. Will contact pt once we receive them. Pt verbally understood.

## 2024-11-12 NOTE — TELEPHONE ENCOUNTER
Called pt, advised pt samples are ready for  at the  [address] location in [place].     Xarelto 20 mg  Lot:[id number]  Exp:01/27  Count dispensed:6

## 2024-11-18 DIAGNOSIS — I10 ESSENTIAL HYPERTENSION: ICD-10-CM

## 2024-11-18 DIAGNOSIS — I48.0 PAROXYSMAL ATRIAL FIBRILLATION (HCC): ICD-10-CM

## 2024-11-20 RX ORDER — NEBIVOLOL 10 MG/1
10 TABLET ORAL DAILY
Qty: 90 TABLET | Refills: 1 | Status: SHIPPED | OUTPATIENT
Start: 2024-11-20

## 2024-11-25 NOTE — PROGRESS NOTES
CHIEF COMPLAINT: The patient presents for Office Visit and Eye Exam     HISTORY OF PRESENT ILLNESS: Patient presents for a complete eye exam. No questions/concerns per mother and patient.       Family Hx:  (-)Glaucoma, (-)AMD    Patient Hx: Hyperopia OU    OCULAR MEDICATION: None    Tech notes reviewed.    ASSESSMENT/PLAN:   1. Emmetropia of both eyes  - normal ocular health on exam   - cycloplegic exam next year  - no glasses needed at this time  - normal color  - normal stereo   - normal alignment     2. Other chronic allergic conjunctivitis of both eyes  - asymptomatic  - pataday 0.2% or 0.7% oph, 1 drop daily as needed for itch if symptomatic  - refridgerated artificial tears as needed (Refresh, Thera tears, Systane, Genteal)     Patient's assessment and plan discussed in detail with patient.  All questions answered.    RETURN TO CLINIC:  Return in about 1 year (around 11/25/2025) for Annual Eye Exam. Return immediately if pain, redness, or decreased vision occur.             MDT LOOP   CARELINK TRANSMISSION: LOOP RECORDER  PRESENTING RHYTHM VS @ 56 BPM  BATTERY STATUS "OK " NO PATIENT OR DEVICE ACTIVATED EPISODES  NORMAL DEVICE FUNCTION   DL

## 2024-12-12 ENCOUNTER — APPOINTMENT (OUTPATIENT)
Dept: LAB | Age: 74
End: 2024-12-12
Payer: MEDICARE

## 2024-12-12 DIAGNOSIS — I48.0 PAROXYSMAL ATRIAL FIBRILLATION (HCC): ICD-10-CM

## 2024-12-12 LAB
ALBUMIN SERPL BCG-MCNC: 4.5 G/DL (ref 3.5–5)
ALP SERPL-CCNC: 52 U/L (ref 34–104)
ALT SERPL W P-5'-P-CCNC: 18 U/L (ref 7–52)
ANION GAP SERPL CALCULATED.3IONS-SCNC: 11 MMOL/L (ref 4–13)
AST SERPL W P-5'-P-CCNC: 23 U/L (ref 13–39)
BASOPHILS # BLD AUTO: 0.05 THOUSANDS/ÂΜL (ref 0–0.1)
BASOPHILS NFR BLD AUTO: 1 % (ref 0–1)
BILIRUB SERPL-MCNC: 1.06 MG/DL (ref 0.2–1)
BUN SERPL-MCNC: 22 MG/DL (ref 5–25)
CALCIUM SERPL-MCNC: 9.6 MG/DL (ref 8.4–10.2)
CHLORIDE SERPL-SCNC: 94 MMOL/L (ref 96–108)
CO2 SERPL-SCNC: 25 MMOL/L (ref 21–32)
CREAT SERPL-MCNC: 1.04 MG/DL (ref 0.6–1.3)
EOSINOPHIL # BLD AUTO: 0.21 THOUSAND/ÂΜL (ref 0–0.61)
EOSINOPHIL NFR BLD AUTO: 2 % (ref 0–6)
ERYTHROCYTE [DISTWIDTH] IN BLOOD BY AUTOMATED COUNT: 12 % (ref 11.6–15.1)
GFR SERPL CREATININE-BSD FRML MDRD: 69 ML/MIN/1.73SQ M
GLUCOSE P FAST SERPL-MCNC: 111 MG/DL (ref 65–99)
HCT VFR BLD AUTO: 43.3 % (ref 36.5–49.3)
HGB BLD-MCNC: 14.7 G/DL (ref 12–17)
IMM GRANULOCYTES # BLD AUTO: 0.03 THOUSAND/UL (ref 0–0.2)
IMM GRANULOCYTES NFR BLD AUTO: 0 % (ref 0–2)
LYMPHOCYTES # BLD AUTO: 4.73 THOUSANDS/ÂΜL (ref 0.6–4.47)
LYMPHOCYTES NFR BLD AUTO: 47 % (ref 14–44)
MCH RBC QN AUTO: 31.8 PG (ref 26.8–34.3)
MCHC RBC AUTO-ENTMCNC: 33.9 G/DL (ref 31.4–37.4)
MCV RBC AUTO: 94 FL (ref 82–98)
MONOCYTES # BLD AUTO: 0.88 THOUSAND/ÂΜL (ref 0.17–1.22)
MONOCYTES NFR BLD AUTO: 9 % (ref 4–12)
NEUTROPHILS # BLD AUTO: 4.01 THOUSANDS/ÂΜL (ref 1.85–7.62)
NEUTS SEG NFR BLD AUTO: 41 % (ref 43–75)
NRBC BLD AUTO-RTO: 0 /100 WBCS
PLATELET # BLD AUTO: 204 THOUSANDS/UL (ref 149–390)
PMV BLD AUTO: 11.2 FL (ref 8.9–12.7)
POTASSIUM SERPL-SCNC: 4.1 MMOL/L (ref 3.5–5.3)
PROT SERPL-MCNC: 7.4 G/DL (ref 6.4–8.4)
RBC # BLD AUTO: 4.62 MILLION/UL (ref 3.88–5.62)
SODIUM SERPL-SCNC: 130 MMOL/L (ref 135–147)
WBC # BLD AUTO: 9.91 THOUSAND/UL (ref 4.31–10.16)

## 2024-12-16 ENCOUNTER — OFFICE VISIT (OUTPATIENT)
Age: 74
End: 2024-12-16
Payer: MEDICARE

## 2024-12-16 VITALS
DIASTOLIC BLOOD PRESSURE: 76 MMHG | BODY MASS INDEX: 43.16 KG/M2 | SYSTOLIC BLOOD PRESSURE: 128 MMHG | HEIGHT: 67 IN | HEART RATE: 60 BPM | WEIGHT: 275 LBS | OXYGEN SATURATION: 94 %

## 2024-12-16 DIAGNOSIS — N18.2 STAGE 2 CHRONIC KIDNEY DISEASE: ICD-10-CM

## 2024-12-16 DIAGNOSIS — E78.2 MIXED HYPERLIPIDEMIA: ICD-10-CM

## 2024-12-16 DIAGNOSIS — K21.9 GASTROESOPHAGEAL REFLUX DISEASE WITHOUT ESOPHAGITIS: ICD-10-CM

## 2024-12-16 DIAGNOSIS — I10 ESSENTIAL HYPERTENSION: ICD-10-CM

## 2024-12-16 DIAGNOSIS — F41.9 ANXIETY: Primary | ICD-10-CM

## 2024-12-16 DIAGNOSIS — Z13.29 SCREENING FOR HYPOTHYROIDISM: ICD-10-CM

## 2024-12-16 DIAGNOSIS — L65.9 HAIR LOSS: ICD-10-CM

## 2024-12-16 DIAGNOSIS — E11.51 TYPE 2 DIABETES MELLITUS WITH DIABETIC PERIPHERAL ANGIOPATHY WITHOUT GANGRENE, WITHOUT LONG-TERM CURRENT USE OF INSULIN (HCC): ICD-10-CM

## 2024-12-16 DIAGNOSIS — I48.0 PAROXYSMAL ATRIAL FIBRILLATION (HCC): ICD-10-CM

## 2024-12-16 DIAGNOSIS — E29.1 HYPOGONADISM IN MALE: ICD-10-CM

## 2024-12-16 DIAGNOSIS — Z23 ENCOUNTER FOR IMMUNIZATION: ICD-10-CM

## 2024-12-16 DIAGNOSIS — E29.1 TESTICULAR HYPOGONADISM: ICD-10-CM

## 2024-12-16 DIAGNOSIS — Z12.5 PROSTATE CANCER SCREENING: ICD-10-CM

## 2024-12-16 PROCEDURE — G0439 PPPS, SUBSEQ VISIT: HCPCS | Performed by: INTERNAL MEDICINE

## 2024-12-16 PROCEDURE — 99214 OFFICE O/P EST MOD 30 MIN: CPT | Performed by: INTERNAL MEDICINE

## 2024-12-16 PROCEDURE — 90662 IIV NO PRSV INCREASED AG IM: CPT | Performed by: INTERNAL MEDICINE

## 2024-12-16 PROCEDURE — 90471 IMMUNIZATION ADMIN: CPT | Performed by: INTERNAL MEDICINE

## 2024-12-16 RX ORDER — ALPRAZOLAM 0.25 MG/1
TABLET ORAL
Qty: 60 TABLET | Refills: 0 | Status: SHIPPED | OUTPATIENT
Start: 2024-12-16

## 2024-12-16 NOTE — ASSESSMENT & PLAN NOTE
Patient currently on Prozac 40 mg daily which she has been on for several years relates he has been experiencing increased anxiety symptoms.  I have recommended that we try Xanax 0.25 mg 1/2 to 1 tablet in the morning and again in the late afternoon for treatment of his anxiety symptoms.    Orders:    ALPRAZolam (XANAX) 0.25 mg tablet; Take 1/2 to one pill twice a day

## 2024-12-16 NOTE — PROGRESS NOTES
Name: Alexys Valdez Jr.      : 1950      MRN: [id number]  Encounter Provider: Tian Cui MD  Encounter Date: 2024   Encounter department: [facility]    Assessment & Plan  Encounter for immunization    Orders:    influenza vaccine, high-dose, PF 0.5 mL (Fluzone High Dose)    Type 2 diabetes mellitus with diabetic peripheral angiopathy without gangrene, without long-term current use of insulin (HCC)  Patient continues with good control continue lifestyle management including low carbohydrate diet weight reduction advised  Lab Results   Component Value Date    HGBA1C 5.4 2024            Mixed hyperlipidemia  Recommend continuation of low-cholesterol diet along with Pravachol at 10 mg daily.  An updated lipid profile has been requested and will be reviewed with the patient upon completion    Orders:    Lipid panel; Future    Prostate cancer screening    Orders:    PSA, Total Screen; Future    Screening for hypothyroidism    Orders:    T4, free; Future    TSH, 3rd generation; Future    Hypogonadism in male    Orders:    Testosterone; Future    Anxiety  Patient currently on Prozac 40 mg daily which she has been on for several years relates he has been experiencing increased anxiety symptoms.  I have recommended that we try Xanax 0.25 mg 1/2 to 1 tablet in the morning and again in the late afternoon for treatment of his anxiety symptoms.    Orders:    ALPRAZolam (XANAX) 0.25 mg tablet; Take 1/2 to one pill twice a day    Paroxysmal atrial fibrillation (HCC)  Patient currently remains in a regular heart rhythm on auscultation today he continues on Tikosyn medication as well as Xarelto as directed by his cardiologist.         Essential hypertension  Hypertension assessment confirms good control recommend continuation of amlodipine at 10 mg daily, Catapres 0.2 mg prior to bedtime, olmesartan 40 mg daily, nebivolol 10 mg daily         Gastroesophageal reflux  disease without esophagitis  Patient continues with medication for acid reflux maintenance continue omeprazole 40 mg twice a day and famotidine 40 mg at bedtime         Testicular hypogonadism  I testosterone value has been requested to evaluate the patient's thinning hair previous level was noted to be below normal range         Stage 2 chronic kidney disease  Lab Results   Component Value Date    EGFR 69 12/12/2024    EGFR 86 07/17/2024    EGFR 83 03/22/2024    CREATININE 1.04 12/12/2024    CREATININE 0.81 07/17/2024    CREATININE 0.87 03/22/2024   Most recent laboratory testing reviewed pertaining to electrolyte balance and kidney performance he is in chronic stage II kidney disease recommend adequate hydration on a daily basis and avoidance of nonsteroidal anti-inflammatory medications along with continued tight control of hypertension         Hair loss  Patient displays thinning hair globally across the scalp he is concerned about this hair loss.  Previous testosterone level was measured below normal value range updated study has been requested we have also requested a thyroid screening study            Preventive health issues were discussed with patient, and age appropriate screening tests were ordered as noted in patient's After Visit Summary. Personalized health advice and appropriate referrals for health education or preventive services given if needed, as noted in patient's After Visit Summary.    History of Present Illness     This pleasant 74-year-old gentleman returns to our office today for an annual physical examination and a review of his comprehensive blood work.  He has concerns about several issues.    First concern is that of hair loss.  He indicates that 3 to 4 months ago he began to experience unusual hair loss notes that patient when he wakes up and there is a fair amount of hair on his pillow never had this issue in the past.    He also has a history of anxiety disorder is currently on 40 mg  "of Prozac daily but still gets symptoms of anxiety.    He has a history of sleep apnea and continues to be compliant with his CPAP device.    He denies any recent infections also denies any recent chest pain or palpitation symptoms.       Patient Care Team:  Tian Cui MD as PCP - General (Internal Medicine)  MD Juan Yang Jr., MD Thierno Price MD (Nephrology)    Review of Systems   Endocrine:        Unusual hair loss and generalized thinning of hair   Psychiatric/Behavioral:  The patient is nervous/anxious.    All other systems reviewed and are negative.    Medical History Reviewed by provider this encounter:  Meds  Problems       Annual Wellness Visit Questionnaire   Alexys is here for his Subsequent Wellness visit. Last Medicare Wellness visit information reviewed, patient interviewed, no change since last AWV.     PREVENTIVE SCREENINGS      Cardiovascular Screening:    General: Screening Not Indicated and History Lipid Disorder      Diabetes Screening:     General: Screening Not Indicated and History Diabetes      Colorectal Cancer Screening:     General: Screening Current      Prostate Cancer Screening:    General: Screening Not Indicated      Lung Cancer Screening:     General: Screening Not Indicated      Hepatitis C Screening:    General: Screening Current    Social Drivers of Health     Financial Resource Strain: Low Risk  (12/11/2023)    Overall Financial Resource Strain (CARDIA)     Difficulty of Paying Living Expenses: Not hard at all   Transportation Needs: No Transportation Needs (12/11/2023)    PRAPARE - Transportation     Lack of Transportation (Medical): No     Lack of Transportation (Non-Medical): No     No results found.    Objective   /76   Pulse 60   Ht 5' 7\" (1.702 m)   Wt 125 kg (275 lb)   SpO2 94%   BMI 43.07 kg/m²     Physical Exam  Vitals and nursing note reviewed.   Constitutional:       General: He is not in acute " distress.     Appearance: He is well-developed.   HENT:      Head: Normocephalic and atraumatic.      Right Ear: Tympanic membrane, ear canal and external ear normal.      Left Ear: Tympanic membrane, ear canal and external ear normal.      Nose: Nose normal.      Mouth/Throat:      Mouth: Mucous membranes are moist.   Eyes:      Conjunctiva/sclera: Conjunctivae normal.      Pupils: Pupils are equal, round, and reactive to light.   Neck:      Vascular: No carotid bruit.   Cardiovascular:      Rate and Rhythm: Normal rate and regular rhythm.      Heart sounds: No murmur heard.  Pulmonary:      Effort: Pulmonary effort is normal. No respiratory distress.      Breath sounds: Normal breath sounds. No wheezing, rhonchi or rales.   Abdominal:      General: Bowel sounds are normal. There is no distension.      Palpations: Abdomen is soft. There is no mass.      Tenderness: There is no abdominal tenderness. There is no guarding.   Genitourinary:     Penis: Normal.       Testes: Normal.      Comments: Prostate exam deferred by patient  Musculoskeletal:         General: No swelling.      Cervical back: Normal range of motion and neck supple. No rigidity or tenderness.      Right lower leg: Edema present.      Left lower leg: Edema present.   Lymphadenopathy:      Cervical: No cervical adenopathy.   Skin:     General: Skin is warm and dry.      Capillary Refill: Capillary refill takes less than 2 seconds.      Comments: Generalized thinning of the hair   Neurological:      General: No focal deficit present.      Mental Status: He is alert.      Cranial Nerves: Cranial nerve deficit present.   Psychiatric:         Behavior: Behavior normal.         Thought Content: Thought content normal.         Judgment: Judgment normal.

## 2024-12-16 NOTE — ASSESSMENT & PLAN NOTE
I testosterone value has been requested to evaluate the patient's thinning hair previous level was noted to be below normal range

## 2024-12-16 NOTE — ASSESSMENT & PLAN NOTE
Patient continues with good control continue lifestyle management including low carbohydrate diet weight reduction advised  Lab Results   Component Value Date    HGBA1C 5.4 07/17/2024

## 2024-12-16 NOTE — ASSESSMENT & PLAN NOTE
Recommend continuation of low-cholesterol diet along with Pravachol at 10 mg daily.  An updated lipid profile has been requested and will be reviewed with the patient upon completion    Orders:    Lipid panel; Future

## 2024-12-16 NOTE — ASSESSMENT & PLAN NOTE
Patient displays thinning hair globally across the scalp he is concerned about this hair loss.  Previous testosterone level was measured below normal value range updated study has been requested we have also requested a thyroid screening study

## 2024-12-16 NOTE — ASSESSMENT & PLAN NOTE
Lab Results   Component Value Date    EGFR 69 12/12/2024    EGFR 86 07/17/2024    EGFR 83 03/22/2024    CREATININE 1.04 12/12/2024    CREATININE 0.81 07/17/2024    CREATININE 0.87 03/22/2024   Most recent laboratory testing reviewed pertaining to electrolyte balance and kidney performance he is in chronic stage II kidney disease recommend adequate hydration on a daily basis and avoidance of nonsteroidal anti-inflammatory medications along with continued tight control of hypertension

## 2024-12-16 NOTE — ASSESSMENT & PLAN NOTE
Hypertension assessment confirms good control recommend continuation of amlodipine at 10 mg daily, Catapres 0.2 mg prior to bedtime, olmesartan 40 mg daily, nebivolol 10 mg daily

## 2024-12-16 NOTE — ASSESSMENT & PLAN NOTE
Patient currently remains in a regular heart rhythm on auscultation today he continues on Tikosyn medication as well as Xarelto as directed by his cardiologist.

## 2024-12-16 NOTE — ASSESSMENT & PLAN NOTE
Patient continues with medication for acid reflux maintenance continue omeprazole 40 mg twice a day and famotidine 40 mg at bedtime

## 2024-12-17 RX ORDER — OLMESARTAN MEDOXOMIL 40 MG/1
40 TABLET ORAL DAILY
Qty: 90 TABLET | Refills: 1 | Status: SHIPPED | OUTPATIENT
Start: 2024-12-17

## 2024-12-24 ENCOUNTER — REMOTE DEVICE CLINIC VISIT (OUTPATIENT)
Dept: CARDIOLOGY CLINIC | Facility: CLINIC | Age: 74
End: 2024-12-24
Payer: MEDICARE

## 2024-12-24 DIAGNOSIS — I48.0 PAROXYSMAL ATRIAL FIBRILLATION (HCC): Primary | ICD-10-CM

## 2024-12-24 PROCEDURE — 93298 REM INTERROG DEV EVAL SCRMS: CPT | Performed by: INTERNAL MEDICINE

## 2024-12-24 NOTE — PROGRESS NOTES
"MDT LOOP2/ ACTIVE SYSTEM IS MRI CONDITIONAL   CARELINK TRANSMISSION/LOOP: BATTERY STATUS \"OK.\" PRESENTING RHYTHM SHOWS NSR @ AVG 60 BPM. NO PATIENT ACTIVATED EPISODES. 1 ECG  NOTED (10/27/24) AND NOT PREV. ADDRESSED FOR AF, DURATION 67 HR, 52 MIN. AF BURDEN 1.2%. PVC BURDEN 0.2%. PATIENT TAKING XARELTO, BYSTOLIC, DOFETILIDE. NORMAL DEVICE FUNCTION.  ES   "

## 2024-12-25 ENCOUNTER — RESULTS FOLLOW-UP (OUTPATIENT)
Dept: CARDIOLOGY CLINIC | Facility: CLINIC | Age: 74
End: 2024-12-25

## 2025-01-09 ENCOUNTER — OFFICE VISIT (OUTPATIENT)
Dept: CARDIOLOGY CLINIC | Facility: CLINIC | Age: 75
End: 2025-01-09
Payer: MEDICARE

## 2025-01-09 VITALS
HEIGHT: 67 IN | BODY MASS INDEX: 43.63 KG/M2 | SYSTOLIC BLOOD PRESSURE: 150 MMHG | HEART RATE: 50 BPM | WEIGHT: 278 LBS | DIASTOLIC BLOOD PRESSURE: 78 MMHG

## 2025-01-09 DIAGNOSIS — I48.0 PAROXYSMAL ATRIAL FIBRILLATION (HCC): Primary | ICD-10-CM

## 2025-01-09 DIAGNOSIS — E66.01 MORBID OBESITY (HCC): ICD-10-CM

## 2025-01-09 DIAGNOSIS — I10 ESSENTIAL HYPERTENSION: ICD-10-CM

## 2025-01-09 DIAGNOSIS — Z95.818 STATUS POST PLACEMENT OF IMPLANTABLE LOOP RECORDER: ICD-10-CM

## 2025-01-09 DIAGNOSIS — G47.33 OSA (OBSTRUCTIVE SLEEP APNEA): ICD-10-CM

## 2025-01-09 PROCEDURE — 99214 OFFICE O/P EST MOD 30 MIN: CPT | Performed by: INTERNAL MEDICINE

## 2025-01-09 PROCEDURE — 93000 ELECTROCARDIOGRAM COMPLETE: CPT | Performed by: INTERNAL MEDICINE

## 2025-01-09 NOTE — ASSESSMENT & PLAN NOTE
Prior ablation: ablation of atrial fibrillation with pulmonary vein isolation by Dr. Swann on 12/3/2019  EF of 60% per echo 1/2023 / mild dilation of left atrium noted  Current Medication Regimen:  rate control: nebivolol  antiarrhythmic therapy: dofetilide  AC: Xarelto 20mg daily ([id number] 2 (age, HTN))    Patient is currently in sinus rhythm, somewhat bradycardic but no symptoms with this. QT is within normal limits - would continue dofetilide at current dose.    Mr. Valdez did have one episode that lasted 72 hours in October - was aware given elevated heart rates and feeling bad enough to potentially call ambulance.  However, this subsided.    We did discuss that at current time, we will not adjust beta-blocker or antiarrhythmic.  No need for cardioversion or ablation at this time but we can consider if he has further episodes increasing in duration or frequency.

## 2025-01-09 NOTE — PROGRESS NOTES
Electrophysiology Follow Up  [facility]  [facility] [address], [place] [address]    Name: Alexys Valdez Jr.  : 1950  MRN: [id number]    Assessment & Plan  Paroxysmal atrial fibrillation (HCC)  Prior ablation: ablation of atrial fibrillation with pulmonary vein isolation by Dr. Swann on 12/3/2019  EF of 60% per echo 2023 / mild dilation of left atrium noted  Current Medication Regimen:  rate control: nebivolol  antiarrhythmic therapy: dofetilide  AC: Xarelto 20mg daily ([id number] 2 (age, HTN))    Patient is currently in sinus rhythm, somewhat bradycardic but no symptoms with this. QT is within normal limits - would continue dofetilide at current dose.    Mr. Valdez did have one episode that lasted 72 hours in October - was aware given elevated heart rates and feeling bad enough to potentially call ambulance.  However, this subsided.    We did discuss that at current time, we will not adjust beta-blocker or antiarrhythmic.  No need for cardioversion or ablation at this time but we can consider if he has further episodes increasing in duration or frequency.  Status post placement of [employer] loop recorder 2024  Implanted 2019 for surveillance of A-fib with later explant/reimplant 2024  Essential hypertension  BP in office today 150/78  Has gained weight lately   MARISSA (obstructive sleep apnea)  Recommend CPAP compliance  Morbid obesity (HCC)  Recommend weight loss via healthy diet and exercise       Patient has been instructed to follow up in our EP office in 1 year or as needed. He will call our office with any questions or concerns in the meantime.    Rhythm History:   Atrial fibrillation:      Atrial flutter:      SVT:      VT/VF/PVC:     Device history:   Pacemaker:     Defibrillator:     BIV PPM:     BIV ICD:     ILR:    Interim History/HPI:   Interim history: Alexys Valdez Jr. is a 74 y.o. male with a PMH of  "PAF, loop recorder in situ, HTN, MARISSA, and obesity.    He presents today for routine outpatient follow-up given his history of A-fib.  Since his last outpatient EP follow-up appointment he reports that he has had some shortness of breath but that he feels that this is due to his sinus, his doctor does agree.    He has been gaining weight, he knows that he needs to stick to a better diet.    Patient did not offer that he had an episode at the end of October but after we interrogated his loop recorder, he did report that he was under a lot of emotional distress during that time.  He was checking his heart rate and it was elevated.  He felt bad enough that he did almost call an ambulance but waited out and it subsided on its own.    EKG: Sinus bradycardia at 50 bpm, QTc interval 443 ms    Review of Systems   Constitutional:  Negative for activity change, appetite change, chills, fatigue and fever.   HENT:  Negative for nosebleeds.    Respiratory:  Negative for chest tightness and shortness of breath.    Cardiovascular:  Negative for chest pain, palpitations and leg swelling.   Neurological:  Negative for dizziness, syncope, weakness and light-headedness.         OBJECTIVE:   Vitals:   /78   Pulse (!) 50   Ht 5' 7\" (1.702 m)   Wt 126 kg (278 lb)   BMI 43.54 kg/m²   Body mass index is 43.54 kg/m².        Physical Exam:   Physical Exam  Constitutional:       General: He is not in acute distress.     Appearance: Normal appearance. He is not toxic-appearing.   HENT:      Head: Normocephalic and atraumatic.   Eyes:      General:         Right eye: No discharge.         Left eye: No discharge.   Cardiovascular:      Rate and Rhythm: Normal rate and regular rhythm.      Pulses: Normal pulses.   Pulmonary:      Effort: Pulmonary effort is normal.      Breath sounds: Normal breath sounds.   Musculoskeletal:      Right lower leg: No edema.      Left lower leg: No edema.   Skin:     General: Skin is warm and dry.      " Capillary Refill: Capillary refill takes less than 2 seconds.   Neurological:      Mental Status: He is alert.            Medications:      Current Outpatient Medications:     acetaminophen-codeine (TYLENOL/CODEINE #3) 300-30 MG per tablet, Take 1 tablet by mouth every 12 (twelve) hours as needed for severe pain, Disp: 5 tablet, Rfl: 0    allopurinol (ZYLOPRIM) 300 mg tablet, TAKE 1 TABLET BY MOUTH DAILY, Disp: 90 tablet, Rfl: 3    ALPRAZolam (XANAX) 0.25 mg tablet, Take 1/2 to one pill twice a day, Disp: 60 tablet, Rfl: 0    amLODIPine (NORVASC) 10 mg tablet, TAKE 1 TABLET BY MOUTH DAILY, Disp: 90 tablet, Rfl: 3    amoxicillin (AMOXIL) 500 mg capsule, Prior to dental visits, Disp: , Rfl:     cloNIDine (CATAPRES) 0.2 mg tablet, TAKE 1 TABLET BY MOUTH DAILY 1  HOUR PRIOR TO BEDTIME, Disp: 90 tablet, Rfl: 3    dofetilide (TIKOSYN) 250 mcg capsule, TAKE 1 CAPSULE BY MOUTH EVERY TWELVE (12) HOURS, Disp: 180 capsule, Rfl: 3    famotidine (PEPCID) 40 MG tablet, TAKE 1 TABLET BY MOUTH DAILY AT  BEDTIME, Disp: 90 tablet, Rfl: 3    fexofenadine (ALLEGRA) 180 MG tablet, Take 180 mg by mouth daily, Disp: , Rfl:     FLUoxetine (PROzac) 40 MG capsule, TAKE 2 CAPSULES BY MOUTH DAILY, Disp: 180 capsule, Rfl: 1    furosemide (LASIX) 40 mg tablet, TAKE 1 TABLET BY MOUTH DAILY, Disp: 90 tablet, Rfl: 3    Klor-Con M15 15 MEQ TBCR, TAKE 2 TABLETS BY MOUTH DAILY, Disp: 180 each, Rfl: 3    nebivolol (BYSTOLIC) 10 mg tablet, TAKE 1 TABLET BY MOUTH DAILY, Disp: 90 tablet, Rfl: 1    ofloxacin (FLOXIN) 0.3 % otic solution, Adminster 4 drops into the afffected ear twice daily x 3 days, Disp: 5 mL, Rfl: 0    olmesartan (BENICAR) 40 mg tablet, TAKE 1 TABLET BY MOUTH DAILY, Disp: 90 tablet, Rfl: 1    omeprazole (PriLOSEC) 40 MG capsule, Take 1 capsule (40 mg total) by mouth 2 (two) times a day, Disp: 180 capsule, Rfl: 3    pravastatin (PRAVACHOL) 10 mg tablet, TAKE 1 TABLET BY MOUTH DAILY AT  BEDTIME, Disp: 90 tablet, Rfl: 1    rivaroxaban  (Xarelto) 20 mg tablet, Take 1 tablet (20 mg total) by mouth daily with breakfast, Disp: 100 tablet, Rfl: 1       Historical Information   Past Medical History:   Diagnosis Date    Acute prostatitis 07/26/2021    Diagnosed in [place] and treated with Cipro x2 weeks 6/7/21. Questionable recurrence July 16, 2021    Allergic     Anxiety     Arthritis     Atrial fibrillation (HCC)     BPH (benign prostatic hyperplasia)     Chronic diastolic (congestive) heart failure (HCC)     Depression     Diabetes mellitus (HCC)     Drug-induced insomnia (HCC) 02/03/2020    Ear problems     GERD (gastroesophageal reflux disease)     Gout     Headache(784.0)     HL (hearing loss)     Hyperlipidemia     Hypertension     Hyponatremia     last assessed: 09/08/2014    Leukocytosis     Liver function abnormality     Morbid obesity (HCC)     Nasal congestion     Obesity     Obstructive sleep apnea     Sleep apnea        Past Surgical History:   Procedure Laterality Date    CARDIAC CATHETERIZATION      outcome: Neg    CARDIAC ELECTROPHYSIOLOGY PROCEDURE N/A 2/29/2024    Procedure: Cardiac Loop Recorder Explant/Implant;  Surgeon: Nuno Swann DO;  [facility];  Service: Cardiology    CARDIAC ELECTROPHYSIOLOGY STUDY AND ABLATION      CARDIAC LOOP RECORDER  2019    CARDIOVASCULAR STRESS TEST      resolved: 10/27/2010; negative    COLONOSCOPY  11/2013    polyp; complete    COLONOSCOPY  05/26/2017    HERNIA REPAIR      resolved: 11/1999    JOINT REPLACEMENT      AR NEUROPLASTY &/TRANSPOS MEDIAN NRV CARPAL TUNNE Right 11/03/2023    Procedure: CTR;  Surgeon: Finn Hernández MD;  Location: [facility];  Service: Orthopedics    AR NEUROPLASTY &/TRANSPOS MEDIAN NRV CARPAL TUNNE Left 4/5/2024    Procedure: RELEASE CARPAL TUNNEL;  Surgeon: Finn Hernández MD;  Location: [facility];  Service: Orthopedics    REPLACEMENT TOTAL KNEE Left 2010    REPLACEMENT TOTAL KNEE Right 2003    THYROGLOSSAL DUCT EXCISION      TONSILLECTOMY      last  assessed: 06/02/2014    UPPER GASTROINTESTINAL ENDOSCOPY         Social History     Substance and Sexual Activity   Alcohol Use Not Currently    Alcohol/week: 3.0 standard drinks of alcohol    Types: 3 Standard drinks or equivalent per week     Social History     Substance and Sexual Activity   Drug Use Never     Social History     Tobacco Use   Smoking Status Never   Smokeless Tobacco Never       Family History   Problem Relation Age of Onset    Diabetes Mother     Heart attack Mother     Hypertension Mother     Heart attack Sister          Labs & Results:  Below is the patient's most recent value for Albumin, ALT, AST, BUN, Calcium, Chloride, Cholesterol, CO2, Creatinine, GFR, Glucose, HDL, Hematocrit, Hemoglobin, Hemoglobin A1C, LDL, Magnesium, Phosphorus, Platelets, Potassium, PSA, Sodium, Triglycerides, and WBC.   Lab Results   Component Value Date    ALT 18 12/12/2024    AST 23 12/12/2024    BUN 22 12/12/2024    CALCIUM 9.6 12/12/2024    CL 94 (L) 12/12/2024    CO2 25 12/12/2024    CREATININE 1.04 12/12/2024    HDL 59 07/17/2024    HCT 43.3 12/12/2024    HGB 14.7 12/12/2024    HGBA1C 5.4 07/17/2024    MG 1.6 12/03/2019     12/12/2024    K 4.1 12/12/2024    PSA 0.13 08/31/2023    TRIG 61 07/17/2024    WBC 9.91 12/12/2024     Note: for a comprehensive list of the patient's lab results, access the Results Review activity.

## 2025-01-14 ENCOUNTER — APPOINTMENT (OUTPATIENT)
Dept: LAB | Age: 75
End: 2025-01-14
Payer: MEDICARE

## 2025-01-14 DIAGNOSIS — E29.1 HYPOGONADISM IN MALE: ICD-10-CM

## 2025-01-14 DIAGNOSIS — E78.2 MIXED HYPERLIPIDEMIA: ICD-10-CM

## 2025-01-14 DIAGNOSIS — Z12.5 PROSTATE CANCER SCREENING: ICD-10-CM

## 2025-01-14 DIAGNOSIS — Z13.29 SCREENING FOR HYPOTHYROIDISM: ICD-10-CM

## 2025-01-14 LAB
CHOLEST SERPL-MCNC: 141 MG/DL (ref ?–200)
HDLC SERPL-MCNC: 59 MG/DL
LDLC SERPL CALC-MCNC: 70 MG/DL (ref 0–100)
NONHDLC SERPL-MCNC: 82 MG/DL
PSA SERPL-MCNC: 0.77 NG/ML (ref 0–4)
T4 FREE SERPL-MCNC: 0.96 NG/DL (ref 0.61–1.12)
TESTOST SERPL-MSCNC: 209 NG/DL (ref 175–781)
TRIGL SERPL-MCNC: 59 MG/DL (ref ?–150)
TSH SERPL DL<=0.05 MIU/L-ACNC: 1.79 UIU/ML (ref 0.45–4.5)

## 2025-01-14 PROCEDURE — 80061 LIPID PANEL: CPT

## 2025-01-14 PROCEDURE — 84443 ASSAY THYROID STIM HORMONE: CPT

## 2025-01-14 PROCEDURE — 84403 ASSAY OF TOTAL TESTOSTERONE: CPT

## 2025-01-14 PROCEDURE — G0103 PSA SCREENING: HCPCS

## 2025-01-14 PROCEDURE — 84439 ASSAY OF FREE THYROXINE: CPT

## 2025-01-14 PROCEDURE — 36415 COLL VENOUS BLD VENIPUNCTURE: CPT

## 2025-01-16 ENCOUNTER — OFFICE VISIT (OUTPATIENT)
Age: 75
End: 2025-01-16
Payer: MEDICARE

## 2025-01-16 VITALS
HEIGHT: 67 IN | WEIGHT: 274 LBS | DIASTOLIC BLOOD PRESSURE: 78 MMHG | TEMPERATURE: 98.2 F | SYSTOLIC BLOOD PRESSURE: 120 MMHG | OXYGEN SATURATION: 97 % | HEART RATE: 70 BPM | BODY MASS INDEX: 43 KG/M2

## 2025-01-16 DIAGNOSIS — I10 ESSENTIAL HYPERTENSION: ICD-10-CM

## 2025-01-16 DIAGNOSIS — L65.9 HAIR LOSS: ICD-10-CM

## 2025-01-16 DIAGNOSIS — I48.91 ATRIAL FIBRILLATION, UNSPECIFIED TYPE (HCC): ICD-10-CM

## 2025-01-16 DIAGNOSIS — F41.9 ANXIETY: Primary | ICD-10-CM

## 2025-01-16 DIAGNOSIS — L65.9 HAIR LOSS DISORDER: ICD-10-CM

## 2025-01-16 PROCEDURE — 99214 OFFICE O/P EST MOD 30 MIN: CPT | Performed by: INTERNAL MEDICINE

## 2025-01-16 RX ORDER — BUSPIRONE HYDROCHLORIDE 5 MG/1
TABLET ORAL
Qty: 90 TABLET | Refills: 1 | Status: SHIPPED | OUTPATIENT
Start: 2025-01-16

## 2025-01-16 RX ORDER — FINASTERIDE 1 MG/1
1 TABLET, FILM COATED ORAL DAILY
Qty: 90 TABLET | Refills: 3 | Status: SHIPPED | OUTPATIENT
Start: 2025-01-16 | End: 2025-01-17

## 2025-01-16 NOTE — ASSESSMENT & PLAN NOTE
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  Will initiate Propecia 1 mg daily

## 2025-01-16 NOTE — ASSESSMENT & PLAN NOTE
Current blood pressure assessment indicates good control recommend continuation of Benicar 40 mg daily and Catapres 0.2 mg 1 hour prior to bedtime along with nebivolol 10 mg daily

## 2025-01-16 NOTE — PROGRESS NOTES
Name: Alexys Valdez Jr.      : 1950      MRN: [id number]  Encounter Provider: Tian Cui MD  Encounter Date: 2025   Encounter department: [facility] INTERNAL MEDICINE    Assessment & Plan  Hair loss disorder    Orders:    finasteride (PROPECIA) 1 MG tablet; Take 1 tablet (1 mg total) by mouth daily    Anxiety  Trial of Xanax did not go well due to oversedation even at 0.25 mg.  Medication stopped by the patient after several days.  Will try buspirone 2.5 mg twice a day follow-up in 1 month continue Prozac at 40 mg 2 tablets daily    Orders:    busPIRone (BUSPAR) 5 mg tablet; Take 1/2 pill twice a day    Essential hypertension  Current blood pressure assessment indicates good control recommend continuation of Benicar 40 mg daily and Catapres 0.2 mg 1 hour prior to bedtime along with nebivolol 10 mg daily         Atrial fibrillation, unspecified type (HCC)  Patient currently in a sinus rhythm.  Denies any chest pains or palpitations.  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  Will initiate Propecia 1 mg daily              History of Present Illness     74-year-old gentleman returns to our office today for follow-up visit.  At the time of his last visit with us he had several concerns including an increase in anxiety symptoms.  He is currently on Prozac 80 mg daily and we attempted to add alprazolam to the Prozac.  The combination proved to be too sedating to the patient and he discontinued it it after several doses.  Continues to experience anxiety and is wondering if there is anything else that we can do.    Also on the last visit with the patient he indicated concerned about thinning hair.  We ran several tests including thyroid testing and testosterone levels which were  normal.  Review of family history indicates that there are several members of his family that have similar hair thinning issues so I think most likely his condition is genetic.  He has started neutropol which is an over-the-counter supplement but sees no significant improvement yet.  I reviewed the benefits of Propecia with the patient he is willing to start the medication and we will start him at 1 mg daily.      Review of Systems   Psychiatric/Behavioral:  The patient is nervous/anxious.    All other systems reviewed and are negative.    Past Medical History:   Diagnosis Date    Acute prostatitis 07/26/2021    Diagnosed in [place] and treated with Cipro x2 weeks 6/7/21. Questionable recurrence July 16, 2021    Allergic     Anxiety     Arthritis     Atrial fibrillation (HCC)     BPH (benign prostatic hyperplasia)     Chronic diastolic (congestive) heart failure (HCC)     Depression     Diabetes mellitus (HCC)     Drug-induced insomnia (HCC) 02/03/2020    Ear problems     GERD (gastroesophageal reflux disease)     Gout     Headache(784.0)     HL (hearing loss)     Hyperlipidemia     Hypertension     Hyponatremia     last assessed: 09/08/2014    Leukocytosis     Liver function abnormality     Morbid obesity (HCC)     Nasal congestion     Obesity     Obstructive sleep apnea     Sleep apnea      Past Surgical History:   Procedure Laterality Date    CARDIAC CATHETERIZATION      outcome: Neg    CARDIAC ELECTROPHYSIOLOGY PROCEDURE N/A 2/29/2024    Procedure: Cardiac Loop Recorder Explant/Implant;  Surgeon: Nuno Swann DO;  [facility];  Service: Cardiology    CARDIAC ELECTROPHYSIOLOGY STUDY AND ABLATION      CARDIAC LOOP RECORDER  2019    CARDIOVASCULAR STRESS TEST      resolved: 10/27/2010; negative    COLONOSCOPY  11/2013    polyp; complete    COLONOSCOPY  05/26/2017    HERNIA REPAIR      resolved: 11/1999    JOINT REPLACEMENT      SC NEUROPLASTY &/TRANSPOS MEDIAN NRV CARPAL TUNNE Right 11/03/2023     Procedure: CTR;  Surgeon: Finn Hernández MD;  Location[facility];  Service: Orthopedics    [place] NEUROPLASTY &/TRANSPOS MEDIAN NRV CARPAL TUNNE Left 4/5/2024    Procedure: RELEASE CARPAL TUNNEL;  Surgeon: Finn Hernández MD;  Location:  MAIN OR;  Service: Orthopedics    REPLACEMENT TOTAL KNEE Left 2010    REPLACEMENT TOTAL KNEE Right 2003    THYROGLOSSAL DUCT EXCISION      TONSILLECTOMY      last assessed: 06/02/2014    UPPER GASTROINTESTINAL ENDOSCOPY       Family History   Problem Relation Age of Onset    Diabetes Mother     Heart attack Mother     Hypertension Mother     Heart attack Sister      Social History     Tobacco Use    Smoking status: Never    Smokeless tobacco: Never   Vaping Use    Vaping status: Never Used   Substance and Sexual Activity    Alcohol use: Not Currently     Alcohol/week: 3.0 standard drinks of alcohol     Types: 3 Standard drinks or equivalent per week    Drug use: Never    Sexual activity: Not Currently     Current Outpatient Medications on File Prior to Visit   Medication Sig    acetaminophen-codeine (TYLENOL/CODEINE #3) 300-30 MG per tablet Take 1 tablet by mouth every 12 (twelve) hours as needed for severe pain    allopurinol (ZYLOPRIM) 300 mg tablet TAKE 1 TABLET BY MOUTH DAILY    amLODIPine (NORVASC) 10 mg tablet TAKE 1 TABLET BY MOUTH DAILY    amoxicillin (AMOXIL) 500 mg capsule Prior to dental visits    cloNIDine (CATAPRES) 0.2 mg tablet TAKE 1 TABLET BY MOUTH DAILY 1  HOUR PRIOR TO BEDTIME    dofetilide (TIKOSYN) 250 mcg capsule TAKE 1 CAPSULE BY MOUTH EVERY TWELVE (12) HOURS    famotidine (PEPCID) 40 MG tablet TAKE 1 TABLET BY MOUTH DAILY AT  BEDTIME    fexofenadine (ALLEGRA) 180 MG tablet Take 180 mg by mouth daily    FLUoxetine (PROzac) 40 MG capsule TAKE 2 CAPSULES BY MOUTH DAILY    furosemide (LASIX) 40 mg tablet TAKE 1 TABLET BY MOUTH DAILY    Klor-Con M15 15 MEQ TBCR TAKE 2 TABLETS BY MOUTH DAILY    nebivolol (BYSTOLIC) 10 mg tablet TAKE 1 TABLET BY MOUTH DAILY    ofloxacin  "(FLOXIN) 0.3 % otic solution Adminster 4 drops into the afffected ear twice daily x 3 days    olmesartan (BENICAR) 40 mg tablet TAKE 1 TABLET BY MOUTH DAILY    omeprazole (PriLOSEC) 40 MG capsule Take 1 capsule (40 mg total) by mouth 2 (two) times a day    pravastatin (PRAVACHOL) 10 mg tablet TAKE 1 TABLET BY MOUTH DAILY AT  BEDTIME    rivaroxaban (Xarelto) 20 mg tablet Take 1 tablet (20 mg total) by mouth daily with breakfast    [DISCONTINUED] ALPRAZolam (XANAX) 0.25 mg tablet Take 1/2 to one pill twice a day (Patient not taking: Reported on 1/16/2025)    [DISCONTINUED] losartan (COZAAR) 100 MG tablet TAKE 1 TABLET BY MOUTH  DAILY     Allergies   Allergen Reactions    Metoprolol Shortness Of Breath     Tolerates Bystolic    Benazepril Cough    Clonidine Fatigue     Pt takes med with no side effects     Diltiazem Bradycardia    Entex T  [Pseudoephedrine-Guaifenesin]      Annotation - [id number]: LEG SWELLING    Tizanidine Other (See Comments)     Immunization History   Administered Date(s) Administered    COVID-19 PFIZER VACCINE 0.3 ML IM 02/06/2021, 03/02/2021, 08/19/2021, 10/06/2021    INFLUENZA 08/19/2021, 12/14/2023    Influenza Split High Dose Preservative Free IM 09/30/2013, 12/01/2014, 11/02/2015, 11/03/2016, 12/08/2017, 12/16/2024    Influenza, high dose seasonal 0.7 mL 10/16/2019, 10/12/2020, 10/12/2022, 12/14/2023    Influenza, seasonal, injectable 12/14/2005, 11/26/2007    Pneumococcal Conjugate 13-Valent 12/01/2014    Pneumococcal Polysaccharide PPV23 11/26/2007, 12/08/2017, 10/26/2018    Td (adult), adsorbed 01/15/1996    Tdap 06/05/2012    Zoster Vaccine Recombinant 12/15/2018, 04/30/2019    influenza, trivalent, adjuvanted 10/26/2018     Objective   /78   Pulse 70   Temp 98.2 °F (36.8 °C) (Tympanic)   Ht 5' 7\" (1.702 m)   Wt 124 kg (274 lb)   SpO2 97%   BMI 42.91 kg/m²     Physical Exam  Vitals and nursing note reviewed.   Constitutional:       General: He is not in acute distress.     " Appearance: He is well-developed.   HENT:      Head: Normocephalic and atraumatic.   Eyes:      Conjunctiva/sclera: Conjunctivae normal.   Cardiovascular:      Rate and Rhythm: Normal rate and regular rhythm.      Heart sounds: No murmur heard.  Pulmonary:      Effort: Pulmonary effort is normal. No respiratory distress.      Breath sounds: Normal breath sounds. No wheezing, rhonchi or rales.   Abdominal:      Palpations: Abdomen is soft.   Musculoskeletal:         General: No swelling.      Cervical back: Neck supple.   Skin:     General: Skin is warm and dry.      Capillary Refill: Capillary refill takes less than 2 seconds.   Neurological:      Mental Status: He is alert.   Psychiatric:         Mood and Affect: Mood normal.

## 2025-01-16 NOTE — ASSESSMENT & PLAN NOTE
Trial of Xanax did not go well due to oversedation even at 0.25 mg.  Medication stopped by the patient after several days.  Will try buspirone 2.5 mg twice a day follow-up in 1 month continue Prozac at 40 mg 2 tablets daily    Orders:    busPIRone (BUSPAR) 5 mg tablet; Take 1/2 pill twice a day

## 2025-01-16 NOTE — ASSESSMENT & PLAN NOTE
Patient currently in a sinus rhythm.  Denies any chest pains or palpitations.  Recommend continuation of anticoagulation Xarelto 20 mg daily in the morning and beta-blocker therapy nebivolol 10 mg daily

## 2025-01-17 ENCOUNTER — TELEPHONE (OUTPATIENT)
Age: 75
End: 2025-01-17

## 2025-01-17 DIAGNOSIS — L65.9 HAIR LOSS DISORDER: Primary | ICD-10-CM

## 2025-01-17 RX ORDER — FINASTERIDE 5 MG/1
2.5 TABLET, FILM COATED ORAL DAILY
Qty: 45 TABLET | Refills: 3 | Status: SHIPPED | OUTPATIENT
Start: 2025-01-17

## 2025-01-17 NOTE — TELEPHONE ENCOUNTER
Please notify the patient that we have sent an order for 5 mg finasteride to his mail order pharmacy please have him take only one half of a tablet daily.  Thank you

## 2025-01-17 NOTE — TELEPHONE ENCOUNTER
Pt calling because he got off the phone with [facility] and they stated the insurance will not cover the finasteride 1 mg but if Dr. Cui changes it to 5 mg, it will be covered completely.    Please advise if this change is able to be approved and notify pt once completed, TY.  Callback # [phone]

## 2025-01-23 DIAGNOSIS — I48.0 PAROXYSMAL ATRIAL FIBRILLATION (HCC): ICD-10-CM

## 2025-01-23 NOTE — TELEPHONE ENCOUNTER
Samples of this drug were given to the patient, quantity 14, Lot Number [id number]. The following was discussed with the patient as indicated: administration, storage, potential interactions, side effects.

## 2025-02-06 DIAGNOSIS — I10 ESSENTIAL HYPERTENSION: ICD-10-CM

## 2025-02-06 DIAGNOSIS — I48.0 PAROXYSMAL ATRIAL FIBRILLATION (HCC): ICD-10-CM

## 2025-02-06 RX ORDER — AMLODIPINE BESYLATE 10 MG/1
10 TABLET ORAL DAILY
Qty: 90 TABLET | Refills: 1 | Status: SHIPPED | OUTPATIENT
Start: 2025-02-06

## 2025-02-06 RX ORDER — CLONIDINE HYDROCHLORIDE 0.2 MG/1
TABLET ORAL
Qty: 90 TABLET | Refills: 1 | Status: SHIPPED | OUTPATIENT
Start: 2025-02-06

## 2025-02-07 RX ORDER — FLUOXETINE 40 MG/1
80 CAPSULE ORAL DAILY
Qty: 180 CAPSULE | Refills: 1 | Status: SHIPPED | OUTPATIENT
Start: 2025-02-07

## 2025-03-25 ENCOUNTER — REMOTE DEVICE CLINIC VISIT (OUTPATIENT)
Dept: CARDIOLOGY CLINIC | Facility: CLINIC | Age: 75
End: 2025-03-25
Payer: MEDICARE

## 2025-03-25 DIAGNOSIS — I48.0 PAROXYSMAL ATRIAL FIBRILLATION (HCC): Primary | ICD-10-CM

## 2025-03-25 PROCEDURE — 93298 REM INTERROG DEV EVAL SCRMS: CPT | Performed by: INTERNAL MEDICINE

## 2025-03-25 NOTE — PROGRESS NOTES
"MDT LOOP2/ ACTIVE SYSTEM IS MRI CONDITIONAL   CARELINK TRANSMISSION: LOOP RECORDER. PRESENTING RHYTHM SB @ 50 BPM. BATTERY STATUS \"OK.\" NO PATIENT OR DEVICE ACTIVATED EPISODES. NORMAL DEVICE FUNCTION. DL   "

## 2025-03-29 ENCOUNTER — RESULTS FOLLOW-UP (OUTPATIENT)
Dept: CARDIOLOGY CLINIC | Facility: CLINIC | Age: 75
End: 2025-03-29

## 2025-04-11 DIAGNOSIS — I48.0 PAROXYSMAL ATRIAL FIBRILLATION (HCC): ICD-10-CM

## 2025-04-11 NOTE — TELEPHONE ENCOUNTER
Reason for call:   [x] Refill   [] Prior Auth  [] Other:     Office:   [] PCP/Provider -   [x] Specialty/Provider - Cardio    Medication: rivaroxaban (Xarelto) 20 mg tablet     Dose/Frequency: Take 1 tablet (20 mg total) by mouth daily with breakfast     Quantity: 90    Pharmacy: ([facility])    Local Pharmacy   Does the patient have enough for 3 days?   [] Yes   [] No - Send as HP to POD    Mail Away Pharmacy   Does the patient have enough for 10 days?   [x] Yes   [] No - Send as HP to POD

## 2025-04-20 DIAGNOSIS — I50.32 CHRONIC DIASTOLIC (CONGESTIVE) HEART FAILURE (HCC): ICD-10-CM

## 2025-04-20 DIAGNOSIS — E78.00 HYPERCHOLESTEROLEMIA: ICD-10-CM

## 2025-04-21 RX ORDER — FUROSEMIDE 40 MG/1
40 TABLET ORAL DAILY
Qty: 90 TABLET | Refills: 1 | Status: SHIPPED | OUTPATIENT
Start: 2025-04-21

## 2025-04-21 RX ORDER — PRAVASTATIN SODIUM 10 MG
10 TABLET ORAL
Qty: 90 TABLET | Refills: 1 | Status: SHIPPED | OUTPATIENT
Start: 2025-04-21

## 2025-05-06 DIAGNOSIS — I48.0 PAROXYSMAL ATRIAL FIBRILLATION (HCC): ICD-10-CM

## 2025-05-06 RX ORDER — POTASSIUM CHLORIDE 1125 MG/1
30 TABLET, EXTENDED RELEASE ORAL DAILY
Qty: 180 TABLET | Refills: 1 | Status: SHIPPED | OUTPATIENT
Start: 2025-05-06 | End: 2025-05-08

## 2025-05-08 ENCOUNTER — TELEPHONE (OUTPATIENT)
Age: 75
End: 2025-05-08

## 2025-05-08 DIAGNOSIS — E87.6 HYPOKALEMIA: ICD-10-CM

## 2025-05-08 DIAGNOSIS — I48.91 ATRIAL FIBRILLATION, UNSPECIFIED TYPE (HCC): Primary | ICD-10-CM

## 2025-05-08 RX ORDER — POTASSIUM CHLORIDE 750 MG/1
10 TABLET, EXTENDED RELEASE ORAL 3 TIMES DAILY
Qty: 270 TABLET | Refills: 3 | Status: SHIPPED | OUTPATIENT
Start: 2025-05-08

## 2025-05-08 NOTE — TELEPHONE ENCOUNTER
Please contact the patient I have ordered 10 mEq tablets from his [facility] on [address] he should take 3 tablets daily.  Thank you

## 2025-05-08 NOTE — TELEPHONE ENCOUNTER
Patient called stating his preferred pharmacy and one other pharm he tried is out of stock for the of potassium chloride SA (Klor-Con M15) 15 MEQ tablet. They have the 10 and 20 but no 15. He would like to know what he should do. The last physician to put refills in was his cardiologist but patient states he was just helping him out by putting refills in for him. Patient states that Dr Cui is the physician responsible for this medication.    Please advise patient.

## 2025-05-27 DIAGNOSIS — I48.0 PAROXYSMAL ATRIAL FIBRILLATION (HCC): ICD-10-CM

## 2025-05-27 RX ORDER — DOFETILIDE 0.25 MG/1
250 CAPSULE ORAL 2 TIMES DAILY
Qty: 180 CAPSULE | Refills: 3 | Status: SHIPPED | OUTPATIENT
Start: 2025-05-27

## 2025-06-14 ENCOUNTER — RESULTS FOLLOW-UP (OUTPATIENT)
Dept: CARDIOLOGY CLINIC | Facility: CLINIC | Age: 75
End: 2025-06-14

## 2025-06-24 ENCOUNTER — REMOTE DEVICE CLINIC VISIT (OUTPATIENT)
Dept: CARDIOLOGY CLINIC | Facility: CLINIC | Age: 75
End: 2025-06-24

## 2025-06-24 DIAGNOSIS — I48.0 PAROXYSMAL ATRIAL FIBRILLATION (HCC): Primary | ICD-10-CM

## 2025-06-24 NOTE — PROGRESS NOTES
"MDT LOOP2/ ACTIVE SYSTEM IS MRI CONDITIONAL   CARELINK TRANSMISSION: LOOP RECORDER. PRESENTING RHTYHM VS @ 50 BPM. BATTERY STATUS \"OK.\" 15 PAUSE EPISODES W/ EGRAMS SHOWING UNDERSENSING. 4 DEVICE CLASSIFIED AF EPISODES, LONGEST EPISODE IS 72:28 HOURS, AVAILABLE STRIPS DEMONSTRATE PACs AND ATRIAL FIBRILLATION. NO ALERTS RECEIVED. AF BURDEN IS 7.4%. HX OF PAF. PT TAKES TIKOSYN, BYSTOLIC AND XARELTO. NO PATIENT ACTIVATED EPISODES. NORMAL DEVICE FUNCTION. DL   "

## 2025-07-03 DIAGNOSIS — I48.0 PAROXYSMAL ATRIAL FIBRILLATION (HCC): ICD-10-CM

## 2025-07-03 DIAGNOSIS — I10 ESSENTIAL HYPERTENSION: ICD-10-CM

## 2025-07-03 NOTE — TELEPHONE ENCOUNTER
Reason for call:   [x] Refill   [] Prior Auth  [x] Other: pharm change    Office:   [] PCP/Provider -   [x] Specialty/Provider -     dofetilide (TIKOSYN) 250 mcg capsule 250 mcg, Oral, 2 times daily       nebivolol (BYSTOLIC) 10 mg tablet 10 mg, Oral, Daily       Quantity: 90    Pharmacy: [facility]    Local Pharmacy   Does the patient have enough for 3 days?   [] Yes   [x] No - Send as HP to POD    Mail Away Pharmacy   Does the patient have enough for 10 days?   [] Yes   [] No - Send as HP to POD

## 2025-07-05 DIAGNOSIS — I48.0 PAROXYSMAL ATRIAL FIBRILLATION (HCC): ICD-10-CM

## 2025-07-05 DIAGNOSIS — I10 ESSENTIAL HYPERTENSION: ICD-10-CM

## 2025-07-07 RX ORDER — NEBIVOLOL 10 MG/1
10 TABLET ORAL DAILY
Qty: 90 TABLET | Refills: 1 | Status: SHIPPED | OUTPATIENT
Start: 2025-07-07

## 2025-07-08 DIAGNOSIS — I10 ESSENTIAL HYPERTENSION: ICD-10-CM

## 2025-07-08 DIAGNOSIS — M10.9 GOUT, UNSPECIFIED CAUSE, UNSPECIFIED CHRONICITY, UNSPECIFIED SITE: ICD-10-CM

## 2025-07-09 RX ORDER — AMLODIPINE BESYLATE 10 MG/1
10 TABLET ORAL DAILY
Qty: 90 TABLET | Refills: 1 | Status: SHIPPED | OUTPATIENT
Start: 2025-07-09

## 2025-07-09 RX ORDER — CLONIDINE HYDROCHLORIDE 0.2 MG/1
TABLET ORAL
Qty: 90 TABLET | Refills: 1 | Status: SHIPPED | OUTPATIENT
Start: 2025-07-09

## 2025-07-09 RX ORDER — OLMESARTAN MEDOXOMIL 40 MG/1
40 TABLET ORAL DAILY
Qty: 90 TABLET | Refills: 1 | Status: SHIPPED | OUTPATIENT
Start: 2025-07-09

## 2025-07-10 RX ORDER — ALLOPURINOL 300 MG/1
300 TABLET ORAL DAILY
Qty: 90 TABLET | Refills: 3 | Status: SHIPPED | OUTPATIENT
Start: 2025-07-10

## 2025-07-12 RX ORDER — NEBIVOLOL 10 MG/1
10 TABLET ORAL DAILY
Qty: 90 TABLET | Refills: 3 | Status: SHIPPED | OUTPATIENT
Start: 2025-07-12

## 2025-07-12 RX ORDER — DOFETILIDE 0.25 MG/1
250 CAPSULE ORAL 2 TIMES DAILY
Qty: 180 CAPSULE | Refills: 3 | Status: SHIPPED | OUTPATIENT
Start: 2025-07-12

## 2025-07-17 DIAGNOSIS — I48.0 PAROXYSMAL ATRIAL FIBRILLATION (HCC): ICD-10-CM

## 2025-07-17 DIAGNOSIS — I10 ESSENTIAL HYPERTENSION: ICD-10-CM

## 2025-07-17 RX ORDER — DOFETILIDE 0.25 MG/1
250 CAPSULE ORAL 2 TIMES DAILY
Qty: 180 CAPSULE | Refills: 0 | OUTPATIENT
Start: 2025-07-17

## 2025-07-17 RX ORDER — NEBIVOLOL 10 MG/1
10 TABLET ORAL DAILY
Qty: 90 TABLET | Refills: 0 | OUTPATIENT
Start: 2025-07-17

## 2025-07-23 DIAGNOSIS — I48.91 ATRIAL FIBRILLATION, UNSPECIFIED TYPE (HCC): Primary | ICD-10-CM

## 2025-07-23 RX ORDER — DOFETILIDE 0.25 MG/1
250 CAPSULE ORAL 2 TIMES DAILY
Qty: 180 CAPSULE | Refills: 3 | Status: SHIPPED | OUTPATIENT
Start: 2025-07-23 | End: 2025-07-23

## 2025-07-23 RX ORDER — DOFETILIDE 0.25 MG/1
250 CAPSULE ORAL 2 TIMES DAILY
Qty: 180 CAPSULE | Refills: 3 | Status: SHIPPED | OUTPATIENT
Start: 2025-07-23

## 2025-07-23 RX ORDER — NEBIVOLOL 10 MG/1
10 TABLET ORAL DAILY
Qty: 90 TABLET | Refills: 3 | Status: SHIPPED | OUTPATIENT
Start: 2025-07-23 | End: 2025-07-23

## 2025-07-23 RX ORDER — NEBIVOLOL 10 MG/1
10 TABLET ORAL DAILY
Qty: 90 TABLET | Refills: 3 | Status: SHIPPED | OUTPATIENT
Start: 2025-07-23

## 2025-07-23 NOTE — TELEPHONE ENCOUNTER
Hello, patient called in regards to his prescription request was denied, can you please refill as patient needs his meds before he goes on vacation. [facility], I asked which he prefers, he stated both work.

## 2025-07-24 ENCOUNTER — APPOINTMENT (OUTPATIENT)
Dept: LAB | Age: 75
End: 2025-07-24
Payer: MEDICARE

## 2025-07-24 DIAGNOSIS — I48.91 ATRIAL FIB/FLUTTER, TRANSIENT (HCC): ICD-10-CM

## 2025-07-24 DIAGNOSIS — I48.91 ATRIAL FIBRILLATION, UNSPECIFIED TYPE (HCC): ICD-10-CM

## 2025-07-24 DIAGNOSIS — I48.92 ATRIAL FIB/FLUTTER, TRANSIENT (HCC): ICD-10-CM

## 2025-07-24 LAB
ANION GAP SERPL CALCULATED.3IONS-SCNC: 9 MMOL/L (ref 4–13)
BASOPHILS # BLD MANUAL: 0 THOUSAND/UL (ref 0–0.1)
BASOPHILS NFR MAR MANUAL: 0 % (ref 0–1)
BUN SERPL-MCNC: 12 MG/DL (ref 5–25)
CALCIUM SERPL-MCNC: 9.4 MG/DL (ref 8.4–10.2)
CHLORIDE SERPL-SCNC: 94 MMOL/L (ref 96–108)
CO2 SERPL-SCNC: 26 MMOL/L (ref 21–32)
CREAT SERPL-MCNC: 0.85 MG/DL (ref 0.6–1.3)
EOSINOPHIL # BLD MANUAL: 0 THOUSAND/UL (ref 0–0.4)
EOSINOPHIL NFR BLD MANUAL: 0 % (ref 0–6)
ERYTHROCYTE [DISTWIDTH] IN BLOOD BY AUTOMATED COUNT: 12.2 % (ref 11.6–15.1)
GFR SERPL CREATININE-BSD FRML MDRD: 84 ML/MIN/1.73SQ M
GLUCOSE P FAST SERPL-MCNC: 105 MG/DL (ref 65–99)
HCT VFR BLD AUTO: 41.1 % (ref 36.5–49.3)
HGB BLD-MCNC: 14.1 G/DL (ref 12–17)
LYMPHOCYTES # BLD AUTO: 5.16 THOUSAND/UL (ref 0.6–4.47)
LYMPHOCYTES # BLD AUTO: 57 % (ref 14–44)
MCH RBC QN AUTO: 31.6 PG (ref 26.8–34.3)
MCHC RBC AUTO-ENTMCNC: 34.3 G/DL (ref 31.4–37.4)
MCV RBC AUTO: 92 FL (ref 82–98)
MONOCYTES # BLD AUTO: 0.63 THOUSAND/UL (ref 0–1.22)
MONOCYTES NFR BLD: 7 % (ref 4–12)
NEUTROPHILS # BLD MANUAL: 3.26 THOUSAND/UL (ref 1.85–7.62)
NEUTS SEG NFR BLD AUTO: 36 % (ref 43–75)
PLATELET # BLD AUTO: 210 THOUSANDS/UL (ref 149–390)
PLATELET BLD QL SMEAR: ADEQUATE
PMV BLD AUTO: 10.6 FL (ref 8.9–12.7)
POTASSIUM SERPL-SCNC: 4.6 MMOL/L (ref 3.5–5.3)
RBC # BLD AUTO: 4.46 MILLION/UL (ref 3.88–5.62)
SODIUM SERPL-SCNC: 129 MMOL/L (ref 135–147)
WBC # BLD AUTO: 9.06 THOUSAND/UL (ref 4.31–10.16)

## 2025-07-24 PROCEDURE — 36415 COLL VENOUS BLD VENIPUNCTURE: CPT

## 2025-07-24 PROCEDURE — 80048 BASIC METABOLIC PNL TOTAL CA: CPT

## 2025-07-24 PROCEDURE — 85027 COMPLETE CBC AUTOMATED: CPT

## 2025-07-24 PROCEDURE — 85007 BL SMEAR W/DIFF WBC COUNT: CPT

## (undated) DEVICE — GLOVE SRG BIOGEL 7.5

## (undated) DEVICE — STERILE BETHLEHEM PLASTIC HAND: Brand: CARDINAL HEALTH

## (undated) DEVICE — GLOVE INDICATOR PI UNDERGLOVE SZ 8 BLUE

## (undated) DEVICE — OCCLUSIVE GAUZE STRIP,3% BISMUTH TRIBROMOPHENATE IN PETROLATUM BLEND: Brand: XEROFORM

## (undated) DEVICE — ACE WRAP 3 IN VELCRO LATEX FREE

## (undated) DEVICE — GLOVE INDICATOR PI UNDERGLOVE SZ 7.5 BLUE

## (undated) DEVICE — NEEDLE 18 G X 1 1/2

## (undated) DEVICE — ACE WRAP 4 IN STERILE

## (undated) DEVICE — INTENDED FOR TISSUE SEPARATION, AND OTHER PROCEDURES THAT REQUIRE A SHARP SURGICAL BLADE TO PUNCTURE OR CUT.: Brand: BARD-PARKER ® CARBON RIB-BACK BLADES

## (undated) DEVICE — SYRINGE 10ML LL

## (undated) DEVICE — SUT ETHILON 4-0 PS-2 18 IN 1667H

## (undated) DEVICE — PAD CAST 4 IN COTTON NON STERILE

## (undated) DEVICE — GAUZE SPONGES,16 PLY: Brand: CURITY

## (undated) DEVICE — PREP PAD BNS: Brand: CONVERTORS

## (undated) DEVICE — NEEDLE HYPO 22G X 1-1/2 IN